# Patient Record
Sex: FEMALE | Race: WHITE | NOT HISPANIC OR LATINO | Employment: OTHER | URBAN - METROPOLITAN AREA
[De-identification: names, ages, dates, MRNs, and addresses within clinical notes are randomized per-mention and may not be internally consistent; named-entity substitution may affect disease eponyms.]

---

## 2023-04-18 ENCOUNTER — HOSPITAL ENCOUNTER (OUTPATIENT)
Dept: WOUND CARE | Facility: HOSPITAL | Age: 68
Discharge: HOME | End: 2023-04-18
Attending: FAMILY MEDICINE
Payer: MEDICARE

## 2023-04-18 VITALS
WEIGHT: 215 LBS | SYSTOLIC BLOOD PRESSURE: 124 MMHG | BODY MASS INDEX: 34.55 KG/M2 | HEIGHT: 66 IN | HEART RATE: 62 BPM | DIASTOLIC BLOOD PRESSURE: 58 MMHG | RESPIRATION RATE: 18 BRPM

## 2023-04-18 DIAGNOSIS — S91.302A OPEN WOUND OF PLANTAR ASPECT OF LEFT FOOT: Primary | ICD-10-CM

## 2023-04-18 DIAGNOSIS — I87.8 CHRONIC VENOUS STASIS: ICD-10-CM

## 2023-04-18 PROCEDURE — 27000999 HC MEDICAL RECORD PHOTO DOCUMENTATION

## 2023-04-18 PROCEDURE — 97597 DBRDMT OPN WND 1ST 20 CM/<: CPT

## 2023-04-18 PROCEDURE — 99203 OFFICE O/P NEW LOW 30 MIN: CPT | Mod: 25

## 2023-04-18 RX ORDER — FLUTICASONE FUROATE, UMECLIDINIUM BROMIDE AND VILANTEROL TRIFENATATE 200; 62.5; 25 UG/1; UG/1; UG/1
POWDER RESPIRATORY (INHALATION)
COMMUNITY
Start: 2023-04-10

## 2023-04-18 RX ORDER — MELOXICAM 15 MG/1
TABLET ORAL
COMMUNITY
Start: 2023-04-10 | End: 2023-07-25

## 2023-04-18 RX ORDER — AMOXICILLIN AND CLAVULANATE POTASSIUM 875; 125 MG/1; MG/1
TABLET, FILM COATED ORAL
COMMUNITY
Start: 2023-04-14 | End: 2023-05-02

## 2023-04-18 RX ORDER — DIVALPROEX SODIUM 500 MG/1
1000 TABLET, DELAYED RELEASE ORAL NIGHTLY
COMMUNITY
Start: 2023-04-10

## 2023-04-18 RX ORDER — ROPINIROLE 0.5 MG/1
0.5 TABLET, FILM COATED ORAL NIGHTLY
COMMUNITY
Start: 2023-04-10

## 2023-04-18 RX ORDER — TRAZODONE HYDROCHLORIDE 50 MG/1
50 TABLET ORAL NIGHTLY PRN
COMMUNITY
Start: 2023-04-10

## 2023-04-18 RX ORDER — PRIMIDONE 50 MG/1
50 TABLET ORAL
COMMUNITY
Start: 2023-04-10

## 2023-04-18 RX ORDER — SERTRALINE HYDROCHLORIDE 100 MG/1
100 TABLET, FILM COATED ORAL
COMMUNITY
Start: 2023-04-10

## 2023-04-18 RX ORDER — TAMSULOSIN HYDROCHLORIDE 0.4 MG/1
1 CAPSULE ORAL
COMMUNITY
Start: 2023-04-10

## 2023-04-18 RX ORDER — NEBIVOLOL 10 MG/1
10 TABLET ORAL NIGHTLY
COMMUNITY
Start: 2023-04-10 | End: 2023-07-25

## 2023-04-18 RX ORDER — FUROSEMIDE 20 MG/1
TABLET ORAL
COMMUNITY
Start: 2023-04-10

## 2023-04-18 RX ORDER — POTASSIUM CHLORIDE 750 MG/1
TABLET, EXTENDED RELEASE ORAL
COMMUNITY
Start: 2023-04-10

## 2023-04-18 RX ORDER — BUSPIRONE HYDROCHLORIDE 10 MG/1
10 TABLET ORAL 2 TIMES DAILY
COMMUNITY
Start: 2023-04-10

## 2023-04-18 RX ORDER — PANTOPRAZOLE SODIUM 40 MG/1
40 TABLET, DELAYED RELEASE ORAL
COMMUNITY
Start: 2023-04-10

## 2023-04-18 RX ORDER — AMLODIPINE BESYLATE 10 MG/1
10 TABLET ORAL
COMMUNITY
Start: 2023-04-10

## 2023-04-18 RX ORDER — HYDROCHLOROTHIAZIDE 25 MG/1
25 TABLET ORAL
COMMUNITY
Start: 2023-04-10 | End: 2023-07-25

## 2023-04-18 RX ORDER — LABETALOL 200 MG/1
TABLET, FILM COATED ORAL
COMMUNITY
Start: 2023-04-10 | End: 2023-07-25

## 2023-04-18 RX ORDER — OXYBUTYNIN CHLORIDE 5 MG/1
5 TABLET, EXTENDED RELEASE ORAL 2 TIMES DAILY
COMMUNITY
Start: 2023-02-24

## 2023-04-18 RX ORDER — ARIPIPRAZOLE 15 MG/1
15 TABLET ORAL
COMMUNITY
Start: 2023-04-10

## 2023-04-18 RX ORDER — FENOFIBRATE 54 MG/1
TABLET ORAL
COMMUNITY
Start: 2023-04-10

## 2023-04-18 RX ORDER — GABAPENTIN 600 MG/1
600 TABLET ORAL 3 TIMES DAILY
COMMUNITY
Start: 2023-04-10

## 2023-04-18 RX ORDER — PNV,CALCIUM 72/IRON/FOLIC ACID 27 MG-1 MG
1 TABLET ORAL
COMMUNITY
Start: 2023-04-10

## 2023-04-18 RX ORDER — MIRTAZAPINE 15 MG/1
15 TABLET, FILM COATED ORAL NIGHTLY
COMMUNITY
Start: 2023-04-10

## 2023-04-18 RX ORDER — LOSARTAN POTASSIUM 100 MG/1
100 TABLET ORAL
Status: ON HOLD | COMMUNITY
Start: 2023-04-10 | End: 2023-08-02 | Stop reason: HOSPADM

## 2023-04-18 RX ORDER — ALBUTEROL SULFATE 90 UG/1
AEROSOL, METERED RESPIRATORY (INHALATION)
COMMUNITY
Start: 2023-04-10 | End: 2023-07-25

## 2023-04-18 NOTE — PROGRESS NOTES
Subjective:       Patient ID: Diane Larsen is a 68 y.o. female.    Chief Complaint: No chief complaint on file.    68-year-old white female, who was referred here by Dr. Hank Owen, for an open wound on the left plantar surface, left foot.  She is not a diabetic.  She was admitted into the [facility], for cellulitis and abscess of the plantar surface of the left foot, and she underwent an intraoperative debridement.  He is here for follow up of this new wound.  It appears that she has had some chronic venous stasis over the past several months, both lower legs.  This has not been treated in the past, as far as I know.  While she was in the hospital, she underwent an MRI of the left foot, which showed no evidence of osteomyelitis.  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  She has Pes planus.  She also underwent arterial ultrasound on April 10th, which showed patent arteries in both lower extremities.  The venous ultrasound was also negative for DVT.  I have reviewed her records from the hospital.  Pt caregiver states that a blister was noticed on her left foot about 2 weeks ago around 4/4/23. The pt opened the blister by scratching and by Monday 4/10/23 the caregiver noted that it needed immediate attention. Pt went to ED in [place] on Monday 4/10/23 and was admitted. Dr. Mckinney debrided the left foot on 4/11/23, she was discharged on 4/14/23. She has a follow up with Dr. Mckinney tomorrow 4/19/23. She is currently taking Augmentin PO. She states that she is not diabetic. Her semmes yudi is 1/10 and her blood sugar was 124. She had venous/arterial ultrasounds done during her hospital stay as well as MRI and xrays    Review of Systems   Constitutional: Negative.    Respiratory: Negative.     Cardiovascular: Negative.    Skin:         As documented in the HPI.   All other systems reviewed and are negative.      Objective:      Vitals:    04/18/23 1338   BP: (!) 124/58   BP Location: Right arm   Patient  "Position: Sitting   Pulse: 62   Resp: 18   Weight: 97.5 kg (215 lb)   Height: 5' 6" (1.676 m)        Physical Exam  Vitals reviewed.   Constitutional:       Appearance: Normal appearance.   Cardiovascular:      Rate and Rhythm: Normal rate.   Pulmonary:      Effort: Pulmonary effort is normal.   Skin:     General: Skin is warm and dry.      Comments: There is some scaling in both lower extremities, over the tibia.  There is trace edema.  There is the healing wound on the left plantar aspect of the left foot, with no obvious secondary infection.  I did some debridement with a freer, but this is mostly fibrin.  There was no significant necrotic tissue removed.   Neurological:      Mental Status: She is alert.     Left plantar foot, prior to debridement    Left plantar foot, post debridement       Altered Skin Integrity 04/18/23 1351 Left medial Foot (Active)   04/18/23 1351   Altered Skin Integrity Present on Admission - Did Patient arrive to the hospital with altered skin?: yes   Side: Left   Orientation: medial   Location: Foot   Wound Number:    Is this injury device related?:    Primary Wound Type:    Description of Altered Skin Integrity:    Ankle-Brachial Index:    Pulses:    Removal Indication and Assessment:    Wound Outcome:    (Retired) Wound Length (cm):    (Retired) Wound Width (cm):    (Retired) Depth (cm):    Wound Description (Comments):    Removal Indications:    Dressing Appearance Moist drainage 04/18/23 1339   Drainage Amount Moderate 04/18/23 1339   Drainage Characteristics/Odor Serosanguineous 04/18/23 1339   Appearance Maroon;العلي;Moist;Fibrin 04/18/23 1339   Tissue loss description Full thickness 04/18/23 1339   Periwound Area Moist;Pale white;Edematous;Redness 04/18/23 1339   Wound Edges Open;Callused 04/18/23 1339   Wound Length (cm) 3.7 cm 04/18/23 1339   Wound Width (cm) 3.6 cm 04/18/23 1339   Wound Depth (cm) 0.4 cm 04/18/23 1339   Wound Volume (cm^3) 5.328 cm^3 04/18/23 1339   Wound Surface " "Area (cm^2) 13.32 cm^2 04/18/23 1339   Care Cleansed with:;Applied: 04/18/23 1339   Dressing Applied 04/18/23 1339   Dressing Change Due 04/19/23 04/18/23 1339        Debridement     Date/Time: 4/18/2023 1:00 PM  Performed by: Samir Lassiter MD  Authorized by: Samir Lsasiter MD      Time out: Immediately prior to procedure a "time out" was called to verify the correct patient, procedure, equipment, support staff and site/side marked as required.     Consent Done?:  Yes (Verbal)     Preparation: Patient was prepped and draped with aseptic technique    Local anesthesia used?: No       Wound Details:    Location:  Left foot    Location:  Left Plantar    Type of Debridement:  Non-excisional       Length (cm):  3.7       Area (sq cm):  13.32       Width (cm):  3.6       Percent Debrided (%):  80       Depth (cm):  0.4       Total Area Debrided (sq cm):  10.66    Depth of debridement:  Epidermis/Dermis    Tissue debrided:  Epidermis    Devitalized tissue debrided:  Biofilm, Exudate and Slough    Instruments:  Curette (freer)  Bleeding:  None  Patient tolerance:  Patient tolerated the procedure well with no immediate complications  1st Wound Pain Assessment: 0            Assessment:         ICD-10-CM ICD-9-CM   1. Open wound of plantar aspect of left foot  S91.302A 892.0   2. Chronic venous stasis  I87.8 459.81         Procedures:   Excisional debridement performed:  [] Yes [x] No   Selective debridement performed:  [x] Yes [] No   Mechanical debridement performed:  [] Yes [x] No   Silver nitrate applied :    [] Yes [x] No   Labs ordered this visit:   [] Yes [x] No   Imaging ordered this visit:   [] Yes [x] No   Tissue pathology and/or culture taken:  [] Yes [x] No     Procedures:  HOME HEALTH AGENCY:   [facility]     Home Health Services     Since 4/17/2023     [phone]     TIMES PER WEEK/DAYS:  ORDERS:  Left medial foot: cleanse with wound cleanser, put topical antibiotic ointment onto wound " bed, cover with silver alginate and 4x4 gauze, wrap with kerlix and coban, change this dressing daily      Patient Orders:                 Follow up in 10 days (on 4/28/2023) for left foot.

## 2023-04-18 NOTE — ADDENDUM NOTE
Encounter addended by: Sugar Artis RN on: 4/18/2023 3:47 PM   Actions taken: Chief Complaint modified

## 2023-05-02 ENCOUNTER — HOSPITAL ENCOUNTER (OUTPATIENT)
Dept: WOUND CARE | Facility: HOSPITAL | Age: 68
Discharge: HOME | End: 2023-05-02
Attending: FAMILY MEDICINE
Payer: MEDICARE

## 2023-05-02 VITALS
BODY MASS INDEX: 34.55 KG/M2 | SYSTOLIC BLOOD PRESSURE: 176 MMHG | WEIGHT: 215 LBS | HEART RATE: 56 BPM | DIASTOLIC BLOOD PRESSURE: 78 MMHG | RESPIRATION RATE: 18 BRPM | HEIGHT: 66 IN

## 2023-05-02 DIAGNOSIS — I87.8 CHRONIC VENOUS STASIS: ICD-10-CM

## 2023-05-02 DIAGNOSIS — S91.302A OPEN WOUND OF PLANTAR ASPECT OF LEFT FOOT: Primary | ICD-10-CM

## 2023-05-02 PROCEDURE — 27000999 HC MEDICAL RECORD PHOTO DOCUMENTATION

## 2023-05-02 PROCEDURE — 97597 DBRDMT OPN WND 1ST 20 CM/<: CPT

## 2023-05-02 PROCEDURE — 99213 OFFICE O/P EST LOW 20 MIN: CPT | Mod: 25

## 2023-05-02 RX ORDER — HYDROXYZINE HYDROCHLORIDE 25 MG/1
TABLET, FILM COATED ORAL
COMMUNITY
Start: 2023-04-20 | End: 2023-07-25

## 2023-05-02 RX ORDER — QUETIAPINE FUMARATE 100 MG/1
TABLET, FILM COATED ORAL
COMMUNITY
Start: 2023-04-20 | End: 2023-07-25

## 2023-05-02 NOTE — PROGRESS NOTES
Subjective:       Patient ID: Diane Larsen is a 68 y.o. female.    Chief Complaint: Wound Care (Left mid foot )    HPI  4/18/23 (Dr. Lassiter) - 68-year-old white female, who was referred here by Dr. Hank Owen, for an open wound on the left plantar surface, left foot.  She is not a diabetic.  She was admitted into the [facility], for cellulitis and abscess of the plantar surface of the left foot, and she underwent an intraoperative debridement.  He is here for follow up of this new wound.  It appears that she has had some chronic venous stasis over the past several months, both lower legs.  This has not been treated in the past, as far as I know.  While she was in the hospital, she underwent an MRI of the left foot, which showed no evidence of osteomyelitis.  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  She has Pes planus.  She also underwent arterial ultrasound on April 10th, which showed patent arteries in both lower extremities.  The venous ultrasound was also negative for DVT.  I have reviewed her records from the hospital.  Pt caregiver states that a blister was noticed on her left foot about 2 weeks ago around 4/4/23. The pt opened the blister by scratching and by Monday 4/10/23 the caregiver noted that it needed immediate attention. Pt went to ED in [place] on Monday 4/10/23 and was admitted. Dr. Mckinney debrided the left foot on 4/11/23, she was discharged on 4/14/23. She has a follow up with Dr. Mckinney tomorrow 4/19/23. She is currently taking Augmentin PO. She states that she is not diabetic. Her semmes yudi is 1/10 and her blood sugar was 124. She had venous/arterial ultrasounds done during her hospital stay as well as MRI and xrays    5/2/23 - Ms. Larsen returns to clinic today for followup on the open wound to the left mid foot.  Her history includes the left ankle being broken several months ago which left the left foot somewhat deformed.  The wound was surgically debrided by Dr. Mckinney  "[place].  She stayed for 1 week at the hospital in [place], [place] and went home on oral Augmentin has now completed.  Her PCP is from [facility] and she reports that he has stated that they are not to use compression on the leg.  However, ultrasounds were done in April and they were clear.  The caregiver does have concern that she is unable to ambulate and would like a wheelchair obtained for the patient.  They were advised to go to [employer]  to have the paperwork sent to us for prescription.  However, [employer] called and their insurance does not cover the wheelchair.    Today, the left mid foot was assessed and selectively debrided.  Additionally, she does have several scattered wounds on her bilateral lower legs that will be monitored.  She was given Hibiclens and instructed to wash the lower extremities including the foot with a Hibiclens x3 days.    Review of Systems   Constitutional: Negative.    Respiratory: Negative.     Cardiovascular: Negative.    Skin:         As documented in the HPI.   All other systems reviewed and are negative.      Objective:      Vitals:    05/02/23 1215   BP: (!) 176/78  Comment: asmptomatic   BP Location: Right arm   Patient Position: Sitting   Pulse: (!) 56   Resp: 18   Weight: 97.5 kg (215 lb)   Height: 5' 6" (1.676 m)        Physical Exam  Vitals reviewed.   Constitutional:       Appearance: Normal appearance.   Cardiovascular:      Rate and Rhythm: Normal rate.   Pulmonary:      Effort: Pulmonary effort is normal.   Skin:     General: Skin is warm and dry.      Comments: There is some scaling in both lower extremities, over the tibia.  There is trace edema.  There is the healing wound on the left plantar aspect of the left foot, with no obvious secondary infection.  I did some debridement with a freer, but this is mostly fibrin.  There was no significant necrotic tissue removed.   Neurological:      Mental Status: She is alert.          Altered Skin Integrity " 04/18/23 1351 Left medial Foot (Active)   04/18/23 1351   Altered Skin Integrity Present on Admission - Did Patient arrive to the hospital with altered skin?: yes   Side: Left   Orientation: medial   Location: Foot   Wound Number:    Is this injury device related?:    Primary Wound Type:    Description of Altered Skin Integrity:    Ankle-Brachial Index:    Pulses:    Removal Indication and Assessment:    Wound Outcome:    (Retired) Wound Length (cm):    (Retired) Wound Width (cm):    (Retired) Depth (cm):    Wound Description (Comments):    Removal Indications:    Dressing Appearance Moist drainage 05/02/23 1207   Drainage Amount Moderate 05/02/23 1207   Drainage Characteristics/Odor Serosanguineous 05/02/23 1207   Appearance Pink;Slough;Moist 05/02/23 1207   Tissue loss description Full thickness 05/02/23 1207   Periwound Area Dry 05/02/23 1207   Wound Edges Callused;Open 05/02/23 1207   Wound Length (cm) 3.4 cm 05/02/23 1207   Wound Width (cm) 3.5 cm 05/02/23 1207   Wound Depth (cm) 0.3 cm 05/02/23 1207   Wound Volume (cm^3) 3.57 cm^3 05/02/23 1207   Wound Surface Area (cm^2) 11.9 cm^2 05/02/23 1207   Care Cleansed with:;Wound cleanser 05/02/23 1207   Dressing Applied 05/02/23 1207   Off Loading Football dressing 05/02/23 1207   Dressing Change Due 05/03/23 05/02/23 1207         Left plantar, pre     Post  calcium alginate, 4x4 gauze, kerlix, coban        Right lower leg  (monitor)    Left lower leg (monitor)  ML      Assessment:         ICD-10-CM ICD-9-CM   1. Open wound of plantar aspect of left foot  S91.302A 892.0   2. Chronic venous stasis  I87.8 459.81         Procedures:   Excisional debridement performed:  [] Yes [x] No   Selective debridement performed:  [x] Yes [] No   Mechanical debridement performed:  [] Yes [x] No   Silver nitrate applied :    [] Yes [x] No   Labs ordered this visit:   [] Yes [x] No   Imaging ordered this visit:   [] Yes [x] No   Tissue pathology and/or culture taken:  [] Yes [x] No  "    Procedures:    Debridement     Date/Time: 5/2/2023 11:20 AM  Performed by: Barbra Perez NP  Authorized by: Barbra Perez NP      Time out: Immediately prior to procedure a "time out" was called to verify the correct patient, procedure, equipment, support staff and site/side marked as required.     Consent Done?:  Yes (Verbal)     Preparation: Patient was prepped and draped with clean technique    Local anesthesia used?: No       Wound Details:    Location:  Left foot    Location:  Left Midfoot    Type of Debridement:  Non-excisional       Length (cm):  3.4       Area (sq cm):  11.9       Width (cm):  3.5       Percent Debrided (%):  100       Depth (cm):  0.3       Total Area Debrided (sq cm):  11.9    Depth of debridement:  Epidermis/Dermis    Devitalized tissue debrided:  Biofilm, Callus, Exudate and Fibrin    Instruments:  Forceps, Scissors and Curette (Dermal)  Patient tolerance:  Patient tolerated the procedure well with no immediate complications                HOME HEALTH AGENCY:   [facility]     Since 4/17/2023     [phone]     TIMES PER WEEK/DAYS: 1 x weekly, Fridays only  Patient will come to wound care weekly    Left mid foot: Cleanse with wound cleanser and apply silver alginate to the wound bed, cover with 4x4 gauze and wrap foot in kerlix and coban loosely - to be changed daily. HH to change on Friday's.     (Hibaclense scrub brush - cleans both legs, let sit for 2 minutes, wash off, dry well. 3 days ONLY)    Follow up in about 1 week (around 5/9/2023) for Left foot .                 "

## 2023-05-02 NOTE — PROCEDURES
"Debridement    Date/Time: 5/2/2023 11:20 AM  Performed by: Barbra Perez NP  Authorized by: Barbra Perez NP     Time out: Immediately prior to procedure a "time out" was called to verify the correct patient, procedure, equipment, support staff and site/side marked as required.    Consent Done?:  Yes (Verbal)    Preparation: Patient was prepped and draped with clean technique    Local anesthesia used?: No      Wound Details:    Location:  Left foot    Location:  Left Midfoot    Type of Debridement:  Non-excisional       Length (cm):  3.4       Area (sq cm):  11.9       Width (cm):  3.5       Percent Debrided (%):  100       Depth (cm):  0.3       Total Area Debrided (sq cm):  11.9    Depth of debridement:  Epidermis/Dermis    Devitalized tissue debrided:  Biofilm, Callus, Exudate and Fibrin    Instruments:  Forceps, Scissors and Curette (Dermal)  Patient tolerance:  Patient tolerated the procedure well with no immediate complications     ML  "

## 2023-05-09 ENCOUNTER — HOSPITAL ENCOUNTER (OUTPATIENT)
Dept: WOUND CARE | Facility: HOSPITAL | Age: 68
Discharge: HOME | End: 2023-05-09
Attending: NURSE PRACTITIONER
Payer: MEDICARE

## 2023-05-09 VITALS
RESPIRATION RATE: 18 BRPM | BODY MASS INDEX: 34.55 KG/M2 | DIASTOLIC BLOOD PRESSURE: 67 MMHG | HEIGHT: 66 IN | SYSTOLIC BLOOD PRESSURE: 125 MMHG | HEART RATE: 59 BPM | WEIGHT: 215 LBS

## 2023-05-09 DIAGNOSIS — I87.8 CHRONIC VENOUS STASIS: ICD-10-CM

## 2023-05-09 DIAGNOSIS — S91.302A OPEN WOUND OF PLANTAR ASPECT OF LEFT FOOT: Primary | ICD-10-CM

## 2023-05-09 PROCEDURE — 99213 OFFICE O/P EST LOW 20 MIN: CPT | Mod: 25

## 2023-05-09 PROCEDURE — 97597 DBRDMT OPN WND 1ST 20 CM/<: CPT

## 2023-05-09 PROCEDURE — 27000999 HC MEDICAL RECORD PHOTO DOCUMENTATION

## 2023-05-09 NOTE — PROGRESS NOTES
Subjective:       Patient ID: Diane Larsen is a 68 y.o. female.    Chief Complaint: Wound Care (Left mid foot )    HPI  4/18/23 (Dr. Lassiter) - 68-year-old white female, who was referred here by Dr. Hank Owen, for an open wound on the left plantar surface, left foot.  She is not a diabetic.  She was admitted into the [facility], for cellulitis and abscess of the plantar surface of the left foot, and she underwent an intraoperative debridement.  He is here for follow up of this new wound.  It appears that she has had some chronic venous stasis over the past several months, both lower legs.  This has not been treated in the past, as far as I know.  While she was in the hospital, she underwent an MRI of the left foot, which showed no evidence of osteomyelitis.  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  She has Pes planus.  She also underwent arterial ultrasound on April 10th, which showed patent arteries in both lower extremities.  The venous ultrasound was also negative for DVT.  I have reviewed her records from the hospital.  Pt caregiver states that a blister was noticed on her left foot about 2 weeks ago around 4/4/23. The pt opened the blister by scratching and by Monday 4/10/23 the caregiver noted that it needed immediate attention. Pt went to ED in [place] on Monday 4/10/23 and was admitted. Dr. Mckinney debrided the left foot on 4/11/23, she was discharged on 4/14/23. She has a follow up with Dr. Mckinney tomorrow 4/19/23. She is currently taking Augmentin PO. She states that she is not diabetic. Her semmes yudi is 1/10 and her blood sugar was 124. She had venous/arterial ultrasounds done during her hospital stay as well as MRI and xrays    5/2/23 - Ms. Larsen returns to clinic today for followup on the open wound to the left mid foot.  Her history includes the left ankle being broken several months ago which left the left foot somewhat deformed.  The wound was surgically debrided by Dr. Hank Owen.   She stayed for 1 week at the hospital in [place], [place] and went home on oral Augmentin has now completed.  Her PCP is from [facility] and she reports that he has stated that they are not to use compression on the leg.  However, ultrasounds were done in April and they were clear.  The caregiver does have concern that she is unable to ambulate and would like a wheelchair obtained for the patient.  They were advised to go to [employer] [facility]  to have the paperwork sent to us for prescription.  However, [facility] called and their insurance does not cover the wheelchair.    Today, the left mid foot was assessed and selectively debrided.  Additionally, she does have several scattered wounds on her bilateral lower legs that will be monitored.  She was given Hibiclens and instructed to wash the lower extremities including the foot with a Hibiclens x3 days.    5/9/23 - the patient returns today for followup with her caregiver present.  In addition to the neuropathic wound on the plantar surface of the left foot she had small scattered wounds on her bilateral lower extremities.  She was instructed to cleanse her wounds on her legs with Hibiclens and she did so.  Today, the majority of those small open areas on the legs are now closed and they look significantly improved.  The wound on the left foot was selectively debrided and it does remains stable.  Granular tissue is surface level so we will begin applying Endoform to the wound bed with the hopes of developing epithelial tissue.  Of note, the patient is still has not received her wheelchair.  That wheelchair was faxed to her [employer] company of choice and she and the caregiver were instructed that they will need to followup with that company.        Review of Systems   Constitutional: Negative.    Respiratory: Negative.     Cardiovascular: Negative.    Skin:         As documented in the HPI.   All other systems reviewed and are negative.      Objective:      Vitals:     "05/09/23 1149   BP: 125/67   BP Location: Left arm   Patient Position: Sitting   Pulse: (!) 59   Resp: 18   Weight: 97.5 kg (215 lb)   Height: 5' 6" (1.676 m)        Physical Exam  Vitals reviewed.   Constitutional:       Appearance: Normal appearance.   Cardiovascular:      Rate and Rhythm: Normal rate.   Pulmonary:      Effort: Pulmonary effort is normal.   Skin:     General: Skin is warm and dry.      Comments: There is some scaling in both lower extremities, over the tibia.  There is trace edema.  There is the healing wound on the left plantar aspect of the left foot, with no obvious secondary infection.  I did some debridement with a freer, but this is mostly fibrin.  There was no significant necrotic tissue removed.   Neurological:      Mental Status: She is alert.          Altered Skin Integrity 04/18/23 1351 Left medial Foot (Active)   04/18/23 1351   Altered Skin Integrity Present on Admission - Did Patient arrive to the hospital with altered skin?: yes   Side: Left   Orientation: medial   Location: Foot   Wound Number:    Is this injury device related?:    Primary Wound Type:    Description of Altered Skin Integrity:    Ankle-Brachial Index:    Pulses:    Removal Indication and Assessment:    Wound Outcome:    (Retired) Wound Length (cm):    (Retired) Wound Width (cm):    (Retired) Depth (cm):    Wound Description (Comments):    Removal Indications:    Dressing Appearance Moist drainage 05/09/23 1150   Drainage Amount Moderate 05/09/23 1150   Drainage Characteristics/Odor Serosanguineous 05/09/23 1150   Appearance Moist;Pink;Smooth 05/09/23 1150   Tissue loss description Full thickness 05/09/23 1150   Periwound Area Intact 05/09/23 1150   Wound Edges Callused 05/09/23 1150   Wound Length (cm) 3.5 cm 05/09/23 1150   Wound Width (cm) 3.2 cm 05/09/23 1150   Wound Depth (cm) 0.3 cm 05/09/23 1150   Wound Volume (cm^3) 3.36 cm^3 05/09/23 1150   Wound Surface Area (cm^2) 11.2 cm^2 05/09/23 1150   Care Cleansed " "with:;Wound cleanser;Debrided 05/09/23 1150   Dressing Applied;Other (comment) 05/09/23 1150   Dressing Change Due 05/10/23 05/09/23 1150         Left plantar, pre     Post  calcium alginate, 4x4 gauze, kerlix, coban        Right lower leg  (monitor)    Left lower leg (monitor)  CT      Assessment:         ICD-10-CM ICD-9-CM   1. Open wound of plantar aspect of left foot  S91.302A 892.0   2. Chronic venous stasis  I87.8 459.81         Procedures:     Debridement     Date/Time: 5/9/2023 11:00 AM  Performed by: Barbra Perez NP  Authorized by: Barbra Perez NP      Time out: Immediately prior to procedure a "time out" was called to verify the correct patient, procedure, equipment, support staff and site/side marked as required.     Consent Done?:  Yes (Verbal)     Preparation: Patient was prepped and draped with clean technique    Local anesthesia used?: No       Wound Details:    Location:  Left foot    Location:  Left Midfoot    Type of Debridement:  Non-excisional       Length (cm):  3.5       Area (sq cm):  12.25       Width (cm):  3.5       Percent Debrided (%):  100       Depth (cm):  0.3       Total Area Debrided (sq cm):  12.25    Depth of debridement:  Epidermis/Dermis    Devitalized tissue debrided:  Biofilm, Callus, Exudate, Fibrin and Slough    Instruments:  Forceps, Scissors and Curette (Dermal)  Patient tolerance:  Patient tolerated the procedure well with no immediate complications      CT      Excisional debridement performed:  [] Yes [x] No   Selective debridement performed:  [x] Yes [] No   Mechanical debridement performed:  [] Yes [x] No   Silver nitrate applied :    [] Yes [x] No   Labs ordered this visit:   [] Yes [x] No   Imaging ordered this visit:   [] Yes [x] No   Tissue pathology and/or culture taken:  [] Yes [x] No         HOME HEALTH AGENCY:   [facility]     Home Health Services     Since 4/17/2023     [phone]     TIMES PER WEEK/DAYS: 1 x weekly, Fridays " only  Patient will come to wound care weekly    Left midfoot wound: Do not remove current dressing, endoform and silver alginate, until Friday 05/12. On Friday, Cleanse with wound cleanser, apply silver alginate, 4x4 gauze, kerlix, and loosely wrapped coban     Follow up in 1 week (on 5/16/2023) for left midfoot .

## 2023-05-09 NOTE — PROCEDURES
"Debridement    Date/Time: 5/9/2023 11:00 AM  Performed by: Barbra Perez NP  Authorized by: Barbra Perez NP     Time out: Immediately prior to procedure a "time out" was called to verify the correct patient, procedure, equipment, support staff and site/side marked as required.    Consent Done?:  Yes (Verbal)    Preparation: Patient was prepped and draped with clean technique    Local anesthesia used?: No      Wound Details:    Location:  Left foot    Location:  Left Midfoot    Type of Debridement:  Non-excisional       Length (cm):  3.5       Area (sq cm):  12.25       Width (cm):  3.5       Percent Debrided (%):  100       Depth (cm):  0.3       Total Area Debrided (sq cm):  12.25    Depth of debridement:  Epidermis/Dermis    Devitalized tissue debrided:  Biofilm, Callus, Exudate, Fibrin and Slough    Instruments:  Forceps, Scissors and Curette (Dermal)  Patient tolerance:  Patient tolerated the procedure well with no immediate complications     CT  "

## 2023-05-16 ENCOUNTER — HOSPITAL ENCOUNTER (OUTPATIENT)
Dept: WOUND CARE | Facility: HOSPITAL | Age: 68
Discharge: HOME | End: 2023-05-16
Attending: NURSE PRACTITIONER
Payer: MEDICARE

## 2023-05-16 VITALS
RESPIRATION RATE: 18 BRPM | SYSTOLIC BLOOD PRESSURE: 115 MMHG | HEART RATE: 54 BPM | DIASTOLIC BLOOD PRESSURE: 56 MMHG | BODY MASS INDEX: 34.55 KG/M2 | HEIGHT: 66 IN | WEIGHT: 215 LBS

## 2023-05-16 DIAGNOSIS — S91.302A OPEN WOUND OF PLANTAR ASPECT OF LEFT FOOT: Primary | ICD-10-CM

## 2023-05-16 DIAGNOSIS — I87.8 CHRONIC VENOUS STASIS: ICD-10-CM

## 2023-05-16 DIAGNOSIS — Z51.89 ENCOUNTER FOR WOUND RE-CHECK: ICD-10-CM

## 2023-05-16 PROCEDURE — 99213 OFFICE O/P EST LOW 20 MIN: CPT | Mod: 25

## 2023-05-16 PROCEDURE — 27000999 HC MEDICAL RECORD PHOTO DOCUMENTATION

## 2023-05-16 PROCEDURE — 97597 DBRDMT OPN WND 1ST 20 CM/<: CPT

## 2023-05-16 NOTE — PROGRESS NOTES
Subjective:       Patient ID: Diane Larsen is a 68 y.o. female.    Chief Complaint: Wound Care (Left mid foot)    HPI  4/18/23 (Dr. Lassiter) - 68-year-old white female, who was referred here by Dr. Hank Owen, for an open wound on the left plantar surface, left foot.  She is not a diabetic.  She was admitted into the [facility], for cellulitis and abscess of the plantar surface of the left foot, and she underwent an intraoperative debridement.  He is here for follow up of this new wound.  It appears that she has had some chronic venous stasis over the past several months, both lower legs.  This has not been treated in the past, as far as I know.  While she was in the hospital, she underwent an MRI of the left foot, which showed no evidence of osteomyelitis.  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  She has Pes planus.  She also underwent arterial ultrasound on April 10th, which showed patent arteries in both lower extremities.  The venous ultrasound was also negative for DVT.  I have reviewed her records from the hospital.  Pt caregiver states that a blister was noticed on her left foot about 2 weeks ago around 4/4/23. The pt opened the blister by scratching and by Monday 4/10/23 the caregiver noted that it needed immediate attention. Pt went to ED in [place] on Monday 4/10/23 and was admitted. Dr. Mckinney debrided the left foot on 4/11/23, she was discharged on 4/14/23. She has a follow up with Dr. Mckinney tomorrow 4/19/23. She is currently taking Augmentin PO. She states that she is not diabetic. Her semmes yudi is 1/10 and her blood sugar was 124. She had venous/arterial ultrasounds done during her hospital stay as well as MRI and xrays    5/2/23 - Ms. Larsen returns to clinic today for followup on the open wound to the left mid foot.  Her history includes the left ankle being broken several months ago which left the left foot somewhat deformed.  The wound was surgically debrided by Dr. Mckinney  Lexie.  She stayed for 1 week at the hospital in [place], [place] and went home on oral Augmentin has now completed.  Her PCP is from [facility] and she reports that he has stated that they are not to use compression on the leg.  However, ultrasounds were done in April and they were clear.  The caregiver does have concern that she is unable to ambulate and would like a wheelchair obtained for the patient.  They were advised to go to [employer]  to have the paperwork sent to us for prescription.  However, [employer] called and their insurance does not cover the wheelchair.    Today, the left mid foot was assessed and selectively debrided.  Additionally, she does have several scattered wounds on her bilateral lower legs that will be monitored.  She was given Hibiclens and instructed to wash the lower extremities including the foot with a Hibiclens x3 days.    5/9/23 - the patient returns today for followup with her caregiver present.  In addition to the neuropathic wound on the plantar surface of the left foot she had small scattered wounds on her bilateral lower extremities.  She was instructed to cleanse her wounds on her legs with Hibiclens and she did so.  Today, the majority of those small open areas on the legs are now closed and they look significantly improved.  The wound on the left foot was selectively debrided and it does remains stable.  Granular tissue is surface level so we will begin applying Endoform to the wound bed with the hopes of developing epithelial tissue.  Of note, the patient is still has not received her wheelchair.  That wheelchair was faxed to her [employer] company of choice and she and the caregiver were instructed that they will need to followup with that company.    5/16/23 - Ms. Larsen returns today for followup in wound check on the wound to the left plantar foot.  This neuropathic wound was selectively debrided today and does remains stable.  We will begin today applying Endoform  "with the hopes of developing epithelial tissue across the wound bed.  She has an appointment with Doreen Barnes NP, on 05/30/2023 to initiate services  as her PCP because she is not satisfied with her current provider.  She will return to clinic in 1 week for followup.        Review of Systems   Constitutional: Negative.    Respiratory: Negative.     Cardiovascular: Negative.    Skin:         As documented in the HPI.   All other systems reviewed and are negative.      Objective:      Vitals:    05/16/23 1044   BP: (!) 115/56   BP Location: Right arm   Patient Position: Sitting   Pulse: (!) 54   Resp: 18   Weight: 97.5 kg (215 lb)   Height: 5' 6" (1.676 m)        Physical Exam  Vitals reviewed.   Constitutional:       Appearance: Normal appearance.   Cardiovascular:      Rate and Rhythm: Normal rate.   Pulmonary:      Effort: Pulmonary effort is normal.   Skin:     General: Skin is warm and dry.      Comments: There is some scaling in both lower extremities, over the tibia.  There is trace edema.  There is the healing wound on the left plantar aspect of the left foot, with no obvious secondary infection.  I did some debridement with a freer, but this is mostly fibrin.  There was no significant necrotic tissue removed.   Neurological:      Mental Status: She is alert.          Altered Skin Integrity 04/18/23 1351 Left medial Foot (Active)   04/18/23 1351   Altered Skin Integrity Present on Admission - Did Patient arrive to the hospital with altered skin?: yes   Side: Left   Orientation: medial   Location: Foot   Wound Number:    Is this injury device related?:    Primary Wound Type:    Description of Altered Skin Integrity:    Ankle-Brachial Index:    Pulses:    Removal Indication and Assessment:    Wound Outcome:    (Retired) Wound Length (cm):    (Retired) Wound Width (cm):    (Retired) Depth (cm):    Wound Description (Comments):    Removal Indications:    Dressing Appearance Moist drainage 05/16/23 1047 " "  Drainage Amount Moderate 05/16/23 1047   Drainage Characteristics/Odor Serosanguineous 05/16/23 1047   Appearance Moist;Smooth;Pink 05/16/23 1047   Tissue loss description Full thickness 05/16/23 1047   Periwound Area Intact 05/16/23 1047   Wound Edges Open 05/16/23 1047   Wound Length (cm) 3.4 cm 05/16/23 1047   Wound Width (cm) 3.5 cm 05/16/23 1047   Wound Depth (cm) 0.3 cm 05/16/23 1047   Wound Volume (cm^3) 3.57 cm^3 05/16/23 1047   Wound Surface Area (cm^2) 11.9 cm^2 05/16/23 1047   Care Cleansed with:;Wound cleanser;Debrided 05/16/23 1047   Dressing Applied;Other (comment) 05/16/23 1047   Dressing Change Due 05/19/23 05/16/23 1047         Left plantar, pre     Post  endoform, silver alginate, 4x4 gauze, kerlix, coban  CT        Assessment:         ICD-10-CM ICD-9-CM   1. Open wound of plantar aspect of left foot  S91.302A 892.0   2. Chronic venous stasis  I87.8 459.81   3. Encounter for wound re-check  Z51.89 V58.89           Procedures:     Debridement     Date/Time: 5/16/2023 10:19 AM  Performed by: Barbra Perez NP  Authorized by: Barbra Perez NP      Time out: Immediately prior to procedure a "time out" was called to verify the correct patient, procedure, equipment, support staff and site/side marked as required.     Consent Done?:  Yes (Verbal)     Preparation: Patient was prepped and draped with clean technique    Local anesthesia used?: No       Wound Details:    Location:  Left foot    Location:  Left Midfoot    Type of Debridement:  Non-excisional       Length (cm):  3.4       Area (sq cm):  11.9       Width (cm):  3.5       Percent Debrided (%):  100       Depth (cm):  0.3       Total Area Debrided (sq cm):  11.9    Depth of debridement:  Epidermis/Dermis    Devitalized tissue debrided:  Biofilm, Callus, Exudate, Fibrin and Slough    Instruments:  Curette (Dermal)  Bleeding:  None  Patient tolerance:  Patient tolerated the procedure well with no immediate complications      " CT      Excisional debridement performed:  [] Yes [x] No   Selective debridement performed:  [x] Yes [] No   Mechanical debridement performed:  [] Yes [x] No   Silver nitrate applied :    [] Yes [x] No   Labs ordered this visit:   [] Yes [x] No   Imaging ordered this visit:   [] Yes [x] No   Tissue pathology and/or culture taken:  [] Yes [x] No         HOME HEALTH AGENCY:   [facility]     Since 4/17/2023     [phone]     TIMES PER WEEK/DAYS: 1 x weekly, Fridays only  Patient will come to wound care weekly    Left foot wound: Do not remove current dressing, endoform and silver alginate, until Friday 05/19. On Friday, cleanse with wound cleanser, apply silver alginate, 4x4 gauze, kerlix, and coban to be changed daily     Follow up in 1 week (on 5/23/2023) for left foot .

## 2023-05-17 PROBLEM — Z51.89 ENCOUNTER FOR WOUND RE-CHECK: Status: ACTIVE | Noted: 2023-05-17

## 2023-05-17 NOTE — PROCEDURES
"Debridement    Date/Time: 5/16/2023 10:19 AM  Performed by: Barbra Perez NP  Authorized by: Barbra Perez NP     Time out: Immediately prior to procedure a "time out" was called to verify the correct patient, procedure, equipment, support staff and site/side marked as required.    Consent Done?:  Yes (Verbal)    Preparation: Patient was prepped and draped with clean technique    Local anesthesia used?: No      Wound Details:    Location:  Left foot    Location:  Left Midfoot    Type of Debridement:  Non-excisional       Length (cm):  3.4       Area (sq cm):  11.9       Width (cm):  3.5       Percent Debrided (%):  100       Depth (cm):  0.3       Total Area Debrided (sq cm):  11.9    Depth of debridement:  Epidermis/Dermis    Devitalized tissue debrided:  Biofilm, Callus, Exudate, Fibrin and Slough    Instruments:  Curette (Dermal)  Bleeding:  None  Patient tolerance:  Patient tolerated the procedure well with no immediate complications     CT  "

## 2023-05-24 ENCOUNTER — HOSPITAL ENCOUNTER (OUTPATIENT)
Dept: WOUND CARE | Facility: HOSPITAL | Age: 68
Discharge: HOME | End: 2023-05-24
Attending: NURSE PRACTITIONER
Payer: MEDICARE

## 2023-05-24 VITALS
DIASTOLIC BLOOD PRESSURE: 59 MMHG | BODY MASS INDEX: 34.55 KG/M2 | HEIGHT: 66 IN | WEIGHT: 215 LBS | SYSTOLIC BLOOD PRESSURE: 130 MMHG | HEART RATE: 45 BPM | RESPIRATION RATE: 18 BRPM

## 2023-05-24 DIAGNOSIS — S91.302A OPEN WOUND OF PLANTAR ASPECT OF LEFT FOOT: Primary | ICD-10-CM

## 2023-05-24 DIAGNOSIS — I87.8 CHRONIC VENOUS STASIS: ICD-10-CM

## 2023-05-24 DIAGNOSIS — Z51.89 ENCOUNTER FOR WOUND RE-CHECK: ICD-10-CM

## 2023-05-24 PROCEDURE — 27000999 HC MEDICAL RECORD PHOTO DOCUMENTATION

## 2023-05-24 PROCEDURE — 99212 OFFICE O/P EST SF 10 MIN: CPT | Mod: 25

## 2023-05-24 PROCEDURE — 97597 DBRDMT OPN WND 1ST 20 CM/<: CPT

## 2023-05-24 NOTE — PROGRESS NOTES
Subjective:       Patient ID: Diane Larsne is a 68 y.o. female.    Chief Complaint: Wound Care (Left midfoot )    HPI  4/18/23 (Dr. Lassiter) - 68-year-old white female, who was referred here by Dr. Hank Owen, for an open wound on the left plantar surface, left foot.  She is not a diabetic.  She was admitted into the [facility], for cellulitis and abscess of the plantar surface of the left foot, and she underwent an intraoperative debridement.  He is here for follow up of this new wound.  It appears that she has had some chronic venous stasis over the past several months, both lower legs.  This has not been treated in the past, as far as I know.  While she was in the hospital, she underwent an MRI of the left foot, which showed no evidence of osteomyelitis.  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  She has Pes planus.  She also underwent arterial ultrasound on April 10th, which showed patent arteries in both lower extremities.  The venous ultrasound was also negative for DVT.  I have reviewed her records from the hospital.  Pt caregiver states that a blister was noticed on her left foot about 2 weeks ago around 4/4/23. The pt opened the blister by scratching and by Monday 4/10/23 the caregiver noted that it needed immediate attention. Pt went to ED in [place] on Monday 4/10/23 and was admitted. Dr. Mckinney debrided the left foot on 4/11/23, she was discharged on 4/14/23. She has a follow up with Dr. Mckinney tomorrow 4/19/23. She is currently taking Augmentin PO. She states that she is not diabetic. Her semmes yudi is 1/10 and her blood sugar was 124. She had venous/arterial ultrasounds done during her hospital stay as well as MRI and xrays    5/2/23 - Ms. Larsen returns to clinic today for followup on the open wound to the left mid foot.  Her history includes the left ankle being broken several months ago which left the left foot somewhat deformed.  The wound was surgically debrided by Dr. Hank Owen.   She stayed for 1 week at the hospital in [place], [place] and went home on oral Augmentin has now completed.  Her PCP is from [facility] and she reports that he has stated that they are not to use compression on the leg.  However, ultrasounds were done in April and they were clear.  The caregiver does have concern that she is unable to ambulate and would like a wheelchair obtained for the patient.  They were advised to go to [employer]  to have the paperwork sent to us for prescription.  However, [facility] called and their insurance does not cover the wheelchair.    Today, the left mid foot was assessed and selectively debrided.  Additionally, she does have several scattered wounds on her bilateral lower legs that will be monitored.  She was given Hibiclens and instructed to wash the lower extremities including the foot with a Hibiclens x3 days.    5/9/23 - the patient returns today for followup with her caregiver present.  In addition to the neuropathic wound on the plantar surface of the left foot she had small scattered wounds on her bilateral lower extremities.  She was instructed to cleanse her wounds on her legs with Hibiclens and she did so.  Today, the majority of those small open areas on the legs are now closed and they look significantly improved.  The wound on the left foot was selectively debrided and it does remains stable.  Granular tissue is surface level so we will begin applying Endoform to the wound bed with the hopes of developing epithelial tissue.  Of note, the patient is still has not received her wheelchair.  That wheelchair was faxed to her [employer] company of choice and she and the caregiver were instructed that they will need to followup with that company.    5/16/23 - Ms. Larsen returns today for followup in wound check on the wound to the left plantar foot.  This neuropathic wound was selectively debrided today and does remains stable.  We will begin today applying Endoform with the hopes  "of developing epithelial tissue across the wound bed.  She has an appointment with Doreen Barnes NP, on 05/30/2023 to initiate services  as her PCP because she is not satisfied with her current provider.  She will return to clinic in 1 week for followup.    5/24/23 - the patient returns today for followup on the neuropathic wound to the plantar surface of the left foot.  We began using Endoform at the last visit, however, the patient in her caregiver misunderstood the instructions in removed that product prematurely.  Today, the wound was selectively debrided and we did again place Endoform on the wound to be left in place until Friday.  The wound does appear to be slowly improving.  We will be seeking authorization for grafting if possible.        Review of Systems   Constitutional: Negative.    Respiratory: Negative.     Cardiovascular: Negative.    Skin:         As documented in the HPI.   All other systems reviewed and are negative.      Objective:      Vitals:    05/24/23 1139   BP: (!) 130/59   BP Location: Right arm   Patient Position: Sitting   Pulse: (!) 45   Resp: 18   Weight: 97.5 kg (215 lb)   Height: 5' 6" (1.676 m)        Physical Exam  Vitals reviewed.   Constitutional:       Appearance: Normal appearance.   Cardiovascular:      Rate and Rhythm: Normal rate.   Pulmonary:      Effort: Pulmonary effort is normal.   Skin:     General: Skin is warm and dry.      Comments: There is some scaling in both lower extremities, over the tibia.  There is trace edema.  There is the healing wound on the left plantar aspect of the left foot, with no obvious secondary infection.  I did some debridement with a freer, but this is mostly fibrin.  There was no significant necrotic tissue removed.   Neurological:      Mental Status: She is alert.          Altered Skin Integrity 04/18/23 1351 Left medial Foot (Active)   04/18/23 1351   Altered Skin Integrity Present on Admission - Did Patient arrive to the hospital with " "altered skin?: yes   Side: Left   Orientation: medial   Location: Foot   Wound Number:    Is this injury device related?:    Primary Wound Type:    Description of Altered Skin Integrity:    Ankle-Brachial Index:    Pulses:    Removal Indication and Assessment:    Wound Outcome:    (Retired) Wound Length (cm):    (Retired) Wound Width (cm):    (Retired) Depth (cm):    Wound Description (Comments):    Removal Indications:    Dressing Appearance Moist drainage 05/24/23 1141   Drainage Amount Moderate 05/24/23 1141   Drainage Characteristics/Odor Serosanguineous 05/24/23 1141   Appearance Pink;Slough;Moist 05/24/23 1141   Tissue loss description Full thickness 05/24/23 1141   Periwound Area Intact 05/24/23 1141   Wound Edges Open 05/24/23 1141   Wound Length (cm) 3.4 cm 05/24/23 1141   Wound Width (cm) 3.5 cm 05/24/23 1141   Wound Depth (cm) 0.2 cm 05/24/23 1141   Wound Volume (cm^3) 2.38 cm^3 05/24/23 1141   Wound Surface Area (cm^2) 11.9 cm^2 05/24/23 1141   Care Cleansed with:;Wound cleanser 05/24/23 1141   Dressing Applied 05/24/23 1141   Dressing Change Due 05/25/23 05/24/23 1141         Left plantar, pre     Post  endoform, silver alginate, 4x4 gauze, kerlix, coban  ML        Assessment:         ICD-10-CM ICD-9-CM   1. Open wound of plantar aspect of left foot  S91.302A 892.0   2. Chronic venous stasis  I87.8 459.81   3. Encounter for wound re-check  Z51.89 V58.89           Procedures:     Debridement     Date/Time: 5/24/2023 10:31 AM  Performed by: Barbra Perez NP  Authorized by: Barbra Preez NP      Time out: Immediately prior to procedure a "time out" was called to verify the correct patient, procedure, equipment, support staff and site/side marked as required.     Consent Done?:  Yes (Verbal)     Preparation: Patient was prepped and draped with clean technique    Local anesthesia used?: No       Wound Details:    Location:  Left foot    Location:  Left Midfoot    Type of Debridement:  " Non-excisional       Length (cm):  3.4       Area (sq cm):  11.9       Width (cm):  3.5       Percent Debrided (%):  100       Depth (cm):  0.2       Total Area Debrided (sq cm):  11.9    Depth of debridement:  Epidermis/Dermis    Devitalized tissue debrided:  Biofilm, Callus, Exudate, Fibrin and Slough    Instruments:  Curette (Dermal)      ML      Excisional debridement performed:  [] Yes [x] No   Selective debridement performed:  [x] Yes [] No   Mechanical debridement performed:  [] Yes [x] No   Silver nitrate applied :    [] Yes [x] No   Labs ordered this visit:   [] Yes [x] No   Imaging ordered this visit:   [] Yes [x] No   Tissue pathology and/or culture taken:  [] Yes [x] No         HOME HEALTH AGENCY:   [facility]     Since 4/17/2023     [phone]     TIMES PER WEEK/DAYS: 1 x weekly, Fridays only  Patient will come to wound care weekly    Left foot wound: Do not remove current dressing, endoform and silver alginate, until Saturday 05/27. On Saturday, cleanse with wound cleanser, apply silver alginate, 4x4 gauze, kerlix, and coban - to be changed every other day. (HH to change dressing on Saturday and Monday)     Follow up in about 1 week (around 5/31/2023) for Left foot .

## 2023-05-24 NOTE — PROCEDURES
"Debridement    Date/Time: 5/24/2023 10:31 AM  Performed by: Barbra Perez NP  Authorized by: Barbra Perez NP     Time out: Immediately prior to procedure a "time out" was called to verify the correct patient, procedure, equipment, support staff and site/side marked as required.    Consent Done?:  Yes (Verbal)    Preparation: Patient was prepped and draped with clean technique    Local anesthesia used?: No      Wound Details:    Location:  Left foot    Location:  Left Midfoot    Type of Debridement:  Non-excisional       Length (cm):  3.4       Area (sq cm):  11.9       Width (cm):  3.5       Percent Debrided (%):  100       Depth (cm):  0.2       Total Area Debrided (sq cm):  11.9    Depth of debridement:  Epidermis/Dermis    Devitalized tissue debrided:  Biofilm, Callus, Exudate, Fibrin and Slough    Instruments:  Curette (Dermal)     ML  "

## 2023-05-30 ENCOUNTER — HOSPITAL ENCOUNTER (OUTPATIENT)
Dept: WOUND CARE | Facility: HOSPITAL | Age: 68
Discharge: HOME | DRG: 683 | End: 2023-05-30
Attending: NURSE PRACTITIONER
Payer: MEDICARE

## 2023-05-30 VITALS
DIASTOLIC BLOOD PRESSURE: 80 MMHG | SYSTOLIC BLOOD PRESSURE: 118 MMHG | WEIGHT: 215 LBS | RESPIRATION RATE: 18 BRPM | BODY MASS INDEX: 34.55 KG/M2 | HEART RATE: 50 BPM | HEIGHT: 66 IN

## 2023-05-30 DIAGNOSIS — I87.8 CHRONIC VENOUS STASIS: ICD-10-CM

## 2023-05-30 DIAGNOSIS — Z51.89 ENCOUNTER FOR WOUND RE-CHECK: ICD-10-CM

## 2023-05-30 DIAGNOSIS — M14.672 CHARCOT'S JOINT OF LEFT FOOT, NON-DIABETIC: ICD-10-CM

## 2023-05-30 DIAGNOSIS — S91.302A OPEN WOUND OF PLANTAR ASPECT OF LEFT FOOT: Primary | ICD-10-CM

## 2023-05-30 PROCEDURE — 27000999 HC MEDICAL RECORD PHOTO DOCUMENTATION

## 2023-05-30 PROCEDURE — 97597 DBRDMT OPN WND 1ST 20 CM/<: CPT

## 2023-05-30 PROCEDURE — 99212 OFFICE O/P EST SF 10 MIN: CPT | Mod: 25

## 2023-05-30 NOTE — PROCEDURES
"Debridement    Date/Time: 5/30/2023 10:40 AM  Performed by: Barbra Perez NP  Authorized by: Barbra Perez NP     Time out: Immediately prior to procedure a "time out" was called to verify the correct patient, procedure, equipment, support staff and site/side marked as required.    Consent Done?:  Yes (Verbal)    Preparation: Patient was prepped and draped with clean technique    Local anesthesia used?: No      Wound Details:    Location:  Left foot    Location:  Left Midfoot    Type of Debridement:  Non-excisional       Length (cm):  3.3       Area (sq cm):  11.55       Width (cm):  3.5       Percent Debrided (%):  100       Depth (cm):  0.2       Total Area Debrided (sq cm):  11.55    Depth of debridement:  Epidermis/Dermis    Devitalized tissue debrided:  Biofilm, Callus, Exudate, Fibrin and Necrotic/Eschar    Debridement - 1st Wound - Instruments: Dermal.  Bleeding:  None     ML  "

## 2023-05-30 NOTE — PROGRESS NOTES
Subjective:       Patient ID: Diane Larsen is a 68 y.o. female.    Chief Complaint: Wound Care (Left midfoot )    HPI  4/18/23 (Dr. Lassiter) - 68-year-old white female, who was referred here by Dr. Hank Owen, for an open wound on the left plantar surface, left foot.  She is not a diabetic.  She was admitted into the [facility], for cellulitis and abscess of the plantar surface of the left foot, and she underwent an intraoperative debridement.  He is here for follow up of this new wound.  It appears that she has had some chronic venous stasis over the past several months, both lower legs.  This has not been treated in the past, as far as I know.  While she was in the hospital, she underwent an MRI of the left foot, which showed no evidence of osteomyelitis.  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  She has Pes planus.  She also underwent arterial ultrasound on April 10th, which showed patent arteries in both lower extremities.  The venous ultrasound was also negative for DVT.  I have reviewed her records from the hospital.  Pt caregiver states that a blister was noticed on her left foot about 2 weeks ago around 4/4/23. The pt opened the blister by scratching and by Monday 4/10/23 the caregiver noted that it needed immediate attention. Pt went to ED in [place] on Monday 4/10/23 and was admitted. Dr. Mckinney debrided the left foot on 4/11/23, she was discharged on 4/14/23. She has a follow up with Dr. Mckinney tomorrow 4/19/23. She is currently taking Augmentin PO. She states that she is not diabetic. Her semmes yudi is 1/10 and her blood sugar was 124. She had venous/arterial ultrasounds done during her hospital stay as well as MRI and xrays    5/2/23 - Ms. Larsen returns to clinic today for followup on the open wound to the left mid foot.  Her history includes the left ankle being broken several months ago which left the left foot somewhat deformed.  The wound was surgically debrided by Dr. Mckinney  Lexie.  She stayed for 1 week at the hospital in [place], [place] and went home on oral Augmentin has now completed.  Her PCP is from [facility] and she reports that he has stated that they are not to use compression on the leg.  However, ultrasounds were done in April and they were clear.  The caregiver does have concern that she is unable to ambulate and would like a wheelchair obtained for the patient.  They were advised to go to [employer]  to have the paperwork sent to us for prescription.  However, [employer] called and their insurance does not cover the wheelchair.    Today, the left mid foot was assessed and selectively debrided.  Additionally, she does have several scattered wounds on her bilateral lower legs that will be monitored.  She was given Hibiclens and instructed to wash the lower extremities including the foot with a Hibiclens x3 days.    5/9/23 - the patient returns today for followup with her caregiver present.  In addition to the neuropathic wound on the plantar surface of the left foot she had small scattered wounds on her bilateral lower extremities.  She was instructed to cleanse her wounds on her legs with Hibiclens and she did so.  Today, the majority of those small open areas on the legs are now closed and they look significantly improved.  The wound on the left foot was selectively debrided and it does remains stable.  Granular tissue is surface level so we will begin applying Endoform to the wound bed with the hopes of developing epithelial tissue.  Of note, the patient is still has not received her wheelchair.  That wheelchair was faxed to her [employer] company of choice and she and the caregiver were instructed that they will need to followup with that company.    5/16/23 - Ms. Larsen returns today for followup in wound check on the wound to the left plantar foot.  This neuropathic wound was selectively debrided today and does remains stable.  We will begin today applying Endoform  with the hopes of developing epithelial tissue across the wound bed.  She has an appointment with Doreen Barnes NP, on 05/30/2023 to initiate services  as her PCP because she is not satisfied with her current provider.  She will return to clinic in 1 week for followup.    5/24/23 - the patient returns today for followup on the neuropathic wound to the plantar surface of the left foot.  We began using Endoform at the last visit, however, the patient in her caregiver misunderstood the instructions in removed that product prematurely.  Today, the wound was selectively debrided and we did again place Endoform on the wound to be left in place until Friday.  The wound does appear to be slowly improving.  We will be seeking authorization for grafting if possible.    5/30/23 - the patient returns today for the neuropathic wound to the left foot.  She does report that she fell sometime over the weekend.  She did fall backwards and injured her bottom.  She noted some bruises and pain, however, there are no open wounds.  She did not go to ER.  This morning, her caregiver reports that the patient was found soaked in urine and that urine had dripped down to the wound on the foot.  There does not appear to be any change in the wound.  Today, the patient fell asleep in both the lobby and in the chair while she was receiving care.  She will be seeing a new provider today, Doreen Barnes NP.  We are hopeful that there may be some medication changes done that will help her in becoming a little more alert.    Today, the wound was assessed and appears to be improving.  A selective debridement was performed and the hyper granulated tissue was chemically cauterized with silver nitrate.  We are still attempting to authorize a graft of some kind.  She does though need 30 days of continuous care so we can hopefully get one of the grafts authorized now.        Review of Systems   Constitutional: Negative.    Respiratory: Negative.    "  Cardiovascular: Negative.    Skin:         As documented in the HPI.   All other systems reviewed and are negative.      Objective:      Vitals:    05/30/23 1107   BP: 118/80   BP Location: Left arm   Patient Position: Sitting   Pulse: (!) 50   Resp: 18   Weight: 97.5 kg (215 lb)   Height: 5' 6" (1.676 m)        Physical Exam  Vitals reviewed.   Constitutional:       Appearance: Normal appearance.   Cardiovascular:      Rate and Rhythm: Normal rate.   Pulmonary:      Effort: Pulmonary effort is normal.   Skin:     General: Skin is warm and dry.      Comments: There is some scaling in both lower extremities, over the tibia.  There is trace edema.  There is the healing wound on the left plantar aspect of the left foot, with no obvious secondary infection.  I did some debridement with a freer, but this is mostly fibrin.  There was no significant necrotic tissue removed.   Neurological:      Mental Status: She is alert.          Altered Skin Integrity 04/18/23 1351 Left medial Foot (Active)   04/18/23 1351   Altered Skin Integrity Present on Admission - Did Patient arrive to the hospital with altered skin?: yes   Side: Left   Orientation: medial   Location: Foot   Wound Number:    Is this injury device related?:    Primary Wound Type:    Description of Altered Skin Integrity:    Ankle-Brachial Index:    Pulses:    Removal Indication and Assessment:    Wound Outcome:    (Retired) Wound Length (cm):    (Retired) Wound Width (cm):    (Retired) Depth (cm):    Wound Description (Comments):    Removal Indications:    Dressing Appearance Moist drainage 05/30/23 1109   Drainage Amount Moderate 05/30/23 1109   Drainage Characteristics/Odor Serosanguineous 05/30/23 1109   Appearance Pink;Moist 05/30/23 1109   Tissue loss description Full thickness 05/30/23 1109   Periwound Area Redness 05/30/23 1109   Wound Edges Open 05/30/23 1109   Wound Length (cm) 3.3 cm 05/30/23 1109   Wound Width (cm) 3.5 cm 05/30/23 1109   Wound Depth " "(cm) 0.2 cm 05/30/23 1109   Wound Volume (cm^3) 2.31 cm^3 05/30/23 1109   Wound Surface Area (cm^2) 11.55 cm^2 05/30/23 1109   Care Cleansed with:;Wound cleanser 05/30/23 1109         Left plantar, pre     Post  endoform, silver alginate, 4x4 gauze, kerlix, coban  ML        Assessment:         ICD-10-CM ICD-9-CM   1. Open wound of plantar aspect of left foot  S91.302A 892.0   2. Chronic venous stasis  I87.8 459.81   3. Encounter for wound re-check  Z51.89 V58.89   4. Charcot's joint of right foot, non-diabetic  M14.671 094.0     713.5           Procedures:     Debridement     Date/Time: 5/30/2023 10:40 AM  Performed by: Barbra Perez NP  Authorized by: Barbra Perez NP      Time out: Immediately prior to procedure a "time out" was called to verify the correct patient, procedure, equipment, support staff and site/side marked as required.     Consent Done?:  Yes (Verbal)     Preparation: Patient was prepped and draped with clean technique    Local anesthesia used?: No       Wound Details:    Location:  Left foot    Location:  Left Midfoot    Type of Debridement:  Non-excisional       Length (cm):  3.3       Area (sq cm):  11.55       Width (cm):  3.5       Percent Debrided (%):  100       Depth (cm):  0.2       Total Area Debrided (sq cm):  11.55    Depth of debridement:  Epidermis/Dermis    Devitalized tissue debrided:  Biofilm, Callus, Exudate, Fibrin and Necrotic/Eschar    Debridement - 1st Wound - Instruments: Dermal.  Bleeding:  None      ML      Excisional debridement performed:  [] Yes [x] No   Selective debridement performed:  [x] Yes [] No   Mechanical debridement performed:  [] Yes [x] No   Silver nitrate applied :    [x] Yes [] No   Labs ordered this visit:   [] Yes [x] No   Imaging ordered this visit:   [] Yes [x] No   Tissue pathology and/or culture taken:  [] Yes [x] No         HOME HEALTH AGENCY:   [facility]     Home Health Services     Since 4/17/2023     [phone] "     TIMES PER WEEK/DAYS: 1 x weekly, Fridays only  Patient will come to wound care weekly    Left foot wound: Do not remove current dressing, endoform and silver alginate, until Friday 6/02. On Friday, cleanse with wound cleanser, apply silver alginate, 4x4 gauze, kerlix, and coban - to be changed every other day. (HH to change dressing on Friday and Tuesday)     Follow up in about 1 week (around 6/6/2023).

## 2023-05-31 ENCOUNTER — HOSPITAL ENCOUNTER (INPATIENT)
Facility: HOSPITAL | Age: 68
LOS: 6 days | Discharge: HOME HEALTH | DRG: 683 | End: 2023-06-06
Attending: EMERGENCY MEDICINE | Admitting: INTERNAL MEDICINE
Payer: MEDICARE

## 2023-05-31 DIAGNOSIS — R00.1 BRADYCARDIA WITH 41-50 BEATS PER MINUTE: ICD-10-CM

## 2023-05-31 DIAGNOSIS — R53.1 GENERALIZED WEAKNESS: ICD-10-CM

## 2023-05-31 DIAGNOSIS — N17.9 AKI (ACUTE KIDNEY INJURY): Primary | ICD-10-CM

## 2023-05-31 DIAGNOSIS — R07.9 CHEST PAIN: ICD-10-CM

## 2023-05-31 DIAGNOSIS — R31.9 URINARY TRACT INFECTION WITH HEMATURIA, SITE UNSPECIFIED: ICD-10-CM

## 2023-05-31 DIAGNOSIS — N39.0 URINARY TRACT INFECTION WITH HEMATURIA, SITE UNSPECIFIED: ICD-10-CM

## 2023-05-31 DIAGNOSIS — E87.1 HYPONATREMIA: ICD-10-CM

## 2023-05-31 LAB
ANION GAP SERPL CALC-SCNC: 11 MEQ/L
ANION GAP SERPL CALC-SCNC: 13 MEQ/L
APPEARANCE UR: CLEAR
BACTERIA #/AREA URNS AUTO: ABNORMAL /HPF
BASOPHILS # BLD AUTO: 0.01 X10(3)/MCL
BASOPHILS NFR BLD AUTO: 0.1 %
BILIRUB UR QL STRIP.AUTO: NEGATIVE MG/DL
BNP BLD-MCNC: 1250.4 PG/ML
BUN SERPL-MCNC: 41 MG/DL (ref 9.8–20.1)
BUN SERPL-MCNC: 41 MG/DL (ref 9.8–20.1)
CALCIUM SERPL-MCNC: 8.7 MG/DL (ref 8.4–10.2)
CALCIUM SERPL-MCNC: 9 MG/DL (ref 8.4–10.2)
CHLORIDE SERPL-SCNC: 90 MMOL/L (ref 98–107)
CHLORIDE SERPL-SCNC: 94 MMOL/L (ref 98–107)
CO2 SERPL-SCNC: 22 MMOL/L (ref 23–31)
CO2 SERPL-SCNC: 22 MMOL/L (ref 23–31)
COLOR UR: YELLOW
CREAT SERPL-MCNC: 1.79 MG/DL (ref 0.55–1.02)
CREAT SERPL-MCNC: 2.05 MG/DL (ref 0.55–1.02)
CREAT/UREA NIT SERPL: 20
CREAT/UREA NIT SERPL: 23
EOSINOPHIL # BLD AUTO: 0.08 X10(3)/MCL (ref 0–0.9)
EOSINOPHIL NFR BLD AUTO: 0.7 %
ERYTHROCYTE [DISTWIDTH] IN BLOOD BY AUTOMATED COUNT: 14.6 % (ref 11.5–17)
GFR SERPLBLD CREATININE-BSD FMLA CKD-EPI: 26 MLS/MIN/1.73/M2
GFR SERPLBLD CREATININE-BSD FMLA CKD-EPI: 31 MLS/MIN/1.73/M2
GLUCOSE SERPL-MCNC: 101 MG/DL (ref 82–115)
GLUCOSE SERPL-MCNC: 93 MG/DL (ref 82–115)
GLUCOSE UR QL STRIP.AUTO: NEGATIVE MG/DL
HCT VFR BLD AUTO: 33.9 % (ref 37–47)
HGB BLD-MCNC: 11.1 G/DL (ref 12–16)
IMM GRANULOCYTES # BLD AUTO: 0.04 X10(3)/MCL (ref 0–0.04)
IMM GRANULOCYTES NFR BLD AUTO: 0.4 %
KETONES UR QL STRIP.AUTO: NEGATIVE MG/DL
LACTATE SERPL-SCNC: 1 MMOL/L (ref 0.5–2.2)
LEUKOCYTE ESTERASE UR QL STRIP.AUTO: ABNORMAL UNIT/L
LYMPHOCYTES # BLD AUTO: 2.71 X10(3)/MCL (ref 0.6–4.6)
LYMPHOCYTES NFR BLD AUTO: 25 %
MAGNESIUM SERPL-MCNC: 1.8 MG/DL (ref 1.6–2.6)
MCH RBC QN AUTO: 28.2 PG (ref 27–31)
MCHC RBC AUTO-ENTMCNC: 32.7 G/DL (ref 33–36)
MCV RBC AUTO: 86 FL (ref 80–94)
MONOCYTES # BLD AUTO: 0.99 X10(3)/MCL (ref 0.1–1.3)
MONOCYTES NFR BLD AUTO: 9.1 %
NEUTROPHILS # BLD AUTO: 7.03 X10(3)/MCL (ref 2.1–9.2)
NEUTROPHILS NFR BLD AUTO: 64.7 %
NITRITE UR QL STRIP.AUTO: NEGATIVE
PH UR STRIP.AUTO: 6 [PH]
PLATELET # BLD AUTO: 412 X10(3)/MCL (ref 130–400)
PMV BLD AUTO: 9.1 FL (ref 7.4–10.4)
POCT GLUCOSE: 98 MG/DL (ref 70–110)
POTASSIUM SERPL-SCNC: 3.5 MMOL/L (ref 3.5–5.1)
POTASSIUM SERPL-SCNC: 3.6 MMOL/L (ref 3.5–5.1)
PROT UR QL STRIP.AUTO: ABNORMAL MG/DL
RBC # BLD AUTO: 3.94 X10(6)/MCL (ref 4.2–5.4)
RBC #/AREA URNS AUTO: ABNORMAL /HPF
RBC UR QL AUTO: ABNORMAL UNIT/L
SODIUM SERPL-SCNC: 125 MMOL/L (ref 136–145)
SODIUM SERPL-SCNC: 127 MMOL/L (ref 136–145)
SP GR UR STRIP.AUTO: 1.02
SQUAMOUS #/AREA URNS AUTO: ABNORMAL /HPF
TROPONIN I SERPL-MCNC: 0.03 NG/ML (ref 0–0.04)
UROBILINOGEN UR STRIP-ACNC: 0.2 MG/DL
WBC # SPEC AUTO: 10.86 X10(3)/MCL (ref 4.5–11.5)
WBC #/AREA URNS AUTO: ABNORMAL /HPF

## 2023-05-31 PROCEDURE — 87040 BLOOD CULTURE FOR BACTERIA: CPT | Performed by: EMERGENCY MEDICINE

## 2023-05-31 PROCEDURE — 96365 THER/PROPH/DIAG IV INF INIT: CPT

## 2023-05-31 PROCEDURE — 93010 EKG 12-LEAD: ICD-10-PCS | Mod: ,,, | Performed by: INTERNAL MEDICINE

## 2023-05-31 PROCEDURE — 83605 ASSAY OF LACTIC ACID: CPT | Performed by: EMERGENCY MEDICINE

## 2023-05-31 PROCEDURE — 83880 ASSAY OF NATRIURETIC PEPTIDE: CPT | Performed by: EMERGENCY MEDICINE

## 2023-05-31 PROCEDURE — 87088 URINE BACTERIA CULTURE: CPT | Performed by: EMERGENCY MEDICINE

## 2023-05-31 PROCEDURE — 99285 EMERGENCY DEPT VISIT HI MDM: CPT | Mod: 25

## 2023-05-31 PROCEDURE — 25000242 PHARM REV CODE 250 ALT 637 W/ HCPCS: Performed by: EMERGENCY MEDICINE

## 2023-05-31 PROCEDURE — 63600175 PHARM REV CODE 636 W HCPCS: Performed by: EMERGENCY MEDICINE

## 2023-05-31 PROCEDURE — 93005 ELECTROCARDIOGRAM TRACING: CPT

## 2023-05-31 PROCEDURE — 81001 URINALYSIS AUTO W/SCOPE: CPT | Performed by: EMERGENCY MEDICINE

## 2023-05-31 PROCEDURE — 11000001 HC ACUTE MED/SURG PRIVATE ROOM

## 2023-05-31 PROCEDURE — 94640 AIRWAY INHALATION TREATMENT: CPT

## 2023-05-31 PROCEDURE — 80048 BASIC METABOLIC PNL TOTAL CA: CPT | Performed by: EMERGENCY MEDICINE

## 2023-05-31 PROCEDURE — 87186 SC STD MICRODIL/AGAR DIL: CPT | Performed by: EMERGENCY MEDICINE

## 2023-05-31 PROCEDURE — 25000003 PHARM REV CODE 250: Performed by: EMERGENCY MEDICINE

## 2023-05-31 PROCEDURE — 84484 ASSAY OF TROPONIN QUANT: CPT | Performed by: EMERGENCY MEDICINE

## 2023-05-31 PROCEDURE — 94761 N-INVAS EAR/PLS OXIMETRY MLT: CPT

## 2023-05-31 PROCEDURE — 83735 ASSAY OF MAGNESIUM: CPT | Performed by: EMERGENCY MEDICINE

## 2023-05-31 PROCEDURE — 85025 COMPLETE CBC W/AUTO DIFF WBC: CPT | Performed by: EMERGENCY MEDICINE

## 2023-05-31 PROCEDURE — 96361 HYDRATE IV INFUSION ADD-ON: CPT

## 2023-05-31 PROCEDURE — 93010 ELECTROCARDIOGRAM REPORT: CPT | Mod: ,,, | Performed by: INTERNAL MEDICINE

## 2023-05-31 PROCEDURE — 82962 GLUCOSE BLOOD TEST: CPT

## 2023-05-31 RX ORDER — FLUCONAZOLE 100 MG/1
100 TABLET ORAL DAILY
COMMUNITY
End: 2023-07-25

## 2023-05-31 RX ORDER — NYSTATIN 100000 U/G
1 CREAM TOPICAL 2 TIMES DAILY
COMMUNITY
End: 2023-07-25

## 2023-05-31 RX ORDER — IPRATROPIUM BROMIDE 0.5 MG/2.5ML
500 SOLUTION RESPIRATORY (INHALATION) 4 TIMES DAILY
COMMUNITY

## 2023-05-31 RX ORDER — SODIUM CHLORIDE 9 MG/ML
1000 INJECTION, SOLUTION INTRAVENOUS
Status: COMPLETED | OUTPATIENT
Start: 2023-05-31 | End: 2023-05-31

## 2023-05-31 RX ORDER — DIPHENOXYLATE HYDROCHLORIDE AND ATROPINE SULFATE 2.5; .025 MG/1; MG/1
1 TABLET ORAL 4 TIMES DAILY PRN
COMMUNITY

## 2023-05-31 RX ORDER — NYSTATIN 100000 [USP'U]/G
1 POWDER TOPICAL 2 TIMES DAILY
COMMUNITY
End: 2023-07-25

## 2023-05-31 RX ORDER — IPRATROPIUM BROMIDE AND ALBUTEROL SULFATE 2.5; .5 MG/3ML; MG/3ML
3 SOLUTION RESPIRATORY (INHALATION)
Status: COMPLETED | OUTPATIENT
Start: 2023-05-31 | End: 2023-05-31

## 2023-05-31 RX ORDER — DAPAGLIFLOZIN 10 MG/1
10 TABLET, FILM COATED ORAL DAILY
COMMUNITY

## 2023-05-31 RX ADMIN — CEFTRIAXONE SODIUM 2 G: 2 INJECTION, POWDER, FOR SOLUTION INTRAMUSCULAR; INTRAVENOUS at 09:05

## 2023-05-31 RX ADMIN — SODIUM CHLORIDE 1000 ML: 9 INJECTION, SOLUTION INTRAVENOUS at 10:05

## 2023-05-31 RX ADMIN — IPRATROPIUM BROMIDE AND ALBUTEROL SULFATE 3 ML: .5; 3 SOLUTION RESPIRATORY (INHALATION) at 08:05

## 2023-05-31 RX ADMIN — SODIUM CHLORIDE 1000 ML: 9 INJECTION, SOLUTION INTRAVENOUS at 09:05

## 2023-06-01 LAB
ANION GAP SERPL CALC-SCNC: 9 MEQ/L
AV PEAK GRADIENT: 16 MMHG
AV VELOCITY RATIO: 0.74
BASOPHILS # BLD AUTO: 0.04 X10(3)/MCL
BASOPHILS NFR BLD AUTO: 0.4 %
BSA FOR ECHO PROCEDURE: 2.07 M2
BUN SERPL-MCNC: 38 MG/DL (ref 9.8–20.1)
CALCIUM SERPL-MCNC: 8.9 MG/DL (ref 8.4–10.2)
CHLORIDE SERPL-SCNC: 96 MMOL/L (ref 98–107)
CO2 SERPL-SCNC: 25 MMOL/L (ref 23–31)
CREAT SERPL-MCNC: 1.65 MG/DL (ref 0.55–1.02)
CREAT/UREA NIT SERPL: 23
CV ECHO LV RWT: 0.71 CM
DOP CALC AO PEAK VEL: 1.98 M/S
DOP CALC LVOT AREA: 2.8 CM2
DOP CALC LVOT DIAMETER: 1.9 CM
DOP CALC LVOT PEAK VEL: 1.46 M/S
DOP CALC LVOT STROKE VOLUME: 91.53 CM3
DOP CALCLVOT PEAK VEL VTI: 32.3 CM
ECHO LV POSTERIOR WALL: 1.43 CM (ref 0.6–1.1)
EJECTION FRACTION: 58 %
EOSINOPHIL # BLD AUTO: 0.14 X10(3)/MCL (ref 0–0.9)
EOSINOPHIL NFR BLD AUTO: 1.3 %
ERYTHROCYTE [DISTWIDTH] IN BLOOD BY AUTOMATED COUNT: 14.6 % (ref 11.5–17)
FRACTIONAL SHORTENING: 30 % (ref 28–44)
GFR SERPLBLD CREATININE-BSD FMLA CKD-EPI: 34 MLS/MIN/1.73/M2
GLUCOSE SERPL-MCNC: 98 MG/DL (ref 82–115)
HCT VFR BLD AUTO: 35 % (ref 37–47)
HGB BLD-MCNC: 11.4 G/DL (ref 12–16)
IMM GRANULOCYTES # BLD AUTO: 0.03 X10(3)/MCL (ref 0–0.04)
IMM GRANULOCYTES NFR BLD AUTO: 0.3 %
INTERVENTRICULAR SEPTUM: 1.53 CM (ref 0.6–1.1)
LEFT ATRIUM SIZE: 4.06 CM
LEFT INTERNAL DIMENSION IN SYSTOLE: 2.82 CM (ref 2.1–4)
LEFT VENTRICLE DIASTOLIC VOLUME INDEX: 22.99 ML/M2
LEFT VENTRICLE DIASTOLIC VOLUME: 46.21 ML
LEFT VENTRICLE MASS INDEX: 115 G/M2
LEFT VENTRICLE SYSTOLIC VOLUME INDEX: 9.1 ML/M2
LEFT VENTRICLE SYSTOLIC VOLUME: 18.39 ML
LEFT VENTRICULAR INTERNAL DIMENSION IN DIASTOLE: 4.03 CM (ref 3.5–6)
LEFT VENTRICULAR MASS: 230.3 G
LVOT MG: 4.52 MMHG
LVOT MV: 1 CM/S
LYMPHOCYTES # BLD AUTO: 1.81 X10(3)/MCL (ref 0.6–4.6)
LYMPHOCYTES NFR BLD AUTO: 17.3 %
MCH RBC QN AUTO: 28.2 PG (ref 27–31)
MCHC RBC AUTO-ENTMCNC: 32.6 G/DL (ref 33–36)
MCV RBC AUTO: 86.6 FL (ref 80–94)
MONOCYTES # BLD AUTO: 0.78 X10(3)/MCL (ref 0.1–1.3)
MONOCYTES NFR BLD AUTO: 7.5 %
NEUTROPHILS # BLD AUTO: 7.66 X10(3)/MCL (ref 2.1–9.2)
NEUTROPHILS NFR BLD AUTO: 73.2 %
OHS LV EJECTION FRACTION SIMPSONS BIPLANE MOD: 6 %
PISA MRMAX VEL: 3.29 M/S
PISA TR MAX VEL: 2.36 M/S
PLATELET # BLD AUTO: 403 X10(3)/MCL (ref 130–400)
PMV BLD AUTO: 9.2 FL (ref 7.4–10.4)
POCT GLUCOSE: 115 MG/DL (ref 70–110)
POCT GLUCOSE: 133 MG/DL (ref 70–110)
POTASSIUM SERPL-SCNC: 3.7 MMOL/L (ref 3.5–5.1)
PV PEAK VELOCITY: 0.95 CM/S
RBC # BLD AUTO: 4.04 X10(6)/MCL (ref 4.2–5.4)
RIGHT VENTRICULAR END-DIASTOLIC DIMENSION: 1.76 CM
SODIUM SERPL-SCNC: 130 MMOL/L (ref 136–145)
TR MAX PG: 22 MMHG
WBC # SPEC AUTO: 10.46 X10(3)/MCL (ref 4.5–11.5)

## 2023-06-01 PROCEDURE — 80048 BASIC METABOLIC PNL TOTAL CA: CPT | Performed by: INTERNAL MEDICINE

## 2023-06-01 PROCEDURE — 94761 N-INVAS EAR/PLS OXIMETRY MLT: CPT

## 2023-06-01 PROCEDURE — 21400001 HC TELEMETRY ROOM

## 2023-06-01 PROCEDURE — 11000001 HC ACUTE MED/SURG PRIVATE ROOM

## 2023-06-01 PROCEDURE — 25000003 PHARM REV CODE 250: Performed by: INTERNAL MEDICINE

## 2023-06-01 PROCEDURE — 63600175 PHARM REV CODE 636 W HCPCS: Performed by: INTERNAL MEDICINE

## 2023-06-01 PROCEDURE — 85025 COMPLETE CBC W/AUTO DIFF WBC: CPT | Performed by: INTERNAL MEDICINE

## 2023-06-01 RX ORDER — ONDANSETRON 2 MG/ML
4 INJECTION INTRAMUSCULAR; INTRAVENOUS EVERY 8 HOURS PRN
Status: DISCONTINUED | OUTPATIENT
Start: 2023-06-01 | End: 2023-06-06 | Stop reason: HOSPADM

## 2023-06-01 RX ORDER — AMLODIPINE BESYLATE 10 MG/1
10 TABLET ORAL DAILY
Status: DISCONTINUED | OUTPATIENT
Start: 2023-06-01 | End: 2023-06-06 | Stop reason: HOSPADM

## 2023-06-01 RX ORDER — GLUCAGON 1 MG
1 KIT INJECTION
Status: DISCONTINUED | OUTPATIENT
Start: 2023-06-01 | End: 2023-06-06 | Stop reason: HOSPADM

## 2023-06-01 RX ORDER — INSULIN ASPART 100 [IU]/ML
0-5 INJECTION, SOLUTION INTRAVENOUS; SUBCUTANEOUS
Status: DISCONTINUED | OUTPATIENT
Start: 2023-06-01 | End: 2023-06-06 | Stop reason: HOSPADM

## 2023-06-01 RX ORDER — ARIPIPRAZOLE 5 MG/1
15 TABLET ORAL DAILY
Status: DISCONTINUED | OUTPATIENT
Start: 2023-06-01 | End: 2023-06-06 | Stop reason: HOSPADM

## 2023-06-01 RX ORDER — ROPINIROLE 0.25 MG/1
0.5 TABLET, FILM COATED ORAL NIGHTLY
Status: DISCONTINUED | OUTPATIENT
Start: 2023-06-01 | End: 2023-06-06 | Stop reason: HOSPADM

## 2023-06-01 RX ORDER — ACETAMINOPHEN 325 MG/1
650 TABLET ORAL EVERY 4 HOURS PRN
Status: DISCONTINUED | OUTPATIENT
Start: 2023-06-01 | End: 2023-06-06 | Stop reason: HOSPADM

## 2023-06-01 RX ORDER — LOSARTAN POTASSIUM 50 MG/1
100 TABLET ORAL DAILY
Status: DISCONTINUED | OUTPATIENT
Start: 2023-06-01 | End: 2023-06-06 | Stop reason: HOSPADM

## 2023-06-01 RX ORDER — SODIUM CHLORIDE 0.9 % (FLUSH) 0.9 %
10 SYRINGE (ML) INJECTION EVERY 12 HOURS PRN
Status: DISCONTINUED | OUTPATIENT
Start: 2023-06-01 | End: 2023-06-06 | Stop reason: HOSPADM

## 2023-06-01 RX ORDER — TRAZODONE HYDROCHLORIDE 50 MG/1
50 TABLET ORAL NIGHTLY PRN
Status: DISCONTINUED | OUTPATIENT
Start: 2023-06-01 | End: 2023-06-06 | Stop reason: HOSPADM

## 2023-06-01 RX ORDER — MICONAZOLE NITRATE 2 %
POWDER (GRAM) TOPICAL 2 TIMES DAILY
Status: DISCONTINUED | OUTPATIENT
Start: 2023-06-01 | End: 2023-06-06 | Stop reason: HOSPADM

## 2023-06-01 RX ORDER — HYDROCODONE BITARTRATE AND ACETAMINOPHEN 5; 325 MG/1; MG/1
1 TABLET ORAL EVERY 6 HOURS PRN
Status: DISCONTINUED | OUTPATIENT
Start: 2023-06-01 | End: 2023-06-06 | Stop reason: HOSPADM

## 2023-06-01 RX ORDER — NALOXONE HCL 0.4 MG/ML
0.02 VIAL (ML) INJECTION
Status: DISCONTINUED | OUTPATIENT
Start: 2023-06-01 | End: 2023-06-06 | Stop reason: HOSPADM

## 2023-06-01 RX ORDER — HEPARIN SODIUM 5000 [USP'U]/ML
5000 INJECTION, SOLUTION INTRAVENOUS; SUBCUTANEOUS EVERY 8 HOURS
Status: DISCONTINUED | OUTPATIENT
Start: 2023-06-01 | End: 2023-06-06 | Stop reason: HOSPADM

## 2023-06-01 RX ORDER — OXYBUTYNIN CHLORIDE 5 MG/1
5 TABLET, EXTENDED RELEASE ORAL DAILY
Status: DISCONTINUED | OUTPATIENT
Start: 2023-06-01 | End: 2023-06-06 | Stop reason: HOSPADM

## 2023-06-01 RX ADMIN — MICONAZOLE NITRATE: 20 POWDER TOPICAL at 10:06

## 2023-06-01 RX ADMIN — HEPARIN SODIUM 5000 UNITS: 5000 INJECTION, SOLUTION INTRAVENOUS; SUBCUTANEOUS at 05:06

## 2023-06-01 RX ADMIN — OXYBUTYNIN CHLORIDE 5 MG: 5 TABLET, EXTENDED RELEASE ORAL at 09:06

## 2023-06-01 RX ADMIN — ARIPIPRAZOLE 15 MG: 5 TABLET ORAL at 09:06

## 2023-06-01 RX ADMIN — HEPARIN SODIUM 5000 UNITS: 5000 INJECTION, SOLUTION INTRAVENOUS; SUBCUTANEOUS at 10:06

## 2023-06-01 RX ADMIN — HEPARIN SODIUM 5000 UNITS: 5000 INJECTION, SOLUTION INTRAVENOUS; SUBCUTANEOUS at 03:06

## 2023-06-01 RX ADMIN — ROPINIROLE HYDROCHLORIDE 0.5 MG: 0.25 TABLET, FILM COATED ORAL at 02:06

## 2023-06-01 RX ADMIN — LOSARTAN POTASSIUM 100 MG: 50 TABLET, FILM COATED ORAL at 09:06

## 2023-06-01 RX ADMIN — ROPINIROLE HYDROCHLORIDE 0.5 MG: 0.25 TABLET, FILM COATED ORAL at 10:06

## 2023-06-01 RX ADMIN — MICONAZOLE NITRATE: 20 POWDER TOPICAL at 11:06

## 2023-06-01 RX ADMIN — AMLODIPINE BESYLATE 10 MG: 10 TABLET ORAL at 09:06

## 2023-06-01 NOTE — ED PROVIDER NOTES
Encounter Date: 5/31/2023       History     Chief Complaint   Patient presents with    Fatigue     Pt caregiver states pt has had low bp, low oxygen sat x past week, saw Doreen Barnes yesterday and meds adjusted and given iv fluids.     Fatigue.  Onset was about 2 weeks ago whenever she began having fatigue along with cough, wheezing, and shortness of breath.  States her blood pressure and heart rate has been running low with systolic as low as 99 and heart rate in the 40s.  Reports that her PCP did make some recent blood pressure medication adjustments.  She also reports having chronic nonbloody diarrhea for the past 6 months or so.  Denies chest pain, fever, abdominal pain, nausea, vomiting.    The history is provided by the patient and a caregiver.   Review of patient's allergies indicates:  No Known Allergies  Past Medical History:   Diagnosis Date    Anxiety disorder, unspecified     Bipolar disorder, unspecified     COPD (chronic obstructive pulmonary disease)     Depression     Essential (primary) hypertension     Neuropathy     Schizophrenia, unspecified     Seizure disorder      Past Surgical History:   Procedure Laterality Date    BACK SURGERY  2003    jesusita inserted    EXCISION-WIDE LOCAL Left 04/11/2023    left foot     Family History   Problem Relation Age of Onset    Hypertension Mother     Parkinsonism Mother     Hypertension Father     Alzheimer's disease Father     Stroke Father     Heart attack Father      Social History     Tobacco Use    Smoking status: Every Day     Packs/day: 0.50     Years: 50.00     Pack years: 25.00     Types: Cigarettes    Smokeless tobacco: Never   Substance Use Topics    Alcohol use: Not Currently    Drug use: Never     Review of Systems    Physical Exam     Initial Vitals [05/31/23 1853]   BP Pulse Resp Temp SpO2   (!) 115/99 (!) 44 20 97.4 °F (36.3 °C) (!) 94 %      MAP       --         Physical Exam    Nursing note and vitals reviewed.  Constitutional: She appears  well-developed and well-nourished.   HENT:   Head: Normocephalic and atraumatic.   Mouth/Throat: Uvula is midline and mucous membranes are normal.   Eyes: EOM are normal. Pupils are equal, round, and reactive to light.   Neck: Trachea normal. Neck supple.   Cardiovascular:  Normal rate, regular rhythm and normal pulses.           Pulmonary/Chest: Effort normal. She has wheezes.   Abdominal: Abdomen is soft. Bowel sounds are normal. There is no rebound and no guarding.   Musculoskeletal:         General: Normal range of motion.      Cervical back: Neck supple.      Right lower le+ Pitting Edema present.      Left lower le+ Pitting Edema present.     Neurological: She is alert. GCS eye subscore is 4. GCS verbal subscore is 4. GCS motor subscore is 6.   Skin: Skin is warm and dry.   3.5 x 3.5 cm wound to medial left foot with beefy red wound bed and mild surrounding erythema, no induration or exudate.       Psychiatric: She has a normal mood and affect. Her speech is normal. Thought content normal.       ED Course   Procedures  Labs Reviewed   BASIC METABOLIC PANEL - Abnormal; Notable for the following components:       Result Value    Sodium Level 125 (*)     Chloride 90 (*)     Carbon Dioxide 22 (*)     Blood Urea Nitrogen 41.0 (*)     Creatinine 2.05 (*)     All other components within normal limits   B-TYPE NATRIURETIC PEPTIDE - Abnormal; Notable for the following components:    Natriuretic Peptide 1,250.4 (*)     All other components within normal limits   URINALYSIS, REFLEX TO URINE CULTURE - Abnormal; Notable for the following components:    Protein, UA 2+ (*)     Blood, UA 3+ (*)     Leukocyte Esterase, UA 2+ (*)     All other components within normal limits   CBC WITH DIFFERENTIAL - Abnormal; Notable for the following components:    RBC 3.94 (*)     Hgb 11.1 (*)     Hct 33.9 (*)     MCHC 32.7 (*)     Platelet 412 (*)     All other components within normal limits   URINALYSIS, MICROSCOPIC - Abnormal;  Notable for the following components:    Bacteria, UA Many (*)     RBC, UA 21-50 (*)     WBC, UA 21-50 (*)     Squamous Epithelial Cells, UA Few (*)     All other components within normal limits   BASIC METABOLIC PANEL - Abnormal; Notable for the following components:    Sodium Level 127 (*)     Chloride 94 (*)     Carbon Dioxide 22 (*)     Blood Urea Nitrogen 41.0 (*)     Creatinine 1.79 (*)     All other components within normal limits   MAGNESIUM - Normal   TROPONIN I - Normal   LACTIC ACID, PLASMA - Normal   CULTURE, URINE   BLOOD CULTURE [facility]   BLOOD CULTURE [facility]   CBC W/ AUTO DIFFERENTIAL    Narrative:     The following orders were created for panel order CBC auto differential.  Procedure                               Abnormality         Status                     ---------                               -----------         ------                     CBC with Differential[[id number]]        Abnormal            Final result                 Please view results for these tests on the individual orders.   BASIC METABOLIC PANEL   CBC W/ AUTO DIFFERENTIAL    Narrative:     The following orders were created for panel order CBC with Automated Differential.  Procedure                               Abnormality         Status                     ---------                               -----------         ------                     CBC with Differential[[id number]]                                                         Please view results for these tests on the individual orders.   CBC WITH DIFFERENTIAL   POCT GLUCOSE     EKG Readings: (Independently Interpreted)   Initial Reading: No STEMI. Rhythm: Sinus Arrhythmia. Heart Rate: 64. Conduction: 1st Degree AV Block. Axis: Normal.   ECG Results              EKG 12-lead (In process)  Result time 05/31/23 21:10:28      In process by Interface, Lab In [facility] (05/31/23 21:10:28)                   Narrative:    Test Reason : R53.1,    Vent. Rate : 064 BPM     Atrial Rate : 064  BPM     P-R Int : 244 ms          QRS Dur : 104 ms      QT Int : 454 ms       P-R-T Axes : 058 061 071 degrees     QTc Int : 468 ms    Sinus rhythm with 1st degree A-V block  Otherwise normal ECG  No previous ECGs available    Referred By: [email or web address]   SELF           Confirmed By:                                   Imaging Results              X-Ray Chest AP Portable (Preliminary result)  Result time 05/31/23 22:08:08      Preliminary result by Javier Victoria Jr., MD (05/31/23 22:08:08)                   Narrative:    START OF REPORT:  TECHNIQUE: 1 AP VIEW OF THE CHEST WASPERFORMED.    COMPARISON: NONE.    CLINICAL HISTORY: SOB. LOW O2 SATURATION FOR COUPLE OF DAYS.    Findings:  Patient Position: Moderate leftward patient rotation is seen.  Soft Tissues: Unremarkable.  Lines and Tubes: None.  Neck: The trachea is midline.  Mediastinum: The cardiomediastinal silhouette is within normal limits.  Heart: There is moderate cardiomegaly after accounting for patient position technique.  Aorta: There is moderate tortuosity of the arch and thoracic aorta.  Pulmonary Arteries: Unremarkable.  Lungs: Asymmetric prominence of lung markings assumed due to patient rotation and greater overlying right chest soft tissues. No focal consolidation.  Diaphragms: Unremarkable.  Pleura: No pneumothorax or effusions are identified.  Bony Structures:  Spine: Mild lower thoracic dextroscoliosis centred at T11-T12.  Left ribs: Normal.  Right ribs: Normal.  Abdomen: The visualized upper abdomen appears unremarkable.      Impression:  1. Asymmetric prominence of lung markings assumed due to patient rotation and greater overlying right chest soft tissues. No focal consolidation.                          Wet Read by Dylon Root DO (05/31/23 20:55:35, [facility] - Emergency Dept, Emergency Medicine)    Cardiomegaly.  Increased interstitial markings.  No dense lobar consolidation or pneumothorax.                                      Medications   amLODIPine tablet 10 mg (has no administration in time range)   ARIPiprazole tablet 15 mg (has no administration in time range)   losartan tablet 100 mg (has no administration in time range)   oxybutynin 24 hr tablet 5 mg (has no administration in time range)   rOPINIRole tablet 0.5 mg (0.5 mg Oral Given 6/1/23 0250)   traZODone tablet 50 mg (has no administration in time range)   sodium chloride 0.9% flush 10 mL (has no administration in time range)   naloxone 0.4 mg/mL injection 0.02 mg (has no administration in time range)   glucagon (human recombinant) injection 1 mg (has no administration in time range)   heparin (porcine) injection 5,000 Units (has no administration in time range)   acetaminophen tablet 650 mg (has no administration in time range)   HYDROcodone-acetaminophen 5-325 mg per tablet 1 tablet (has no administration in time range)   ondansetron injection 4 mg (has no administration in time range)   insulin aspart U-100 injection 0-5 Units (has no administration in time range)   miconazole NITRATE 2 % top powder (has no administration in time range)   albuterol-ipratropium 2.5 mg-0.5 mg/3 mL nebulizer solution 3 mL (3 mLs Nebulization Given 5/31/23 2052)   0.9%  NaCl infusion (0 mLs Intravenous Stopped 5/31/23 2240)   0.9%  NaCl infusion (1,000 mLs Intravenous New Bag 5/31/23 2226)   cefTRIAXone (ROCEPHIN) 2 g in dextrose 5 % in water (D5W) 5 % 50 mL IVPB (MB+) (0 g Intravenous Stopped 5/31/23 2130)     Medical Decision Making:   History:   I obtained history from: someone other than patient.  Initial Assessment:   68-year-old female who presents with fatigue over the past couple of weeks along with cough, wheezing, and shortness of breath.  States that her systolic blood pressure typically ran around 160 but over the past couple of weeks has been running in the low 100s and has been as low as 99 systolic along with having low heart rate.  Was seen by her PCP recently and had some  of her blood pressure medication discontinued.  Labs were obtained to evaluate for possible etiologies of her low blood pressure and fatigue.  CBC shows no leukocytosis or leukopenia, hemoglobin 11.1, no previous labs for comparison.  Initial troponin within normal limits.  ECG shows sinus rhythm with first-degree AV block at a rate of 64, no STEMI.  UA shows evidence of infection with 2+ leukocyte esterase many bacteria with 21-50 WBCs and RBCs, this was sent for culture.  Blood cultures were obtained along with lactic acid which was normal and she was given IV Rocephin to cover for urinary tract infection.  BMP shows hyponatremia of 125 with creatinine 2.05, no previous labs to compare this to.  Her hyponatremia definitely could be a cause for her fatigue and weakness.  Started on normal saline at 125 mL/hr.  We will admit for further medical evaluation and treatment.  I have spoken with the patient and/or caregivers. I have explained the patient's condition, diagnoses and treatment plan based on the information available to me at this time. I have answered the patient's and/or caregiver's questions and addressed any concerns. The patient and/or caregivers have as good an understanding of the patient's diagnosis, condition and treatment plan as can be expected at this point. The patient has been stabilized within the capability of the emergency department. The patient will be transported for further care and management or will be moved to an observation or inpatient service. I have communicated with the staff or medical practitioner taking over this patient's care.    I have personally provided 10 minutes of critical care time exclusive of time spent on separately billable procedures. Time includes review of laboratory data, radiology results, discussion with consultants, and monitoring for potential decompensation. Interventions were performed as documented above.   Clinical Tests:   Lab Tests: Ordered and  Reviewed  Radiological Study: Reviewed and Ordered  Medical Tests: Ordered and Reviewed           ED Course as of 06/01/23 0448   Wed May 31, 2023   2054 Sodium(!): 125 [IB]   2054 Creatinine(!): 2.05 [IB]   2054 BNP(!): 1,250.4 [IB]      ED Course User Index  [IB] Dylon Root DO                 Clinical Impression:   Final diagnoses:  [R53.1] Generalized weakness  [N17.9] LUCIEN (acute kidney injury) (Primary)  [E87.1] Hyponatremia  [N39.0, R31.9] Urinary tract infection with hematuria, site unspecified        ED Disposition Condition    Admit                 Dylon Root DO  06/01/23 0448

## 2023-06-01 NOTE — CONSULTS
Subjective:       Patient ID: Diane Larsen is a 68 y.o. female.    Chief Complaint: Fatigue (Pt caregiver states pt has had low bp, low oxygen sat x past week, saw Doreen Barnes yesterday and meds adjusted and given iv fluids.)    HPI  6/1/23 - History obtained from medical record, Dr. England:  Ms Larsen presented to the ER with caregiver with a primary complaint of low blood pressure low heart rate. She is on a monumentally long list of medications but it is uncertain if all these are accurate at this time. She reportedly was seen by PCP recently pressure medication adjustments which likely includes stopping hydrochlorothiazide but this has not been confirmed.  There is also a Ford of nonbloody diarrhea chronically for at least six months. No report of fever, chills, abdominal pain, nausea or vomiting.  Workup in the emergency room did reveal a low sodium of 125.  She also has acute  renal failure with an uncertain baseline.  She is being admitted to telemetry due to the hypotension bradycardia and given IV fluids for the low-sodium.    Wound Care Consult:  Wound Care is being consulted for the patient for a neuropathic wound at the dorsal surface of the left foot.  She is known to this provider as a patient of mine at [facility].  She establish services on 05/02/2023 this foot wound.  During the duration of her time as the patient, her personal Care attendance has indicated that she will be changing her primary care provider due to excessive medications, lack of lab tests performed etc.  She was seen on 05/30/2023 and did fall asleep a couple of times during the visit and seemed unusually groggy.  Particular concern is her increase in falls, she did have a fall the previous day and she was uncertain if she hit her head although she said that she thought that she did not.  The care attendance said that there were no obvious injuries on the head and the patient denied filling dizzy  etc  Wound #1 - L97.422 - non pressure chronic ulcer, left heel and midfoot, fat layer exposed  3.3 x 3.5 x 0.2 cm, 100% granular tissue, defined margin          Left foot: cleanse with wound cleanser, apply mesalt to wound bed, cover with silver alginate, 4x4, kerlix and coban, change daily, wear heel protectors at all times      Review of Systems      Objective:      Vitals:    06/01/23 0753   BP: 136/74   Pulse: 66   Resp: 20   Temp: 97.9 °F (36.6 °C)       Physical Exam       Altered Skin Integrity 04/18/23 1351 Left medial Foot (Active)   04/18/23 1351   Altered Skin Integrity Present on Admission - Did Patient arrive to the hospital with altered skin?: yes   Side: Left   Orientation: medial   Location: Foot   Wound Number:    Is this injury device related?:    Primary Wound Type:    Description of Altered Skin Integrity:    Ankle-Brachial Index:    Pulses:    Removal Indication and Assessment:    Wound Outcome:    (Retired) Wound Length (cm):    (Retired) Wound Width (cm):    (Retired) Depth (cm):    Wound Description (Comments):    Removal Indications:    Wound Image   06/01/23 0245   Tissue loss description Full thickness 06/01/23 0245         Assessment:         ICD-10-CM ICD-9-CM   1. LUCIEN (acute kidney injury)  N17.9 584.9   2. Generalized weakness  R53.1 780.79   3. Hyponatremia  E87.1 276.1   4. Urinary tract infection with hematuria, site unspecified  N39.0 599.0    R31.9 599.70   5. Chest pain  R07.9 786.50           MEDICATIONS    Current Facility-Administered Medications:     acetaminophen tablet 650 mg, 650 mg, Oral, Q4H PRN, Jt England Jr., MD    amLODIPine tablet 10 mg, 10 mg, Oral, Daily, Jt England Jr., MD    ARIPiprazole tablet 15 mg, 15 mg, Oral, Daily, Jt England Jr., MD    glucagon (human recombinant) injection 1 mg, 1 mg, Intramuscular, PRN, Jt England Jr., MD    heparin (porcine) injection 5,000 Units, 5,000 Units, Subcutaneous, Q8H, Jt England Jr., MD, 5,000 Units at 06/01/23 0550     HYDROcodone-acetaminophen 5-325 mg per tablet 1 tablet, 1 tablet, Oral, Q6H PRN, Jt England Jr., MD    insulin aspart U-100 injection 0-5 Units, 0-5 Units, Subcutaneous, QID (AC + HS) PRN, Jt England Jr., MD    losartan tablet 100 mg, 100 mg, Oral, Daily, Jt England Jr., MD    miconazole NITRATE 2 % top powder, , Topical (Top), BID, Jt England Jr., MD    naloxone 0.4 mg/mL injection 0.02 mg, 0.02 mg, Intravenous, PRN, Jt England Jr., MD    ondansetron injection 4 mg, 4 mg, Intravenous, Q8H PRN, Jt England Jr., MD    oxybutynin 24 hr tablet 5 mg, 5 mg, Oral, Daily, Jt England Jr., MD    rOPINIRole tablet 0.5 mg, 0.5 mg, Oral, QHS, Jt England Jr., MD, 0.5 mg at 06/01/23 0250    sodium chloride 0.9% flush 10 mL, 10 mL, Intravenous, Q12H PRN, Jt England Jr., MD    traZODone tablet 50 mg, 50 mg, Oral, Nightly PRN, Jt England Jr., MD Review of patient's allergies indicates:  No Known Allergies    DIAGNOSTICS      Labs/Scans/Micro:    CBC:   Lab Results   Component Value Date    WBC 10.46 06/01/2023    HGB 11.4 (L) 06/01/2023    HCT 35.0 (L) 06/01/2023    MCV 86.6 06/01/2023     (H) 06/01/2023       Sedimentation rate: No results found for: SEDRATE C-reactive protein: No results found for: CRP Chemistry: [unfilled]  HBA1C: No components found for: HBA1C  (ordered)    Ankle Brachial Index: No results found for this or any previous visit.      Imaging:    Plain film: No results found for this or any previous visit.    MRI: No results found for this or any previous visit.   No results found for this or any previous visit.    Bone Scan: No results found for this or any previous visit.   No results found for this or any previous visit.      Vascular studies:      Microbiology: No results found for: MICRO    Electronically signed:  Barbra Perez NP

## 2023-06-01 NOTE — PROGRESS NOTES
Inpatient Nutrition Evaluation    Admit Date: 5/31/2023   Total duration of encounter: 1 day    Nutrition Recommendation/Prescription     Continue current diet as tolerated.   Monitor need for adding Renal restriction to current diet.   Daily weight and labs.   Monitor intake, weight, and labs.     RD following and available as needed. Thank you.     Nutrition Assessment     Chart Review    Reason Seen: continuous nutrition monitoring    Malnutrition Screening Tool Results   Have you recently lost weight without trying?: No  Have you been eating poorly because of a decreased appetite?: No   MST Score: 0     Diagnosis:  1 - Hyponatremia: IV fluids, by report hydrochlorothiazide stopped recently, Check a.m. basic metabolic panel     2 - UTI: IV ceftriaxone, follow-up urine culture susceptibility     3 - Acute renal failure: IV fluids, check a.m. basic metabolic panel, renal dose medications as indicated     4 - Bradycardia: Monitor on telemetry, Hold labetalol for now     5 - Parkinson's disease: Continue current management, assistance with ADLs as needed    Relevant Medical History:   Anxiety disorder, unspecified      Bipolar disorder, unspecified      COPD (chronic obstructive pulmonary disease)      Depression      Essential (primary) hypertension      Neuropathy      Schizophrenia, unspecified      Seizure disorder          Nutrition-Related Medications:   Heparin; Insulin.    Nutrition-Related Labs:  6/1: Na+ 130(L); Cl 96(L); BUN/Crea 38/1.65(H);GFR 34(L); H/H 11.4/35.0(L).     Diet Order: Diet diabetic  Oral Supplement Order: none  Appetite/Oral Intake: good/% of meals  Factors Affecting Nutritional Intake: none identified  Food/Mandaen/Cultural Preferences: none reported  Food Allergies: none reported    Skin Integrity: wound, other (see comments)  Wound(s):      Altered Skin Integrity 04/18/23 1351 Left medial Foot-Tissue loss description: Full thickness     Comments    6/1: Pt is a new admit with  "good appetite and intake. Consuming 75% of meals so far today. No recent weight loss noted/reported. Labs and meds reviewed. Na+ and renal indices improving from admit. Will continue to monitor during stay.     Anthropometrics    Height: 5' 6" (167.6 cm) Height Method: Stated  Last Weight: 92 kg (202 lb 13.2 oz) (06/01/23 0309) Weight Method: Bed Scale  BMI (Calculated): 32.8  BMI Classification: obese grade I (BMI 30-34.9)     Ideal Body Weight (IBW), Female: 130 lb     % Ideal Body Weight, Female (lb): 156.02 %                             Usual Weight Provided By: unable to obtain usual weight    Wt Readings from Last 5 Encounters:   06/01/23 92 kg (202 lb 13.2 oz)   05/30/23 97.5 kg (215 lb)   05/24/23 97.5 kg (215 lb)   05/16/23 97.5 kg (215 lb)   05/09/23 97.5 kg (215 lb)     Weight Change(s) Since Admission:  Admit Weight: 90.7 kg (200 lb) (05/31/23 1853)      Patient Education    Not applicable.    Monitoring & Evaluation     Dietitian will monitor food and beverage intake, energy intake, weight, weight change, electrolyte/renal panel, glucose/endocrine profile, and gastrointestinal profile.  Nutrition Risk/Follow-Up: low (follow-up in 5-7 days)  Patients assigned 'low nutrition risk' status do not qualify for a full nutritional assessment but will be monitored and re-evaluated in a 5-7 day time period. Please consult if re-evaluation needed sooner.   "

## 2023-06-01 NOTE — CONSULTS
[facility]  Cardiology  Consult Note    Patient Name: Diane Larsen  MRN: [id number]  Admission Date: 5/31/2023  Hospital Length of Stay: 1 days  Code Status: Full Code   Attending Provider: Jt England Jr., MD   Consulting Provider: NIGEL Holland  Primary Care Physician: NIGEL Avendaño  Principal Problem:<principal problem not specified>    Patient information was obtained from patient and primary team.     Inpatient consult to Cardiology  Consult performed by: NIGEL Holland  Consult ordered by: Jt England Jr., MD  Reason for consult: Bradycardia      Subjective:     Chief Complaint:  Weakness     HPI: Patient is a 67 yo female unknown to [facility]. PMH of COPD, HTN and anxiety. She presented to ED with c/o weakness. She was found to be hyponatremic and bradycardic. Reviewing her home med list, it looks like she was on 2 beta blockers. Her HR was in the 40's overnight but has since been running in the 60's since around 630 am. She is currently resting in bed without distress. She denies any CP or SOB.     Past Medical History:   Diagnosis Date    Anxiety disorder, unspecified     Bipolar disorder, unspecified     COPD (chronic obstructive pulmonary disease)     Depression     Essential (primary) hypertension     Neuropathy     Schizophrenia, unspecified     Seizure disorder        Past Surgical History:   Procedure Laterality Date    BACK SURGERY  2003    jesusita inserted    EXCISION-WIDE LOCAL Left 04/11/2023    left foot       Review of patient's allergies indicates:  No Known Allergies    No current facility-administered medications on file prior to encounter.     Current Outpatient Medications on File Prior to Encounter   Medication Sig    albuterol (PROVENTIL/VENTOLIN HFA) 90 mcg/actuation inhaler USE TWO PUFFS BY MOUTH EVERY 4 HOURS AS NEEDED    amLODIPine (NORVASC) 10 MG tablet Take 10 mg by mouth.    ARIPiprazole (ABILIFY) 15 MG Tab Take 15 mg by mouth.     busPIRone (BUSPAR) 10 MG tablet Take 10 mg by mouth 2 (two) times daily.    dapagliflozin (FARXIGA) 10 mg tablet Take 10 mg by mouth once daily.    diphenoxylate-atropine 2.5-0.025 mg (LOMOTIL) 2.5-0.025 mg per tablet Take 1 tablet by mouth 4 (four) times daily as needed for Diarrhea.    divalproex (DEPAKOTE) 500 MG TbEC Take 1,000 mg by mouth every evening.    fenofibrate (TRICOR) 54 MG tablet TAKE ONE TABLET BY MOUTH ONCE A DAY WITH FOOD    fluconazole (DIFLUCAN) 100 MG tablet Take 100 mg by mouth once daily.    furosemide (LASIX) 20 MG tablet TAKE ONE TABLET BY MOUTH EVERY DAY AS NEEDED FOR FLUID    gabapentin (NEURONTIN) 600 MG tablet Take 600 mg by mouth 3 (three) times daily.    ipratropium (ATROVENT) 0.02 % nebulizer solution Take 500 mcg by nebulization 4 (four) times daily. Rescue    labetaloL (NORMODYNE) 200 MG tablet TAKE ONE TABLET BY MOUTH TWICE A DAY FOR BLOOD PRESSURE    losartan (COZAAR) 100 MG tablet Take 100 mg by mouth.    mirtazapine (REMERON) 15 MG tablet Take 15 mg by mouth every evening. at bedtime.    nystatin (MYCOSTATIN) cream Apply 1 g topically 2 (two) times daily.    nystatin (MYCOSTATIN) powder Apply 1 g topically 2 (two) times daily.    oxybutynin (DITROPAN-XL) 5 MG TR24 Take 5 mg by mouth 2 (two) times daily.    pantoprazole (PROTONIX) 40 MG tablet Take 40 mg by mouth.    potassium chloride (KLOR-CON) 10 MEQ TbSR TAKE ONE TABLET BY MOUTH ONCE A DAY ONLY WHEN TAKING LASIX    primidone (MYSOLINE) 50 MG Tab Take 50 mg by mouth.    QUEtiapine (SEROQUEL) 100 MG Tab     rOPINIRole (REQUIP) 0.5 MG tablet Take 0.5 mg by mouth every evening. at bedtime.    sertraline (ZOLOFT) 100 MG tablet Take 100 mg by mouth.    tamsulosin (FLOMAX) 0.4 mg Cap Take 1 capsule by mouth.    traZODone (DESYREL) 50 MG tablet Take 50 mg by mouth nightly as needed.    TRELEGY ELLIPTA 200-62.5-25 mcg inhaler INHALE ONE PUFF INTO THE LUNGS EVERY DAY    WESTAB PLUS 27 mg iron- 1 mg Tab Take 1 tablet by mouth.     hydroCHLOROthiazide (HYDRODIURIL) 25 MG tablet Take 25 mg by mouth.    hydrOXYzine HCL (ATARAX) 25 MG tablet     meloxicam (MOBIC) 15 MG tablet TAKE ONE TABLET BY MOUTH EVERY DAY FOR ARTHRITIS    nebivoloL (BYSTOLIC) 10 MG Tab Take 10 mg by mouth every evening. at bedtime.     Family History       Problem Relation (Age of Onset)    Alzheimer's disease Father    Heart attack Father    Hypertension Mother, Father    Parkinsonism Mother    Stroke Father          Tobacco Use    Smoking status: Every Day     Packs/day: 0.50     Years: 50.00     Pack years: 25.00     Types: Cigarettes    Smokeless tobacco: Never   Substance and Sexual Activity    Alcohol use: Not Currently    Drug use: Never    Sexual activity: Not on file     Review of Systems   Constitutional: Negative.   Cardiovascular: Negative.    Respiratory: Negative.     Objective:     Vital Signs (Most Recent):  Temp: 98.9 °F (37.2 °C) (06/01/23 1157)  Pulse: 67 (06/01/23 1157)  Resp: 20 (06/01/23 1157)  BP: (!) 142/71 (06/01/23 1157)  SpO2: 95 % (06/01/23 1157) Vital Signs (24h Range):  Temp:  [97.4 °F (36.3 °C)-98.9 °F (37.2 °C)] 98.9 °F (37.2 °C)  Pulse:  [40-67] 67  Resp:  [16-22] 20  SpO2:  [93 %-98 %] 95 %  BP: (103-159)/(58-99) 142/71     Weight: 92 kg (202 lb 13.2 oz)  Body mass index is 32.74 kg/m².    SpO2: 95 %         Intake/Output Summary (Last 24 hours) at 6/1/2023 1325  Last data filed at 6/1/2023 1311  Gross per 24 hour   Intake 1659 ml   Output 800 ml   Net 859 ml       Lines/Drains/Airways       Peripheral Intravenous Line  Duration                  Peripheral IV - Single Lumen 05/31/23 2100 22 G Left;Posterior Hand <1 day                    Physical Exam  Constitutional:       General: She is not in acute distress.  Eyes:      Extraocular Movements: Extraocular movements intact.   Cardiovascular:      Rate and Rhythm: Normal rate and regular rhythm.      Heart sounds: No murmur heard.  Pulmonary:      Effort: Pulmonary effort is normal. No  respiratory distress.      Breath sounds: Normal breath sounds.   Musculoskeletal:         General: No swelling. Normal range of motion.   Skin:     General: Skin is warm and dry.   Neurological:      Mental Status: She is alert and oriented to person, place, and time.   Psychiatric:         Mood and Affect: Mood normal.         Behavior: Behavior normal.       Significant Labs: CMP   Recent Labs   Lab 05/31/23 1946 05/31/23  2330 06/01/23  0448   * 127* 130*   K 3.5 3.6 3.7   CO2 22* 22* 25   BUN 41.0* 41.0* 38.0*   CREATININE 2.05* 1.79* 1.65*   CALCIUM 9.0 8.7 8.9   , CBC   Recent Labs   Lab 05/31/23 1946 06/01/23 0448   WBC 10.86 10.46   HGB 11.1* 11.4*   HCT 33.9* 35.0*   * 403*   , and Troponin   Recent Labs   Lab 05/31/23 1946   TROPONINI 0.027       Significant Imaging:   Assessment and Plan:     1. Bradycardia, resolved     - Hold AV andrés blocking agents     - Monitor tele  2. Hyponatremia     - Per primary  3. Acute renal failure, improving  4. UTI     - per primary      Thank you for your consult.     Ludwig Forrest, NIGEL  Cardiology   [facility]

## 2023-06-01 NOTE — PLAN OF CARE
Problem: Adult Inpatient Plan of Care  Goal: Plan of Care Review  Outcome: Ongoing, Progressing  Goal: Patient-Specific Goal (Individualized)  Outcome: Ongoing, Progressing  Goal: Absence of Hospital-Acquired Illness or Injury  Outcome: Ongoing, Progressing  Goal: Optimal Comfort and Wellbeing  Outcome: Ongoing, Progressing  Goal: Readiness for Transition of Care  Outcome: Ongoing, Progressing     Problem: Impaired Wound Healing  Goal: Optimal Wound Healing  Outcome: Ongoing, Progressing     Problem: Skin Injury Risk Increased  Goal: Skin Health and Integrity  Outcome: Ongoing, Progressing     Problem: Dysrhythmia  Goal: Normalized Cardiac Rhythm  Outcome: Ongoing, Progressing     Problem: Fall Injury Risk  Goal: Absence of Fall and Fall-Related Injury  Outcome: Ongoing, Progressing     Problem: Fatigue  Goal: Improved Activity Tolerance  Outcome: Ongoing, Progressing     Problem: UTI (Urinary Tract Infection)  Goal: Improved Infection Symptoms  Outcome: Ongoing, Progressing

## 2023-06-01 NOTE — H&P
[facility] - Emergency Dept    History & Physical      Patient Name: Diane Larsen  MRN: [id number]  Admission Date: 5/31/2023  Attending Physician:  Jt England MD  Primary Care Provider: NIGEL Avendaño         Patient information was obtained from patient and ER records.     Subjective:     Principal Problem: Hyponatremia    Chief Complaint:   Chief Complaint   Patient presents with    Fatigue     Pt caregiver states pt has had low bp, low oxygen sat x past week, saw Doreen Barnes yesterday and meds adjusted and given iv fluids.        HPI: Ms Larsen presented to the ER with caregiver with a primary complaint of low blood pressure low heart rate. She is on a monumentally long list of medications but it is uncertain if all these are accurate at this time. She reportedly was seen by PCP recently pressure medication adjustments which likely includes stopping hydrochlorothiazide but this has not been confirmed.  There is also a Ford of nonbloody diarrhea chronically for at least six months. No report of fever, chills, abdominal pain, nausea or vomiting.  Workup in the emergency room did reveal a low sodium of 125.  She also has acute  renal failure with an uncertain baseline.  She is being admitted to telemetry due to the hypotension bradycardia and given IV fluids for the low-sodium.      Past Medical History:   Diagnosis Date    Anxiety disorder, unspecified     Bipolar disorder, unspecified     COPD (chronic obstructive pulmonary disease)     Depression     Essential (primary) hypertension     Neuropathy     Schizophrenia, unspecified     Seizure disorder        Past Surgical History:   Procedure Laterality Date    BACK SURGERY  2003    jesusita inserted    EXCISION-WIDE LOCAL Left 04/11/2023    left foot       Review of patient's allergies indicates:  No Known Allergies    No current facility-administered medications on file prior to encounter.     Current Outpatient Medications on File Prior to  Encounter   Medication Sig    albuterol (PROVENTIL/VENTOLIN HFA) 90 mcg/actuation inhaler USE TWO PUFFS BY MOUTH EVERY 4 HOURS AS NEEDED    amLODIPine (NORVASC) 10 MG tablet Take 10 mg by mouth.    ARIPiprazole (ABILIFY) 15 MG Tab Take 15 mg by mouth.    busPIRone (BUSPAR) 10 MG tablet Take 10 mg by mouth 2 (two) times daily.    dapagliflozin (FARXIGA) 10 mg tablet Take 10 mg by mouth once daily.    diphenoxylate-atropine 2.5-0.025 mg (LOMOTIL) 2.5-0.025 mg per tablet Take 1 tablet by mouth 4 (four) times daily as needed for Diarrhea.    divalproex (DEPAKOTE) 500 MG TbEC Take 1,000 mg by mouth every evening.    fenofibrate (TRICOR) 54 MG tablet TAKE ONE TABLET BY MOUTH ONCE A DAY WITH FOOD    fluconazole (DIFLUCAN) 100 MG tablet Take 100 mg by mouth once daily.    furosemide (LASIX) 20 MG tablet TAKE ONE TABLET BY MOUTH EVERY DAY AS NEEDED FOR FLUID    gabapentin (NEURONTIN) 600 MG tablet Take 600 mg by mouth 3 (three) times daily.    ipratropium (ATROVENT) 0.02 % nebulizer solution Take 500 mcg by nebulization 4 (four) times daily. Rescue    labetaloL (NORMODYNE) 200 MG tablet TAKE ONE TABLET BY MOUTH TWICE A DAY FOR BLOOD PRESSURE    losartan (COZAAR) 100 MG tablet Take 100 mg by mouth.    mirtazapine (REMERON) 15 MG tablet Take 15 mg by mouth every evening. at bedtime.    nystatin (MYCOSTATIN) cream Apply 1 g topically 2 (two) times daily.    nystatin (MYCOSTATIN) powder Apply 1 g topically 2 (two) times daily.    oxybutynin (DITROPAN-XL) 5 MG TR24 Take 5 mg by mouth 2 (two) times daily.    pantoprazole (PROTONIX) 40 MG tablet Take 40 mg by mouth.    potassium chloride (KLOR-CON) 10 MEQ TbSR TAKE ONE TABLET BY MOUTH ONCE A DAY ONLY WHEN TAKING LASIX    primidone (MYSOLINE) 50 MG Tab Take 50 mg by mouth.    QUEtiapine (SEROQUEL) 100 MG Tab     rOPINIRole (REQUIP) 0.5 MG tablet Take 0.5 mg by mouth every evening. at bedtime.    sertraline (ZOLOFT) 100 MG tablet Take 100 mg by mouth.    tamsulosin (FLOMAX) 0.4 mg  Cap Take 1 capsule by mouth.    traZODone (DESYREL) 50 MG tablet Take 50 mg by mouth nightly as needed.    TRELEGY ELLIPTA 200-62.5-25 mcg inhaler INHALE ONE PUFF INTO THE LUNGS EVERY DAY    WESTAB PLUS 27 mg iron- 1 mg Tab Take 1 tablet by mouth.    hydroCHLOROthiazide (HYDRODIURIL) 25 MG tablet Take 25 mg by mouth.    hydrOXYzine HCL (ATARAX) 25 MG tablet     meloxicam (MOBIC) 15 MG tablet TAKE ONE TABLET BY MOUTH EVERY DAY FOR ARTHRITIS    nebivoloL (BYSTOLIC) 10 MG Tab Take 10 mg by mouth every evening. at bedtime.     Family History       Problem Relation (Age of Onset)    Alzheimer's disease Father    Heart attack Father    Hypertension Mother, Father    Parkinsonism Mother    Stroke Father          Tobacco Use    Smoking status: Every Day     Packs/day: 0.50     Years: 50.00     Pack years: 25.00     Types: Cigarettes    Smokeless tobacco: Never   Substance and Sexual Activity    Alcohol use: Not Currently    Drug use: Never    Sexual activity: Not on file     Review of Systems   Constitutional:  Positive for fatigue. Negative for chills and fever.   HENT:  Negative for congestion and sore throat.    Eyes:  Negative for photophobia and pain.   Respiratory:  Positive for shortness of breath. Negative for chest tightness.    Cardiovascular:  Negative for chest pain and palpitations.   Gastrointestinal:  Negative for abdominal pain, diarrhea, nausea and vomiting.   Endocrine: Negative for cold intolerance and heat intolerance.   Genitourinary:  Negative for dysuria and flank pain.   Musculoskeletal:  Negative for joint swelling and myalgias.   Skin:  Negative for pallor and rash.   Allergic/Immunologic: Negative for environmental allergies and food allergies.   Neurological:  Negative for seizures and headaches.   Psychiatric/Behavioral:  Negative for agitation and confusion.      Objective:     Vital Signs (Most Recent):  Temp: 97.4 °F (36.3 °C) (05/31/23 1853)  Pulse: 66 (05/31/23 2329)  Resp: (!) 22 (05/31/23  2052)  BP: 119/67 (05/31/23 2302)  SpO2: 96 % (05/31/23 2329) Vital Signs (24h Range):  Temp:  [97.4 °F (36.3 °C)] 97.4 °F (36.3 °C)  Pulse:  [44-66] 66  Resp:  [20-22] 22  SpO2:  [93 %-96 %] 96 %  BP: (115-146)/(67-99) 119/67     Weight: 90.7 kg (200 lb)  Body mass index is 32.28 kg/m².    Physical Exam  Constitutional:       General: She is not in acute distress.     Appearance: Normal appearance.   HENT:      Head: Normocephalic and atraumatic.   Eyes:      General: No scleral icterus.     Extraocular Movements: Extraocular movements intact.   Cardiovascular:      Rate and Rhythm: Normal rate and regular rhythm.   Pulmonary:      Effort: Pulmonary effort is normal. No respiratory distress.   Abdominal:      General: There is no distension.      Palpations: Abdomen is soft.   Musculoskeletal:         General: No swelling. Normal range of motion.      Cervical back: Normal range of motion and neck supple.   Skin:     General: Skin is dry.      Coloration: Skin is not jaundiced.   Neurological:      General: No focal deficit present.      Mental Status: She is alert. Mental status is at baseline.   Psychiatric:         Mood and Affect: Mood normal.         Behavior: Behavior normal.          Significant Labs: All pertinent labs within the past 24 hours have been reviewed.  Recent Lab Results  (Last 5 results in the past 24 hours)        05/31/23  2330   05/31/23  2107 05/31/23 2022 05/31/23  1946 05/31/23  1853        Anion Gap 11.0       13.0         Appearance, UA     Clear           Bacteria, UA     Many           Baso #       0.01         Basophil %       0.1         Bilirubin, UA     Negative           BNP       1,250.4         BUN 41.0       41.0         BUN/CREAT RATIO 23       20         Calcium 8.7       9.0         Chloride 94       90         CO2 22       22         Color, UA     Yellow           Creatinine 1.79       2.05         eGFR 31       26         Eos #       0.08         Eosinophil %        0.7         Glucose 101       93         Glucose, UA     Negative           Hematocrit       33.9         Hemoglobin       11.1         Immature Grans (Abs)       0.04         Immature Granulocytes       0.4         Ketones, UA     Negative           Lactate, Joaquim   1.0             Leukocytes, UA     2+           Lymph #       2.71         LYMPH %       25.0         Magnesium       1.80         MCH       28.2         MCHC       32.7         MCV       86.0         Mono #       0.99         Mono %       9.1         MPV       9.1         Neut #       7.03         Neut %       64.7         NITRITE UA     Negative           Occult Blood UA     3+           pH, UA     6.0           Platelets       412         POCT Glucose         98       Potassium 3.6       3.5         Protein, UA     2+           RBC       3.94         RBC, UA     21-50           RDW       14.6         Sodium 127       125         Specific Gravity,UA     1.020           Squam Epithel, UA     Few           Troponin I       0.027         Urobilinogen, UA     0.2           WBC, UA     21-50           WBC       10.86                                Significant Imaging: I have reviewed all pertinent imaging results/findings within the past 24 hours.    Assessment/Plan:     1 - Hyponatremia: IV fluids, by report hydrochlorothiazide stopped recently, Check a.m. basic metabolic panel    2 - UTI: IV ceftriaxone, follow-up urine culture susceptibility    3 - Acute renal failure: IV fluids, check a.m. basic metabolic panel, renal dose medications as indicated    4 - Bradycardia: Monitor on telemetry, Hold labetalol for now    5 - Parkinson's disease: Continue current management, assistance with ADLs as needed    VTE Risk Mitigation (From admission, onward)           Ordered     heparin (porcine) injection 5,000 Units  Every 8 hours         06/01/23 0003     IP VTE HIGH RISK PATIENT  Once         06/01/23 0003     Place sequential compression device  Until  discontinued         06/01/23 0003                  This visit is via telemedicine from [place], [place]  Patient is located in [place], [place]    Full code    Time spent 6/1/23 is 50 minutes  Plan discussed with patient, she is her own decision maker and does understand and concur with plan    Patient screened for home safety and security concerns    Jt England JR, MD  Department of Hospital Medicine   [facility] - Emergency Dept

## 2023-06-02 LAB
ANION GAP SERPL CALC-SCNC: 10 MEQ/L
BUN SERPL-MCNC: 21 MG/DL (ref 9.8–20.1)
CALCIUM SERPL-MCNC: 9.7 MG/DL (ref 8.4–10.2)
CHLORIDE SERPL-SCNC: 101 MMOL/L (ref 98–107)
CLOSTRIDIUM DIFFICILE GDH ANTIGEN (OHS): POSITIVE
CLOSTRIDIUM DIFFICILE TOXIN A/B (OHS): NEGATIVE
CO2 SERPL-SCNC: 25 MMOL/L (ref 23–31)
CREAT SERPL-MCNC: 0.86 MG/DL (ref 0.55–1.02)
CREAT/UREA NIT SERPL: 24
CRP SERPL-MCNC: 60.7 MG/L
ERYTHROCYTE [SEDIMENTATION RATE] IN BLOOD: 66 MM/HR (ref 0–20)
EST. AVERAGE GLUCOSE BLD GHB EST-MCNC: 148.5 MG/DL
GFR SERPLBLD CREATININE-BSD FMLA CKD-EPI: >60 MLS/MIN/1.73/M2
GLUCOSE SERPL-MCNC: 114 MG/DL (ref 82–115)
HBA1C MFR BLD: 6.8 %
POCT GLUCOSE: 104 MG/DL (ref 70–110)
POCT GLUCOSE: 111 MG/DL (ref 70–110)
POCT GLUCOSE: 122 MG/DL (ref 70–110)
POTASSIUM SERPL-SCNC: 3.5 MMOL/L (ref 3.5–5.1)
SODIUM SERPL-SCNC: 136 MMOL/L (ref 136–145)

## 2023-06-02 PROCEDURE — 83036 HEMOGLOBIN GLYCOSYLATED A1C: CPT | Performed by: NURSE PRACTITIONER

## 2023-06-02 PROCEDURE — 27000207 HC ISOLATION

## 2023-06-02 PROCEDURE — 11000001 HC ACUTE MED/SURG PRIVATE ROOM

## 2023-06-02 PROCEDURE — 86318 IA INFECTIOUS AGENT ANTIBODY: CPT | Performed by: INTERNAL MEDICINE

## 2023-06-02 PROCEDURE — 25000003 PHARM REV CODE 250: Performed by: INTERNAL MEDICINE

## 2023-06-02 PROCEDURE — 86140 C-REACTIVE PROTEIN: CPT | Performed by: NURSE PRACTITIONER

## 2023-06-02 PROCEDURE — 97530 THERAPEUTIC ACTIVITIES: CPT

## 2023-06-02 PROCEDURE — 85652 RBC SED RATE AUTOMATED: CPT | Performed by: NURSE PRACTITIONER

## 2023-06-02 PROCEDURE — 87507 IADNA-DNA/RNA PROBE TQ 12-25: CPT | Performed by: INTERNAL MEDICINE

## 2023-06-02 PROCEDURE — 21400001 HC TELEMETRY ROOM

## 2023-06-02 PROCEDURE — 80048 BASIC METABOLIC PNL TOTAL CA: CPT | Performed by: INTERNAL MEDICINE

## 2023-06-02 PROCEDURE — 94761 N-INVAS EAR/PLS OXIMETRY MLT: CPT

## 2023-06-02 PROCEDURE — 87493 C DIFF AMPLIFIED PROBE: CPT | Performed by: INTERNAL MEDICINE

## 2023-06-02 PROCEDURE — 63600175 PHARM REV CODE 636 W HCPCS: Performed by: INTERNAL MEDICINE

## 2023-06-02 PROCEDURE — 97161 PT EVAL LOW COMPLEX 20 MIN: CPT

## 2023-06-02 RX ORDER — HYDRALAZINE HYDROCHLORIDE 20 MG/ML
10 INJECTION INTRAMUSCULAR; INTRAVENOUS EVERY 4 HOURS PRN
Status: DISCONTINUED | OUTPATIENT
Start: 2023-06-02 | End: 2023-06-06 | Stop reason: HOSPADM

## 2023-06-02 RX ORDER — TAMSULOSIN HYDROCHLORIDE 0.4 MG/1
1 CAPSULE ORAL DAILY
Status: DISCONTINUED | OUTPATIENT
Start: 2023-06-02 | End: 2023-06-06 | Stop reason: HOSPADM

## 2023-06-02 RX ADMIN — HYDRALAZINE HYDROCHLORIDE 10 MG: 20 INJECTION INTRAMUSCULAR; INTRAVENOUS at 03:06

## 2023-06-02 RX ADMIN — ARIPIPRAZOLE 15 MG: 5 TABLET ORAL at 08:06

## 2023-06-02 RX ADMIN — HYDRALAZINE HYDROCHLORIDE 10 MG: 20 INJECTION INTRAMUSCULAR; INTRAVENOUS at 09:06

## 2023-06-02 RX ADMIN — HEPARIN SODIUM 5000 UNITS: 5000 INJECTION, SOLUTION INTRAVENOUS; SUBCUTANEOUS at 01:06

## 2023-06-02 RX ADMIN — MICONAZOLE NITRATE: 20 POWDER TOPICAL at 09:06

## 2023-06-02 RX ADMIN — HEPARIN SODIUM 5000 UNITS: 5000 INJECTION, SOLUTION INTRAVENOUS; SUBCUTANEOUS at 09:06

## 2023-06-02 RX ADMIN — ROPINIROLE HYDROCHLORIDE 0.5 MG: 0.25 TABLET, FILM COATED ORAL at 09:06

## 2023-06-02 RX ADMIN — AMLODIPINE BESYLATE 10 MG: 10 TABLET ORAL at 08:06

## 2023-06-02 RX ADMIN — LOSARTAN POTASSIUM 100 MG: 50 TABLET, FILM COATED ORAL at 08:06

## 2023-06-02 RX ADMIN — HEPARIN SODIUM 5000 UNITS: 5000 INJECTION, SOLUTION INTRAVENOUS; SUBCUTANEOUS at 05:06

## 2023-06-02 RX ADMIN — HYDROCODONE BITARTRATE AND ACETAMINOPHEN 1 TABLET: 5; 325 TABLET ORAL at 10:06

## 2023-06-02 RX ADMIN — OXYBUTYNIN CHLORIDE 5 MG: 5 TABLET, EXTENDED RELEASE ORAL at 08:06

## 2023-06-02 RX ADMIN — TAMSULOSIN HYDROCHLORIDE 0.4 MG: 0.4 CAPSULE ORAL at 03:06

## 2023-06-02 NOTE — PROGRESS NOTES
[facility] - Medical Surgical Unit  Cardiology  Progress Note    Patient Name: Diane Larsen  MRN: [id number]  Admission Date: 5/31/2023  Hospital Length of Stay: 2 days  Code Status: Full Code   Attending Provider: Jt England Jr., MD   Consulting Provider: NIGEL [place]  Primary Care Physician: NIGEL Avendaño  Principal Problem:<principal problem not specified>    Patient information was obtained from patient and primary team.       Subjective:     Chief Complaint:  Weakness     HPI: Patient is a 67 yo female unknown to [facility]. PMH of COPD, HTN and anxiety. She presented to ED with c/o weakness. She was found to be hyponatremic and bradycardic. Reviewing her home med list, it looks like she was on 2 beta blockers. Her HR was in the 40's overnight but has since been running in the 60's since around 630 am. She is currently resting in bed without distress. She denies any CP or SOB.     6/2/23: Patient in bed without distress. She denies any CP or SOB. HR has remained stable in the 60's.    Past Medical History:   Diagnosis Date    Anxiety disorder, unspecified     Bipolar disorder, unspecified     COPD (chronic obstructive pulmonary disease)     Depression     Essential (primary) hypertension     Neuropathy     Schizophrenia, unspecified     Seizure disorder        Past Surgical History:   Procedure Laterality Date    BACK SURGERY  2003    jesusita inserted    EXCISION-WIDE LOCAL Left 04/11/2023    left foot       Review of patient's allergies indicates:  No Known Allergies    No current facility-administered medications on file prior to encounter.     Current Outpatient Medications on File Prior to Encounter   Medication Sig    albuterol (PROVENTIL/VENTOLIN HFA) 90 mcg/actuation inhaler USE TWO PUFFS BY MOUTH EVERY 4 HOURS AS NEEDED    amLODIPine (NORVASC) 10 MG tablet Take 10 mg by mouth.    ARIPiprazole (ABILIFY) 15 MG Tab Take 15 mg by mouth.    busPIRone (BUSPAR) 10 MG tablet Take 10 mg by  mouth 2 (two) times daily.    dapagliflozin (FARXIGA) 10 mg tablet Take 10 mg by mouth once daily.    diphenoxylate-atropine 2.5-0.025 mg (LOMOTIL) 2.5-0.025 mg per tablet Take 1 tablet by mouth 4 (four) times daily as needed for Diarrhea.    divalproex (DEPAKOTE) 500 MG TbEC Take 1,000 mg by mouth every evening.    fenofibrate (TRICOR) 54 MG tablet TAKE ONE TABLET BY MOUTH ONCE A DAY WITH FOOD    fluconazole (DIFLUCAN) 100 MG tablet Take 100 mg by mouth once daily.    furosemide (LASIX) 20 MG tablet TAKE ONE TABLET BY MOUTH EVERY DAY AS NEEDED FOR FLUID    gabapentin (NEURONTIN) 600 MG tablet Take 600 mg by mouth 3 (three) times daily.    ipratropium (ATROVENT) 0.02 % nebulizer solution Take 500 mcg by nebulization 4 (four) times daily. Rescue    labetaloL (NORMODYNE) 200 MG tablet TAKE ONE TABLET BY MOUTH TWICE A DAY FOR BLOOD PRESSURE    losartan (COZAAR) 100 MG tablet Take 100 mg by mouth.    mirtazapine (REMERON) 15 MG tablet Take 15 mg by mouth every evening. at bedtime.    nystatin (MYCOSTATIN) cream Apply 1 g topically 2 (two) times daily.    nystatin (MYCOSTATIN) powder Apply 1 g topically 2 (two) times daily.    oxybutynin (DITROPAN-XL) 5 MG TR24 Take 5 mg by mouth 2 (two) times daily.    pantoprazole (PROTONIX) 40 MG tablet Take 40 mg by mouth.    potassium chloride (KLOR-CON) 10 MEQ TbSR TAKE ONE TABLET BY MOUTH ONCE A DAY ONLY WHEN TAKING LASIX    primidone (MYSOLINE) 50 MG Tab Take 50 mg by mouth.    QUEtiapine (SEROQUEL) 100 MG Tab     rOPINIRole (REQUIP) 0.5 MG tablet Take 0.5 mg by mouth every evening. at bedtime.    sertraline (ZOLOFT) 100 MG tablet Take 100 mg by mouth.    tamsulosin (FLOMAX) 0.4 mg Cap Take 1 capsule by mouth.    traZODone (DESYREL) 50 MG tablet Take 50 mg by mouth nightly as needed.    TRELEGY ELLIPTA 200-62.5-25 mcg inhaler INHALE ONE PUFF INTO THE LUNGS EVERY DAY    WESTAB PLUS 27 mg iron- 1 mg Tab Take 1 tablet by mouth.    hydroCHLOROthiazide (HYDRODIURIL) 25 MG tablet Take  25 mg by mouth.    hydrOXYzine HCL (ATARAX) 25 MG tablet     meloxicam (MOBIC) 15 MG tablet TAKE ONE TABLET BY MOUTH EVERY DAY FOR ARTHRITIS    nebivoloL (BYSTOLIC) 10 MG Tab Take 10 mg by mouth every evening. at bedtime.     Family History       Problem Relation (Age of Onset)    Alzheimer's disease Father    Heart attack Father    Hypertension Mother, Father    Parkinsonism Mother    Stroke Father          Tobacco Use    Smoking status: Every Day     Packs/day: 0.50     Years: 50.00     Pack years: 25.00     Types: Cigarettes    Smokeless tobacco: Never   Substance and Sexual Activity    Alcohol use: Not Currently    Drug use: Never    Sexual activity: Not on file     Review of Systems   Constitutional: Negative.   Cardiovascular: Negative.    Respiratory: Negative.     Objective:     Vital Signs (Most Recent):  Temp: 98.9 °F (37.2 °C) (06/02/23 1114)  Pulse: 68 (06/02/23 1114)  Resp: 20 (06/02/23 1114)  BP: (!) 177/92 (reported b/p to nurse) (06/02/23 1114)  SpO2: (!) 91 % (06/02/23 1114) Vital Signs (24h Range):  Temp:  [97.9 °F (36.6 °C)-98.9 °F (37.2 °C)] 98.9 °F (37.2 °C)  Pulse:  [] 68  Resp:  [20-21] 20  SpO2:  [91 %-97 %] 91 %  BP: (111-177)/(62-92) 177/92     Weight: 92 kg (202 lb 13.2 oz)  Body mass index is 32.74 kg/m².    SpO2: (!) 91 %         Intake/Output Summary (Last 24 hours) at 6/2/2023 1318  Last data filed at 6/2/2023 1143  Gross per 24 hour   Intake 660 ml   Output 1375 ml   Net -715 ml         Lines/Drains/Airways       Peripheral Intravenous Line  Duration                  Peripheral IV - Single Lumen 05/31/23 2100 22 G Left;Posterior Hand 1 day                    Physical Exam  Constitutional:       General: She is not in acute distress.  Eyes:      Extraocular Movements: Extraocular movements intact.   Cardiovascular:      Rate and Rhythm: Normal rate and regular rhythm.      Heart sounds: No murmur heard.  Pulmonary:      Effort: Pulmonary effort is normal. No respiratory distress.       Breath sounds: Normal breath sounds.   Musculoskeletal:         General: No swelling. Normal range of motion.   Skin:     General: Skin is warm and dry.   Neurological:      Mental Status: She is alert and oriented to person, place, and time.   Psychiatric:         Mood and Affect: Mood normal.         Behavior: Behavior normal.       Significant Labs: CMP   Recent Labs   Lab 05/31/23 1946 05/31/23  2330 06/01/23  0448   * 127* 130*   K 3.5 3.6 3.7   CO2 22* 22* 25   BUN 41.0* 41.0* 38.0*   CREATININE 2.05* 1.79* 1.65*   CALCIUM 9.0 8.7 8.9     , CBC   Recent Labs   Lab 05/31/23 1946 06/01/23 0448   WBC 10.86 10.46   HGB 11.1* 11.4*   HCT 33.9* 35.0*   * 403*     , and Troponin   Recent Labs   Lab 05/31/23 1946   TROPONINI 0.027         Significant Imaging:   Echocardiogram 6/1/23:   The left ventricle is normal in size with moderate concentric hypertrophy and normal systolic function.  The estimated ejection fraction is 58%.  Grade I left ventricular diastolic dysfunction.  Mild tricuspid regurgitation.  Normal right ventricular size with normal right ventricular systolic function.  Mild aortic stenosis. Peak AV velocity 2.0 m/sec.  Assessment and Plan:     1. Bradycardia, resolved     - Hold AV andrés blocking agents     - Monitor tele  2. Hyponatremia, improved     - Per primary  3. Acute renal failure, improving  4. UTI     - per primary      Thank you for your consult.   Please call with any further questions.    Ludwig Forrest, LINDYP  Cardiology   [facility] - Medical Surgical Unit

## 2023-06-02 NOTE — PT/OT/SLP EVAL
"Physical Therapy Evaluation    Patient Name:  Diane Larsen   MRN:  [id number]    Recommendations:     Discharge Recommendations: home   Discharge Equipment Recommendations: none   Barriers to discharge: None    Assessment:     Diane Larsen is a 68 y.o. female admitted with a medical diagnosis of <principal problem not specified>.  She presents with the following impairments/functional limitations: weakness, impaired endurance, impaired functional mobility, gait instability, impaired balance, decreased lower extremity function, decreased safety awareness Patient tolerated multiple sit to stands at EOB using rollator. Patient with noted fatigue.     Rehab Prognosis: Good and Fair; patient would benefit from acute skilled PT services to address these deficits and reach maximum level of function.    Recent Surgery: * No surgery found *      Plan:     During this hospitalization, patient to be seen 5 x/week (5-6x week/1-2x daily) to address the identified rehab impairments via gait training, therapeutic activities, therapeutic exercises and progress toward the following goals:    Plan of Care Expires:  07/02/23    Subjective     Chief Complaint: fatigue  Patient/Family Comments/goals: to go home  Pain/Comfort:  Pain Rating 1: 0/10  Pain Rating Post-Intervention 1: 0/10    Patients cultural, spiritual, Moravian conflicts given the current situation:      Living Environment:  Lives alone, has "worker"    Prior to admission, patients level of function was able to walk short distances, able to transfer.  Equipment used at home: wheelchair, rollator, bath bench.  DME owned (not currently used): none.  Upon discharge, patient will have assistance from worker.    Objective:     Communicated with nursing prior to session.  Patient found HOB elevated with peripheral IV, pressure relief boots, pulse ox (continuous)  upon PT entry to room.    General Precautions: Standard, fall, seizure  Orthopedic Precautions:N/A   Braces: " N/A  Respiratory Status: Nasal cannula, flow 2 L/min    Exams:  RLE Strength: grossly 3+/5  LLE Strength: grossly 3+/5    Functional Mobility:  Bed Mobility:     Supine to Sit: minimum assistance  Sit to Supine: minimum assistance  Transfers:     Sit to Stand:  contact guard assistance and minimum assistance with rollator      AM-PAC 6 CLICK MOBILITY  Total Score:        Treatment & Education:  See above. Patient able to tolerated multiple sit to stands at EOB with rollator. Also able to tolerate BLE therex while sitting EOB.   Call bell and safety education.    Patient left HOB elevated with all lines intact, call button in reach, and bed alarm on.    GOALS:   Multidisciplinary Problems       Physical Therapy Goals          Problem: Physical Therapy    Goal Priority Disciplines Outcome Goal Variances Interventions   Physical Therapy Goal     PT, PT/OT Ongoing, Progressing     Description: Goals to be met by: discharge     Patient will increase functional independence with mobility by performin. Supine to sit with Contact Guard Assistance  2. Sit to stand transfer with Stand-by Assistance and Contact Guard Assistance  3. Bed to chair transfer with Stand-by Assistance and Contact Guard Assistance using Rolling Walker                         History:     Past Medical History:   Diagnosis Date    Anxiety disorder, unspecified     Bipolar disorder, unspecified     COPD (chronic obstructive pulmonary disease)     Depression     Essential (primary) hypertension     Neuropathy     Schizophrenia, unspecified     Seizure disorder        Past Surgical History:   Procedure Laterality Date    BACK SURGERY      jesusita inserted    EXCISION-WIDE LOCAL Left 2023    left foot       Time Tracking:     PT Received On: 23  PT Start Time: 1235     PT Stop Time: 1300  PT Total Time (min): 25 min     Billable Minutes: Evaluation 15 and Therapeutic Activity 10      2023

## 2023-06-02 NOTE — PLAN OF CARE
Problem: Physical Therapy  Goal: Physical Therapy Goal  Description: Goals to be met by: discharge     Patient will increase functional independence with mobility by performin. Supine to sit with Contact Guard Assistance  2. Sit to stand transfer with Stand-by Assistance and Contact Guard Assistance  3. Bed to chair transfer with Stand-by Assistance and Contact Guard Assistance using Rolling Walker    Outcome: Ongoing, Progressing

## 2023-06-02 NOTE — PLAN OF CARE
06/02/23 1509   Discharge Assessment   Assessment Type Discharge Planning Assessment   Source of Information patient   People in Home alone   Do you expect to return to your current living situation? Yes   Do you have help at home or someone to help you manage your care at home? Yes   Prior to hospitilization cognitive status: Alert/Oriented;No Deficits   Current cognitive status: Alert/Oriented;No Deficits   Walking or Climbing Stairs ambulation difficulty, requires equipment   Dressing/Bathing bathing difficulty, assistance 1 person   Equipment Currently Used at Home walker, rolling;wheelchair;bath bench   Do you currently have service(s) that help you manage your care at home? No   Do you take prescription medications? Yes   Do you have prescription coverage? Yes   Do you have any problems affording any of your prescribed medications? No   Is the patient taking medications as prescribed? yes   How do you get to doctors appointments? family or friend will provide   Are you on dialysis? No   Do you take coumadin? No   Discharge Plan A Home   Discharge Plan B Home   DME Needed Upon Discharge  none   Discharge Plan discussed with: Patient   Transition of Care Barriers None   Physical Activity   On average, how many days per week do you engage in moderate to strenuous exercise (like a brisk walk)? Patient refu   On average, how many minutes do you engage in exercise at this level? Patient refu   Financial Resource Strain   How hard is it for you to pay for the very basics like food, housing, medical care, and heating? Patient refu   Housing Stability   In the last 12 months, was there a time when you were not able to pay the mortgage or rent on time? [place]   In the last 12 months, was there a time when you did not have a steady place to sleep or slept in a shelter (including now)? [place]   Transportation Needs   In the past 12 months, has lack of transportation kept you from medical appointments or from getting  medications? Patient refu   In the past 12 months, has lack of transportation kept you from meetings, work, or from getting things needed for daily living? Patient refu   Food Insecurity   Within the past 12 months, you worried that your food would run out before you got the money to buy more. Patient refu   Within the past 12 months, the food you bought just didn't last and you didn't have money to get more. Patient refu   Stress   Do you feel stress - tense, restless, nervous, or anxious, or unable to sleep at night because your mind is troubled all the time - these days? Patient refu   Social Connections   In a typical week, how many times do you talk on the phone with family, friends, or neighbors? Patient refu   How often do you get together with friends or relatives? Patient refu   How often do you attend Latter day or Hoahaoism services? Patient refu   Do you belong to any clubs or organizations such as Latter day groups, unions, fraternal or athletic groups, or school groups? Patient refu   How often do you attend meetings of the clubs or organizations you belong to? Patient refu   Are you , , , , never , or living with a partner? Patient refu   Alcohol Use   Q1: How often do you have a drink containing alcohol? Patient refu   Q2: How many drinks containing alcohol do you have on a typical day when you are drinking? Patient refu   Q3: How often do you have six or more drinks on one occasion? Patient refu

## 2023-06-03 PROBLEM — A09 DIARRHEA OF INFECTIOUS ORIGIN: Status: ACTIVE | Noted: 2023-06-03

## 2023-06-03 PROBLEM — E87.1 HYPONATREMIA: Status: ACTIVE | Noted: 2023-06-03

## 2023-06-03 PROBLEM — E87.6 HYPOKALEMIA: Status: ACTIVE | Noted: 2023-06-03

## 2023-06-03 LAB
ADV 40+41 DNA STL QL NAA+NON-PROBE: NOT DETECTED
ANION GAP SERPL CALC-SCNC: 12 MEQ/L
ASTRO TYP 1-8 RNA STL QL NAA+NON-PROBE: NOT DETECTED
BUN SERPL-MCNC: 16 MG/DL (ref 9.8–20.1)
C CAYETANENSIS DNA STL QL NAA+NON-PROBE: NOT DETECTED
C COLI+JEJ+UPSA DNA STL QL NAA+NON-PROBE: NOT DETECTED
C DIFF TOX GENS STL QL NAA+PROBE: DETECTED
CALCIUM SERPL-MCNC: 9.7 MG/DL (ref 8.4–10.2)
CHLORIDE SERPL-SCNC: 102 MMOL/L (ref 98–107)
CO2 SERPL-SCNC: 24 MMOL/L (ref 23–31)
CREAT SERPL-MCNC: 0.69 MG/DL (ref 0.55–1.02)
CREAT/UREA NIT SERPL: 23
CRYPTOSP DNA STL QL NAA+NON-PROBE: NOT DETECTED
E HISTOLYT DNA STL QL NAA+NON-PROBE: NOT DETECTED
EAEC PAA PLAS AGGR+AATA ST NAA+NON-PRB: NOT DETECTED
EC STX1+STX2 GENES STL QL NAA+NON-PROBE: NOT DETECTED
EPEC EAE GENE STL QL NAA+NON-PROBE: NOT DETECTED
ETEC LTA+ST1A+ST1B TOX ST NAA+NON-PROBE: NOT DETECTED
G LAMBLIA DNA STL QL NAA+NON-PROBE: NOT DETECTED
GFR SERPLBLD CREATININE-BSD FMLA CKD-EPI: >60 MLS/MIN/1.73/M2
GLUCOSE SERPL-MCNC: 123 MG/DL (ref 82–115)
NOROVIRUS GI+II RNA STL QL NAA+NON-PROBE: NOT DETECTED
P SHIGELLOIDES DNA STL QL NAA+NON-PROBE: NOT DETECTED
POCT GLUCOSE: 109 MG/DL (ref 70–110)
POCT GLUCOSE: 99 MG/DL (ref 70–110)
POTASSIUM SERPL-SCNC: 3 MMOL/L (ref 3.5–5.1)
RVA RNA STL QL NAA+NON-PROBE: NOT DETECTED
S ENT+BONG DNA STL QL NAA+NON-PROBE: NOT DETECTED
SAPO I+II+IV+V RNA STL QL NAA+NON-PROBE: NOT DETECTED
SHIGELLA SP+EIEC IPAH ST NAA+NON-PROBE: NOT DETECTED
SODIUM SERPL-SCNC: 138 MMOL/L (ref 136–145)
V CHOL+PARA+VUL DNA STL QL NAA+NON-PROBE: NOT DETECTED
V CHOLERAE DNA STL QL NAA+NON-PROBE: NOT DETECTED
Y ENTEROCOL DNA STL QL NAA+NON-PROBE: NOT DETECTED

## 2023-06-03 PROCEDURE — 27000207 HC ISOLATION

## 2023-06-03 PROCEDURE — 94761 N-INVAS EAR/PLS OXIMETRY MLT: CPT

## 2023-06-03 PROCEDURE — 25000003 PHARM REV CODE 250: Performed by: INTERNAL MEDICINE

## 2023-06-03 PROCEDURE — 21400001 HC TELEMETRY ROOM

## 2023-06-03 PROCEDURE — 99900035 HC TECH TIME PER 15 MIN (STAT)

## 2023-06-03 PROCEDURE — 11000001 HC ACUTE MED/SURG PRIVATE ROOM

## 2023-06-03 PROCEDURE — 80048 BASIC METABOLIC PNL TOTAL CA: CPT | Performed by: INTERNAL MEDICINE

## 2023-06-03 PROCEDURE — 63600175 PHARM REV CODE 636 W HCPCS: Performed by: INTERNAL MEDICINE

## 2023-06-03 PROCEDURE — 27000221 HC OXYGEN, UP TO 24 HOURS

## 2023-06-03 RX ORDER — POTASSIUM CHLORIDE 20 MEQ/1
20 TABLET, EXTENDED RELEASE ORAL ONCE
Status: COMPLETED | OUTPATIENT
Start: 2023-06-03 | End: 2023-06-03

## 2023-06-03 RX ADMIN — LOSARTAN POTASSIUM 100 MG: 50 TABLET, FILM COATED ORAL at 09:06

## 2023-06-03 RX ADMIN — MICONAZOLE NITRATE: 20 POWDER TOPICAL at 09:06

## 2023-06-03 RX ADMIN — OXYBUTYNIN CHLORIDE 5 MG: 5 TABLET, EXTENDED RELEASE ORAL at 09:06

## 2023-06-03 RX ADMIN — ARIPIPRAZOLE 15 MG: 5 TABLET ORAL at 09:06

## 2023-06-03 RX ADMIN — VANCOMYCIN HYDROCHLORIDE 125 MG: KIT at 05:06

## 2023-06-03 RX ADMIN — TAMSULOSIN HYDROCHLORIDE 0.4 MG: 0.4 CAPSULE ORAL at 09:06

## 2023-06-03 RX ADMIN — HEPARIN SODIUM 5000 UNITS: 5000 INJECTION, SOLUTION INTRAVENOUS; SUBCUTANEOUS at 05:06

## 2023-06-03 RX ADMIN — HEPARIN SODIUM 5000 UNITS: 5000 INJECTION, SOLUTION INTRAVENOUS; SUBCUTANEOUS at 01:06

## 2023-06-03 RX ADMIN — HYDRALAZINE HYDROCHLORIDE 10 MG: 20 INJECTION INTRAMUSCULAR; INTRAVENOUS at 01:06

## 2023-06-03 RX ADMIN — HYDROCODONE BITARTRATE AND ACETAMINOPHEN 1 TABLET: 5; 325 TABLET ORAL at 10:06

## 2023-06-03 RX ADMIN — POTASSIUM CHLORIDE 20 MEQ: 1500 TABLET, EXTENDED RELEASE ORAL at 12:06

## 2023-06-03 RX ADMIN — MICONAZOLE NITRATE: 20 POWDER TOPICAL at 10:06

## 2023-06-03 RX ADMIN — AMLODIPINE BESYLATE 10 MG: 10 TABLET ORAL at 09:06

## 2023-06-03 RX ADMIN — HYDRALAZINE HYDROCHLORIDE 10 MG: 20 INJECTION INTRAMUSCULAR; INTRAVENOUS at 06:06

## 2023-06-03 RX ADMIN — HEPARIN SODIUM 5000 UNITS: 5000 INJECTION, SOLUTION INTRAVENOUS; SUBCUTANEOUS at 10:06

## 2023-06-03 RX ADMIN — ROPINIROLE HYDROCHLORIDE 0.5 MG: 0.25 TABLET, FILM COATED ORAL at 10:06

## 2023-06-03 RX ADMIN — VANCOMYCIN HYDROCHLORIDE 125 MG: KIT at 12:06

## 2023-06-03 NOTE — HPI
68 yearo old  female presented to the ER with caregiver with a primary complaint of low blood pressure low heart rate. She is on a monumentally long list of medications but it is uncertain if all these are accurate at this time. She reportedly was seen by PCP recently pressure medication adjustments which likely includes stopping hydrochlorothiazide but this has not been confirmed.  There is also a Ford of nonbloody diarrhea chronically for at least six months. No report of fever, chills, abdominal pain, nausea or vomiting.  Workup in the emergency room did reveal a low sodium of 125.  She also has acute  renal failure with an uncertain baseline.  She is being admitted to telemetry due to the hypotension bradycardia and given IV fluids for the low-sodium.

## 2023-06-03 NOTE — PROGRESS NOTES
[facility] Medicine  Progress Note    Patient Name: Diane Larsen  MRN: [id number]  Patient Class: IP- Inpatient   Admission Date: 5/31/2023  Length of Stay: 3 days  Attending Physician: Jt England Jr., MD  Primary Care Provider: NIGEL Avendaño        Subjective:     Principal Problem:<principal problem not specified>        HPI:  68 yearo old  female presented to the ER with caregiver with a primary complaint of low blood pressure low heart rate. She is on a monumentally long list of medications but it is uncertain if all these are accurate at this time. She reportedly was seen by PCP recently pressure medication adjustments which likely includes stopping hydrochlorothiazide but this has not been confirmed.  There is also a Ford of nonbloody diarrhea chronically for at least six months. No report of fever, chills, abdominal pain, nausea or vomiting.  Workup in the emergency room did reveal a low sodium of 125.  She also has acute  renal failure with an uncertain baseline.  She is being admitted to telemetry due to the hypotension bradycardia and given IV fluids for the low-sodium.      Overview/Hospital Course:  6/3/23-Patient is still having some diarrhea.  Will start oral Vanco today and continue to monitor.      Interval History:     Review of Systems   Constitutional:  Positive for activity change and fatigue.   HENT: Negative.     Eyes: Negative.    Respiratory: Negative.     Cardiovascular: Negative.    Gastrointestinal:  Positive for abdominal pain and diarrhea.   Endocrine: Negative.    Genitourinary: Negative.    Musculoskeletal: Negative.    Skin: Negative.    Allergic/Immunologic: Negative.    Neurological:  Positive for weakness.   Hematological: Negative.    Psychiatric/Behavioral: Negative.     Objective:     Vital Signs (Most Recent):  Temp: 98.3 °F (36.8 °C) (06/03/23 1228)  Pulse: 72 (06/03/23 1228)  Resp: 20 (06/03/23 0400)  BP: (!) 159/86  (06/03/23 1228)  SpO2: 95 % (06/03/23 1228) Vital Signs (24h Range):  Temp:  [98.2 °F (36.8 °C)-99.4 °F (37.4 °C)] 98.3 °F (36.8 °C)  Pulse:  [67-85] 72  Resp:  [18-20] 20  SpO2:  [91 %-98 %] 95 %  BP: (159-190)/(70-97) 159/86     Weight: 92 kg (202 lb 13.2 oz)  Body mass index is 32.74 kg/m².    Intake/Output Summary (Last 24 hours) at 6/3/2023 1502  Last data filed at 6/3/2023 1200  Gross per 24 hour   Intake --   Output 2920 ml   Net -2920 ml         Physical Exam  Constitutional:       Appearance: Normal appearance. She is obese.   HENT:      Head: Normocephalic and atraumatic.      Nose: Nose normal.      Mouth/Throat:      Mouth: Mucous membranes are moist.      Pharynx: Oropharynx is clear.   Eyes:      Extraocular Movements: Extraocular movements intact.      Conjunctiva/sclera: Conjunctivae normal.      Pupils: Pupils are equal, round, and reactive to light.   Cardiovascular:      Rate and Rhythm: Normal rate and regular rhythm.      Pulses: Normal pulses.      Heart sounds: Normal heart sounds.   Pulmonary:      Effort: Pulmonary effort is normal.      Breath sounds: Normal breath sounds.   Abdominal:      General: Bowel sounds are normal.      Palpations: Abdomen is soft.   Musculoskeletal:         General: Normal range of motion.      Cervical back: Normal range of motion and neck supple.   Skin:     General: Skin is warm and dry.      Capillary Refill: Capillary refill takes 2 to 3 seconds.   Neurological:      General: No focal deficit present.      Mental Status: She is alert and oriented to person, place, and time. Mental status is at baseline.   Psychiatric:         Mood and Affect: Mood normal.         Behavior: Behavior normal.         Thought Content: Thought content normal.         Judgment: Judgment normal.           Significant Labs: All pertinent labs within the past 24 hours have been reviewed.  BMP:   Recent Labs   Lab 06/03/23  0244      K 3.0*   CO2 24   BUN 16.0   CREATININE 0.69    CALCIUM 9.7     CBC: No results for input(s): WBC, HGB, HCT, PLT in the last 48 hours.  CMP:   Recent Labs   Lab 06/02/23  1413 06/03/23  0244    138   K 3.5 3.0*   CO2 25 24   BUN 21.0* 16.0   CREATININE 0.86 0.69   CALCIUM 9.7 9.7     Magnesium: No results for input(s): MG in the last 48 hours.    Significant Imaging: I have reviewed all pertinent imaging results/findings within the past 24 hours.      Assessment/Plan:      Hypokalemia  Follow labs  Replace K      Hyponatremia  IV fluids  Follow labs      Diarrhea of infectious origin  Follow labs  DVT prophylaxis  Add oral Vanco        VTE Risk Mitigation (From admission, onward)         Ordered     heparin (porcine) injection 5,000 Units  Every 8 hours         06/01/23 0003     IP VTE HIGH RISK PATIENT  Once         06/01/23 0003     Place sequential compression device  Until discontinued         06/01/23 0003            follow labs  Replace K  Add oral vanco  OOB  therapy    Discharge Planning   MEGHANN:      Code Status: Full Code   Is the patient medically ready for discharge?:     Reason for patient still in hospital (select all that apply): Patient trending condition, Laboratory test, Treatment and PT / OT recommendations  Discharge Plan A: Home                  Loco Brice MD  Department of Hospital Medicine   [facility]

## 2023-06-03 NOTE — PROGRESS NOTES
Hospital Medicine  Progress Note    Patient Name: Diane Larsen  MRN: [id number]  Status: IP- Inpatient   Admission Date: 5/31/2023  Length of Stay: 2  Date of Service: 06/02/2023       CC: hospital follow-up for diarrhea       SUBJECTIVE   68 yearo old  female presented to the ER with caregiver with a primary complaint of low blood pressure low heart rate. She is on a monumentally long list of medications but it is uncertain if all these are accurate at this time. She reportedly was seen by PCP recently pressure medication adjustments which likely includes stopping hydrochlorothiazide but this has not been confirmed.  There is also a Ford of nonbloody diarrhea chronically for at least six months. No report of fever, chills, abdominal pain, nausea or vomiting.  Workup in the emergency room did reveal a low sodium of 125.  She also has acute  renal failure with an uncertain baseline.  She is being admitted to telemetry due to the hypotension bradycardia and given IV fluids for the low-sodium.    Today: Patient seen and examined at bedside, and chart reviewed.  Still having diarrhea, otherwise no acute complaints.      MEDICATIONS   Scheduled   amLODIPine  10 mg Oral Daily    ARIPiprazole  15 mg Oral Daily    heparin (porcine)  5,000 Units Subcutaneous Q8H    losartan  100 mg Oral Daily    miconazole NITRATE 2 %   Topical (Top) BID    oxybutynin  5 mg Oral Daily    rOPINIRole  0.5 mg Oral QHS    tamsulosin  1 capsule Oral Daily     Continuous Infusions        PHYSICAL EXAM   VITALS: T 98.6 °F (37 °C)   BP (!) 183/74   P 76   RR 18   O2 98 %    GENERAL: Awake and in NAD  LUNGS: CTA B/L  CVS: Normal rate  GI/: Soft, NT, bowel sounds positive.  EXTREMITIES: No peripheral edema  NEURO: AAOx3  PSYCH: Cooperative      LABS   CBC  Recent Labs     05/31/23 1946 06/01/23  0448   WBC 10.86 10.46   RBC 3.94* 4.04*   HGB 11.1* 11.4*   HCT 33.9* 35.0*   MCV 86.0 86.6   MCH 28.2 28.2   MCHC 32.7* 32.6*   RDW 14.6 14.6   *  403*     CHEM  Recent Labs     05/31/23  1946 05/31/23  2330 06/01/23  0448 06/02/23  0806 06/02/23  1413   *   < > 130*  --  136   K 3.5   < > 3.7  --  3.5   CHLORIDE 90*   < > 96*  --  101   CO2 22*   < > 25  --  25   BUN 41.0*   < > 38.0*  --  21.0*   CREATININE 2.05*   < > 1.65*  --  0.86   GLUCOSE 93   < > 98  --  114   CALCIUM 9.0   < > 8.9  --  9.7   MG 1.80  --   --   --   --    CRP  --   --   --  60.70*  --     < > = values in this interval not displayed.         MICROBIOLOGY     Microbiology Results (last 7 days)       Procedure Component Value Units Date/Time    Clostridium Diff Toxin, A & B, EIA [[id number]]  (Abnormal) Collected: 06/02/23 1738    Order Status: Completed Specimen: Stool Updated: 06/02/23 1818     Clostridium Difficile GDH Antigen Positive     Clostridium Difficile Toxin A/B Negative    C Diff Toxin by PCR [[id number]] Collected: 06/02/23 1738    Order Status: Sent Specimen: Stool Updated: 06/02/23 1818    Blood culture #1 **CANNOT BE ORDERED STAT** [[id number]]  (Normal) Collected: 05/31/23 2107    Order Status: Completed Specimen: Blood from Arm, Right Updated: 06/02/23 1500     CULTURE, BLOOD ([facility]) No Growth At 24 Hours    Blood culture #2 **CANNOT BE ORDERED STAT** [[id number]]  (Normal) Collected: 05/31/23 2107    Order Status: Completed Specimen: Blood from Arm, Right Updated: 06/02/23 1500     CULTURE, BLOOD ([facility]) No Growth At 24 Hours    Urine culture [[id number]]  (Abnormal) Collected: 05/31/23 2022    Order Status: Completed Specimen: Urine Updated: 06/02/23 0739     Urine Culture >/= 100,000 colonies/ml Gram-negative Rods              DIAGNOSTICS   Echo  · The left ventricle is normal in size with moderate concentric   hypertrophy and normal systolic function.  · The estimated ejection fraction is 58%.  · Grade I left ventricular diastolic dysfunction.  · Mild tricuspid regurgitation.  · Normal right ventricular size with normal right ventricular systolic   function.  · Mild  aortic stenosis. Peak AV velocity 2.0 m/sec.     X-Ray Chest AP Portable  Narrative: EXAMINATION:  XR CHEST AP PORTABLE    CLINICAL HISTORY:  cough;, .    COMPARISON:  None available    FINDINGS:  An AP view or more reveals the heart to be enlarged.  The trachea is midline.  Mild increased interstitial opacities evident at the right lung.  Degenerative changes in curvature are noted to the thoracic spine.  Impression: 1. Borderline cardiomegaly  2. Mild hazy interstitial opacities at the right the lung which may be due to positioning and technique.  A acute infiltrative process cannot be excluded  3. Thoracic spondylosis and scoliosis    Electronically signed by: Osman Guevara  Date:    06/01/2023  Time:    09:04        ASSESSMENT/PLAN   1 - Hyponatremia: IV fluids discontinued, repeat BMP in AM    2 Acute renal failure: resolved    3 - Bradycardia: Appreciate cardiology input, holding AV andrés blocking agents, continue to monitor on telemetry    4 - Diarrhea: check stool studies    5 - Parkinson's disease: Continue home meds        Prophylaxis: SC Heparin      Arsalan Collins MD  [facility]

## 2023-06-03 NOTE — PLAN OF CARE
Problem: Adult Inpatient Plan of Care  Goal: Plan of Care Review  Outcome: Ongoing, Progressing  Goal: Patient-Specific Goal (Individualized)  Outcome: Ongoing, Progressing  Goal: Absence of Hospital-Acquired Illness or Injury  Outcome: Ongoing, Progressing  Goal: Optimal Comfort and Wellbeing  Outcome: Ongoing, Progressing  Goal: Readiness for Transition of Care  Outcome: Ongoing, Progressing     Problem: Impaired Wound Healing  Goal: Optimal Wound Healing  Outcome: Ongoing, Progressing     Problem: Skin Injury Risk Increased  Goal: Skin Health and Integrity  Outcome: Ongoing, Progressing     Problem: Dysrhythmia  Goal: Normalized Cardiac Rhythm  Outcome: Ongoing, Progressing     Problem: Fall Injury Risk  Goal: Absence of Fall and Fall-Related Injury  Outcome: Ongoing, Progressing     Problem: Fatigue  Goal: Improved Activity Tolerance  Outcome: Ongoing, Progressing     Problem: UTI (Urinary Tract Infection)  Goal: Improved Infection Symptoms  Outcome: Ongoing, Progressing     Problem: Infection  Goal: Absence of Infection Signs and Symptoms  Outcome: Ongoing, Progressing

## 2023-06-03 NOTE — HOSPITAL COURSE
6/3/23-Patient is still having some diarrhea.  Will start oral Vanco today and continue to monitor.    6/4/23-Patient is feeling a little better.  She states diarrhea is slowing down.  Will replace K and Mag today.    6/5/23-Patient is doing well.  She did have a large BM this morning which was very watery.  Will continue to monitor.    6/6/23-Patient is doing well at this time.  She has only had one BM thus far today and her previous was last night.  She will need to continue the vancomycin for another 10 days.  She is stable for discharge home today.  Exam(A&O, NAD, RRR, CTA, BS +, soft,  NTTP, ROM I)

## 2023-06-03 NOTE — SUBJECTIVE & OBJECTIVE
Interval History:     Review of Systems   Constitutional:  Positive for activity change and fatigue.   HENT: Negative.     Eyes: Negative.    Respiratory: Negative.     Cardiovascular: Negative.    Gastrointestinal:  Positive for abdominal pain and diarrhea.   Endocrine: Negative.    Genitourinary: Negative.    Musculoskeletal: Negative.    Skin: Negative.    Allergic/Immunologic: Negative.    Neurological:  Positive for weakness.   Hematological: Negative.    Psychiatric/Behavioral: Negative.     Objective:     Vital Signs (Most Recent):  Temp: 98.3 °F (36.8 °C) (06/03/23 1228)  Pulse: 72 (06/03/23 1228)  Resp: 20 (06/03/23 0400)  BP: (!) 159/86 (06/03/23 1228)  SpO2: 95 % (06/03/23 1228) Vital Signs (24h Range):  Temp:  [98.2 °F (36.8 °C)-99.4 °F (37.4 °C)] 98.3 °F (36.8 °C)  Pulse:  [67-85] 72  Resp:  [18-20] 20  SpO2:  [91 %-98 %] 95 %  BP: (159-190)/(70-97) 159/86     Weight: 92 kg (202 lb 13.2 oz)  Body mass index is 32.74 kg/m².    Intake/Output Summary (Last 24 hours) at 6/3/2023 1502  Last data filed at 6/3/2023 1200  Gross per 24 hour   Intake --   Output 2920 ml   Net -2920 ml         Physical Exam  Constitutional:       Appearance: Normal appearance. She is obese.   HENT:      Head: Normocephalic and atraumatic.      Nose: Nose normal.      Mouth/Throat:      Mouth: Mucous membranes are moist.      Pharynx: Oropharynx is clear.   Eyes:      Extraocular Movements: Extraocular movements intact.      Conjunctiva/sclera: Conjunctivae normal.      Pupils: Pupils are equal, round, and reactive to light.   Cardiovascular:      Rate and Rhythm: Normal rate and regular rhythm.      Pulses: Normal pulses.      Heart sounds: Normal heart sounds.   Pulmonary:      Effort: Pulmonary effort is normal.      Breath sounds: Normal breath sounds.   Abdominal:      General: Bowel sounds are normal.      Palpations: Abdomen is soft.   Musculoskeletal:         General: Normal range of motion.      Cervical back: Normal range  of motion and neck supple.   Skin:     General: Skin is warm and dry.      Capillary Refill: Capillary refill takes 2 to 3 seconds.   Neurological:      General: No focal deficit present.      Mental Status: She is alert and oriented to person, place, and time. Mental status is at baseline.   Psychiatric:         Mood and Affect: Mood normal.         Behavior: Behavior normal.         Thought Content: Thought content normal.         Judgment: Judgment normal.           Significant Labs: All pertinent labs within the past 24 hours have been reviewed.  BMP:   Recent Labs   Lab 06/03/23  0244      K 3.0*   CO2 24   BUN 16.0   CREATININE 0.69   CALCIUM 9.7     CBC: No results for input(s): WBC, HGB, HCT, PLT in the last 48 hours.  CMP:   Recent Labs   Lab 06/02/23  1413 06/03/23  0244    138   K 3.5 3.0*   CO2 25 24   BUN 21.0* 16.0   CREATININE 0.86 0.69   CALCIUM 9.7 9.7     Magnesium: No results for input(s): MG in the last 48 hours.    Significant Imaging: I have reviewed all pertinent imaging results/findings within the past 24 hours.

## 2023-06-04 LAB
ALBUMIN SERPL-MCNC: 3 G/DL (ref 3.4–4.8)
ALBUMIN/GLOB SERPL: 0.7 RATIO (ref 1.1–2)
ALP SERPL-CCNC: 58 UNIT/L (ref 40–150)
ALT SERPL-CCNC: 25 UNIT/L (ref 0–55)
AST SERPL-CCNC: 40 UNIT/L (ref 5–34)
BACTERIA UR CULT: ABNORMAL
BACTERIA UR CULT: ABNORMAL
BASOPHILS # BLD AUTO: 0.04 X10(3)/MCL
BASOPHILS NFR BLD AUTO: 0.5 %
BILIRUBIN DIRECT+TOT PNL SERPL-MCNC: 0.5 MG/DL
BUN SERPL-MCNC: 13 MG/DL (ref 9.8–20.1)
CALCIUM SERPL-MCNC: 9.4 MG/DL (ref 8.4–10.2)
CHLORIDE SERPL-SCNC: 102 MMOL/L (ref 98–107)
CO2 SERPL-SCNC: 25 MMOL/L (ref 23–31)
CREAT SERPL-MCNC: 0.72 MG/DL (ref 0.55–1.02)
EOSINOPHIL # BLD AUTO: 0.11 X10(3)/MCL (ref 0–0.9)
EOSINOPHIL NFR BLD AUTO: 1.4 %
ERYTHROCYTE [DISTWIDTH] IN BLOOD BY AUTOMATED COUNT: 15.2 % (ref 11.5–17)
GFR SERPLBLD CREATININE-BSD FMLA CKD-EPI: >60 MLS/MIN/1.73/M2
GLOBULIN SER-MCNC: 4.3 GM/DL (ref 2.4–3.5)
GLUCOSE SERPL-MCNC: 109 MG/DL (ref 82–115)
HCT VFR BLD AUTO: 37.3 % (ref 37–47)
HGB BLD-MCNC: 11.8 G/DL (ref 12–16)
IMM GRANULOCYTES # BLD AUTO: 0.03 X10(3)/MCL (ref 0–0.04)
IMM GRANULOCYTES NFR BLD AUTO: 0.4 %
LYMPHOCYTES # BLD AUTO: 2.62 X10(3)/MCL (ref 0.6–4.6)
LYMPHOCYTES NFR BLD AUTO: 32.6 %
MAGNESIUM SERPL-MCNC: 1.5 MG/DL (ref 1.6–2.6)
MCH RBC QN AUTO: 27.9 PG (ref 27–31)
MCHC RBC AUTO-ENTMCNC: 31.6 G/DL (ref 33–36)
MCV RBC AUTO: 88.2 FL (ref 80–94)
MONOCYTES # BLD AUTO: 0.83 X10(3)/MCL (ref 0.1–1.3)
MONOCYTES NFR BLD AUTO: 10.3 %
NEUTROPHILS # BLD AUTO: 4.4 X10(3)/MCL (ref 2.1–9.2)
NEUTROPHILS NFR BLD AUTO: 54.8 %
PLATELET # BLD AUTO: 303 X10(3)/MCL (ref 130–400)
PMV BLD AUTO: 9.9 FL (ref 7.4–10.4)
POTASSIUM SERPL-SCNC: 3.3 MMOL/L (ref 3.5–5.1)
PROT SERPL-MCNC: 7.3 GM/DL (ref 5.8–7.6)
RBC # BLD AUTO: 4.23 X10(6)/MCL (ref 4.2–5.4)
SODIUM SERPL-SCNC: 138 MMOL/L (ref 136–145)
WBC # SPEC AUTO: 8.03 X10(3)/MCL (ref 4.5–11.5)

## 2023-06-04 PROCEDURE — 11000001 HC ACUTE MED/SURG PRIVATE ROOM

## 2023-06-04 PROCEDURE — 25000003 PHARM REV CODE 250: Performed by: INTERNAL MEDICINE

## 2023-06-04 PROCEDURE — 63600175 PHARM REV CODE 636 W HCPCS: Performed by: INTERNAL MEDICINE

## 2023-06-04 PROCEDURE — 27000221 HC OXYGEN, UP TO 24 HOURS

## 2023-06-04 PROCEDURE — 21400001 HC TELEMETRY ROOM

## 2023-06-04 PROCEDURE — 27000207 HC ISOLATION

## 2023-06-04 PROCEDURE — 94761 N-INVAS EAR/PLS OXIMETRY MLT: CPT

## 2023-06-04 PROCEDURE — 80053 COMPREHEN METABOLIC PANEL: CPT | Performed by: INTERNAL MEDICINE

## 2023-06-04 PROCEDURE — 85025 COMPLETE CBC W/AUTO DIFF WBC: CPT | Performed by: INTERNAL MEDICINE

## 2023-06-04 PROCEDURE — 83735 ASSAY OF MAGNESIUM: CPT | Performed by: INTERNAL MEDICINE

## 2023-06-04 RX ORDER — MAGNESIUM SULFATE HEPTAHYDRATE 40 MG/ML
4 INJECTION, SOLUTION INTRAVENOUS ONCE
Status: COMPLETED | OUTPATIENT
Start: 2023-06-04 | End: 2023-06-04

## 2023-06-04 RX ORDER — POTASSIUM CHLORIDE 20 MEQ/1
20 TABLET, EXTENDED RELEASE ORAL ONCE
Status: COMPLETED | OUTPATIENT
Start: 2023-06-04 | End: 2023-06-04

## 2023-06-04 RX ADMIN — MICONAZOLE NITRATE: 20 POWDER TOPICAL at 10:06

## 2023-06-04 RX ADMIN — AMLODIPINE BESYLATE 10 MG: 10 TABLET ORAL at 09:06

## 2023-06-04 RX ADMIN — ARIPIPRAZOLE 15 MG: 5 TABLET ORAL at 09:06

## 2023-06-04 RX ADMIN — MAGNESIUM SULFATE HEPTAHYDRATE 4 G: 40 INJECTION, SOLUTION INTRAVENOUS at 11:06

## 2023-06-04 RX ADMIN — OXYBUTYNIN CHLORIDE 5 MG: 5 TABLET, EXTENDED RELEASE ORAL at 09:06

## 2023-06-04 RX ADMIN — VANCOMYCIN HYDROCHLORIDE 125 MG: KIT at 12:06

## 2023-06-04 RX ADMIN — TAMSULOSIN HYDROCHLORIDE 0.4 MG: 0.4 CAPSULE ORAL at 09:06

## 2023-06-04 RX ADMIN — LOSARTAN POTASSIUM 100 MG: 50 TABLET, FILM COATED ORAL at 09:06

## 2023-06-04 RX ADMIN — VANCOMYCIN HYDROCHLORIDE 125 MG: KIT at 06:06

## 2023-06-04 RX ADMIN — POTASSIUM CHLORIDE 20 MEQ: 1500 TABLET, EXTENDED RELEASE ORAL at 11:06

## 2023-06-04 RX ADMIN — VANCOMYCIN HYDROCHLORIDE 125 MG: KIT at 04:06

## 2023-06-04 RX ADMIN — VANCOMYCIN HYDROCHLORIDE 125 MG: KIT at 11:06

## 2023-06-04 RX ADMIN — HEPARIN SODIUM 5000 UNITS: 5000 INJECTION, SOLUTION INTRAVENOUS; SUBCUTANEOUS at 10:06

## 2023-06-04 RX ADMIN — VANCOMYCIN HYDROCHLORIDE 125 MG: KIT at 10:06

## 2023-06-04 RX ADMIN — HEPARIN SODIUM 5000 UNITS: 5000 INJECTION, SOLUTION INTRAVENOUS; SUBCUTANEOUS at 06:06

## 2023-06-04 RX ADMIN — MICONAZOLE NITRATE: 20 POWDER TOPICAL at 09:06

## 2023-06-04 RX ADMIN — HYDRALAZINE HYDROCHLORIDE 10 MG: 20 INJECTION INTRAMUSCULAR; INTRAVENOUS at 04:06

## 2023-06-04 RX ADMIN — ROPINIROLE HYDROCHLORIDE 0.5 MG: 0.25 TABLET, FILM COATED ORAL at 10:06

## 2023-06-04 RX ADMIN — HEPARIN SODIUM 5000 UNITS: 5000 INJECTION, SOLUTION INTRAVENOUS; SUBCUTANEOUS at 02:06

## 2023-06-04 NOTE — PROGRESS NOTES
[facility]  Progress Note    Patient Name: Diane Larsen  MRN: [id number]  Patient Class: IP- Inpatient   Admission Date: 5/31/2023  Length of Stay: 4 days  Attending Physician: Jt England Jr., MD  Primary Care Provider: NIGEL Avendaño        Subjective:     Principal Problem:Diarrhea of infectious origin        HPI:  68 yearo old  female presented to the ER with caregiver with a primary complaint of low blood pressure low heart rate. She is on a monumentally long list of medications but it is uncertain if all these are accurate at this time. She reportedly was seen by PCP recently pressure medication adjustments which likely includes stopping hydrochlorothiazide but this has not been confirmed.  There is also a Ford of nonbloody diarrhea chronically for at least six months. No report of fever, chills, abdominal pain, nausea or vomiting.  Workup in the emergency room did reveal a low sodium of 125.  She also has acute  renal failure with an uncertain baseline.  She is being admitted to telemetry due to the hypotension bradycardia and given IV fluids for the low-sodium.      Overview/Hospital Course:  6/3/23-Patient is still having some diarrhea.  Will start oral Vanco today and continue to monitor.    6/4/23-Patient is feeling a little better.  She states diarrhea is slowing down.  Will replace K and Mag today.      Interval History:     Review of Systems   Constitutional:  Positive for activity change and fatigue.   HENT: Negative.     Eyes: Negative.    Respiratory: Negative.     Cardiovascular: Negative.    Gastrointestinal:  Positive for diarrhea.   Endocrine: Negative.    Genitourinary: Negative.    Musculoskeletal: Negative.    Skin: Negative.    Allergic/Immunologic: Negative.    Neurological:  Positive for weakness.   Hematological: Negative.    Psychiatric/Behavioral: Negative.     Objective:     Vital Signs (Most Recent):  Temp: 98 °F (36.7 °C)  (06/04/23 1305)  Pulse: 72 (06/04/23 1305)  Resp: (!) 22 (06/04/23 0900)  BP: (!) 174/89 (06/04/23 1305)  SpO2: (!) 94 % (06/04/23 1305) Vital Signs (24h Range):  Temp:  [98 °F (36.7 °C)-98.9 °F (37.2 °C)] 98 °F (36.7 °C)  Pulse:  [66-85] 72  Resp:  [16-22] 22  SpO2:  [94 %-97 %] 94 %  BP: (151-181)/(76-95) 174/89     Weight: 92 kg (202 lb 13.2 oz)  Body mass index is 32.74 kg/m².    Intake/Output Summary (Last 24 hours) at 6/4/2023 1446  Last data filed at 6/3/2023 1759  Gross per 24 hour   Intake --   Output 500 ml   Net -500 ml         Physical Exam  Constitutional:       Appearance: Normal appearance. She is obese.   HENT:      Head: Normocephalic and atraumatic.      Nose: Nose normal.      Mouth/Throat:      Mouth: Mucous membranes are moist.      Pharynx: Oropharynx is clear.   Eyes:      Extraocular Movements: Extraocular movements intact.      Conjunctiva/sclera: Conjunctivae normal.      Pupils: Pupils are equal, round, and reactive to light.   Cardiovascular:      Rate and Rhythm: Normal rate and regular rhythm.      Pulses: Normal pulses.      Heart sounds: Normal heart sounds.   Pulmonary:      Effort: Pulmonary effort is normal.      Breath sounds: Normal breath sounds.   Abdominal:      General: Bowel sounds are normal.      Palpations: Abdomen is soft.   Musculoskeletal:         General: Normal range of motion.      Cervical back: Normal range of motion and neck supple.   Skin:     General: Skin is warm and dry.      Capillary Refill: Capillary refill takes 2 to 3 seconds.   Neurological:      General: No focal deficit present.      Mental Status: She is alert and oriented to person, place, and time. Mental status is at baseline.   Psychiatric:         Mood and Affect: Mood normal.         Behavior: Behavior normal.         Thought Content: Thought content normal.         Judgment: Judgment normal.           Significant Labs: All pertinent labs within the past 24 hours have been reviewed.  BMP:    Recent Labs   Lab 06/04/23  0152      K 3.3*   CO2 25   BUN 13.0   CREATININE 0.72   CALCIUM 9.4   MG 1.50*     CBC:   Recent Labs   Lab 06/04/23  0152   WBC 8.03   HGB 11.8*   HCT 37.3        CMP:   Recent Labs   Lab 06/03/23  0244 06/04/23  0152    138   K 3.0* 3.3*   CO2 24 25   BUN 16.0 13.0   CREATININE 0.69 0.72   CALCIUM 9.7 9.4   ALBUMIN  --  3.0*   BILITOT  --  0.5   ALKPHOS  --  58   AST  --  40*   ALT  --  25     Magnesium:   Recent Labs   Lab 06/04/23  0152   MG 1.50*       Significant Imaging: I have reviewed all pertinent imaging results/findings within the past 24 hours.      Assessment/Plan:      * Diarrhea of infectious origin  Follow labs  DVT prophylaxis  Add oral Vanco      Hypokalemia  Follow labs  Replace K      Hyponatremia  IV fluids  Follow labs        VTE Risk Mitigation (From admission, onward)         Ordered     heparin (porcine) injection 5,000 Units  Every 8 hours         06/01/23 0003     IP VTE HIGH RISK PATIENT  Once         06/01/23 0003     Place sequential compression device  Until discontinued         06/01/23 0003            follow labs  Replace Mag and K  Oral vanco  Possible d/c tomorrow  therapy    Discharge Planning   MEGHANN:      Code Status: Full Code   Is the patient medically ready for discharge?:     Reason for patient still in hospital (select all that apply): Patient trending condition, Laboratory test, Treatment, PT / OT recommendations and Pending disposition  Discharge Plan A: Home                  Loco Brice MD  Department of Hospital Medicine   [facility]

## 2023-06-04 NOTE — SUBJECTIVE & OBJECTIVE
Interval History:     Review of Systems   Constitutional:  Positive for activity change and fatigue.   HENT: Negative.     Eyes: Negative.    Respiratory: Negative.     Cardiovascular: Negative.    Gastrointestinal:  Positive for diarrhea.   Endocrine: Negative.    Genitourinary: Negative.    Musculoskeletal: Negative.    Skin: Negative.    Allergic/Immunologic: Negative.    Neurological:  Positive for weakness.   Hematological: Negative.    Psychiatric/Behavioral: Negative.     Objective:     Vital Signs (Most Recent):  Temp: 98 °F (36.7 °C) (06/04/23 1305)  Pulse: 72 (06/04/23 1305)  Resp: (!) 22 (06/04/23 0900)  BP: (!) 174/89 (06/04/23 1305)  SpO2: (!) 94 % (06/04/23 1305) Vital Signs (24h Range):  Temp:  [98 °F (36.7 °C)-98.9 °F (37.2 °C)] 98 °F (36.7 °C)  Pulse:  [66-85] 72  Resp:  [16-22] 22  SpO2:  [94 %-97 %] 94 %  BP: (151-181)/(76-95) 174/89     Weight: 92 kg (202 lb 13.2 oz)  Body mass index is 32.74 kg/m².    Intake/Output Summary (Last 24 hours) at 6/4/2023 1446  Last data filed at 6/3/2023 1759  Gross per 24 hour   Intake --   Output 500 ml   Net -500 ml         Physical Exam  Constitutional:       Appearance: Normal appearance. She is obese.   HENT:      Head: Normocephalic and atraumatic.      Nose: Nose normal.      Mouth/Throat:      Mouth: Mucous membranes are moist.      Pharynx: Oropharynx is clear.   Eyes:      Extraocular Movements: Extraocular movements intact.      Conjunctiva/sclera: Conjunctivae normal.      Pupils: Pupils are equal, round, and reactive to light.   Cardiovascular:      Rate and Rhythm: Normal rate and regular rhythm.      Pulses: Normal pulses.      Heart sounds: Normal heart sounds.   Pulmonary:      Effort: Pulmonary effort is normal.      Breath sounds: Normal breath sounds.   Abdominal:      General: Bowel sounds are normal.      Palpations: Abdomen is soft.   Musculoskeletal:         General: Normal range of motion.      Cervical back: Normal range of motion and neck  supple.   Skin:     General: Skin is warm and dry.      Capillary Refill: Capillary refill takes 2 to 3 seconds.   Neurological:      General: No focal deficit present.      Mental Status: She is alert and oriented to person, place, and time. Mental status is at baseline.   Psychiatric:         Mood and Affect: Mood normal.         Behavior: Behavior normal.         Thought Content: Thought content normal.         Judgment: Judgment normal.           Significant Labs: All pertinent labs within the past 24 hours have been reviewed.  BMP:   Recent Labs   Lab 06/04/23 0152      K 3.3*   CO2 25   BUN 13.0   CREATININE 0.72   CALCIUM 9.4   MG 1.50*     CBC:   Recent Labs   Lab 06/04/23 0152   WBC 8.03   HGB 11.8*   HCT 37.3        CMP:   Recent Labs   Lab 06/03/23  0244 06/04/23 0152    138   K 3.0* 3.3*   CO2 24 25   BUN 16.0 13.0   CREATININE 0.69 0.72   CALCIUM 9.7 9.4   ALBUMIN  --  3.0*   BILITOT  --  0.5   ALKPHOS  --  58   AST  --  40*   ALT  --  25     Magnesium:   Recent Labs   Lab 06/04/23 0152   MG 1.50*       Significant Imaging: I have reviewed all pertinent imaging results/findings within the past 24 hours.

## 2023-06-05 LAB
ALBUMIN SERPL-MCNC: 3.2 G/DL (ref 3.4–4.8)
ALBUMIN/GLOB SERPL: 0.7 RATIO (ref 1.1–2)
ALP SERPL-CCNC: 65 UNIT/L (ref 40–150)
ALT SERPL-CCNC: 37 UNIT/L (ref 0–55)
AST SERPL-CCNC: 47 UNIT/L (ref 5–34)
BASOPHILS # BLD AUTO: 0.08 X10(3)/MCL
BASOPHILS NFR BLD AUTO: 0.8 %
BILIRUBIN DIRECT+TOT PNL SERPL-MCNC: 0.5 MG/DL
BUN SERPL-MCNC: 14 MG/DL (ref 9.8–20.1)
CALCIUM SERPL-MCNC: 9.6 MG/DL (ref 8.4–10.2)
CHLORIDE SERPL-SCNC: 105 MMOL/L (ref 98–107)
CO2 SERPL-SCNC: 24 MMOL/L (ref 23–31)
CREAT SERPL-MCNC: 0.73 MG/DL (ref 0.55–1.02)
EOSINOPHIL # BLD AUTO: 0.06 X10(3)/MCL (ref 0–0.9)
EOSINOPHIL NFR BLD AUTO: 0.6 %
ERYTHROCYTE [DISTWIDTH] IN BLOOD BY AUTOMATED COUNT: 14.8 % (ref 11.5–17)
GFR SERPLBLD CREATININE-BSD FMLA CKD-EPI: >60 MLS/MIN/1.73/M2
GLOBULIN SER-MCNC: 4.6 GM/DL (ref 2.4–3.5)
GLUCOSE SERPL-MCNC: 130 MG/DL (ref 82–115)
HCT VFR BLD AUTO: 40.2 % (ref 37–47)
HGB BLD-MCNC: 13.1 G/DL (ref 12–16)
IMM GRANULOCYTES # BLD AUTO: 0.03 X10(3)/MCL (ref 0–0.04)
IMM GRANULOCYTES NFR BLD AUTO: 0.3 %
LYMPHOCYTES # BLD AUTO: 2.74 X10(3)/MCL (ref 0.6–4.6)
LYMPHOCYTES NFR BLD AUTO: 26.3 %
MAGNESIUM SERPL-MCNC: 2 MG/DL (ref 1.6–2.6)
MCH RBC QN AUTO: 28.1 PG (ref 27–31)
MCHC RBC AUTO-ENTMCNC: 32.6 G/DL (ref 33–36)
MCV RBC AUTO: 86.1 FL (ref 80–94)
MONOCYTES # BLD AUTO: 0.8 X10(3)/MCL (ref 0.1–1.3)
MONOCYTES NFR BLD AUTO: 7.7 %
NEUTROPHILS # BLD AUTO: 6.69 X10(3)/MCL (ref 2.1–9.2)
NEUTROPHILS NFR BLD AUTO: 64.3 %
PLATELET # BLD AUTO: 450 X10(3)/MCL (ref 130–400)
PMV BLD AUTO: 8.4 FL (ref 7.4–10.4)
POCT GLUCOSE: 101 MG/DL (ref 70–110)
POCT GLUCOSE: 115 MG/DL (ref 70–110)
POCT GLUCOSE: 116 MG/DL (ref 70–110)
POTASSIUM SERPL-SCNC: 3.5 MMOL/L (ref 3.5–5.1)
PROT SERPL-MCNC: 7.8 GM/DL (ref 5.8–7.6)
RBC # BLD AUTO: 4.67 X10(6)/MCL (ref 4.2–5.4)
SODIUM SERPL-SCNC: 139 MMOL/L (ref 136–145)
WBC # SPEC AUTO: 10.4 X10(3)/MCL (ref 4.5–11.5)

## 2023-06-05 PROCEDURE — 21400001 HC TELEMETRY ROOM

## 2023-06-05 PROCEDURE — 27000221 HC OXYGEN, UP TO 24 HOURS

## 2023-06-05 PROCEDURE — 63600175 PHARM REV CODE 636 W HCPCS: Performed by: INTERNAL MEDICINE

## 2023-06-05 PROCEDURE — 25000003 PHARM REV CODE 250: Performed by: INTERNAL MEDICINE

## 2023-06-05 PROCEDURE — 83735 ASSAY OF MAGNESIUM: CPT | Performed by: INTERNAL MEDICINE

## 2023-06-05 PROCEDURE — 85025 COMPLETE CBC W/AUTO DIFF WBC: CPT | Performed by: INTERNAL MEDICINE

## 2023-06-05 PROCEDURE — 94761 N-INVAS EAR/PLS OXIMETRY MLT: CPT

## 2023-06-05 PROCEDURE — 27000207 HC ISOLATION

## 2023-06-05 PROCEDURE — 97530 THERAPEUTIC ACTIVITIES: CPT | Mod: CQ

## 2023-06-05 PROCEDURE — 80053 COMPREHEN METABOLIC PANEL: CPT | Performed by: INTERNAL MEDICINE

## 2023-06-05 RX ADMIN — HEPARIN SODIUM 5000 UNITS: 5000 INJECTION, SOLUTION INTRAVENOUS; SUBCUTANEOUS at 10:06

## 2023-06-05 RX ADMIN — TAMSULOSIN HYDROCHLORIDE 0.4 MG: 0.4 CAPSULE ORAL at 11:06

## 2023-06-05 RX ADMIN — VANCOMYCIN HYDROCHLORIDE 125 MG: KIT at 06:06

## 2023-06-05 RX ADMIN — LOSARTAN POTASSIUM 100 MG: 50 TABLET, FILM COATED ORAL at 11:06

## 2023-06-05 RX ADMIN — OXYBUTYNIN CHLORIDE 5 MG: 5 TABLET, EXTENDED RELEASE ORAL at 11:06

## 2023-06-05 RX ADMIN — AMLODIPINE BESYLATE 10 MG: 10 TABLET ORAL at 11:06

## 2023-06-05 RX ADMIN — ROPINIROLE HYDROCHLORIDE 0.5 MG: 0.25 TABLET, FILM COATED ORAL at 10:06

## 2023-06-05 RX ADMIN — HYDRALAZINE HYDROCHLORIDE 10 MG: 20 INJECTION INTRAMUSCULAR; INTRAVENOUS at 12:06

## 2023-06-05 RX ADMIN — HEPARIN SODIUM 5000 UNITS: 5000 INJECTION, SOLUTION INTRAVENOUS; SUBCUTANEOUS at 06:06

## 2023-06-05 RX ADMIN — MICONAZOLE NITRATE: 20 POWDER TOPICAL at 11:06

## 2023-06-05 RX ADMIN — HYDROCODONE BITARTRATE AND ACETAMINOPHEN 1 TABLET: 5; 325 TABLET ORAL at 06:06

## 2023-06-05 RX ADMIN — VANCOMYCIN HYDROCHLORIDE 125 MG: KIT at 11:06

## 2023-06-05 RX ADMIN — MICONAZOLE NITRATE: 20 POWDER TOPICAL at 09:06

## 2023-06-05 RX ADMIN — ARIPIPRAZOLE 15 MG: 5 TABLET ORAL at 11:06

## 2023-06-05 RX ADMIN — HEPARIN SODIUM 5000 UNITS: 5000 INJECTION, SOLUTION INTRAVENOUS; SUBCUTANEOUS at 02:06

## 2023-06-05 NOTE — PT/OT/SLP PROGRESS
Physical Therapy Treatment    Patient Name:  Diane Larsen   MRN:  [id number]    Recommendations:     Discharge Recommendations: home  Discharge Equipment Recommendations: none  Barriers to discharge: None    Assessment:     Diane Larsen is a 68 y.o. female admitted with a medical diagnosis of Diarrhea of infectious origin.  She presents with the following impairments/functional limitations: weakness, impaired endurance, gait instability, impaired balance, decreased safety awareness.    Pt participated well however had limited mobility tolerance due to wound on L foot.     Rehab Prognosis: Good; patient would benefit from acute skilled PT services to address these deficits and reach maximum level of function.    Recent Surgery: * No surgery found *      Plan:     During this hospitalization, patient to be seen 5 x/week to address the identified rehab impairments via gait training, therapeutic activities, therapeutic exercises and progress toward the following goals:    Plan of Care Expires:  07/02/23    Subjective     Chief Complaint: weakness  Patient/Family Comments/goals: to get strong enough to return home  Pain/Comfort:         Objective:     Communicated with patient prior to session.  Patient found HOB elevated with telemetry, peripheral IV, oxygen upon PT entry to room.     General Precautions: Standard, fall, seizure  Orthopedic Precautions: N/A  Braces: N/A  Respiratory Status: Nasal cannula, flow 2 L/min     Functional Mobility:  Bed Mobility:     Supine to Sit: minimum assistance  Sit to Supine: minimum assistance  Transfers:     Sit to Stand:  contact guard assistance with 4 wheeled walker  Gait: steps  L and R along bedside  Balance: min-CGA in supported standing      AM-PAC 6 CLICK MOBILITY          Treatment & Education:  See above, seated BLE exercises    Patient left HOB elevated with all lines intact, call button in reach, and bed alarm on..    GOALS:   Multidisciplinary Problems       Physical  Therapy Goals          Problem: Physical Therapy    Goal Priority Disciplines Outcome Goal Variances Interventions   Physical Therapy Goal     PT, PT/OT Ongoing, Progressing     Description: Goals to be met by: discharge     Patient will increase functional independence with mobility by performin. Supine to sit with Contact Guard Assistance  2. Sit to stand transfer with Stand-by Assistance and Contact Guard Assistance  3. Bed to chair transfer with Stand-by Assistance and Contact Guard Assistance using Rolling Walker                         Time Tracking:     PT Received On: 23  PT Start Time: 1450     PT Stop Time: 1515  PT Total Time (min): 25 min     Billable Minutes: Therapeutic Activity 25    Treatment Type: Treatment  PT/PTA: PTA           2023

## 2023-06-05 NOTE — PROGRESS NOTES
[facility]  [facility]  Progress Note    Patient Name: Diane Larsen  MRN: [id number]  Patient Class: IP- Inpatient   Admission Date: 5/31/2023  Length of Stay: 5 days  Attending Physician: Jt England Jr., MD  Primary Care Provider: NIGEL Avendaño        Subjective:     Principal Problem:Diarrhea of infectious origin        HPI:  68 yearo old  female presented to the ER with caregiver with a primary complaint of low blood pressure low heart rate. She is on a monumentally long list of medications but it is uncertain if all these are accurate at this time. She reportedly was seen by PCP recently pressure medication adjustments which likely includes stopping hydrochlorothiazide but this has not been confirmed.  There is also a Ford of nonbloody diarrhea chronically for at least six months. No report of fever, chills, abdominal pain, nausea or vomiting.  Workup in the emergency room did reveal a low sodium of 125.  She also has acute  renal failure with an uncertain baseline.  She is being admitted to telemetry due to the hypotension bradycardia and given IV fluids for the low-sodium.      Overview/Hospital Course:  6/3/23-Patient is still having some diarrhea.  Will start oral Vanco today and continue to monitor.    6/4/23-Patient is feeling a little better.  She states diarrhea is slowing down.  Will replace K and Mag today.    6/5/23-Patient is doing well.  She did have a large BM this morning which was very watery.  Will continue to monitor.      Interval History:     Review of Systems   Constitutional:  Positive for activity change and fatigue.   HENT: Negative.     Eyes: Negative.    Respiratory: Negative.     Cardiovascular: Negative.    Gastrointestinal:  Positive for diarrhea.   Endocrine: Negative.    Genitourinary: Negative.    Musculoskeletal: Negative.    Skin: Negative.    Allergic/Immunologic: Negative.    Neurological: Negative.    Hematological: Negative.     Psychiatric/Behavioral: Negative.     Objective:     Vital Signs (Most Recent):  Temp: 98.8 °F (37.1 °C) (06/05/23 1158)  Pulse: 68 (06/05/23 1158)  Resp: (!) 22 (06/04/23 0900)  BP: (!) 170/95 (06/05/23 1158)  SpO2: 96 % (06/05/23 1158) Vital Signs (24h Range):  Temp:  [98.2 °F (36.8 °C)-98.8 °F (37.1 °C)] 98.8 °F (37.1 °C)  Pulse:  [68-92] 68  SpO2:  [95 %-96 %] 96 %  BP: (166-185)/(72-95) 170/95     Weight: 92 kg (202 lb 13.2 oz)  Body mass index is 32.74 kg/m².    Intake/Output Summary (Last 24 hours) at 6/5/2023 1423  Last data filed at 6/4/2023 1930  Gross per 24 hour   Intake 238 ml   Output 600 ml   Net -362 ml         Physical Exam  Constitutional:       Appearance: Normal appearance. She is obese.   HENT:      Head: Normocephalic and atraumatic.      Nose: Nose normal.      Mouth/Throat:      Mouth: Mucous membranes are moist.      Pharynx: Oropharynx is clear.   Eyes:      Extraocular Movements: Extraocular movements intact.      Conjunctiva/sclera: Conjunctivae normal.      Pupils: Pupils are equal, round, and reactive to light.   Cardiovascular:      Rate and Rhythm: Normal rate and regular rhythm.      Pulses: Normal pulses.      Heart sounds: Normal heart sounds.   Pulmonary:      Effort: Pulmonary effort is normal.      Breath sounds: Normal breath sounds.   Abdominal:      General: Bowel sounds are normal.      Palpations: Abdomen is soft.   Musculoskeletal:         General: Normal range of motion.      Cervical back: Normal range of motion and neck supple.   Skin:     General: Skin is warm and dry.      Capillary Refill: Capillary refill takes 2 to 3 seconds.   Neurological:      General: No focal deficit present.      Mental Status: She is alert and oriented to person, place, and time. Mental status is at baseline.   Psychiatric:         Mood and Affect: Mood normal.         Behavior: Behavior normal.         Thought Content: Thought content normal.         Judgment: Judgment normal.            Significant Labs: All pertinent labs within the past 24 hours have been reviewed.  BMP:   Recent Labs   Lab 06/05/23 0422      K 3.5   CO2 24   BUN 14.0   CREATININE 0.73   CALCIUM 9.6   MG 2.00     CBC:   Recent Labs   Lab 06/04/23  0152 06/05/23 0422   WBC 8.03 10.40   HGB 11.8* 13.1   HCT 37.3 40.2    450*     CMP:   Recent Labs   Lab 06/04/23  0152 06/05/23 0422    139   K 3.3* 3.5   CO2 25 24   BUN 13.0 14.0   CREATININE 0.72 0.73   CALCIUM 9.4 9.6   ALBUMIN 3.0* 3.2*   BILITOT 0.5 0.5   ALKPHOS 58 65   AST 40* 47*   ALT 25 37     Magnesium:   Recent Labs   Lab 06/04/23  0152 06/05/23 0422   MG 1.50* 2.00       Significant Imaging: I have reviewed all pertinent imaging results/findings within the past 24 hours.      Assessment/Plan:      * Diarrhea of infectious origin  Follow labs  DVT prophylaxis  Add oral Vanco      Hypokalemia  Follow labs  Replace K      Hyponatremia  IV fluids  Follow labs        VTE Risk Mitigation (From admission, onward)         Ordered     heparin (porcine) injection 5,000 Units  Every 8 hours         06/01/23 0003     IP VTE HIGH RISK PATIENT  Once         06/01/23 0003     Place sequential compression device  Until discontinued         06/01/23 0003            DVT prophylaxis  Follow labs  Oral Vanco  IV fluids  Discharge Planning   MEGHANN:      Code Status: Full Code   Is the patient medically ready for discharge?:     Reason for patient still in hospital (select all that apply): Patient trending condition, Laboratory test, Treatment and PT / OT recommendations  Discharge Plan A: Home                  Loco Brice MD  Department of Hospital Medicine   [facility]

## 2023-06-05 NOTE — SUBJECTIVE & OBJECTIVE
Interval History:     Review of Systems   Constitutional:  Positive for activity change and fatigue.   HENT: Negative.     Eyes: Negative.    Respiratory: Negative.     Cardiovascular: Negative.    Gastrointestinal:  Positive for diarrhea.   Endocrine: Negative.    Genitourinary: Negative.    Musculoskeletal: Negative.    Skin: Negative.    Allergic/Immunologic: Negative.    Neurological: Negative.    Hematological: Negative.    Psychiatric/Behavioral: Negative.     Objective:     Vital Signs (Most Recent):  Temp: 98.8 °F (37.1 °C) (06/05/23 1158)  Pulse: 68 (06/05/23 1158)  Resp: (!) 22 (06/04/23 0900)  BP: (!) 170/95 (06/05/23 1158)  SpO2: 96 % (06/05/23 1158) Vital Signs (24h Range):  Temp:  [98.2 °F (36.8 °C)-98.8 °F (37.1 °C)] 98.8 °F (37.1 °C)  Pulse:  [68-92] 68  SpO2:  [95 %-96 %] 96 %  BP: (166-185)/(72-95) 170/95     Weight: 92 kg (202 lb 13.2 oz)  Body mass index is 32.74 kg/m².    Intake/Output Summary (Last 24 hours) at 6/5/2023 1423  Last data filed at 6/4/2023 1930  Gross per 24 hour   Intake 238 ml   Output 600 ml   Net -362 ml         Physical Exam  Constitutional:       Appearance: Normal appearance. She is obese.   HENT:      Head: Normocephalic and atraumatic.      Nose: Nose normal.      Mouth/Throat:      Mouth: Mucous membranes are moist.      Pharynx: Oropharynx is clear.   Eyes:      Extraocular Movements: Extraocular movements intact.      Conjunctiva/sclera: Conjunctivae normal.      Pupils: Pupils are equal, round, and reactive to light.   Cardiovascular:      Rate and Rhythm: Normal rate and regular rhythm.      Pulses: Normal pulses.      Heart sounds: Normal heart sounds.   Pulmonary:      Effort: Pulmonary effort is normal.      Breath sounds: Normal breath sounds.   Abdominal:      General: Bowel sounds are normal.      Palpations: Abdomen is soft.   Musculoskeletal:         General: Normal range of motion.      Cervical back: Normal range of motion and neck supple.   Skin:      General: Skin is warm and dry.      Capillary Refill: Capillary refill takes 2 to 3 seconds.   Neurological:      General: No focal deficit present.      Mental Status: She is alert and oriented to person, place, and time. Mental status is at baseline.   Psychiatric:         Mood and Affect: Mood normal.         Behavior: Behavior normal.         Thought Content: Thought content normal.         Judgment: Judgment normal.           Significant Labs: All pertinent labs within the past 24 hours have been reviewed.  BMP:   Recent Labs   Lab 06/05/23 0422      K 3.5   CO2 24   BUN 14.0   CREATININE 0.73   CALCIUM 9.6   MG 2.00     CBC:   Recent Labs   Lab 06/04/23  0152 06/05/23 0422   WBC 8.03 10.40   HGB 11.8* 13.1   HCT 37.3 40.2    450*     CMP:   Recent Labs   Lab 06/04/23  0152 06/05/23 0422    139   K 3.3* 3.5   CO2 25 24   BUN 13.0 14.0   CREATININE 0.72 0.73   CALCIUM 9.4 9.6   ALBUMIN 3.0* 3.2*   BILITOT 0.5 0.5   ALKPHOS 58 65   AST 40* 47*   ALT 25 37     Magnesium:   Recent Labs   Lab 06/04/23  0152 06/05/23 0422   MG 1.50* 2.00       Significant Imaging: I have reviewed all pertinent imaging results/findings within the past 24 hours.

## 2023-06-06 VITALS
BODY MASS INDEX: 32.59 KG/M2 | TEMPERATURE: 99 F | SYSTOLIC BLOOD PRESSURE: 135 MMHG | OXYGEN SATURATION: 95 % | RESPIRATION RATE: 18 BRPM | HEIGHT: 66 IN | DIASTOLIC BLOOD PRESSURE: 72 MMHG | HEART RATE: 74 BPM | WEIGHT: 202.81 LBS

## 2023-06-06 LAB
ALBUMIN SERPL-MCNC: 3.2 G/DL (ref 3.4–4.8)
ALBUMIN/GLOB SERPL: 0.7 RATIO (ref 1.1–2)
ALP SERPL-CCNC: 63 UNIT/L (ref 40–150)
ALT SERPL-CCNC: 27 UNIT/L (ref 0–55)
AST SERPL-CCNC: 34 UNIT/L (ref 5–34)
BACTERIA BLD CULT: NORMAL
BACTERIA BLD CULT: NORMAL
BILIRUBIN DIRECT+TOT PNL SERPL-MCNC: 0.5 MG/DL
BUN SERPL-MCNC: 15 MG/DL (ref 9.8–20.1)
CALCIUM SERPL-MCNC: 9.6 MG/DL (ref 8.4–10.2)
CHLORIDE SERPL-SCNC: 104 MMOL/L (ref 98–107)
CO2 SERPL-SCNC: 23 MMOL/L (ref 23–31)
CREAT SERPL-MCNC: 0.76 MG/DL (ref 0.55–1.02)
GFR SERPLBLD CREATININE-BSD FMLA CKD-EPI: >60 MLS/MIN/1.73/M2
GLOBULIN SER-MCNC: 4.4 GM/DL (ref 2.4–3.5)
GLUCOSE SERPL-MCNC: 123 MG/DL (ref 82–115)
MAGNESIUM SERPL-MCNC: 1.8 MG/DL (ref 1.6–2.6)
POTASSIUM SERPL-SCNC: 3.6 MMOL/L (ref 3.5–5.1)
PROT SERPL-MCNC: 7.6 GM/DL (ref 5.8–7.6)
SODIUM SERPL-SCNC: 138 MMOL/L (ref 136–145)

## 2023-06-06 PROCEDURE — 63600175 PHARM REV CODE 636 W HCPCS: Performed by: INTERNAL MEDICINE

## 2023-06-06 PROCEDURE — 97530 THERAPEUTIC ACTIVITIES: CPT | Mod: CQ

## 2023-06-06 PROCEDURE — 83735 ASSAY OF MAGNESIUM: CPT | Performed by: INTERNAL MEDICINE

## 2023-06-06 PROCEDURE — 27000221 HC OXYGEN, UP TO 24 HOURS

## 2023-06-06 PROCEDURE — 25000003 PHARM REV CODE 250: Performed by: INTERNAL MEDICINE

## 2023-06-06 PROCEDURE — 80053 COMPREHEN METABOLIC PANEL: CPT | Performed by: INTERNAL MEDICINE

## 2023-06-06 PROCEDURE — 94761 N-INVAS EAR/PLS OXIMETRY MLT: CPT

## 2023-06-06 RX ADMIN — TAMSULOSIN HYDROCHLORIDE 0.4 MG: 0.4 CAPSULE ORAL at 09:06

## 2023-06-06 RX ADMIN — LOSARTAN POTASSIUM 100 MG: 50 TABLET, FILM COATED ORAL at 09:06

## 2023-06-06 RX ADMIN — ARIPIPRAZOLE 15 MG: 5 TABLET ORAL at 09:06

## 2023-06-06 RX ADMIN — HEPARIN SODIUM 5000 UNITS: 5000 INJECTION, SOLUTION INTRAVENOUS; SUBCUTANEOUS at 07:06

## 2023-06-06 RX ADMIN — VANCOMYCIN HYDROCHLORIDE 125 MG: KIT at 01:06

## 2023-06-06 RX ADMIN — VANCOMYCIN HYDROCHLORIDE 125 MG: KIT at 07:06

## 2023-06-06 RX ADMIN — AMLODIPINE BESYLATE 10 MG: 10 TABLET ORAL at 09:06

## 2023-06-06 RX ADMIN — HEPARIN SODIUM 5000 UNITS: 5000 INJECTION, SOLUTION INTRAVENOUS; SUBCUTANEOUS at 02:06

## 2023-06-06 RX ADMIN — OXYBUTYNIN CHLORIDE 5 MG: 5 TABLET, EXTENDED RELEASE ORAL at 09:06

## 2023-06-06 NOTE — DISCHARGE SUMMARY
[facility] - Medical Surgical Unit  Hospital Medicine  Discharge Summary      Patient Name: Diane Larsen  MRN: [id number]  MARYJANE: [id number]  Patient Class: IP- Inpatient  Admission Date: 5/31/2023  Hospital Length of Stay: 6 days  Discharge Date and Time:  06/06/2023 12:05 PM  Attending Physician: Jt England Jr., MD   Discharging Provider: Loco Brice MD  Primary Care Provider: NIGEL Avendaño    Primary Care Team: Networked reference to record PCT     HPI:   68 yearo old  female presented to the ER with caregiver with a primary complaint of low blood pressure low heart rate. She is on a monumentally long list of medications but it is uncertain if all these are accurate at this time. She reportedly was seen by PCP recently pressure medication adjustments which likely includes stopping hydrochlorothiazide but this has not been confirmed.  There is also a Ford of nonbloody diarrhea chronically for at least six months. No report of fever, chills, abdominal pain, nausea or vomiting.  Workup in the emergency room did reveal a low sodium of 125.  She also has acute  renal failure with an uncertain baseline.  She is being admitted to telemetry due to the hypotension bradycardia and given IV fluids for the low-sodium.      * No surgery found *      Hospital Course:   6/3/23-Patient is still having some diarrhea.  Will start oral Vanco today and continue to monitor.    6/4/23-Patient is feeling a little better.  She states diarrhea is slowing down.  Will replace K and Mag today.    6/5/23-Patient is doing well.  She did have a large BM this morning which was very watery.  Will continue to monitor.    6/6/23-Patient is doing well at this time.  She has only had one BM thus far today and her previous was last night.  She will need to continue the vancomycin for another 10 days.  She is stable for discharge home today.  Exam(A&O, NAD, RRR, CTA, BS +, soft,  NTTP, ROM I)       Goals of Care Treatment  Preferences:  Code Status: Full Code      Consults:   Consults (From admission, onward)        Status Ordering Provider     Inpatient consult to Cardiology  Once        Provider:  Keron Claros MD    Completed MURTAZA FLEMING JR          No new Assessment & Plan notes have been filed under this hospital service since the last note was generated.  Service: Hospital Medicine    Final Active Diagnoses:    Diagnosis Date Noted POA    PRINCIPAL PROBLEM:  Diarrhea of infectious origin [A09] 06/03/2023 Yes    Hyponatremia [E87.1] 06/03/2023 Yes    Hypokalemia [E87.6] 06/03/2023 Yes      Problems Resolved During this Admission:       Discharged Condition: stable    [place] [facility] [place]    Follow Up:   Follow-up Information     NIGEL Avendaño Follow up in 1 week(s).    Specialties: Family Medicine, Emergency Medicine  Contact information:  [address] [place] [id number] [phone]                       Patient Instructions:   No discharge procedures on file.    Significant Diagnostic Studies: Labs:   BMP:   Recent Labs   Lab 06/05/23 0422 06/06/23 0518    138   K 3.5 3.6   CO2 24 23   BUN 14.0 15.0   CREATININE 0.73 0.76   CALCIUM 9.6 9.6   MG 2.00 1.80   , CMP   Recent Labs   Lab 06/05/23 0422 06/06/23 0518    138   K 3.5 3.6   CO2 24 23   BUN 14.0 15.0   CREATININE 0.73 0.76   CALCIUM 9.6 9.6   ALBUMIN 3.2* 3.2*   BILITOT 0.5 0.5   ALKPHOS 65 63   AST 47* 34   ALT 37 27    and CBC   Recent Labs   Lab 06/05/23 0422   WBC 10.40   HGB 13.1   HCT 40.2   *       Pending Diagnostic Studies:     None         Medications:  Reconciled Home Medications:      Medication List      START taking these medications    vancomycin 125 mg/5 mL Soln  Take 5 mLs (125 mg total) by mouth every 6 (six) hours. for 10 days        CONTINUE taking these medications    albuterol 90 mcg/actuation inhaler  Commonly known as: PROVENTIL/VENTOLIN HFA  USE TWO PUFFS BY MOUTH EVERY 4 HOURS AS NEEDED     amLODIPine  10 MG tablet  Commonly known as: NORVASC  Take 10 mg by mouth.     ARIPiprazole 15 MG Tab  Commonly known as: ABILIFY  Take 15 mg by mouth.     busPIRone 10 MG tablet  Commonly known as: BUSPAR  Take 10 mg by mouth 2 (two) times daily.     diphenoxylate-atropine 2.5-0.025 mg 2.5-0.025 mg per tablet  Commonly known as: LOMOTIL  Take 1 tablet by mouth 4 (four) times daily as needed for Diarrhea.     divalproex 500 MG Tbec  Commonly known as: DEPAKOTE  Take 1,000 mg by mouth every evening.     FARXIGA 10 mg tablet  Generic drug: dapagliflozin  Take 10 mg by mouth once daily.     fenofibrate 54 MG tablet  Commonly known as: TRICOR  TAKE ONE TABLET BY MOUTH ONCE A DAY WITH FOOD     fluconazole 100 MG tablet  Commonly known as: DIFLUCAN  Take 100 mg by mouth once daily.     furosemide 20 MG tablet  Commonly known as: LASIX  TAKE ONE TABLET BY MOUTH EVERY DAY AS NEEDED FOR FLUID     gabapentin 600 MG tablet  Commonly known as: NEURONTIN  Take 600 mg by mouth 3 (three) times daily.     hydroCHLOROthiazide 25 MG tablet  Commonly known as: HYDRODIURIL  Take 25 mg by mouth.     hydrOXYzine HCL 25 MG tablet  Commonly known as: ATARAX     ipratropium 0.02 % nebulizer solution  Commonly known as: ATROVENT  Take 500 mcg by nebulization 4 (four) times daily. Rescue     labetaloL 200 MG tablet  Commonly known as: NORMODYNE  TAKE ONE TABLET BY MOUTH TWICE A DAY FOR BLOOD PRESSURE     losartan 100 MG tablet  Commonly known as: COZAAR  Take 100 mg by mouth.     meloxicam 15 MG tablet  Commonly known as: MOBIC  TAKE ONE TABLET BY MOUTH EVERY DAY FOR ARTHRITIS     mirtazapine 15 MG tablet  Commonly known as: REMERON  Take 15 mg by mouth every evening. at bedtime.     nebivoloL 10 MG Tab  Commonly known as: BYSTOLIC  Take 10 mg by mouth every evening. at bedtime.     * nystatin cream  Commonly known as: MYCOSTATIN  Apply 1 g topically 2 (two) times daily.     * nystatin powder  Commonly known as: MYCOSTATIN  Apply 1 g topically 2 (two)  times daily.     oxybutynin 5 MG Tr24  Commonly known as: DITROPAN-XL  Take 5 mg by mouth 2 (two) times daily.     pantoprazole 40 MG tablet  Commonly known as: PROTONIX  Take 40 mg by mouth.     potassium chloride 10 MEQ Tbsr  Commonly known as: KLOR-CON  TAKE ONE TABLET BY MOUTH ONCE A DAY ONLY WHEN TAKING LASIX     primidone 50 MG Tab  Commonly known as: MYSOLINE  Take 50 mg by mouth.     QUEtiapine 100 MG Tab  Commonly known as: SEROQUEL     rOPINIRole 0.5 MG tablet  Commonly known as: REQUIP  Take 0.5 mg by mouth every evening. at bedtime.     sertraline 100 MG tablet  Commonly known as: ZOLOFT  Take 100 mg by mouth.     tamsulosin 0.4 mg Cap  Commonly known as: FLOMAX  Take 1 capsule by mouth.     traZODone 50 MG tablet  Commonly known as: DESYREL  Take 50 mg by mouth nightly as needed.     TRELEGY ELLIPTA 200-62.5-25 mcg inhaler  Generic drug: fluticasone-umeclidin-vilanter  INHALE ONE PUFF INTO THE LUNGS EVERY DAY     WESTAB PLUS 27 mg iron- 1 mg Tab  Generic drug: PNV,calcium 72-iron-folic acid  Take 1 tablet by mouth.         * This list has 2 medication(s) that are the same as other medications prescribed for you. Read the directions carefully, and ask your doctor or other care provider to review them with you.                Indwelling Lines/Drains at time of discharge:   Lines/Drains/Airways     None                 Time spent on the discharge of patient: 35 minutes         Loco Brice MD  Department of Hospital Medicine  [facility] - Medical Surgical Unit

## 2023-06-06 NOTE — PT/OT/SLP PROGRESS
Physical Therapy Treatment    Patient Name:  Diane Larsen   MRN:  [id number]    Recommendations:     Discharge Recommendations: home  Discharge Equipment Recommendations: none  Barriers to discharge: None    Assessment:     Diane Larsen is a 68 y.o. female admitted with a medical diagnosis of Diarrhea of infectious origin.  She presents with the following impairments/functional limitations: weakness, impaired endurance, gait instability, impaired balance, decreased safety awareness.    Pt required encouragement for participation as well as frequent cues for sequencing tasks safely.    Rehab Prognosis: Good; patient would benefit from acute skilled PT services to address these deficits and reach maximum level of function.    Recent Surgery: * No surgery found *      Plan:     During this hospitalization, patient to be seen 5 x/week to address the identified rehab impairments via gait training, therapeutic activities, therapeutic exercises and progress toward the following goals:    Plan of Care Expires:  07/02/23    Subjective     Chief Complaint: weakness  Patient/Family Comments/goals: to get strong enough to return home  Pain/Comfort:         Objective:     Communicated with patient prior to session.  Patient found HOB elevated with telemetry, peripheral IV, oxygen upon PT entry to room.     General Precautions: Standard, fall, seizure  Orthopedic Precautions: N/A  Braces: N/A  Respiratory Status: Nasal cannula, flow 2 L/min     Functional Mobility:  Bed Mobility:     Supine to Sit: contact guard assistance  Sit to Supine: minimum assistance  Transfers:     Sit to Stand:  contact guard assistance with 4 wheeled walker  Bed to Chair: moderate assistance with  4 wheeled walker  using  Stand Pivot and Step Transfer  Gait: steps  L and R along bedside and to chair to and from bed  Balance: min-CGA in supported standing      AM-PAC 6 CLICK MOBILITY          Treatment & Education:  See above, seated and standing BLE  exercises    Patient left HOB elevated with all lines intact, call button in reach, and bed alarm on..    GOALS:   Multidisciplinary Problems       Physical Therapy Goals          Problem: Physical Therapy    Goal Priority Disciplines Outcome Goal Variances Interventions   Physical Therapy Goal     PT, PT/OT Ongoing, Progressing     Description: Goals to be met by: discharge     Patient will increase functional independence with mobility by performin. Supine to sit with Contact Guard Assistance  2. Sit to stand transfer with Stand-by Assistance and Contact Guard Assistance  3. Bed to chair transfer with Stand-by Assistance and Contact Guard Assistance using Rolling Walker                         Time Tracking:     PT Received On: 23  PT Start Time: 1130    PT Stop Time: 1200  PT Total Time (min): 30 min     Billable Minutes: Therapeutic Activity 30    Treatment Type: Treatment  PT/PTA: PTA           2023

## 2023-06-06 NOTE — PLAN OF CARE
MD placed orders for DC today. Updates with DC info sent to [facility] in [facility].

## 2023-06-06 NOTE — NURSING
6/6/2023 Reassessment of L plantar foot wound    Dressing removed, area cleansed with NS and gauze, measurements and picture taken 3.0cm x 2.7 cm x 0.1cm.  Wound bed pink and granulating, applied silver alginate to wound bed covered with gauze and secured with roll gauze secured to self with tape.  Bilateral foam heel protectors applied.  Patient tolerated well.

## 2023-06-06 NOTE — PLAN OF CARE
Problem: Adult Inpatient Plan of Care  Goal: Plan of Care Review  Outcome: Met  Goal: Patient-Specific Goal (Individualized)  Outcome: Met  Goal: Absence of Hospital-Acquired Illness or Injury  Outcome: Met  Goal: Optimal Comfort and Wellbeing  Outcome: Met  Goal: Readiness for Transition of Care  Outcome: Met     Problem: Impaired Wound Healing  Goal: Optimal Wound Healing  Outcome: Met     Problem: Skin Injury Risk Increased  Goal: Skin Health and Integrity  Outcome: Met     Problem: Dysrhythmia  Goal: Normalized Cardiac Rhythm  Outcome: Met     Problem: Fall Injury Risk  Goal: Absence of Fall and Fall-Related Injury  Outcome: Met     Problem: Fatigue  Goal: Improved Activity Tolerance  Outcome: Met     Problem: UTI (Urinary Tract Infection)  Goal: Improved Infection Symptoms  Outcome: Met     Problem: Infection  Goal: Absence of Infection Signs and Symptoms  Outcome: Met     Problem: Diarrhea  Goal: Fluid and Electrolyte Balance  Outcome: Met     Problem: Behavioral Health Comorbidity  Goal: Maintenance of Behavioral Health Symptom Control  Outcome: Met     Problem: COPD (Chronic Obstructive Pulmonary Disease) Comorbidity  Goal: Maintenance of COPD Symptom Control  Outcome: Met     Problem: Hypertension Comorbidity  Goal: Blood Pressure in Desired Range  Outcome: Met     Problem: Seizure Disorder Comorbidity  Goal: Maintenance of Seizure Control  Outcome: Met

## 2023-06-09 ENCOUNTER — PATIENT OUTREACH (OUTPATIENT)
Dept: ADMINISTRATIVE | Facility: CLINIC | Age: 68
End: 2023-06-09
Payer: MEDICARE

## 2023-06-09 NOTE — PROGRESS NOTES
C3 nurse attempted to contact Diane Larsen for a TCC post hospital discharge follow up call. No answer or voicemail available on the pts phone. Spoke with the pts caregiver Melonie who declined the TCC call, stating she felt comfortable with everything since she has spoken to the pts doctor twice since discharge and has an appt this coming Tuesday. Declined the need for [facility] number, stating home health has already initiated care and she has their number. Unable to route to pts PCP, NIGEL Avendaño.

## 2023-07-18 ENCOUNTER — HOSPITAL ENCOUNTER (OUTPATIENT)
Dept: WOUND CARE | Facility: HOSPITAL | Age: 68
Discharge: HOME | End: 2023-07-18
Attending: NURSE PRACTITIONER
Payer: MEDICARE

## 2023-07-18 VITALS
WEIGHT: 202 LBS | DIASTOLIC BLOOD PRESSURE: 64 MMHG | HEART RATE: 48 BPM | SYSTOLIC BLOOD PRESSURE: 142 MMHG | RESPIRATION RATE: 18 BRPM | BODY MASS INDEX: 32.47 KG/M2 | HEIGHT: 66 IN

## 2023-07-18 DIAGNOSIS — S91.302A OPEN WOUND OF PLANTAR ASPECT OF LEFT FOOT: Primary | ICD-10-CM

## 2023-07-18 DIAGNOSIS — Z51.89 ENCOUNTER FOR WOUND RE-CHECK: ICD-10-CM

## 2023-07-18 DIAGNOSIS — L60.2 ONYCHAUXIS: ICD-10-CM

## 2023-07-18 DIAGNOSIS — I87.8 CHRONIC VENOUS STASIS: ICD-10-CM

## 2023-07-18 DIAGNOSIS — M14.672 CHARCOT'S JOINT OF LEFT FOOT, NON-DIABETIC: ICD-10-CM

## 2023-07-18 PROCEDURE — 27000999 HC MEDICAL RECORD PHOTO DOCUMENTATION

## 2023-07-18 PROCEDURE — 99213 OFFICE O/P EST LOW 20 MIN: CPT | Mod: 25

## 2023-07-18 PROCEDURE — 97597 DBRDMT OPN WND 1ST 20 CM/<: CPT | Mod: 59

## 2023-07-18 PROCEDURE — 11719 TRIM NAIL(S) ANY NUMBER: CPT | Mod: 59

## 2023-07-18 RX ORDER — COLLAGENASE SANTYL 250 [ARB'U]/G
OINTMENT TOPICAL DAILY
Qty: 90 G | Refills: 2 | Status: SHIPPED | OUTPATIENT
Start: 2023-07-18 | End: 2023-08-17

## 2023-07-18 NOTE — PROGRESS NOTES
Home Health: [facility]  Smoker  [x] Yes  [] No   Last A1C: 6.8 on 23  Last CB  Keavy Sukhdeep  [x] Yes [] No            Results: 0/5 left foot   Doppler done in office? [] Yes [x] No   Date:      Right dorsalis:     Right posterior:     Left dorsalis:     Left posterior:  [place] Shoe [x] Yes [] No   Date given: does not like to wear  Is patient eligible for HBO [] Yes [x] No   Start date:   Is the patient eligible for a graft, and/or currently grafting?  [] Yes [x] No    If so, which one/size?      Subjective:       Patient ID: Diane Larsen is a 68 y.o. female.    Chief Complaint: Diabetic Foot Ulcer (Left plantar foot - [place] 2 )    HPI  23 (Dr. Lassiter) - 68-year-old white female, who was referred here by Dr. Hank Owen, for an open wound on the left plantar surface, left foot.  She is not a diabetic.  She was admitted into the [facility], for cellulitis and abscess of the plantar surface of the left foot, and she underwent an intraoperative debridement.  He is here for follow up of this new wound.  It appears that she has had some chronic venous stasis over the past several months, both lower legs.  This has not been treated in the past, as far as I know.  While she was in the hospital, she underwent an MRI of the left foot, which showed no evidence of osteomyelitis.  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  She has Pes planus.  She also underwent arterial ultrasound on , which showed patent arteries in both lower extremities.  The venous ultrasound was also negative for DVT.  I have reviewed her records from the hospital.  Pt caregiver states that a blister was noticed on her left foot about 2 weeks ago around 23. The pt opened the blister by scratching and by Monday 4/10/23 the caregiver noted that it needed immediate attention. Pt went to ED in [place] on Monday 4/10/23 and was admitted. Dr. Mckinney debrided the left foot on 23, she was discharged on  4/14/23. She has a follow up with Dr. Mckinney tomorrow 4/19/23. She is currently taking Augmentin PO. She states that she is not diabetic. Her semmes yudi is 1/10 and her blood sugar was 124. She had venous/arterial ultrasounds done during her hospital stay as well as MRI and xrays    5/2/23 - Ms. Larsen returns to clinic today for followup on the open wound to the left mid foot.  Her history includes the left ankle being broken several months ago which left the left foot somewhat deformed.  The wound was surgically debrided by Dr. Hank Owen.  She stayed for 1 week at the hospital in [place], [place] and went home on oral Augmentin has now completed.  Her PCP is from [facility] and she reports that he has stated that they are not to use compression on the leg.  However, ultrasounds were done in April and they were clear.  The caregiver does have concern that she is unable to ambulate and would like a wheelchair obtained for the patient.  They were advised to go to [employer]  to have the paperwork sent to us for prescription.  However, [facility] called and their insurance does not cover the wheelchair.    Today, the left mid foot was assessed and selectively debrided.  Additionally, she does have several scattered wounds on her bilateral lower legs that will be monitored.  She was given Hibiclens and instructed to wash the lower extremities including the foot with a Hibiclens x3 days.    5/9/23 - the patient returns today for followup with her caregiver present.  In addition to the neuropathic wound on the plantar surface of the left foot she had small scattered wounds on her bilateral lower extremities.  She was instructed to cleanse her wounds on her legs with Hibiclens and she did so.  Today, the majority of those small open areas on the legs are now closed and they look significantly improved.  The wound on the left foot was selectively debrided and it does remains stable.  Granular tissue is surface  level so we will begin applying Endoform to the wound bed with the hopes of developing epithelial tissue.  Of note, the patient is still has not received her wheelchair.  That wheelchair was faxed to her DME company of choice and she and the caregiver were instructed that they will need to followup with that company.    5/16/23 - Ms. Larsen returns today for followup in wound check on the wound to the left plantar foot.  This neuropathic wound was selectively debrided today and does remains stable.  We will begin today applying Endoform with the hopes of developing epithelial tissue across the wound bed.  She has an appointment with Doreen Barnes NP, on 05/30/2023 to initiate services  as her PCP because she is not satisfied with her current provider.  She will return to clinic in 1 week for followup.    5/24/23 - the patient returns today for followup on the neuropathic wound to the plantar surface of the left foot.  We began using Endoform at the last visit, however, the patient in her caregiver misunderstood the instructions in removed that product prematurely.  Today, the wound was selectively debrided and we did again place Endoform on the wound to be left in place until Friday.  The wound does appear to be slowly improving.  We will be seeking authorization for grafting if possible.    5/30/23 - the patient returns today for the neuropathic wound to the left foot.  She does report that she fell sometime over the weekend.  She did fall backwards and injured her bottom.  She noted some bruises and pain, however, there are no open wounds.  She did not go to ER.  This morning, her caregiver reports that the patient was found soaked in urine and that urine had dripped down to the wound on the foot.  There does not appear to be any change in the wound.  Today, the patient fell asleep in both the lobby and in the chair while she was receiving care.  She will be seeing a new provider today, Doreen Barnes NP.  We  "are hopeful that there may be some medication changes done that will help her in becoming a little more alert.    Today, the wound was assessed and appears to be improving.  A selective debridement was performed and the hyper granulated tissue was chemically cauterized with silver nitrate.  We are still attempting to authorize a graft of some kind.  She does though need 30 days of continuous care so we can hopefully get one of the grafts authorized now.    7/18/23 - Ms. Larsen was last seen in the clinic on 5/30/23. She was admitted to [facility] on 5/31/23 - 6/6/23 with hypotension and low O2 sats. While there it was discovered she had C-Diff. Her caregiver states that she did several rounds of oral antibiotics but is no longer taking anything. She has been using mesalt and silver alginate to dress the wound since being home. She has been diagnosed as a diabetic since her last visit with us. Her A1C on 6/2/23 was 6.8.   Today her wound was assessed in has improved since her last visit.  It was selectively debrided.  There is a light coating of slough over the entire wound bed, therefore Santyl has been ordered for daily application.  Today, we applied mesalt and if she is not able to get Santyl with her insurance she will continue with daily use of mesalt.    Additionally, her caregiver requested that her toenails be trimmed.  Six toenails were debrided off today.      Review of Systems   Constitutional: Negative.    Respiratory: Negative.     Cardiovascular: Negative.    Skin:         As documented in the HPI.   All other systems reviewed and are negative.      Objective:      Vitals:    07/18/23 1403   BP: (!) 142/64   BP Location: Left arm   Patient Position: Sitting   Pulse: (!) 48   Resp: 18   Weight: 91.6 kg (202 lb)   Height: 5' 6" (1.676 m)        Physical Exam  Vitals reviewed.   Constitutional:       Appearance: Normal appearance.   Cardiovascular:      Rate and Rhythm: Normal rate.   Pulmonary:      Effort: " Pulmonary effort is normal.   Skin:     General: Skin is warm and dry.      Comments: There is some scaling in both lower extremities, over the tibia.  There is trace edema.  There is the healing wound on the left plantar aspect of the left foot, with no obvious secondary infection.  I did some debridement with a freer, but this is mostly fibrin.  There was no significant necrotic tissue removed.   Neurological:      Mental Status: She is alert.          Altered Skin Integrity 04/18/23 1351 Left medial Foot (Active)   04/18/23 1351   Altered Skin Integrity Present on Admission - Did Patient arrive to the hospital with altered skin?: yes   Side: Left   Orientation: medial   Location: Foot   Wound Number:    Is this injury device related?:    Primary Wound Type:    Description of Altered Skin Integrity:    Ankle-Brachial Index:    Pulses:    Removal Indication and Assessment:    Wound Outcome:    (Retired) Wound Length (cm):    (Retired) Wound Width (cm):    (Retired) Depth (cm):    Wound Description (Comments):    Removal Indications:    Dressing Appearance Moist drainage 07/18/23 1405   Drainage Amount Moderate 07/18/23 1405   Drainage Characteristics/Odor Yellow 07/18/23 1405   Appearance Pink;Red;Moist 07/18/23 1405   Tissue loss description Full thickness 07/18/23 1405   Periwound Area [place];Dry 07/18/23 1405   Wound Edges Callused;Defined 07/18/23 1405   Wound Length (cm) 2.8 cm 07/18/23 1405   Wound Width (cm) 2.6 cm 07/18/23 1405   Wound Depth (cm) 0.2 cm 07/18/23 1405   Wound Volume (cm^3) 1.456 cm^3 07/18/23 1405   Wound Surface Area (cm^2) 7.28 cm^2 07/18/23 1405   Undermining (depth (cm)/location) 0.5cm from 7-9 oclock 07/18/23 1405   Care Cleansed with:;Wound cleanser 07/18/23 1405   Dressing Applied 07/18/23 1405   Dressing Change Due 07/19/23 07/18/23 1405 7/18/23        Left plantar foot (pre)                                     Left plantar foot (post)  mesalt, 4x4, kerlix, coban        Assessment:  "        ICD-10-CM ICD-9-CM   1. Open wound of plantar aspect of left foot  S91.302A 892.0   2. Charcot's joint of left foot, non-diabetic  M14.672 094.0     713.5   3. Chronic venous stasis  I87.8 459.81   4. 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     Consent Done?:  Yes (Verbal)     Preparation: Patient was prepped and draped with clean technique    Local anesthesia used?: No       Wound Details:    Location:  Left foot    Location:  Left Midfoot    Type of Debridement:  Non-excisional       Length (cm):  2.8       Area (sq cm):  7.28       Width (cm):  2.6       Percent Debrided (%):  100       Depth (cm):  0.2       Total Area Debrided (sq cm):  7.28    Depth of debridement:  Epidermis/Dermis    Devitalized tissue debrided:  Biofilm, Callus, Exudate and Fibrin    Instruments:  Curette (Dermal)  Bleeding:  None  Patient tolerance:  Patient tolerated the procedure well with no immediate complications      Silver nitrate applied in treatment of hypergranulated tissue.    Excisional debridement performed:  [] Yes [x] No   Selective debridement performed:  [x] Yes [] No (dermal)  Mechanical debridement performed:  [] Yes [x] No   Silver nitrate applied :    [x] Yes [] No   Labs ordered this visit:   [] Yes [x] No   Imaging ordered this visit:   [] Yes [x] No   Tissue pathology and/or culture taken:  [] Yes [x] No   *nail trimming 6**      HOME HEALTH AGENCY:   [facility]     Since 4/17/2023     [phone]     TIMES PER WEEK/DAYS: 1 x weekly, Fridays only  Patient will come to wound care weekly    Left foot wound: Cleanse with wound " cleanser, apply santyl (nickel thick) to wound, cover wound bed with mesalt, 4x4 gauze and wrap with kerlix and coban, change this dressing daily  **If unable to get Santyl, use mesalt, 4x4 gauze, kerlix and coban**    Follow up in 1 week (on 7/25/2023) for left foot.

## 2023-07-18 NOTE — PROCEDURES
"Debridement    Date/Time: 7/18/2023 1:50 PM  Performed by: Barbra Perez NP  Authorized by: Barbra Perez NP     Time out: Immediately prior to procedure a "time out" was called to verify the correct patient, procedure, equipment, support staff and site/side marked as required.    Consent Done?:  Yes (Verbal)    Preparation: Patient was prepped and draped with clean technique    Local anesthesia used?: No      Wound Details:    Location:  Left foot    Location:  Left Midfoot    Type of Debridement:  Non-excisional       Length (cm):  2.8       Area (sq cm):  7.28       Width (cm):  2.6       Percent Debrided (%):  100       Depth (cm):  0.2       Total Area Debrided (sq cm):  7.28    Depth of debridement:  Epidermis/Dermis    Devitalized tissue debrided:  Biofilm, Callus, Exudate and Fibrin    Instruments:  Curette (Dermal)  Bleeding:  None  Patient tolerance:  Patient tolerated the procedure well with no immediate complications     Silver nitrate applied in treatment of hypergranulated tissue.  "

## 2023-07-25 ENCOUNTER — HOSPITAL ENCOUNTER (OUTPATIENT)
Dept: WOUND CARE | Facility: HOSPITAL | Age: 68
Discharge: HOME | End: 2023-07-25
Attending: NURSE PRACTITIONER
Payer: MEDICARE

## 2023-07-25 VITALS
HEART RATE: 41 BPM | SYSTOLIC BLOOD PRESSURE: 143 MMHG | WEIGHT: 202 LBS | DIASTOLIC BLOOD PRESSURE: 65 MMHG | BODY MASS INDEX: 32.47 KG/M2 | RESPIRATION RATE: 18 BRPM | HEIGHT: 66 IN

## 2023-07-25 DIAGNOSIS — S91.302A OPEN WOUND OF PLANTAR ASPECT OF LEFT FOOT: Primary | ICD-10-CM

## 2023-07-25 DIAGNOSIS — Z51.89 ENCOUNTER FOR WOUND RE-CHECK: ICD-10-CM

## 2023-07-25 DIAGNOSIS — I87.8 CHRONIC VENOUS STASIS: ICD-10-CM

## 2023-07-25 DIAGNOSIS — M14.672 CHARCOT'S JOINT OF LEFT FOOT, NON-DIABETIC: ICD-10-CM

## 2023-07-25 PROCEDURE — 97597 DBRDMT OPN WND 1ST 20 CM/<: CPT

## 2023-07-25 PROCEDURE — 99213 OFFICE O/P EST LOW 20 MIN: CPT | Mod: 25

## 2023-07-25 PROCEDURE — 27000999 HC MEDICAL RECORD PHOTO DOCUMENTATION

## 2023-07-25 RX ORDER — NEBIVOLOL 5 MG/1
5 TABLET ORAL
Status: ON HOLD | COMMUNITY
Start: 2023-07-03 | End: 2023-08-02 | Stop reason: HOSPADM

## 2023-07-25 RX ORDER — METFORMIN HYDROCHLORIDE 500 MG/1
500 TABLET, EXTENDED RELEASE ORAL
COMMUNITY
Start: 2023-07-07

## 2023-07-25 RX ORDER — LIOTHYRONINE SODIUM 5 UG/1
5 TABLET ORAL EVERY MORNING
COMMUNITY
Start: 2023-06-29

## 2023-07-25 RX ORDER — TRAZODONE HYDROCHLORIDE 100 MG/1
100 TABLET ORAL NIGHTLY
COMMUNITY
Start: 2023-07-07 | End: 2023-10-18

## 2023-07-25 RX ORDER — QUETIAPINE FUMARATE 50 MG/1
50 TABLET, FILM COATED ORAL NIGHTLY
COMMUNITY
Start: 2023-07-07

## 2023-07-25 NOTE — PROGRESS NOTES
Home Health: [facility]  Smoker  [x] Yes  [] No   Last A1C: 6.8 on 23  Last CB  [place] Sukhdeep  [x] Yes [] No            Results: 0/5 left foot   Doppler done in office? [] Yes [x] No   Date:      Right dorsalis:     Right posterior:     Left dorsalis:     Left posterior:  [place] Shoe [x] Yes [] No   Date given: does not like to wear  Is patient eligible for HBO [] Yes [x] No   Start date:   Is the patient eligible for a graft, and/or currently grafting?  [] Yes [x] No    If so, which one/size?      Subjective:       Patient ID: Diane Larsen is a 68 y.o. female.    Chief Complaint: Diabetic Foot Ulcer (Left plantar foot - [place] 2)    HPI  23 (Dr. Lassiter) - 68-year-old white female, who was referred here by Dr. Hank Owen, for an open wound on the left plantar surface, left foot.  She is not a diabetic.  She was admitted into the [facility], for cellulitis and abscess of the plantar surface of the left foot, and she underwent an intraoperative debridement.  He is here for follow up of this new wound.  It appears that she has had some chronic venous stasis over the past several months, both lower legs.  This has not been treated in the past, as far as I know.  While she was in the hospital, she underwent an MRI of the left foot, which showed no evidence of osteomyelitis.  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  She has Pes planus.  She also underwent arterial ultrasound on , which showed patent arteries in both lower extremities.  The venous ultrasound was also negative for DVT.  I have reviewed her records from the hospital.  Pt caregiver states that a blister was noticed on her left foot about 2 weeks ago around 23. The pt opened the blister by scratching and by Monday 4/10/23 the caregiver noted that it needed immediate attention. Pt went to ED in [place] on Monday 4/10/23 and was admitted. Dr. Mckinney debrided the left foot on 23, she was discharged on  4/14/23. She has a follow up with Dr. Mckinney tomorrow 4/19/23. She is currently taking Augmentin PO. She states that she is not diabetic. Her semmes yudi is 1/10 and her blood sugar was 124. She had venous/arterial ultrasounds done during her hospital stay as well as MRI and xrays    5/2/23 - Ms. Larsen returns to clinic today for followup on the open wound to the left mid foot.  Her history includes the left ankle being broken several months ago which left the left foot somewhat deformed.  The wound was surgically debrided by Dr. Hank Owen.  She stayed for 1 week at the hospital in [place], [place] and went home on oral Augmentin has now completed.  Her PCP is from [facility] and she reports that he has stated that they are not to use compression on the leg.  However, ultrasounds were done in April and they were clear.  The caregiver does have concern that she is unable to ambulate and would like a wheelchair obtained for the patient.  They were advised to go to [employer]s  to have the paperwork sent to us for prescription.  However, [facility] called and their insurance does not cover the wheelchair.    Today, the left mid foot was assessed and selectively debrided.  Additionally, she does have several scattered wounds on her bilateral lower legs that will be monitored.  She was given Hibiclens and instructed to wash the lower extremities including the foot with a Hibiclens x3 days.    5/9/23 - the patient returns today for followup with her caregiver present.  In addition to the neuropathic wound on the plantar surface of the left foot she had small scattered wounds on her bilateral lower extremities.  She was instructed to cleanse her wounds on her legs with Hibiclens and she did so.  Today, the majority of those small open areas on the legs are now closed and they look significantly improved.  The wound on the left foot was selectively debrided and it does remains stable.  Granular tissue is surface  level so we will begin applying Endoform to the wound bed with the hopes of developing epithelial tissue.  Of note, the patient is still has not received her wheelchair.  That wheelchair was faxed to her DME company of choice and she and the caregiver were instructed that they will need to followup with that company.    5/16/23 - Ms. Larsen returns today for followup in wound check on the wound to the left plantar foot.  This neuropathic wound was selectively debrided today and does remains stable.  We will begin today applying Endoform with the hopes of developing epithelial tissue across the wound bed.  She has an appointment with Doreen Barnes NP, on 05/30/2023 to initiate services  as her PCP because she is not satisfied with her current provider.  She will return to clinic in 1 week for followup.    5/24/23 - the patient returns today for followup on the neuropathic wound to the plantar surface of the left foot.  We began using Endoform at the last visit, however, the patient in her caregiver misunderstood the instructions in removed that product prematurely.  Today, the wound was selectively debrided and we did again place Endoform on the wound to be left in place until Friday.  The wound does appear to be slowly improving.  We will be seeking authorization for grafting if possible.    5/30/23 - the patient returns today for the neuropathic wound to the left foot.  She does report that she fell sometime over the weekend.  She did fall backwards and injured her bottom.  She noted some bruises and pain, however, there are no open wounds.  She did not go to ER.  This morning, her caregiver reports that the patient was found soaked in urine and that urine had dripped down to the wound on the foot.  There does not appear to be any change in the wound.  Today, the patient fell asleep in both the lobby and in the chair while she was receiving care.  She will be seeing a new provider today, Doreen Barnes NP.  We  are hopeful that there may be some medication changes done that will help her in becoming a little more alert.    Today, the wound was assessed and appears to be improving.  A selective debridement was performed and the hyper granulated tissue was chemically cauterized with silver nitrate.  We are still attempting to authorize a graft of some kind.  She does though need 30 days of continuous care so we can hopefully get one of the grafts authorized now.    7/18/23 - Ms. Larsen was last seen in the clinic on 5/30/23. She was admitted to [facility] on 5/31/23 - 6/6/23 with hypotension and low O2 sats. While there it was discovered she had C-Diff. Her caregiver states that she did several rounds of oral antibiotics but is no longer taking anything. She has been using mesalt and silver alginate to dress the wound since being home. She has been diagnosed as a diabetic since her last visit with us. Her A1C on 6/2/23 was 6.8.   Today her wound was assessed in has improved since her last visit.  It was selectively debrided.  There is a light coating of slough over the entire wound bed, therefore Santyl has been ordered for daily application.  Today, we applied mesalt and if she is not able to get Santyl with her insurance she will continue with daily use of mesalt.    Additionally, her caregiver requested that her toenails be trimmed.  Six toenails were debrided off today.    7/25/23 - the patient returns today for followup on a D FU to the left foot.  The wound was assessed and selectively debrided.  The hypergranulated tissue was chemically cauterized using silver nitrate.  The caregiver is still attempting to obtain Santyl that was ordered.  It was initially sent to [facility] in [place] who could not get the product.  The Rx was transferred to [facility] in [place]. She is waiting for this to go through so she can see if the medicine will be covered by the patient's insurance.   The would is improved, however, with the use of  "Mesalt only.  We will continue that product.  Will will also attempt to get her approved for grafts, preferably Apligraft or Theraskin.      Review of Systems   Constitutional: Negative.    Respiratory: Negative.     Cardiovascular: Negative.    Skin:         As documented in the HPI.   All other systems reviewed and are negative.      Objective:      Vitals:    07/25/23 1317   BP: (!) 143/65   BP Location: Right arm   Patient Position: Sitting   Pulse: (!) 41   Resp: 18   Weight: 91.6 kg (202 lb)   Height: 5' 6" (1.676 m)        Physical Exam  Vitals reviewed.   Constitutional:       Appearance: Normal appearance.   Cardiovascular:      Rate and Rhythm: Normal rate.   Pulmonary:      Effort: Pulmonary effort is normal.   Skin:     General: Skin is warm and dry.      Comments: DFU left foot   Neurological:      Mental Status: She is alert.          Altered Skin Integrity 04/18/23 1351 Left medial Foot (Active)   04/18/23 1351   Altered Skin Integrity Present on Admission - Did Patient arrive to the hospital with altered skin?: yes   Side: Left   Orientation: medial   Location: Foot   Wound Number:    Is this injury device related?:    Primary Wound Type:    Description of Altered Skin Integrity:    Ankle-Brachial Index:    Pulses:    Removal Indication and Assessment:    Wound Outcome:    (Retired) Wound Length (cm):    (Retired) Wound Width (cm):    (Retired) Depth (cm):    Wound Description (Comments):    Removal Indications:    Dressing Appearance Moist drainage 07/25/23 1317   Drainage Amount Moderate 07/25/23 1317   Drainage Characteristics/Odor Serosanguineous 07/25/23 1317   Appearance Pink;Red;Tan;Slough;Moist 07/25/23 1317   Tissue loss description Full thickness 07/25/23 1317   Periwound Area Dry;[place] 07/25/23 1317   Wound Edges Callused;Open 07/25/23 1317   Kovacs Classification (diabetic foot ulcers only) Grade 2 07/25/23 1317   Wound Length (cm) 2.8 cm 07/25/23 1317   Wound Width (cm) 2.5 cm 07/25/23 " "1317   Wound Depth (cm) 0.2 cm 07/25/23 1317   Wound Volume (cm^3) 1.4 cm^3 07/25/23 1317   Wound Surface Area (cm^2) 7 cm^2 07/25/23 1317   Care Cleansed with:;Wound cleanser 07/25/23 1317   Dressing Applied 07/25/23 1317   Dressing Change Due 07/26/23 07/25/23 1317 7/25/23        Left medial foot (pre)                                      Left medial foot (post)  mesalt, 4x4, kerlix, coban        Assessment:         ICD-10-CM ICD-9-CM   1. Open wound of plantar aspect of left foot  S91.302A 892.0   2. Charcot's joint of left foot, non-diabetic  M14.672 094.0     713.5   3. Chronic venous stasis  I87.8 459.81   4. Encounter for wound re-check  Z51.89 V58.89           Procedures:     Debridement     Date/Time: 7/25/2023 1:02 PM  Performed by: Barbra Perez NP  Authorized by: Barbra Perez NP      Time out: Immediately prior to procedure a "time out" was called to verify the correct patient, procedure, equipment, support staff and site/side marked as required.     Consent Done?:  Yes (Verbal)  Local anesthesia used?: No       Wound Details:    Location:  Left foot    Location:  Left Plantar    Type of Debridement:  Non-excisional       Length (cm):  2.8       Area (sq cm):  7       Width (cm):  2.5       Percent Debrided (%):  100       Depth (cm):  0.2       Total Area Debrided (sq cm):  7    Depth of debridement:  Epidermis/Dermis    Devitalized tissue debrided:  Biofilm, Callus, Exudate and Fibrin    Instruments:  Curette (Dermal)  Bleeding:  None  Patient tolerance:  Patient tolerated the procedure well with no immediate complications      Silver nitrate applied in treatment of hypergranulated tissue.       Excisional debridement performed:  [] Yes [x] No   Selective debridement performed:  [x] Yes [] No (dermal)   Mechanical debridement performed:  [] Yes [x] No   Silver nitrate applied :    [x] Yes [] No   Labs ordered this visit:   [] Yes [x] No   Imaging ordered this visit:   [] Yes [x] No "   Tissue pathology and/or culture taken:  [] Yes [x] No         HOME HEALTH AGENCY:   [facility]     Home Health Services     Since 4/17/2023     [phone]     TIMES PER WEEK/DAYS: 1 x weekly, Fridays only  Patient will come to wound care weekly    Left foot wound: Cleanse with wound cleanser, apply santyl (nickel thick) to wound, cover wound bed with mesalt, 4x4 gauze and wrap with kerlix and coban, change this dressing daily  **If unable to get Santyl, use mesalt, 4x4 gauze, kerlix and coban**    Follow up in 1 week (on 8/1/2023) for left foot.

## 2023-07-25 NOTE — PROCEDURES
"Debridement    Date/Time: 7/25/2023 1:02 PM  Performed by: Barbra Perez NP  Authorized by: Barbra Perez NP     Time out: Immediately prior to procedure a "time out" was called to verify the correct patient, procedure, equipment, support staff and site/side marked as required.    Consent Done?:  Yes (Verbal)  Local anesthesia used?: No      Wound Details:    Location:  Left foot    Location:  Left Plantar    Type of Debridement:  Non-excisional       Length (cm):  2.8       Area (sq cm):  7       Width (cm):  2.5       Percent Debrided (%):  100       Depth (cm):  0.2       Total Area Debrided (sq cm):  7    Depth of debridement:  Epidermis/Dermis    Devitalized tissue debrided:  Biofilm, Callus, Exudate and Fibrin    Instruments:  Curette (Dermal)  Bleeding:  None  Patient tolerance:  Patient tolerated the procedure well with no immediate complications     Silver nitrate applied in treatment of hypergranulated tissue.  "

## 2023-07-30 ENCOUNTER — HOSPITAL ENCOUNTER (OUTPATIENT)
Facility: HOSPITAL | Age: 68
Discharge: HOME HEALTH | End: 2023-08-02
Attending: STUDENT IN AN ORGANIZED HEALTH CARE EDUCATION/TRAINING PROGRAM | Admitting: INTERNAL MEDICINE
Payer: MEDICARE

## 2023-07-30 DIAGNOSIS — R00.1 BRADYCARDIA: ICD-10-CM

## 2023-07-30 DIAGNOSIS — R53.83 FATIGUE: ICD-10-CM

## 2023-07-30 DIAGNOSIS — E87.1 HYPONATREMIA: Primary | ICD-10-CM

## 2023-07-30 LAB
BASOPHILS # BLD AUTO: 0.04 X10(3)/MCL
BASOPHILS NFR BLD AUTO: 0.4 %
EOSINOPHIL # BLD AUTO: 0.1 X10(3)/MCL (ref 0–0.9)
EOSINOPHIL NFR BLD AUTO: 0.9 %
ERYTHROCYTE [DISTWIDTH] IN BLOOD BY AUTOMATED COUNT: 15.1 % (ref 11.5–17)
HCT VFR BLD AUTO: 32.1 % (ref 37–47)
HGB BLD-MCNC: 10.6 G/DL (ref 12–16)
IMM GRANULOCYTES # BLD AUTO: 0.04 X10(3)/MCL (ref 0–0.04)
IMM GRANULOCYTES NFR BLD AUTO: 0.4 %
LYMPHOCYTES # BLD AUTO: 3.44 X10(3)/MCL (ref 0.6–4.6)
LYMPHOCYTES NFR BLD AUTO: 30.2 %
MCH RBC QN AUTO: 29.5 PG (ref 27–31)
MCHC RBC AUTO-ENTMCNC: 33 G/DL (ref 33–36)
MCV RBC AUTO: 89.4 FL (ref 80–94)
MONOCYTES # BLD AUTO: 0.78 X10(3)/MCL (ref 0.1–1.3)
MONOCYTES NFR BLD AUTO: 6.8 %
NEUTROPHILS # BLD AUTO: 7 X10(3)/MCL (ref 2.1–9.2)
NEUTROPHILS NFR BLD AUTO: 61.3 %
PLATELET # BLD AUTO: 289 X10(3)/MCL (ref 130–400)
PMV BLD AUTO: 9.9 FL (ref 7.4–10.4)
RBC # BLD AUTO: 3.59 X10(6)/MCL (ref 4.2–5.4)
WBC # SPEC AUTO: 11.4 X10(3)/MCL (ref 4.5–11.5)

## 2023-07-30 PROCEDURE — 83930 ASSAY OF BLOOD OSMOLALITY: CPT | Performed by: STUDENT IN AN ORGANIZED HEALTH CARE EDUCATION/TRAINING PROGRAM

## 2023-07-30 PROCEDURE — 63600175 PHARM REV CODE 636 W HCPCS: Performed by: STUDENT IN AN ORGANIZED HEALTH CARE EDUCATION/TRAINING PROGRAM

## 2023-07-30 PROCEDURE — 99285 EMERGENCY DEPT VISIT HI MDM: CPT | Mod: 25

## 2023-07-30 PROCEDURE — 94761 N-INVAS EAR/PLS OXIMETRY MLT: CPT

## 2023-07-30 PROCEDURE — 83735 ASSAY OF MAGNESIUM: CPT | Performed by: STUDENT IN AN ORGANIZED HEALTH CARE EDUCATION/TRAINING PROGRAM

## 2023-07-30 PROCEDURE — 80053 COMPREHEN METABOLIC PANEL: CPT | Performed by: STUDENT IN AN ORGANIZED HEALTH CARE EDUCATION/TRAINING PROGRAM

## 2023-07-30 PROCEDURE — 85025 COMPLETE CBC W/AUTO DIFF WBC: CPT | Performed by: STUDENT IN AN ORGANIZED HEALTH CARE EDUCATION/TRAINING PROGRAM

## 2023-07-30 PROCEDURE — 84484 ASSAY OF TROPONIN QUANT: CPT | Performed by: STUDENT IN AN ORGANIZED HEALTH CARE EDUCATION/TRAINING PROGRAM

## 2023-07-30 RX ADMIN — SODIUM CHLORIDE, POTASSIUM CHLORIDE, SODIUM LACTATE AND CALCIUM CHLORIDE 1000 ML: 600; 310; 30; 20 INJECTION, SOLUTION INTRAVENOUS at 11:07

## 2023-07-31 PROBLEM — F41.9 ANXIETY AND DEPRESSION: Status: ACTIVE | Noted: 2023-07-31

## 2023-07-31 PROBLEM — F31.9 BIPOLAR DISORDER: Status: ACTIVE | Noted: 2023-07-31

## 2023-07-31 PROBLEM — E11.9 T2DM (TYPE 2 DIABETES MELLITUS): Status: ACTIVE | Noted: 2023-07-31

## 2023-07-31 PROBLEM — I10 HTN (HYPERTENSION): Status: ACTIVE | Noted: 2023-07-31

## 2023-07-31 PROBLEM — F32.A ANXIETY AND DEPRESSION: Status: ACTIVE | Noted: 2023-07-31

## 2023-07-31 PROBLEM — F20.9 SCHIZOPHRENIA: Status: ACTIVE | Noted: 2023-07-31

## 2023-07-31 PROBLEM — N17.9 AKI (ACUTE KIDNEY INJURY): Status: ACTIVE | Noted: 2023-07-31

## 2023-07-31 PROBLEM — J44.9 COPD (CHRONIC OBSTRUCTIVE PULMONARY DISEASE): Status: ACTIVE | Noted: 2023-07-31

## 2023-07-31 LAB
ALBUMIN SERPL-MCNC: 3.3 G/DL (ref 3.4–4.8)
ALBUMIN SERPL-MCNC: 3.5 G/DL (ref 3.4–4.8)
ALBUMIN/GLOB SERPL: 0.9 RATIO (ref 1.1–2)
ALBUMIN/GLOB SERPL: 0.9 RATIO (ref 1.1–2)
ALP SERPL-CCNC: 47 UNIT/L (ref 40–150)
ALP SERPL-CCNC: 49 UNIT/L (ref 40–150)
ALT SERPL-CCNC: 10 UNIT/L (ref 0–55)
ALT SERPL-CCNC: 11 UNIT/L (ref 0–55)
APPEARANCE UR: ABNORMAL
AST SERPL-CCNC: 15 UNIT/L (ref 5–34)
AST SERPL-CCNC: 16 UNIT/L (ref 5–34)
BACTERIA #/AREA URNS AUTO: NORMAL /HPF
BASOPHILS # BLD AUTO: 0.04 X10(3)/MCL
BASOPHILS NFR BLD AUTO: 0.4 %
BILIRUB UR QL STRIP.AUTO: NEGATIVE
BILIRUBIN DIRECT+TOT PNL SERPL-MCNC: 0.4 MG/DL
BILIRUBIN DIRECT+TOT PNL SERPL-MCNC: 0.4 MG/DL
BUN SERPL-MCNC: 36 MG/DL (ref 9.8–20.1)
BUN SERPL-MCNC: 39 MG/DL (ref 9.8–20.1)
CALCIUM SERPL-MCNC: 9 MG/DL (ref 8.4–10.2)
CALCIUM SERPL-MCNC: 9.3 MG/DL (ref 8.4–10.2)
CHLORIDE SERPL-SCNC: 94 MMOL/L (ref 98–107)
CHLORIDE SERPL-SCNC: 97 MMOL/L (ref 98–107)
CO2 SERPL-SCNC: 22 MMOL/L (ref 23–31)
CO2 SERPL-SCNC: 23 MMOL/L (ref 23–31)
COLOR UR: ABNORMAL
CREAT SERPL-MCNC: 1.25 MG/DL (ref 0.55–1.02)
CREAT SERPL-MCNC: 1.49 MG/DL (ref 0.55–1.02)
EOSINOPHIL # BLD AUTO: 0.08 X10(3)/MCL (ref 0–0.9)
EOSINOPHIL NFR BLD AUTO: 0.8 %
ERYTHROCYTE [DISTWIDTH] IN BLOOD BY AUTOMATED COUNT: 15.1 % (ref 11.5–17)
GFR SERPLBLD CREATININE-BSD FMLA CKD-EPI: 38 MLS/MIN/1.73/M2
GFR SERPLBLD CREATININE-BSD FMLA CKD-EPI: 47 MLS/MIN/1.73/M2
GLOBULIN SER-MCNC: 3.6 GM/DL (ref 2.4–3.5)
GLOBULIN SER-MCNC: 4 GM/DL (ref 2.4–3.5)
GLUCOSE SERPL-MCNC: 109 MG/DL (ref 82–115)
GLUCOSE SERPL-MCNC: 94 MG/DL (ref 82–115)
GLUCOSE UR QL STRIP.AUTO: ABNORMAL
HCT VFR BLD AUTO: 32.7 % (ref 37–47)
HGB BLD-MCNC: 10.5 G/DL (ref 12–16)
IMM GRANULOCYTES # BLD AUTO: 0.04 X10(3)/MCL (ref 0–0.04)
IMM GRANULOCYTES NFR BLD AUTO: 0.4 %
KETONES UR QL STRIP.AUTO: NEGATIVE
LEUKOCYTE ESTERASE UR QL STRIP.AUTO: NEGATIVE
LYMPHOCYTES # BLD AUTO: 4.24 X10(3)/MCL (ref 0.6–4.6)
LYMPHOCYTES NFR BLD AUTO: 40.4 %
MAGNESIUM SERPL-MCNC: 2 MG/DL (ref 1.6–2.6)
MCH RBC QN AUTO: 29 PG (ref 27–31)
MCHC RBC AUTO-ENTMCNC: 32.1 G/DL (ref 33–36)
MCV RBC AUTO: 90.3 FL (ref 80–94)
MONOCYTES # BLD AUTO: 0.69 X10(3)/MCL (ref 0.1–1.3)
MONOCYTES NFR BLD AUTO: 6.6 %
NEUTROPHILS # BLD AUTO: 5.4 X10(3)/MCL (ref 2.1–9.2)
NEUTROPHILS NFR BLD AUTO: 51.4 %
NITRITE UR QL STRIP.AUTO: NEGATIVE
OSMOLALITY SERPL: 280 MOSM/KG (ref 280–300)
OSMOLALITY UR: 476 MOSM/KG (ref 300–1300)
PH UR STRIP.AUTO: 6 [PH]
PLATELET # BLD AUTO: 253 X10(3)/MCL (ref 130–400)
PMV BLD AUTO: 9.8 FL (ref 7.4–10.4)
POCT GLUCOSE: 111 MG/DL (ref 70–110)
POCT GLUCOSE: 138 MG/DL (ref 70–110)
POCT GLUCOSE: 86 MG/DL (ref 70–110)
POCT GLUCOSE: 97 MG/DL (ref 70–110)
POTASSIUM SERPL-SCNC: 4.2 MMOL/L (ref 3.5–5.1)
POTASSIUM SERPL-SCNC: 4.9 MMOL/L (ref 3.5–5.1)
PROT SERPL-MCNC: 6.9 GM/DL (ref 5.8–7.6)
PROT SERPL-MCNC: 7.5 GM/DL (ref 5.8–7.6)
PROT UR QL STRIP.AUTO: NEGATIVE
RBC # BLD AUTO: 3.62 X10(6)/MCL (ref 4.2–5.4)
RBC #/AREA URNS AUTO: NORMAL /HPF
RBC UR QL AUTO: ABNORMAL
SODIUM SERPL-SCNC: 126 MMOL/L (ref 136–145)
SODIUM SERPL-SCNC: 131 MMOL/L (ref 136–145)
SODIUM UR-SCNC: 24 MMOL/L
SP GR UR STRIP.AUTO: 1.01
SQUAMOUS #/AREA URNS AUTO: NORMAL /HPF
TROPONIN I SERPL-MCNC: 0.02 NG/ML (ref 0–0.04)
UROBILINOGEN UR STRIP-ACNC: 0.2
VALPROATE SERPL-MCNC: 32.2 UG/ML (ref 50–100)
WBC # SPEC AUTO: 10.49 X10(3)/MCL (ref 4.5–11.5)
WBC #/AREA URNS AUTO: NORMAL /HPF

## 2023-07-31 PROCEDURE — 80053 COMPREHEN METABOLIC PANEL: CPT | Performed by: INTERNAL MEDICINE

## 2023-07-31 PROCEDURE — 80164 ASSAY DIPROPYLACETIC ACD TOT: CPT | Performed by: STUDENT IN AN ORGANIZED HEALTH CARE EDUCATION/TRAINING PROGRAM

## 2023-07-31 PROCEDURE — 25000242 PHARM REV CODE 250 ALT 637 W/ HCPCS: Performed by: INTERNAL MEDICINE

## 2023-07-31 PROCEDURE — 85025 COMPLETE CBC W/AUTO DIFF WBC: CPT | Performed by: STUDENT IN AN ORGANIZED HEALTH CARE EDUCATION/TRAINING PROGRAM

## 2023-07-31 PROCEDURE — 84300 ASSAY OF URINE SODIUM: CPT | Performed by: STUDENT IN AN ORGANIZED HEALTH CARE EDUCATION/TRAINING PROGRAM

## 2023-07-31 PROCEDURE — G0378 HOSPITAL OBSERVATION PER HR: HCPCS

## 2023-07-31 PROCEDURE — 93010 EKG 12-LEAD: ICD-10-PCS | Mod: ,,, | Performed by: INTERNAL MEDICINE

## 2023-07-31 PROCEDURE — 94761 N-INVAS EAR/PLS OXIMETRY MLT: CPT

## 2023-07-31 PROCEDURE — 93005 ELECTROCARDIOGRAM TRACING: CPT

## 2023-07-31 PROCEDURE — 96361 HYDRATE IV INFUSION ADD-ON: CPT

## 2023-07-31 PROCEDURE — 94640 AIRWAY INHALATION TREATMENT: CPT | Mod: XB

## 2023-07-31 PROCEDURE — 25000003 PHARM REV CODE 250: Performed by: NURSE PRACTITIONER

## 2023-07-31 PROCEDURE — 96360 HYDRATION IV INFUSION INIT: CPT

## 2023-07-31 PROCEDURE — 27000221 HC OXYGEN, UP TO 24 HOURS

## 2023-07-31 PROCEDURE — 81001 URINALYSIS AUTO W/SCOPE: CPT | Performed by: STUDENT IN AN ORGANIZED HEALTH CARE EDUCATION/TRAINING PROGRAM

## 2023-07-31 PROCEDURE — 25000003 PHARM REV CODE 250: Performed by: INTERNAL MEDICINE

## 2023-07-31 PROCEDURE — 93010 ELECTROCARDIOGRAM REPORT: CPT | Mod: ,,, | Performed by: INTERNAL MEDICINE

## 2023-07-31 PROCEDURE — 83935 ASSAY OF URINE OSMOLALITY: CPT | Performed by: STUDENT IN AN ORGANIZED HEALTH CARE EDUCATION/TRAINING PROGRAM

## 2023-07-31 PROCEDURE — 99900031 HC PATIENT EDUCATION (STAT)

## 2023-07-31 RX ORDER — AMLODIPINE BESYLATE 10 MG/1
10 TABLET ORAL DAILY
Status: DISCONTINUED | OUTPATIENT
Start: 2023-07-31 | End: 2023-08-02 | Stop reason: HOSPADM

## 2023-07-31 RX ORDER — DIPHENOXYLATE HYDROCHLORIDE AND ATROPINE SULFATE 2.5; .025 MG/1; MG/1
1 TABLET ORAL 4 TIMES DAILY PRN
Status: DISCONTINUED | OUTPATIENT
Start: 2023-07-31 | End: 2023-08-02 | Stop reason: HOSPADM

## 2023-07-31 RX ORDER — SERTRALINE HYDROCHLORIDE 50 MG/1
100 TABLET, FILM COATED ORAL DAILY
Status: DISCONTINUED | OUTPATIENT
Start: 2023-07-31 | End: 2023-08-02 | Stop reason: HOSPADM

## 2023-07-31 RX ORDER — TRAZODONE HYDROCHLORIDE 50 MG/1
100 TABLET ORAL NIGHTLY
Status: DISCONTINUED | OUTPATIENT
Start: 2023-07-31 | End: 2023-08-02 | Stop reason: HOSPADM

## 2023-07-31 RX ORDER — GLUCAGON 1 MG
1 KIT INJECTION
Status: DISCONTINUED | OUTPATIENT
Start: 2023-07-31 | End: 2023-08-02 | Stop reason: HOSPADM

## 2023-07-31 RX ORDER — INSULIN ASPART 100 [IU]/ML
1-10 INJECTION, SOLUTION INTRAVENOUS; SUBCUTANEOUS
Status: DISCONTINUED | OUTPATIENT
Start: 2023-07-31 | End: 2023-08-02 | Stop reason: HOSPADM

## 2023-07-31 RX ORDER — IPRATROPIUM BROMIDE 0.5 MG/2.5ML
500 SOLUTION RESPIRATORY (INHALATION) EVERY 6 HOURS
Status: DISCONTINUED | OUTPATIENT
Start: 2023-07-31 | End: 2023-08-02 | Stop reason: HOSPADM

## 2023-07-31 RX ORDER — SODIUM CHLORIDE 0.9 % (FLUSH) 0.9 %
10 SYRINGE (ML) INJECTION
Status: DISCONTINUED | OUTPATIENT
Start: 2023-07-31 | End: 2023-08-02 | Stop reason: HOSPADM

## 2023-07-31 RX ORDER — ROPINIROLE 0.25 MG/1
0.5 TABLET, FILM COATED ORAL NIGHTLY
Status: DISCONTINUED | OUTPATIENT
Start: 2023-07-31 | End: 2023-08-02 | Stop reason: HOSPADM

## 2023-07-31 RX ORDER — ARIPIPRAZOLE 5 MG/1
15 TABLET ORAL DAILY
Status: DISCONTINUED | OUTPATIENT
Start: 2023-07-31 | End: 2023-08-02 | Stop reason: HOSPADM

## 2023-07-31 RX ORDER — IBUPROFEN 200 MG
24 TABLET ORAL
Status: DISCONTINUED | OUTPATIENT
Start: 2023-07-31 | End: 2023-08-02 | Stop reason: HOSPADM

## 2023-07-31 RX ORDER — QUETIAPINE FUMARATE 25 MG/1
50 TABLET, FILM COATED ORAL NIGHTLY
Status: DISCONTINUED | OUTPATIENT
Start: 2023-07-31 | End: 2023-08-02 | Stop reason: HOSPADM

## 2023-07-31 RX ORDER — METOPROLOL TARTRATE 25 MG/1
25 TABLET, FILM COATED ORAL 2 TIMES DAILY
Status: DISCONTINUED | OUTPATIENT
Start: 2023-07-31 | End: 2023-07-31

## 2023-07-31 RX ORDER — BUSPIRONE HYDROCHLORIDE 5 MG/1
10 TABLET ORAL 2 TIMES DAILY
Status: DISCONTINUED | OUTPATIENT
Start: 2023-07-31 | End: 2023-08-02 | Stop reason: HOSPADM

## 2023-07-31 RX ORDER — SODIUM CHLORIDE 9 MG/ML
INJECTION, SOLUTION INTRAVENOUS CONTINUOUS
Status: CANCELLED | OUTPATIENT
Start: 2023-07-31

## 2023-07-31 RX ORDER — SODIUM CHLORIDE 9 MG/ML
INJECTION, SOLUTION INTRAVENOUS CONTINUOUS
Status: DISCONTINUED | OUTPATIENT
Start: 2023-07-31 | End: 2023-08-02 | Stop reason: HOSPADM

## 2023-07-31 RX ORDER — SODIUM CHLORIDE 0.9 % (FLUSH) 0.9 %
10 SYRINGE (ML) INJECTION
Status: CANCELLED | OUTPATIENT
Start: 2023-07-31

## 2023-07-31 RX ORDER — LIOTHYRONINE SODIUM 5 UG/1
5 TABLET ORAL EVERY MORNING
Status: DISCONTINUED | OUTPATIENT
Start: 2023-07-31 | End: 2023-08-02 | Stop reason: HOSPADM

## 2023-07-31 RX ORDER — GABAPENTIN 300 MG/1
600 CAPSULE ORAL 3 TIMES DAILY
Status: DISCONTINUED | OUTPATIENT
Start: 2023-07-31 | End: 2023-08-02 | Stop reason: HOSPADM

## 2023-07-31 RX ORDER — FENOFIBRATE 145 MG/1
145 TABLET, FILM COATED ORAL DAILY
Status: DISCONTINUED | OUTPATIENT
Start: 2023-07-31 | End: 2023-08-02 | Stop reason: HOSPADM

## 2023-07-31 RX ORDER — DIVALPROEX SODIUM 500 MG/1
1000 TABLET, DELAYED RELEASE ORAL NIGHTLY
Status: DISCONTINUED | OUTPATIENT
Start: 2023-07-31 | End: 2023-08-02 | Stop reason: HOSPADM

## 2023-07-31 RX ORDER — MIRTAZAPINE 15 MG/1
15 TABLET, FILM COATED ORAL NIGHTLY
Status: DISCONTINUED | OUTPATIENT
Start: 2023-07-31 | End: 2023-08-02 | Stop reason: HOSPADM

## 2023-07-31 RX ORDER — IBUPROFEN 200 MG
16 TABLET ORAL
Status: DISCONTINUED | OUTPATIENT
Start: 2023-07-31 | End: 2023-08-02 | Stop reason: HOSPADM

## 2023-07-31 RX ORDER — OXYBUTYNIN CHLORIDE 5 MG/1
5 TABLET, EXTENDED RELEASE ORAL 2 TIMES DAILY
Status: DISCONTINUED | OUTPATIENT
Start: 2023-07-31 | End: 2023-08-02 | Stop reason: HOSPADM

## 2023-07-31 RX ORDER — PANTOPRAZOLE SODIUM 40 MG/1
40 TABLET, DELAYED RELEASE ORAL DAILY
Status: DISCONTINUED | OUTPATIENT
Start: 2023-07-31 | End: 2023-08-02 | Stop reason: HOSPADM

## 2023-07-31 RX ORDER — POTASSIUM CHLORIDE 750 MG/1
10 TABLET, EXTENDED RELEASE ORAL DAILY
Status: DISCONTINUED | OUTPATIENT
Start: 2023-07-31 | End: 2023-08-02 | Stop reason: HOSPADM

## 2023-07-31 RX ORDER — LOSARTAN POTASSIUM 50 MG/1
100 TABLET ORAL DAILY
Status: DISCONTINUED | OUTPATIENT
Start: 2023-07-31 | End: 2023-07-31

## 2023-07-31 RX ORDER — PRIMIDONE 50 MG/1
50 TABLET ORAL NIGHTLY
Status: DISCONTINUED | OUTPATIENT
Start: 2023-07-31 | End: 2023-08-02 | Stop reason: HOSPADM

## 2023-07-31 RX ORDER — TAMSULOSIN HYDROCHLORIDE 0.4 MG/1
1 CAPSULE ORAL DAILY
Status: DISCONTINUED | OUTPATIENT
Start: 2023-07-31 | End: 2023-08-02 | Stop reason: HOSPADM

## 2023-07-31 RX ADMIN — SODIUM CHLORIDE: 9 INJECTION, SOLUTION INTRAVENOUS at 02:07

## 2023-07-31 RX ADMIN — OXYBUTYNIN 5 MG: 5 TABLET, FILM COATED, EXTENDED RELEASE ORAL at 08:07

## 2023-07-31 RX ADMIN — MIRTAZAPINE 15 MG: 15 TABLET, FILM COATED ORAL at 08:07

## 2023-07-31 RX ADMIN — OXYBUTYNIN 5 MG: 5 TABLET, FILM COATED, EXTENDED RELEASE ORAL at 09:07

## 2023-07-31 RX ADMIN — SODIUM CHLORIDE: 9 INJECTION, SOLUTION INTRAVENOUS at 09:07

## 2023-07-31 RX ADMIN — SODIUM CHLORIDE: 9 INJECTION, SOLUTION INTRAVENOUS at 05:07

## 2023-07-31 RX ADMIN — BUSPIRONE HYDROCHLORIDE 10 MG: 5 TABLET ORAL at 09:07

## 2023-07-31 RX ADMIN — GABAPENTIN 600 MG: 300 CAPSULE ORAL at 09:07

## 2023-07-31 RX ADMIN — TAMSULOSIN HYDROCHLORIDE 0.4 MG: 0.4 CAPSULE ORAL at 09:07

## 2023-07-31 RX ADMIN — IPRATROPIUM BROMIDE 500 MCG: 0.5 SOLUTION RESPIRATORY (INHALATION) at 07:07

## 2023-07-31 RX ADMIN — GABAPENTIN 600 MG: 300 CAPSULE ORAL at 08:07

## 2023-07-31 RX ADMIN — LOSARTAN POTASSIUM 100 MG: 50 TABLET, FILM COATED ORAL at 09:07

## 2023-07-31 RX ADMIN — FENOFIBRATE 145 MG: 145 TABLET, COATED ORAL at 09:07

## 2023-07-31 RX ADMIN — BUSPIRONE HYDROCHLORIDE 10 MG: 5 TABLET ORAL at 08:07

## 2023-07-31 RX ADMIN — AMLODIPINE BESYLATE 10 MG: 10 TABLET ORAL at 09:07

## 2023-07-31 RX ADMIN — DIVALPROEX SODIUM 1000 MG: 500 TABLET, DELAYED RELEASE ORAL at 08:07

## 2023-07-31 RX ADMIN — ARIPIPRAZOLE 15 MG: 5 TABLET ORAL at 09:07

## 2023-07-31 RX ADMIN — IPRATROPIUM BROMIDE 500 MCG: 0.5 SOLUTION RESPIRATORY (INHALATION) at 02:07

## 2023-07-31 RX ADMIN — TRAZODONE HYDROCHLORIDE 100 MG: 50 TABLET ORAL at 08:07

## 2023-07-31 RX ADMIN — LIOTHYRONINE SODIUM 5 MCG: 5 TABLET ORAL at 06:07

## 2023-07-31 RX ADMIN — ROPINIROLE HYDROCHLORIDE 0.5 MG: 0.25 TABLET, FILM COATED ORAL at 08:07

## 2023-07-31 RX ADMIN — PANTOPRAZOLE SODIUM 40 MG: 40 TABLET, DELAYED RELEASE ORAL at 09:07

## 2023-07-31 RX ADMIN — SERTRALINE HYDROCHLORIDE 100 MG: 50 TABLET ORAL at 09:07

## 2023-07-31 RX ADMIN — COLLAGENASE SANTYL: 250 OINTMENT TOPICAL at 01:07

## 2023-07-31 RX ADMIN — POTASSIUM CHLORIDE 10 MEQ: 750 TABLET, FILM COATED, EXTENDED RELEASE ORAL at 09:07

## 2023-07-31 RX ADMIN — DIVALPROEX SODIUM 1000 MG: 500 TABLET, DELAYED RELEASE ORAL at 02:07

## 2023-07-31 RX ADMIN — QUETIAPINE FUMARATE 50 MG: 25 TABLET ORAL at 08:07

## 2023-07-31 RX ADMIN — METOPROLOL TARTRATE 25 MG: 25 TABLET, FILM COATED ORAL at 09:07

## 2023-07-31 RX ADMIN — PRIMIDONE 50 MG: 50 TABLET ORAL at 09:07

## 2023-07-31 NOTE — H&P
[facility]  History & Physical    Patient Name: Diane Larsen  MRN: [id number]  Patient Class: OP- Observation  Admission Date: 7/30/2023  Attending Physician: Magdy Espinoza MD   Primary Care Provider: NIGEL Avendaño         Patient information was obtained from patient and ER records.     Subjective:     Principal Problem:Hyponatremia    Chief Complaint:   Chief Complaint   Patient presents with    Fatigue     Pt does not really have a chief complaint. Pt was a little too weak to get up from chair. Pt does have some weakness to her left side, which her assisted living staff states is her baseline. Pt is AAO, pt also does have a history of accidentally over medicating herself and being drowsy. Also state that pt has been out in the heat around 6pm today.        HPI: Chief complaint: Fatigue    History given by: patient    HPI: 68 year old F patient with PMH of DM, HTN, bipolar disorder, schizophrenia, depression/anxiety and COPD presents to the ER with fatigue. She reports that she could not get out of the chair. She thinks she took an extra dose of trazodone that likely led to this. Denies cough, chest pain, SOB, fever, nausea or vomiting.     I personally spoke with ED clinician regarding the case and reviewed their notes. I personally reviewed all imaging and lab findings as well as any prior medical records that were available within the chart prior to admission. Workup in the ER showed Na 126 and Cr 1.49. She was admitted for further management.       Past Medical History:   Diagnosis Date    Anxiety disorder, unspecified     Bipolar disorder, unspecified     COPD (chronic obstructive pulmonary disease)     Depression     Diabetes mellitus, type 2     Essential (primary) hypertension     Neuropathy     Schizophrenia, unspecified     Seizure disorder        Past Surgical History:   Procedure Laterality Date    BACK SURGERY  2003    jesusita inserted     EXCISION-WIDE LOCAL Left 04/11/2023    left foot       Review of patient's allergies indicates:  No Known Allergies    No current facility-administered medications on file prior to encounter.     Current Outpatient Medications on File Prior to Encounter   Medication Sig    amLODIPine (NORVASC) 10 MG tablet Take 10 mg by mouth.    ARIPiprazole (ABILIFY) 15 MG Tab Take 15 mg by mouth.    busPIRone (BUSPAR) 10 MG tablet Take 10 mg by mouth 2 (two) times daily.    collagenase (SANTYL) ointment Apply topically once daily. Apply nickel thick to wound bed, changing daily (Patient not taking: Reported on 7/25/2023)    dapagliflozin (FARXIGA) 10 mg tablet Take 10 mg by mouth once daily.    diphenoxylate-atropine 2.5-0.025 mg (LOMOTIL) 2.5-0.025 mg per tablet Take 1 tablet by mouth 4 (four) times daily as needed for Diarrhea.    divalproex (DEPAKOTE) 500 MG TbEC Take 1,000 mg by mouth every evening.    fenofibrate (TRICOR) 54 MG tablet TAKE ONE TABLET BY MOUTH ONCE A DAY WITH FOOD    furosemide (LASIX) 20 MG tablet TAKE ONE TABLET BY MOUTH EVERY DAY AS NEEDED FOR FLUID    gabapentin (NEURONTIN) 600 MG tablet Take 600 mg by mouth 3 (three) times daily.    ipratropium (ATROVENT) 0.02 % nebulizer solution Take 500 mcg by nebulization 4 (four) times daily. Rescue    liothyronine (CYTOMEL) 5 MCG Tab Take 5 mcg by mouth every morning.    losartan (COZAAR) 100 MG tablet Take 100 mg by mouth.    metFORMIN (GLUCOPHAGE-XR) 500 MG ER 24hr tablet Take 500 mg by mouth.    mirtazapine (REMERON) 15 MG tablet Take 15 mg by mouth every evening. at bedtime.    nebivoloL (BYSTOLIC) 5 MG Tab Take 5 mg by mouth.    oxybutynin (DITROPAN-XL) 5 MG TR24 Take 5 mg by mouth 2 (two) times daily.    pantoprazole (PROTONIX) 40 MG tablet Take 40 mg by mouth.    potassium chloride (KLOR-CON) 10 MEQ TbSR TAKE ONE TABLET BY MOUTH ONCE A DAY ONLY WHEN TAKING LASIX    primidone (MYSOLINE) 50 MG Tab Take 50 mg by mouth.    QUEtiapine (SEROQUEL) 50 MG tablet  Take 50 mg by mouth every evening.    rOPINIRole (REQUIP) 0.5 MG tablet Take 0.5 mg by mouth every evening. at bedtime.    sertraline (ZOLOFT) 100 MG tablet Take 100 mg by mouth.    tamsulosin (FLOMAX) 0.4 mg Cap Take 1 capsule by mouth.    traZODone (DESYREL) 100 MG tablet Take 100 mg by mouth every evening. at bedtime.    traZODone (DESYREL) 50 MG tablet Take 50 mg by mouth nightly as needed.    TRELEGY ELLIPTA 200-62.5-25 mcg inhaler INHALE ONE PUFF INTO THE LUNGS EVERY DAY    WESTAB PLUS 27 mg iron- 1 mg Tab Take 1 tablet by mouth.     Family History       Problem Relation (Age of Onset)    Alzheimer's disease Father    Heart attack Father    Hypertension Mother, Father    Parkinsonism Mother    Stroke Father          Tobacco Use    Smoking status: Every Day     Current packs/day: 0.50     Average packs/day: 0.5 packs/day for 50.0 years (25.0 ttl pk-yrs)     Types: Cigarettes    Smokeless tobacco: Never   Substance and Sexual Activity    Alcohol use: Not Currently    Drug use: Never    Sexual activity: Not on file     Review of Systems  Except as documented, all other systems are negative.      Objective:     Vital Signs (Most Recent):  Temp: 97.9 °F (36.6 °C) (07/30/23 2332)  Pulse: 62 (07/31/23 0040)  Resp: 18 (07/30/23 2332)  BP: (!) 141/66 (07/31/23 0040)  SpO2: (!) 93 % (07/31/23 0040) Vital Signs (24h Range):  Temp:  [97.9 °F (36.6 °C)] 97.9 °F (36.6 °C)  Pulse:  [50-62] 62  Resp:  [18] 18  SpO2:  [93 %-97 %] 93 %  BP: (130-141)/(56-66) 141/66     Weight: 90.7 kg (200 lb)  Body mass index is 31.32 kg/m².     Physical Exam     CONSTITUTIONAL: vitals as noted, alert, awake, no distress  HEENT: No scleral icterus, oropharynx clear  RESPIRATORY: clear to auscultation  CVS: regular rate and rhythm  GASTROINTESTINAL: soft, non-tender, non-distended  NEURO: grossly normal exam, moves all extremities  HEM/LYMPH: no lymphadenopathy  PSYCH: alert and oriented x 3, normal affect  SKIN: pressure ulcer on left medial  foot (POA)     Significant Labs: All pertinent labs within the past 24 hours have been reviewed.  CBC:   Recent Labs   Lab 07/30/23  2345   WBC 11.40   HGB 10.6*   HCT 32.1*        CMP:   Recent Labs   Lab 07/30/23  2345   *   K 4.9   CO2 22*   BUN 39.0*   CREATININE 1.49*   CALCIUM 9.3   ALBUMIN 3.5   BILITOT 0.4   ALKPHOS 49   AST 15   ALT 11       Significant Imaging: I have reviewed all pertinent imaging results/findings within the past 24 hours. CXR shows no infiltrates    Assessment/Plan:     * Hyponatremia  - check serum osmolality, urine osmolality, urine sodium  - start hydration and repeat BMP    LUCIEN (acute kidney injury)  - baseline Cr is normal  - IVF as above    T2DM (type 2 diabetes mellitus)  -SSI and Accu-Cheks  -hemoglobin A1c in the morning  -diabetic diet    HTN (hypertension)  - home meds    Anxiety and depression  - home meds    Bipolar disorder  - home meds    Schizophrenia  - home meds    COPD (chronic obstructive pulmonary disease)  - home meds      VTE Risk Mitigation (From admission, onward)           Ordered     IP VTE HIGH RISK PATIENT  Once         07/31/23 0101     Place sequential compression device  Until discontinued         07/31/23 0101                    On 07/31/2023, patient should be placed in hospital observation services under my care.    Service was provided using HIPAA compliant web platform using SOC for audio/visual equipment.   Patient Location: Hospital  Provider Location: Home ([place], [place])  Participants on call: bedside RN, patient  Consent was obtained, and the patient was seen with nurse assisting from the bedside.        Magdy Espinoza MD  Department of Hospital Medicine  [facility]

## 2023-07-31 NOTE — ED PROVIDER NOTES
Encounter Date: 7/30/2023       History     Chief Complaint   Patient presents with    Fatigue     Pt does not really have a chief complaint. Pt was a little too weak to get up from chair. Pt does have some weakness to her left side, which her assisted living staff states is her baseline. Pt is AAO, pt also does have a history of accidentally over medicating herself and being drowsy. Also state that pt has been out in the heat around 6pm today.     HPI    68-year-old female with a past medical history as delineated below presents emergency department for fatigue.  Patient states that she got weak and felt like she could not get out of her chair.  States she took her trazodone at bedtime and thinks she may have accidentally took more.  Denies any shortness of breath or chest pain.  No abdominal pain.  States that she feels like she may be dehydrated because she was out in the heat today.  Patient really does not have any complaints.  She only states that she is just tired.    Review of patient's allergies indicates:  No Known Allergies  Past Medical History:   Diagnosis Date    Anxiety disorder, unspecified     Bipolar disorder, unspecified     COPD (chronic obstructive pulmonary disease)     Depression     Diabetes mellitus, type 2     Essential (primary) hypertension     Neuropathy     Schizophrenia, unspecified     Seizure disorder      Past Surgical History:   Procedure Laterality Date    BACK SURGERY  2003    jesusita inserted    EXCISION-WIDE LOCAL Left 04/11/2023    left foot     Family History   Problem Relation Age of Onset    Hypertension Mother     Parkinsonism Mother     Hypertension Father     Alzheimer's disease Father     Stroke Father     Heart attack Father      Social History     Tobacco Use    Smoking status: Every Day     Current packs/day: 0.50     Average packs/day: 0.5 packs/day for 50.0 years (25.0 ttl pk-yrs)     Types: Cigarettes    Smokeless tobacco: Never   Substance Use Topics    Alcohol use:  Not Currently    Drug use: Never     Review of Systems   Constitutional:  Positive for fatigue. Negative for fever.   Respiratory:  Negative for cough and shortness of breath.    Cardiovascular:  Negative for chest pain.   Gastrointestinal:  Negative for abdominal pain.   Musculoskeletal:  Negative for back pain.   Neurological:  Negative for dizziness, weakness, numbness and headaches.   All other systems reviewed and are negative.      Physical Exam     Initial Vitals [07/30/23 2332]   BP Pulse Resp Temp SpO2   (!) 130/56 (!) 50 18 97.9 °F (36.6 °C) 97 %      MAP       --         Physical Exam    Nursing note and vitals reviewed.  Constitutional: She appears well-developed and well-nourished. No distress.   Patient is drowsy although completely alert and oriented   Cardiovascular:  Normal rate and regular rhythm.           Pulmonary/Chest: Breath sounds normal. No respiratory distress. She has no wheezes. She has no rhonchi. She has no rales.   Abdominal: Abdomen is soft.   Musculoskeletal:         General: No tenderness. Normal range of motion.     Neurological: She is alert and oriented to person, place, and time. She has normal strength.   Skin: Skin is warm. Capillary refill takes less than 2 seconds.   She has a pressure sore noted to the left medial foot which is clean   Psychiatric: She has a normal mood and affect.         ED Course   Procedures  Labs Reviewed   COMPREHENSIVE METABOLIC PANEL - Abnormal; Notable for the following components:       Result Value    Sodium Level 126 (*)     Chloride 94 (*)     Carbon Dioxide 22 (*)     Blood Urea Nitrogen 39.0 (*)     Creatinine 1.49 (*)     Globulin 4.0 (*)     Albumin/Globulin Ratio 0.9 (*)     All other components within normal limits   URINALYSIS, REFLEX TO URINE CULTURE - Abnormal; Notable for the following components:    Appearance, UA Slightly Cloudy (*)     Glucose, UA Trace (*)     Blood, UA 2+ (*)     All other components within normal limits   CBC  WITH DIFFERENTIAL - Abnormal; Notable for the following components:    RBC 3.59 (*)     Hgb 10.6 (*)     Hct 32.1 (*)     All other components within normal limits   VALPROIC ACID - Abnormal; Notable for the following components:    Valproic Acid Level 32.2 (*)     All other components within normal limits   MAGNESIUM - Normal   TROPONIN I - Normal   URINALYSIS, MICROSCOPIC - Normal   CBC W/ AUTO DIFFERENTIAL    Narrative:     The following orders were created for panel order CBC auto differential.  Procedure                               Abnormality         Status                     ---------                               -----------         ------                     CBC with Differential[[id number]]        Abnormal            Final result                 Please view results for these tests on the individual orders.   SODIUM, URINE, RANDOM   OSMOLALITY, SERUM   OSMOLALITY, URINE RANDOM     EKG Readings: (Independently Interpreted)   Initial Reading: No STEMI. Rhythm: Junctional Rhythm. Heart Rate: 37. ST Segments: Non-Specific ST Segment Depression. T Waves: Normal. Clinical Impression: Junctional Escape Rhythm       Imaging Results              X-Ray Chest 1 View (Preliminary result)  Result time 07/31/23 00:21:45      Wet Read by Wicho Maravilla MD (07/31/23 00:21:45, [facility] - Emergency Dept, Emergency Medicine)    No consolidations appreciated                                     Medications   sodium chloride 0.9% flush 10 mL (has no administration in time range)   0.9%  NaCl infusion (has no administration in time range)   lactated ringers bolus 1,000 mL ( Intravenous Stopped 7/31/23 0053)     Medical Decision Making:   Differential Diagnosis:   Medication effect, overmedication, dehydration/metabolic derangement  ED Management:  Will obtain basic blood work and give some fluids.  Will also check for urinary tract infection  Medical Decision Making  Problems Addressed:  Hyponatremia: acute  illness or injury    Amount and/or Complexity of Data Reviewed  Labs: ordered. Decision-making details documented in ED Course.  Radiology: ordered and independent interpretation performed.    Risk  Prescription drug management.                 ED Course as of 07/31/23 0101   Sun Jul 30, 2023 2352 WBC: 11.40 [BS]   2352 Hemoglobin(!): 10.6 [BS]   2352 Hematocrit(!): 32.1 [BS]   2352 Platelets: 289 [BS]   Mon Jul 31, 2023   0007 Sodium(!): 126  New finding.  Will need to be admitted [BS]   0009 Potassium: 4.9 [BS]   0009 Chloride(!): 94 [BS]   0009 CO2(!): 22 [BS]   0009 Glucose: 109 [BS]   0009 BUN(!): 39.0 [BS]   0009 Creatinine(!): 1.49 [BS]   0021 Magnesium: 2.00 [BS]   0059 Hospitalist agrees with admission.  Recommends obtaining a serum and urine osmolality [BS]      ED Course User Index  [BS] Wicho Maravilla MD                 Clinical Impression:   Final diagnoses:  [R53.83] Fatigue  [E87.1] Hyponatremia (Primary)        ED Disposition Condition    Observation                 Wicho Maravilla MD  07/31/23 0101

## 2023-07-31 NOTE — CONSULTS
Subjective:       Patient ID: Diane Larsen is a 68 y.o. female.    Chief Complaint: Fatigue (Pt does not really have a chief complaint. Pt was a little too weak to get up from chair. Pt does have some weakness to her left side, which her assisted living staff states is her baseline. Pt is AAO, pt also does have a history of accidentally over medicating herself and being drowsy. Also state that pt has been out in the heat around 6pm today.)    HPI  7/31/23 - History obtained from medical record, Dr. Espinoza:    68 year old F patient with PMH of DM, HTN, bipolar disorder, schizophrenia, depression/anxiety and COPD presents to the ER with fatigue. She reports that she could not get out of the chair. She thinks she took an extra dose of trazodone that likely led to this. Denies cough, chest pain, SOB, fever, nausea or vomiting.    I personally spoke with ED clinician regarding the case and reviewed their notes. I personally reviewed all imaging and lab findings as well as any prior medical records that were available within the chart prior to admission. Workup in the ER showed Na 126 and Cr 1.49. She was admitted for further management.     Wound Care Consult:  Ms. Larsen is a long term patient of [facility] and this provider.  She is seen weekly for the DFU to the plantar surface of the left foot.  She was last seen at clinic on 7/25/23 at which time her wound was debrided.  Unfortunately the patient does not frequently offload as expected, causing pressure to this area of the charcot foot.  Orders are below.        Left foot wound: Cleanse with wound cleanser, apply santyl (nickel thick) to wound, cover wound bed with mesalt, 4x4 gauze and wrap with kerlix and coban, change this dressing daily  **If unable to get Santyl, use mesalt, 4x4 gauze, kerlix and coban**      Review of Systems      Objective:      Vitals:    07/31/23 0805   BP: 139/73   Pulse: 60   Resp:    Temp: 97.7 °F (36.5 °C)       Physical  Exam         Assessment:         ICD-10-CM ICD-9-CM   1. Hyponatremia  E87.1 276.1   2. Fatigue  R53.83 780.79           Procedures:     No bedside procedures performed    [] Yes [] No   I & D performed  [] Yes [] No   Excisional debridement performed  [] Yes [] No   Selective debridement performed           [] Yes [] No   Mechanical debridement performed         [] Yes [] No  Silver nitrate applied                                     [] Yes [] No  Labs ordered this visit                                  [] Yes [] No   Imaging ordered this visit                           [] Yes [] No   Tissue pathology and/or culture taken       MEDICATIONS    Current Facility-Administered Medications:     0.9%  NaCl infusion, , Intravenous, Continuous, Magdy Espinoza MD, Last Rate: 125 mL/hr at 07/31/23 0928, New Bag at 07/31/23 0928    amLODIPine tablet 10 mg, 10 mg, Oral, Daily, Magdy Espinoza MD, 10 mg at 07/31/23 0928    ARIPiprazole tablet 15 mg, 15 mg, Oral, Daily, Magdy Espinoza MD, 15 mg at 07/31/23 0928    busPIRone tablet 10 mg, 10 mg, Oral, BID, Magdy Espinoza MD, 10 mg at 07/31/23 0928    diphenoxylate-atropine 2.5-0.025 mg per tablet 1 tablet, 1 tablet, Oral, QID PRN, Magdy Espinoza MD    divalproex EC tablet 1,000 mg, 1,000 mg, Oral, QHS, Magdy Espinoza MD, 1,000 mg at 07/31/23 0223    fenofibrate tablet 145 mg, 145 mg, Oral, Daily, Magdy Espinoza MD, 145 mg at 07/31/23 0929    gabapentin capsule 600 mg, 600 mg, Oral, TID, Magdy Espinoza MD, 600 mg at 07/31/23 0928    glucagon (human recombinant) injection 1 mg, 1 mg, Intramuscular, PRN, Magdy Espinoza MD    glucose chewable tablet 16 g, 16 g, Oral, PRN, Magdy Espinoza MD    glucose chewable tablet 24 g, 24 g, Oral, PRN, Magdy Espinoza MD    insulin aspart U-100 injection 1-10 Units, 1-10 Units, Subcutaneous, QID (AC + HS) PRN, Magdy Espinoza MD    ipratropium 0.02 % nebulizer solution 500 mcg, 500 mcg,  "Nebulization, Q6H, Magdy Espinoza MD, 500 mcg at 07/31/23 0731    liothyronine tablet 5 mcg, 5 mcg, Oral, QAM, Magdy Espinoza MD, 5 mcg at 07/31/23 0607    losartan tablet 100 mg, 100 mg, Oral, Daily, Magdy Espinoza MD, 100 mg at 07/31/23 0928    metoprolol tartrate (LOPRESSOR) tablet 25 mg, 25 mg, Oral, BID, Magdy Espinoza MD, 25 mg at 07/31/23 0929    mirtazapine tablet 15 mg, 15 mg, Oral, QHS, Magdy Espinoza MD    oxybutynin 24 hr tablet 5 mg, 5 mg, Oral, BID, Magdy Espinoza MD, 5 mg at 07/31/23 0928    pantoprazole EC tablet 40 mg, 40 mg, Oral, Daily, Magdy Espinoza MD, 40 mg at 07/31/23 0929    potassium chloride CR tablet 10 mEq, 10 mEq, Oral, Daily, Magdy Espinoza MD, 10 mEq at 07/31/23 0928    primidone tablet 50 mg, 50 mg, Oral, QHS, Magdy Espinoza MD    QUEtiapine tablet 50 mg, 50 mg, Oral, QHS, Magdy Espinoza MD    rOPINIRole tablet 0.5 mg, 0.5 mg, Oral, QHS, Magdy Espinoza MD    sertraline tablet 100 mg, 100 mg, Oral, Daily, Magdy Espinoza MD, 100 mg at 07/31/23 0929    sodium chloride 0.9% flush 10 mL, 10 mL, Intravenous, PRN, Magdy Espinoza MD    tamsulosin 24 hr capsule 0.4 mg, 1 capsule, Oral, Daily, Magdy Espinoza MD, 0.4 mg at 07/31/23 0928    traZODone tablet 100 mg, 100 mg, Oral, QHS, Magdy Espinoza MD Review of patient's allergies indicates:  No Known Allergies    DIAGNOSTICS      Labs/Scans/Micro:    CBC:   Lab Results   Component Value Date    WBC 10.49 07/31/2023    HGB 10.5 (L) 07/31/2023    HCT 32.7 (L) 07/31/2023    MCV 90.3 07/31/2023     07/31/2023       Sedimentation rate:   Lab Results   Component Value Date    SEDRATE 66 (H) 06/02/2023    C-reactive protein:   Lab Results   Component Value Date    CRP 60.70 (H) 06/02/2023    Chemistry: [unfilled]  HBA1C: No components found for: "HBA1C"    Ankle Brachial Index: No results found for this or any previous visit.      Imaging:    Plain film: No results " "found for this or any previous visit.    MRI: No results found for this or any previous visit.   No results found for this or any previous visit.    Bone Scan: No results found for this or any previous visit.   No results found for this or any previous visit.      Vascular studies: No recent studies    Microbiology: No results found for: "MICRO"    [facility]:  [facility] in [place], Phone # [phone], Fax # [phone]  TIMES PER WEEK/DAYS:    WOUND CARE ORDERS:    Patient to follow up at wound clinic upon discharge    Electronically signed:  Barbra Perez NP     "

## 2023-07-31 NOTE — HPI
Chief complaint: Fatigue    History given by: patient    HPI: 68 year old F patient with PMH of DM, HTN, bipolar disorder, schizophrenia, depression/anxiety and COPD presents to the ER with fatigue. She reports that she could not get out of the chair. She thinks she took an extra dose of trazodone that likely led to this. Denies cough, chest pain, SOB, fever, nausea or vomiting.     I personally spoke with ED clinician regarding the case and reviewed their notes. I personally reviewed all imaging and lab findings as well as any prior medical records that were available within the chart prior to admission. Workup in the ER showed Na 126 and Cr 1.49. She was admitted for further management.

## 2023-07-31 NOTE — SUBJECTIVE & OBJECTIVE
Past Medical History:   Diagnosis Date    Anxiety disorder, unspecified     Bipolar disorder, unspecified     COPD (chronic obstructive pulmonary disease)     Depression     Diabetes mellitus, type 2     Essential (primary) hypertension     Neuropathy     Schizophrenia, unspecified     Seizure disorder        Past Surgical History:   Procedure Laterality Date    BACK SURGERY  2003    jesusita inserted    EXCISION-WIDE LOCAL Left 04/11/2023    left foot       Review of patient's allergies indicates:  No Known Allergies    No current facility-administered medications on file prior to encounter.     Current Outpatient Medications on File Prior to Encounter   Medication Sig    amLODIPine (NORVASC) 10 MG tablet Take 10 mg by mouth.    ARIPiprazole (ABILIFY) 15 MG Tab Take 15 mg by mouth.    busPIRone (BUSPAR) 10 MG tablet Take 10 mg by mouth 2 (two) times daily.    collagenase (SANTYL) ointment Apply topically once daily. Apply nickel thick to wound bed, changing daily (Patient not taking: Reported on 7/25/2023)    dapagliflozin (FARXIGA) 10 mg tablet Take 10 mg by mouth once daily.    diphenoxylate-atropine 2.5-0.025 mg (LOMOTIL) 2.5-0.025 mg per tablet Take 1 tablet by mouth 4 (four) times daily as needed for Diarrhea.    divalproex (DEPAKOTE) 500 MG TbEC Take 1,000 mg by mouth every evening.    fenofibrate (TRICOR) 54 MG tablet TAKE ONE TABLET BY MOUTH ONCE A DAY WITH FOOD    furosemide (LASIX) 20 MG tablet TAKE ONE TABLET BY MOUTH EVERY DAY AS NEEDED FOR FLUID    gabapentin (NEURONTIN) 600 MG tablet Take 600 mg by mouth 3 (three) times daily.    ipratropium (ATROVENT) 0.02 % nebulizer solution Take 500 mcg by nebulization 4 (four) times daily. Rescue    liothyronine (CYTOMEL) 5 MCG Tab Take 5 mcg by mouth every morning.    losartan (COZAAR) 100 MG tablet Take 100 mg by mouth.    metFORMIN (GLUCOPHAGE-XR) 500 MG ER 24hr tablet Take 500 mg by mouth.    mirtazapine (REMERON) 15 MG tablet Take 15 mg by mouth every evening. at  bedtime.    nebivoloL (BYSTOLIC) 5 MG Tab Take 5 mg by mouth.    oxybutynin (DITROPAN-XL) 5 MG TR24 Take 5 mg by mouth 2 (two) times daily.    pantoprazole (PROTONIX) 40 MG tablet Take 40 mg by mouth.    potassium chloride (KLOR-CON) 10 MEQ TbSR TAKE ONE TABLET BY MOUTH ONCE A DAY ONLY WHEN TAKING LASIX    primidone (MYSOLINE) 50 MG Tab Take 50 mg by mouth.    QUEtiapine (SEROQUEL) 50 MG tablet Take 50 mg by mouth every evening.    rOPINIRole (REQUIP) 0.5 MG tablet Take 0.5 mg by mouth every evening. at bedtime.    sertraline (ZOLOFT) 100 MG tablet Take 100 mg by mouth.    tamsulosin (FLOMAX) 0.4 mg Cap Take 1 capsule by mouth.    traZODone (DESYREL) 100 MG tablet Take 100 mg by mouth every evening. at bedtime.    traZODone (DESYREL) 50 MG tablet Take 50 mg by mouth nightly as needed.    TRELEGY ELLIPTA 200-62.5-25 mcg inhaler INHALE ONE PUFF INTO THE LUNGS EVERY DAY    WESTAB PLUS 27 mg iron- 1 mg Tab Take 1 tablet by mouth.     Family History       Problem Relation (Age of Onset)    Alzheimer's disease Father    Heart attack Father    Hypertension Mother, Father    Parkinsonism Mother    Stroke Father          Tobacco Use    Smoking status: Every Day     Current packs/day: 0.50     Average packs/day: 0.5 packs/day for 50.0 years (25.0 ttl pk-yrs)     Types: Cigarettes    Smokeless tobacco: Never   Substance and Sexual Activity    Alcohol use: Not Currently    Drug use: Never    Sexual activity: Not on file     Review of Systems  Except as documented, all other systems are negative.      Objective:     Vital Signs (Most Recent):  Temp: 97.9 °F (36.6 °C) (07/30/23 2332)  Pulse: 62 (07/31/23 0040)  Resp: 18 (07/30/23 2332)  BP: (!) 141/66 (07/31/23 0040)  SpO2: (!) 93 % (07/31/23 0040) Vital Signs (24h Range):  Temp:  [97.9 °F (36.6 °C)] 97.9 °F (36.6 °C)  Pulse:  [50-62] 62  Resp:  [18] 18  SpO2:  [93 %-97 %] 93 %  BP: (130-141)/(56-66) 141/66     Weight: 90.7 kg (200 lb)  Body mass index is 31.32 kg/m².      Physical Exam     CONSTITUTIONAL: vitals as noted, alert, awake, no distress  HEENT: No scleral icterus, oropharynx clear  RESPIRATORY: clear to auscultation  CVS: regular rate and rhythm  GASTROINTESTINAL: soft, non-tender, non-distended  NEURO: grossly normal exam, moves all extremities  HEM/LYMPH: no lymphadenopathy  PSYCH: alert and oriented x 3, normal affect  SKIN: no rashes noted      Significant Labs: All pertinent labs within the past 24 hours have been reviewed.  CBC:   Recent Labs   Lab 07/30/23  2345   WBC 11.40   HGB 10.6*   HCT 32.1*        CMP:   Recent Labs   Lab 07/30/23  2345   *   K 4.9   CO2 22*   BUN 39.0*   CREATININE 1.49*   CALCIUM 9.3   ALBUMIN 3.5   BILITOT 0.4   ALKPHOS 49   AST 15   ALT 11       Significant Imaging: I have reviewed all pertinent imaging results/findings within the past 24 hours. CXR shows no infiltrates

## 2023-08-01 LAB
ANION GAP SERPL CALC-SCNC: 7 MEQ/L
BUN SERPL-MCNC: 29 MG/DL (ref 9.8–20.1)
CALCIUM SERPL-MCNC: 9 MG/DL (ref 8.4–10.2)
CHLORIDE SERPL-SCNC: 104 MMOL/L (ref 98–107)
CO2 SERPL-SCNC: 24 MMOL/L (ref 23–31)
CREAT SERPL-MCNC: 1 MG/DL (ref 0.55–1.02)
CREAT/UREA NIT SERPL: 29
GFR SERPLBLD CREATININE-BSD FMLA CKD-EPI: >60 MLS/MIN/1.73/M2
GLUCOSE SERPL-MCNC: 85 MG/DL (ref 82–115)
POCT GLUCOSE: 114 MG/DL (ref 70–110)
POCT GLUCOSE: 128 MG/DL (ref 70–110)
POCT GLUCOSE: 183 MG/DL (ref 70–110)
POTASSIUM SERPL-SCNC: 4.8 MMOL/L (ref 3.5–5.1)
SODIUM SERPL-SCNC: 135 MMOL/L (ref 136–145)
TSH SERPL-ACNC: 4.84 UIU/ML (ref 0.35–4.94)

## 2023-08-01 PROCEDURE — 25000003 PHARM REV CODE 250: Performed by: INTERNAL MEDICINE

## 2023-08-01 PROCEDURE — 84443 ASSAY THYROID STIM HORMONE: CPT | Performed by: INTERNAL MEDICINE

## 2023-08-01 PROCEDURE — 97530 THERAPEUTIC ACTIVITIES: CPT

## 2023-08-01 PROCEDURE — 94640 AIRWAY INHALATION TREATMENT: CPT | Mod: XB

## 2023-08-01 PROCEDURE — 97161 PT EVAL LOW COMPLEX 20 MIN: CPT

## 2023-08-01 PROCEDURE — 97116 GAIT TRAINING THERAPY: CPT

## 2023-08-01 PROCEDURE — 93005 ELECTROCARDIOGRAM TRACING: CPT

## 2023-08-01 PROCEDURE — 93010 EKG 12-LEAD: ICD-10-PCS | Mod: ,,, | Performed by: INTERNAL MEDICINE

## 2023-08-01 PROCEDURE — 27000221 HC OXYGEN, UP TO 24 HOURS

## 2023-08-01 PROCEDURE — 80048 BASIC METABOLIC PNL TOTAL CA: CPT | Performed by: INTERNAL MEDICINE

## 2023-08-01 PROCEDURE — G0378 HOSPITAL OBSERVATION PER HR: HCPCS

## 2023-08-01 PROCEDURE — 25000242 PHARM REV CODE 250 ALT 637 W/ HCPCS: Performed by: INTERNAL MEDICINE

## 2023-08-01 PROCEDURE — 94761 N-INVAS EAR/PLS OXIMETRY MLT: CPT

## 2023-08-01 PROCEDURE — 99900035 HC TECH TIME PER 15 MIN (STAT)

## 2023-08-01 PROCEDURE — 96372 THER/PROPH/DIAG INJ SC/IM: CPT | Performed by: INTERNAL MEDICINE

## 2023-08-01 PROCEDURE — 93010 ELECTROCARDIOGRAM REPORT: CPT | Mod: ,,, | Performed by: INTERNAL MEDICINE

## 2023-08-01 PROCEDURE — 96361 HYDRATE IV INFUSION ADD-ON: CPT

## 2023-08-01 PROCEDURE — 63600175 PHARM REV CODE 636 W HCPCS: Performed by: INTERNAL MEDICINE

## 2023-08-01 RX ORDER — HYDRALAZINE HYDROCHLORIDE 50 MG/1
50 TABLET, FILM COATED ORAL EVERY 8 HOURS
Status: DISCONTINUED | OUTPATIENT
Start: 2023-08-01 | End: 2023-08-02 | Stop reason: HOSPADM

## 2023-08-01 RX ORDER — HYDRALAZINE HYDROCHLORIDE 20 MG/ML
10 INJECTION INTRAMUSCULAR; INTRAVENOUS EVERY 6 HOURS PRN
Status: DISCONTINUED | OUTPATIENT
Start: 2023-08-01 | End: 2023-08-02 | Stop reason: HOSPADM

## 2023-08-01 RX ADMIN — MIRTAZAPINE 15 MG: 15 TABLET, FILM COATED ORAL at 09:08

## 2023-08-01 RX ADMIN — ROPINIROLE HYDROCHLORIDE 0.5 MG: 0.25 TABLET, FILM COATED ORAL at 09:08

## 2023-08-01 RX ADMIN — OXYBUTYNIN 5 MG: 5 TABLET, FILM COATED, EXTENDED RELEASE ORAL at 09:08

## 2023-08-01 RX ADMIN — HYDRALAZINE HYDROCHLORIDE 50 MG: 50 TABLET, FILM COATED ORAL at 09:08

## 2023-08-01 RX ADMIN — SODIUM CHLORIDE: 9 INJECTION, SOLUTION INTRAVENOUS at 09:08

## 2023-08-01 RX ADMIN — IPRATROPIUM BROMIDE 500 MCG: 0.5 SOLUTION RESPIRATORY (INHALATION) at 01:08

## 2023-08-01 RX ADMIN — BUSPIRONE HYDROCHLORIDE 10 MG: 5 TABLET ORAL at 09:08

## 2023-08-01 RX ADMIN — SERTRALINE HYDROCHLORIDE 100 MG: 50 TABLET ORAL at 09:08

## 2023-08-01 RX ADMIN — TRAZODONE HYDROCHLORIDE 100 MG: 50 TABLET ORAL at 09:08

## 2023-08-01 RX ADMIN — QUETIAPINE FUMARATE 50 MG: 25 TABLET ORAL at 09:08

## 2023-08-01 RX ADMIN — AMLODIPINE BESYLATE 10 MG: 10 TABLET ORAL at 09:08

## 2023-08-01 RX ADMIN — IPRATROPIUM BROMIDE 500 MCG: 0.5 SOLUTION RESPIRATORY (INHALATION) at 07:08

## 2023-08-01 RX ADMIN — GABAPENTIN 600 MG: 300 CAPSULE ORAL at 09:08

## 2023-08-01 RX ADMIN — PANTOPRAZOLE SODIUM 40 MG: 40 TABLET, DELAYED RELEASE ORAL at 09:08

## 2023-08-01 RX ADMIN — SODIUM CHLORIDE: 9 INJECTION, SOLUTION INTRAVENOUS at 01:08

## 2023-08-01 RX ADMIN — FENOFIBRATE 145 MG: 145 TABLET, COATED ORAL at 09:08

## 2023-08-01 RX ADMIN — INSULIN ASPART 1 UNITS: 100 INJECTION, SOLUTION INTRAVENOUS; SUBCUTANEOUS at 09:08

## 2023-08-01 RX ADMIN — POTASSIUM CHLORIDE 10 MEQ: 750 TABLET, FILM COATED, EXTENDED RELEASE ORAL at 09:08

## 2023-08-01 RX ADMIN — DIVALPROEX SODIUM 1000 MG: 500 TABLET, DELAYED RELEASE ORAL at 09:08

## 2023-08-01 RX ADMIN — GABAPENTIN 600 MG: 300 CAPSULE ORAL at 02:08

## 2023-08-01 RX ADMIN — PRIMIDONE 50 MG: 50 TABLET ORAL at 09:08

## 2023-08-01 RX ADMIN — TAMSULOSIN HYDROCHLORIDE 0.4 MG: 0.4 CAPSULE ORAL at 09:08

## 2023-08-01 RX ADMIN — ARIPIPRAZOLE 15 MG: 5 TABLET ORAL at 09:08

## 2023-08-01 RX ADMIN — SODIUM CHLORIDE: 9 INJECTION, SOLUTION INTRAVENOUS at 06:08

## 2023-08-01 NOTE — NURSING
Night hospitalist notified of decreased heart rate, new orders to hold losartan and continue to monitor patient

## 2023-08-01 NOTE — CONSULTS
[facility]    Cardiology  Consult Note    Patient Name: Diane Larsen  MRN: [id number]  Admission Date: 7/30/2023  Hospital Length of Stay: 0 days  Code Status: Full Code   Attending Provider: Karmen Hightower MD   Consulting Provider: NIGEL Ross  Primary Care Physician: NIGEL Avendaño  Principal Problem:Hyponatremia    Patient information was obtained from patient, past medical records, ER records, and primary team.     Subjective:     Chief Complaint:  bradycardia    HPI:  68-year-old female patient unknown to [facility] with the exception of a hospitalization last month.  She would presented to the ED with complaints of weakness.  Was found to be hyponatremic and bradycardic.  Was also noted to be on 2 beta blockers.  She had an echocardiogram during that time revealed preserved left ventricular function.  Heart rate improved once beta-blockers were discontinued.  She again presented to the emergency room on 07/30 with complaints of weakness.  She had also been outside in the heat that day until 6:00 p.m..  Laboratory data revealed sodium 126, creatinine 1.49.  EKG revealed a junctional bradycardia with heart rate in the 40s.  Serum valproic acid level was low at 32.2, TSH 4.8, magnesium 2.0.    Cis was consulted due to bradycardia  Home meds were reviewed, she was on Bystolic.  During this hospitalization she was started on metoprolol and received her last dose at 9:00 a.m. yesterday.  Telemetry was reviewed she fluctuates from sinus rhythm with heart rates in the 60s to 70s to a junctional rhythm with a heart rate in the low to mid 40s.  She did get up in the chair and heart rate did increase with movement.  She denies any lightheadedness, syncope or near-syncope.    She is resting comfortably in bed.  Denies chest pain, shortness O breath, syncope or near-syncope.      PMH:  Diabetes, hypertension, bipolar, schizophrenia, anxiety, depression, COPD, hyponatremia  PSH:   See below  Social History:  Lives at home alone    Previous Cardiac Diagnostics:   Results for orders placed during the hospital encounter of 05/31/23    Echo    Interpretation Summary  · The left ventricle is normal in size with moderate concentric hypertrophy and normal systolic function.  · The estimated ejection fraction is 58%.  · Grade I left ventricular diastolic dysfunction.  · Mild tricuspid regurgitation.  · Normal right ventricular size with normal right ventricular systolic function.  · Mild aortic stenosis. Peak AV velocity 2.0 m/sec.     Past Medical History:   Diagnosis Date    Anxiety disorder, unspecified     Bipolar disorder, unspecified     COPD (chronic obstructive pulmonary disease)     Depression     Diabetes mellitus, type 2     Essential (primary) hypertension     Neuropathy     Schizophrenia, unspecified     Seizure disorder        Past Surgical History:   Procedure Laterality Date    BACK SURGERY  2003    jesusita inserted    EXCISION-WIDE LOCAL Left 04/11/2023    left foot       Review of patient's allergies indicates:  No Known Allergies    No current facility-administered medications on file prior to encounter.     Current Outpatient Medications on File Prior to Encounter   Medication Sig    amLODIPine (NORVASC) 10 MG tablet Take 10 mg by mouth.    ARIPiprazole (ABILIFY) 15 MG Tab Take 15 mg by mouth.    busPIRone (BUSPAR) 10 MG tablet Take 10 mg by mouth 2 (two) times daily.    collagenase (SANTYL) ointment Apply topically once daily. Apply nickel thick to wound bed, changing daily (Patient not taking: Reported on 7/25/2023)    dapagliflozin (FARXIGA) 10 mg tablet Take 10 mg by mouth once daily.    diphenoxylate-atropine 2.5-0.025 mg (LOMOTIL) 2.5-0.025 mg per tablet Take 1 tablet by mouth 4 (four) times daily as needed for Diarrhea.    divalproex (DEPAKOTE) 500 MG TbEC Take 1,000 mg by mouth every evening.    fenofibrate (TRICOR) 54 MG tablet TAKE ONE TABLET BY MOUTH ONCE A DAY WITH FOOD     furosemide (LASIX) 20 MG tablet TAKE ONE TABLET BY MOUTH EVERY DAY AS NEEDED FOR FLUID    gabapentin (NEURONTIN) 600 MG tablet Take 600 mg by mouth 3 (three) times daily.    ipratropium (ATROVENT) 0.02 % nebulizer solution Take 500 mcg by nebulization 4 (four) times daily. Rescue    liothyronine (CYTOMEL) 5 MCG Tab Take 5 mcg by mouth every morning.    losartan (COZAAR) 100 MG tablet Take 100 mg by mouth.    metFORMIN (GLUCOPHAGE-XR) 500 MG ER 24hr tablet Take 500 mg by mouth.    mirtazapine (REMERON) 15 MG tablet Take 15 mg by mouth every evening. at bedtime.    nebivoloL (BYSTOLIC) 5 MG Tab Take 5 mg by mouth.    oxybutynin (DITROPAN-XL) 5 MG TR24 Take 5 mg by mouth 2 (two) times daily.    pantoprazole (PROTONIX) 40 MG tablet Take 40 mg by mouth.    potassium chloride (KLOR-CON) 10 MEQ TbSR TAKE ONE TABLET BY MOUTH ONCE A DAY ONLY WHEN TAKING LASIX    primidone (MYSOLINE) 50 MG Tab Take 50 mg by mouth.    QUEtiapine (SEROQUEL) 50 MG tablet Take 50 mg by mouth every evening.    rOPINIRole (REQUIP) 0.5 MG tablet Take 0.5 mg by mouth every evening. at bedtime.    sertraline (ZOLOFT) 100 MG tablet Take 100 mg by mouth.    tamsulosin (FLOMAX) 0.4 mg Cap Take 1 capsule by mouth.    traZODone (DESYREL) 100 MG tablet Take 100 mg by mouth every evening. at bedtime.    traZODone (DESYREL) 50 MG tablet Take 50 mg by mouth nightly as needed.    TRELEGY ELLIPTA 200-62.5-25 mcg inhaler INHALE ONE PUFF INTO THE LUNGS EVERY DAY    WESTAB PLUS 27 mg iron- 1 mg Tab Take 1 tablet by mouth.     Family History       Problem Relation (Age of Onset)    Alzheimer's disease Father    Heart attack Father    Hypertension Mother, Father    Parkinsonism Mother    Stroke Father          Tobacco Use    Smoking status: Every Day     Current packs/day: 0.50     Average packs/day: 0.5 packs/day for 50.0 years (25.0 ttl pk-yrs)     Types: Cigarettes    Smokeless tobacco: Never   Substance and Sexual Activity    Alcohol use: Not Currently    Drug  use: Never    Sexual activity: Not on file       Review of Systems   Constitutional:  Positive for fatigue.   Respiratory:  Negative for shortness of breath.    Cardiovascular:  Negative for chest pain, palpitations and leg swelling.   Gastrointestinal: Negative.    Skin:  Positive for wound.   Neurological:  Positive for weakness. Negative for syncope and light-headedness.       Objective:     Vital Signs (Most Recent):  Temp: 98.6 °F (37 °C) (08/01/23 1214)  Pulse: (!) 50 (08/01/23 1300)  Resp: 18 (08/01/23 1300)  BP: (!) 154/75 (08/01/23 1214)  SpO2: 95 % (08/01/23 1300) Vital Signs (24h Range):  Temp:  [97.9 °F (36.6 °C)-99.7 °F (37.6 °C)] 98.6 °F (37 °C)  Pulse:  [38-69] 50  Resp:  [18] 18  SpO2:  [93 %-98 %] 95 %  BP: (122-154)/(68-81) 154/75     Weight: 90.7 kg (200 lb)  Body mass index is 31.32 kg/m².    SpO2: 95 %         Intake/Output Summary (Last 24 hours) at 8/1/2023 1436  Last data filed at 8/1/2023 1200  Gross per 24 hour   Intake 2371.88 ml   Output 2800 ml   Net -428.12 ml       Lines/Drains/Airways       Peripheral Intravenous Line  Duration                  Peripheral IV - Single Lumen 20 G Anterior;Distal;Left Forearm -- days                    Significant Labs:  Recent Results (from the past 72 hour(s))   Comprehensive metabolic panel    Collection Time: 07/30/23 11:45 PM   Result Value Ref Range    Sodium Level 126 (L) 136 - 145 mmol/L    Potassium Level 4.9 3.5 - 5.1 mmol/L    Chloride 94 (L) 98 - 107 mmol/L    Carbon Dioxide 22 (L) 23 - 31 mmol/L    Glucose Level 109 82 - 115 mg/dL    Blood Urea Nitrogen 39.0 (H) 9.8 - 20.1 mg/dL    Creatinine 1.49 (H) 0.55 - 1.02 mg/dL    Calcium Level Total 9.3 8.4 - 10.2 mg/dL    Protein Total 7.5 5.8 - 7.6 gm/dL    Albumin Level 3.5 3.4 - 4.8 g/dL    Globulin 4.0 (H) 2.4 - 3.5 gm/dL    Albumin/Globulin Ratio 0.9 (L) 1.1 - 2.0 ratio    Bilirubin Total 0.4 <=1.5 mg/dL    Alkaline Phosphatase 49 40 - 150 unit/L    Alanine Aminotransferase 11 0 - 55 unit/L     Aspartate Aminotransferase 15 5 - 34 unit/L    eGFR 38 mls/min/1.73/m2   CBC with Differential    Collection Time: 07/30/23 11:45 PM   Result Value Ref Range    WBC 11.40 4.50 - 11.50 x10(3)/mcL    RBC 3.59 (L) 4.20 - 5.40 x10(6)/mcL    Hgb 10.6 (L) 12.0 - 16.0 g/dL    Hct 32.1 (L) 37.0 - 47.0 %    MCV 89.4 80.0 - 94.0 fL    MCH 29.5 27.0 - 31.0 pg    MCHC 33.0 33.0 - 36.0 g/dL    RDW 15.1 11.5 - 17.0 %    Platelet 289 130 - 400 x10(3)/mcL    MPV 9.9 7.4 - 10.4 fL    Neut % 61.3 %    Lymph % 30.2 %    Mono % 6.8 %    Eos % 0.9 %    Basophil % 0.4 %    Lymph # 3.44 0.6 - 4.6 x10(3)/mcL    Neut # 7.00 2.1 - 9.2 x10(3)/mcL    Mono # 0.78 0.1 - 1.3 x10(3)/mcL    Eos # 0.10 0 - 0.9 x10(3)/mcL    Baso # 0.04 <=0.2 x10(3)/mcL    IG# 0.04 0 - 0.04 x10(3)/mcL    IG% 0.4 %   Magnesium    Collection Time: 07/30/23 11:45 PM   Result Value Ref Range    Magnesium Level 2.00 1.60 - 2.60 mg/dL   Troponin I    Collection Time: 07/30/23 11:45 PM   Result Value Ref Range    Troponin-I 0.017 0.000 - 0.045 ng/mL   Osmolality, Serum    Collection Time: 07/30/23 11:45 PM   Result Value Ref Range    Osmolality 280 280 - 300 mOsm/kg   Valproic Acid    Collection Time: 07/31/23 12:24 AM   Result Value Ref Range    Valproic Acid Level 32.2 (L) 50.0 - 100.0 ug/ml   Urinalysis, Reflex to Urine Culture    Collection Time: 07/31/23 12:41 AM    Specimen: Urine   Result Value Ref Range    Color, UA Dark Yellow Yellow, Light-Yellow, Dark Yellow, Hannah, Straw    Appearance, UA Slightly Cloudy (A) Clear    Specific Gravity, UA 1.015     pH, UA 6.0 5.0 - 8.5    Protein, UA Negative Negative    Glucose, UA Trace (A) Negative, Normal    Ketones, UA Negative Negative    Blood, UA 2+ (A) Negative    Bilirubin, UA Negative Negative    Urobilinogen, UA 0.2 0.2, 1.0, Normal    Nitrites, UA Negative Negative    Leukocyte Esterase, UA Negative Negative   Sodium, Random Urine    Collection Time: 07/31/23 12:41 AM   Result Value Ref Range    Urine Sodium 24.0  mmol/L   Urinalysis, Microscopic    Collection Time: 07/31/23 12:41 AM   Result Value Ref Range    Bacteria, UA None Seen None Seen, Rare, Occasional /HPF    RBC, UA 0-2 None Seen, 0-2, 3-5, 0-5 /HPF    WBC, UA 0-2 None Seen, 0-2, 3-5, 0-5 /HPF    Squamous Epithelial Cells, UA Rare None Seen, Rare, Occasional, Occ /HPF   Osmolality, Urine    Collection Time: 07/31/23  1:02 AM   Result Value Ref Range    Urine Osmolality 476 300 - 1,300 mOsm/kg   Comprehensive metabolic panel    Collection Time: 07/31/23  3:51 AM   Result Value Ref Range    Sodium Level 131 (L) 136 - 145 mmol/L    Potassium Level 4.2 3.5 - 5.1 mmol/L    Chloride 97 (L) 98 - 107 mmol/L    Carbon Dioxide 23 23 - 31 mmol/L    Glucose Level 94 82 - 115 mg/dL    Blood Urea Nitrogen 36.0 (H) 9.8 - 20.1 mg/dL    Creatinine 1.25 (H) 0.55 - 1.02 mg/dL    Calcium Level Total 9.0 8.4 - 10.2 mg/dL    Protein Total 6.9 5.8 - 7.6 gm/dL    Albumin Level 3.3 (L) 3.4 - 4.8 g/dL    Globulin 3.6 (H) 2.4 - 3.5 gm/dL    Albumin/Globulin Ratio 0.9 (L) 1.1 - 2.0 ratio    Bilirubin Total 0.4 <=1.5 mg/dL    Alkaline Phosphatase 47 40 - 150 unit/L    Alanine Aminotransferase 10 0 - 55 unit/L    Aspartate Aminotransferase 16 5 - 34 unit/L    eGFR 47 mls/min/1.73/m2   CBC with Differential    Collection Time: 07/31/23  3:51 AM   Result Value Ref Range    WBC 10.49 4.50 - 11.50 x10(3)/mcL    RBC 3.62 (L) 4.20 - 5.40 x10(6)/mcL    Hgb 10.5 (L) 12.0 - 16.0 g/dL    Hct 32.7 (L) 37.0 - 47.0 %    MCV 90.3 80.0 - 94.0 fL    MCH 29.0 27.0 - 31.0 pg    MCHC 32.1 (L) 33.0 - 36.0 g/dL    RDW 15.1 11.5 - 17.0 %    Platelet 253 130 - 400 x10(3)/mcL    MPV 9.8 7.4 - 10.4 fL    Neut % 51.4 %    Lymph % 40.4 %    Mono % 6.6 %    Eos % 0.8 %    Basophil % 0.4 %    Lymph # 4.24 0.6 - 4.6 x10(3)/mcL    Neut # 5.40 2.1 - 9.2 x10(3)/mcL    Mono # 0.69 0.1 - 1.3 x10(3)/mcL    Eos # 0.08 0 - 0.9 x10(3)/mcL    Baso # 0.04 <=0.2 x10(3)/mcL    IG# 0.04 0 - 0.04 x10(3)/mcL    IG% 0.4 %   POCT glucose     Collection Time: 07/31/23  6:10 AM   Result Value Ref Range    POCT Glucose 86 70 - 110 mg/dL   POCT glucose    Collection Time: 07/31/23 11:44 AM   Result Value Ref Range    POCT Glucose 97 70 - 110 mg/dL   POCT glucose    Collection Time: 07/31/23  3:46 PM   Result Value Ref Range    POCT Glucose 138 (H) 70 - 110 mg/dL   POCT glucose    Collection Time: 07/31/23  9:11 PM   Result Value Ref Range    POCT Glucose 111 (H) 70 - 110 mg/dL   Basic Metabolic Panel    Collection Time: 08/01/23  4:21 AM   Result Value Ref Range    Sodium Level 135 (L) 136 - 145 mmol/L    Potassium Level 4.8 3.5 - 5.1 mmol/L    Chloride 104 98 - 107 mmol/L    Carbon Dioxide 24 23 - 31 mmol/L    Glucose Level 85 82 - 115 mg/dL    Blood Urea Nitrogen 29.0 (H) 9.8 - 20.1 mg/dL    Creatinine 1.00 0.55 - 1.02 mg/dL    BUN/Creatinine Ratio 29     Calcium Level Total 9.0 8.4 - 10.2 mg/dL    Anion Gap 7.0 mEq/L    eGFR >60 mls/min/1.73/m2   TSH    Collection Time: 08/01/23  4:22 AM   Result Value Ref Range    Thyroid Stimulating Hormone 4.845 0.350 - 4.940 uIU/mL   POCT glucose    Collection Time: 08/01/23 11:50 AM   Result Value Ref Range    POCT Glucose 114 (H) 70 - 110 mg/dL       Significant Imaging:  Imaging Results              X-Ray Chest 1 View (Final result)  Result time 07/31/23 09:44:52      Final result by Osman Guevara MD (07/31/23 09:44:52)                   Impression:      1. Borderline cardiomegaly  2. Atherosclerosis  3. Thoracic spondylosis and scoliosis      Electronically signed by: Osman Guevara  Date:    07/31/2023  Time:    09:44               Narrative:    EXAMINATION:  XR CHEST 1 VIEW    CLINICAL HISTORY:  , Hypo-osmolality and hyponatremia.    COMPARISON:  05/31/2023    FINDINGS:  An AP view or more reveals the heart to be borderline enlarged.  The trachea is just left of midline.  The patient is rotated on the exam.  Degenerative changes and curvature are noted to the thoracic spine.  No definite infiltrate or  effusion is seen.                        Wet Read by Wicho Maravilla MD (07/31/23 00:21:45, [facility] Dept, Emergency Medicine)    No consolidations appreciated                                    EKG:    Results for orders placed or performed during the hospital encounter of 07/30/23   EKG 12-lead    Narrative    Test Reason : R00.1,    Vent. Rate : 041 BPM     Atrial Rate : 038 BPM     P-R Int : 000 ms          QRS Dur : 088 ms      QT Int : 464 ms       P-R-T Axes : 000 094 024 degrees     QTc Int : 382 ms    Junctional bradycardia  Rightward axis  Abnormal ECG  When compared with ECG of 31-JUL-2023 00:30,  No significant change was found    Referred By: [email or web address]   SELF           Confirmed By:        Telemetry:  Junctional 46 bpm    Physical Exam  Constitutional:       General: She is not in acute distress.     Appearance: She is obese.   HENT:      Mouth/Throat:      Mouth: Mucous membranes are moist.   Eyes:      Extraocular Movements: Extraocular movements intact.   Cardiovascular:      Rate and Rhythm: Regular rhythm. Bradycardia present.      Heart sounds: No murmur heard.  Pulmonary:      Effort: Pulmonary effort is normal.      Breath sounds: Normal breath sounds.   Abdominal:      Palpations: Abdomen is soft.   Musculoskeletal:      Right lower leg: No edema.      Left lower leg: No edema.   Skin:     General: Skin is warm.   Neurological:      General: No focal deficit present.      Mental Status: She is alert.   Psychiatric:         Mood and Affect: Mood normal.         Home Medications:   No current facility-administered medications on file prior to encounter.     Current Outpatient Medications on File Prior to Encounter   Medication Sig Dispense Refill    amLODIPine (NORVASC) 10 MG tablet Take 10 mg by mouth.      ARIPiprazole (ABILIFY) 15 MG Tab Take 15 mg by mouth.      busPIRone (BUSPAR) 10 MG tablet Take 10 mg by mouth 2 (two) times daily.      collagenase (SANTYL)  ointment Apply topically once daily. Apply nickel thick to wound bed, changing daily (Patient not taking: Reported on 7/25/2023) 90 g 2    dapagliflozin (FARXIGA) 10 mg tablet Take 10 mg by mouth once daily.      diphenoxylate-atropine 2.5-0.025 mg (LOMOTIL) 2.5-0.025 mg per tablet Take 1 tablet by mouth 4 (four) times daily as needed for Diarrhea.      divalproex (DEPAKOTE) 500 MG TbEC Take 1,000 mg by mouth every evening.      fenofibrate (TRICOR) 54 MG tablet TAKE ONE TABLET BY MOUTH ONCE A DAY WITH FOOD      furosemide (LASIX) 20 MG tablet TAKE ONE TABLET BY MOUTH EVERY DAY AS NEEDED FOR FLUID      gabapentin (NEURONTIN) 600 MG tablet Take 600 mg by mouth 3 (three) times daily.      ipratropium (ATROVENT) 0.02 % nebulizer solution Take 500 mcg by nebulization 4 (four) times daily. Rescue      liothyronine (CYTOMEL) 5 MCG Tab Take 5 mcg by mouth every morning.      losartan (COZAAR) 100 MG tablet Take 100 mg by mouth.      metFORMIN (GLUCOPHAGE-XR) 500 MG ER 24hr tablet Take 500 mg by mouth.      mirtazapine (REMERON) 15 MG tablet Take 15 mg by mouth every evening. at bedtime.      nebivoloL (BYSTOLIC) 5 MG Tab Take 5 mg by mouth.      oxybutynin (DITROPAN-XL) 5 MG TR24 Take 5 mg by mouth 2 (two) times daily.      pantoprazole (PROTONIX) 40 MG tablet Take 40 mg by mouth.      potassium chloride (KLOR-CON) 10 MEQ TbSR TAKE ONE TABLET BY MOUTH ONCE A DAY ONLY WHEN TAKING LASIX      primidone (MYSOLINE) 50 MG Tab Take 50 mg by mouth.      QUEtiapine (SEROQUEL) 50 MG tablet Take 50 mg by mouth every evening.      rOPINIRole (REQUIP) 0.5 MG tablet Take 0.5 mg by mouth every evening. at bedtime.      sertraline (ZOLOFT) 100 MG tablet Take 100 mg by mouth.      tamsulosin (FLOMAX) 0.4 mg Cap Take 1 capsule by mouth.      traZODone (DESYREL) 100 MG tablet Take 100 mg by mouth every evening. at bedtime.      traZODone (DESYREL) 50 MG tablet Take 50 mg by mouth nightly as needed.      TRELEGY ELLIPTA 200-62.5-25 mcg  inhaler INHALE ONE PUFF INTO THE LUNGS EVERY DAY      WESTAB PLUS 27 mg iron- 1 mg Tab Take 1 tablet by mouth.         Current Inpatient Medications:    Current Facility-Administered Medications:     0.9%  NaCl infusion, , Intravenous, Continuous, Karmen Hightower MD, Last Rate: 100 mL/hr at 08/01/23 1048, Rate Change at 08/01/23 1048    amLODIPine tablet 10 mg, 10 mg, Oral, Daily, Magdy Espinoza MD, 10 mg at 08/01/23 0929    ARIPiprazole tablet 15 mg, 15 mg, Oral, Daily, Magdy Espinoza MD, 15 mg at 08/01/23 0930    busPIRone tablet 10 mg, 10 mg, Oral, BID, Magdy Espinoza MD, 10 mg at 08/01/23 0929    collagenase ointment, , Topical (Top), Daily, Barbra Perez NP, Given at 07/31/23 1300    diphenoxylate-atropine 2.5-0.025 mg per tablet 1 tablet, 1 tablet, Oral, QID PRN, Magdy Espinoza MD    divalproex EC tablet 1,000 mg, 1,000 mg, Oral, QHS, Magdy Espinoza MD, 1,000 mg at 07/31/23 2051    fenofibrate tablet 145 mg, 145 mg, Oral, Daily, Magdy Espinoza MD, 145 mg at 08/01/23 0930    gabapentin capsule 600 mg, 600 mg, Oral, TID, Magdy Espinoza MD, 600 mg at 08/01/23 1412    glucagon (human recombinant) injection 1 mg, 1 mg, Intramuscular, PRN, Magdy Espinoza MD    glucose chewable tablet 16 g, 16 g, Oral, PRN, Magdy Espinoza MD    glucose chewable tablet 24 g, 24 g, Oral, PRN, Magdy Espinoza MD    insulin aspart U-100 injection 1-10 Units, 1-10 Units, Subcutaneous, QID (AC + HS) PRN, Magdy Espinoza MD    ipratropium 0.02 % nebulizer solution 500 mcg, 500 mcg, Nebulization, Q6H, Magdy Espinoza MD, 500 mcg at 08/01/23 1300    liothyronine tablet 5 mcg, 5 mcg, Oral, QAM, Magdy Espinoza MD, 5 mcg at 07/31/23 0607    mirtazapine tablet 15 mg, 15 mg, Oral, QHS, Magdy Espinoza MD, 15 mg at 07/31/23 2052    oxybutynin 24 hr tablet 5 mg, 5 mg, Oral, BID, Magdy Espinoza MD, 5 mg at 08/01/23 0929    pantoprazole EC tablet 40 mg, 40 mg, Oral, Daily,  Magdy Espinoza MD, 40 mg at 08/01/23 0929    potassium chloride CR tablet 10 mEq, 10 mEq, Oral, Daily, Magdy Espinoza MD, 10 mEq at 08/01/23 0929    primidone tablet 50 mg, 50 mg, Oral, QHS, Magdy Espinoza MD, 50 mg at 07/31/23 2101    QUEtiapine tablet 50 mg, 50 mg, Oral, QHS, Magdy Espinoza MD, 50 mg at 07/31/23 2051    rOPINIRole tablet 0.5 mg, 0.5 mg, Oral, QHS, Magdy Espinoza MD, 0.5 mg at 07/31/23 2051    sertraline tablet 100 mg, 100 mg, Oral, Daily, Magdy Espinoza MD, 100 mg at 08/01/23 0929    sodium chloride 0.9% flush 10 mL, 10 mL, Intravenous, PRN, Magdy Espinoza MD    tamsulosin 24 hr capsule 0.4 mg, 1 capsule, Oral, Daily, Magdy Espinoza MD, 0.4 mg at 08/01/23 0929    traZODone tablet 100 mg, 100 mg, Oral, QHS, Magdy Espinoza MD, 100 mg at 07/31/23 2051         VTE Risk Mitigation (From admission, onward)           Ordered     IP VTE HIGH RISK PATIENT  Once         07/31/23 0101     Place sequential compression device  Until discontinued         07/31/23 0101                    Assessment:   Junctional Bradycardia - asymptomatic    Last dose of BB was yesterday am    Avoid all AVN blocking agents (do not resume home bystolic)    TSH ok, Depakote level ok    Echo with normal LVF in June  Hyponatremia  Bipolar disorder  Schizophrenia  COPD  Atherosclerosis noted on CXR    Plan:   Continue to monitor on telemetry  Hold all AVN blocking agents  Will need 14 day MCT at NE and follow up with EP  Treatment of hyponatremia per primary team                Thank you for your consult.     NIGEL Ross  Cardiology  [facility]  08/01/2023 2:36 PM

## 2023-08-01 NOTE — PROGRESS NOTES
Hospitalist Progress      PATIENT NAME:Diane Larsen  DATE OF SERVICE:8/1/2023      Subjective- patient is seen today, no active complaint.  Patient is stable.  She said she wanted to go home not a nursing home.  Heart rate has been relatively low, in the 30s and 40s..  EKG shows junctional bradycardia.  Cardiologist has been consulted for further management.    ROS negative except for above    Scheduled Meds:   amLODIPine  10 mg Oral Daily    ARIPiprazole  15 mg Oral Daily    busPIRone  10 mg Oral BID    collagenase   Topical (Top) Daily    divalproex  1,000 mg Oral QHS    fenofibrate  145 mg Oral Daily    gabapentin  600 mg Oral TID    hydrALAZINE  50 mg Oral Q8H    ipratropium  500 mcg Nebulization Q6H    liothyronine  5 mcg Oral QAM    mirtazapine  15 mg Oral QHS    oxybutynin  5 mg Oral BID    pantoprazole  40 mg Oral Daily    potassium chloride  10 mEq Oral Daily    primidone  50 mg Oral QHS    QUEtiapine  50 mg Oral QHS    rOPINIRole  0.5 mg Oral QHS    sertraline  100 mg Oral Daily    tamsulosin  1 capsule Oral Daily    traZODone  100 mg Oral QHS     Continuous Infusions:   sodium chloride 0.9% 100 mL/hr at 08/01/23 1048     PRN Meds:.diphenoxylate-atropine 2.5-0.025 mg, glucagon (human recombinant), glucose, glucose, hydrALAZINE, insulin aspart U-100, sodium chloride 0.9%    VITALS:  Temp:  [97.9 °F (36.6 °C)-99.7 °F (37.6 °C)] 98.6 °F (37 °C)  Pulse:  [39-69] 50  Resp:  [18] 18  SpO2:  [93 %-98 %] 95 %  BP: (127-154)/(68-81) 154/75    Intake/Output Summary (Last 24 hours) at 8/1/2023 1635  Last data filed at 8/1/2023 1200  Gross per 24 hour   Intake 2371.88 ml   Output 2800 ml   Net -428.12 ml         [unfilled]    On Exam;    General appearance - no acute distress, awake & alert   HEENT - head atraumatic, PERRL , scleral anicteric,mucous membrane moist  Neck- no jvd, carotid bruits, lymphadenopathy, thromegaly  Pulm-equal chest expansion, normal percussion note, clear to auscultation bilaterally, reduced  "breath sounds, Heart -  Regular rate and rhythm, normal S1, S2, bradycardia  Abdomen - soft, nontender, nondistended,no organomegaly, bowel sounds present.  Extremities - no edema, no cyanosis, no clubbing  Neurologic - Awake, alert and oriented x3,moves all extremities.  Skin - dressing applied to the left foot plantar chronic ulcer      LABORATORY:    CBC:   Lab Results   Component Value Date    WBC 10.49 07/31/2023    RBC 3.62 (L) 07/31/2023    HGB 10.5 (L) 07/31/2023    HCT 32.7 (L) 07/31/2023     07/31/2023     CMP:   Lab Results   Component Value Date     (L) 08/01/2023    K 4.8 08/01/2023    CO2 24 08/01/2023    BUN 29.0 (H) 08/01/2023    CREATININE 1.00 08/01/2023    CALCIUM 9.0 08/01/2023    ALKPHOS 47 07/31/2023    AST 16 07/31/2023    ALT 10 07/31/2023    ALBUMIN 3.3 (L) 07/31/2023    BILITOT 0.4 07/31/2023     Cardiac markers:   Lab Results   Component Value Date    BNP 1,250.4 (H) 05/31/2023       Radiology:  Micro:  No components found for: "BLOODCX", "SPUTUMCX", "URINECX"    Radiology:  [unfilled]          ASSESSMENT AND PLAN:    1. Hyponatremia; unclear etiology, may be related to acute kidney injury with syndrome of inappropriate.  Sodium level is improving.  Restrict oral fluid intake.  Repeat BNP on IV normal saline.    2. Acute kidney injury; secondary to hypovolemia, resolving.  Repeat BNP in the morning.    3. Type 2 diabetes mellitus; continue insulin therapy.  Monitor finger glucose serially.    4. Junctional bradycardia; asymptomatic, discontinue beta-blocker nebivolol, TSH level is normal.  Consult cardiologist due to heart rate in the 30s and 40s.  Monitor heart rate closely.  On telemetry.  Avoid AV andrés blockade medication.    5. History of anxiety and depression/disorder; continue home medications.    6. History of schizophrenia; continue home medications.    7. COPD; not in acute exacerbation; continue bronchodilators.      8. Chronic left foot plantar diabetic ulcer; " continue wound care as appropriate.      9. Hypertension; blood pressure fairly stable, continue amlodipine and hydralazine added.  Hold off on angiotensin receptor blocker due to acute kidney injury.      10. Maintain the patient on DVT prophylaxis by way of Lovenox.        Plans discussed in detail with patient and all questions answered. Discussed with RN caring for patient      Disposition- consider possible discharge home tomorrow.     This note was completed using voice recognition software and transcription errors may occur.           Signed by   Karmen Hightower MD  8/1/2023,   4:35 PM

## 2023-08-01 NOTE — PT/OT/SLP EVAL
Physical Therapy Evaluation    Patient Name:  Diane Larsen   MRN:  [id number]    Recommendations:     Discharge Recommendations: assisted living facility   Discharge Equipment Recommendations:     Barriers to discharge: None    Assessment:     Diane Larsen is a 68 y.o. female admitted with a medical diagnosis of Hyponatremia.  She presents with the following impairments/functional limitations:   weakness, difficulty getting up from chair and getting in/out of bed, difficulty walking.    Rehab Prognosis: Good; patient would benefit from acute skilled PT services to address these deficits and reach maximum level of function.    Recent Surgery: * No surgery found *      Plan:     During this hospitalization, patient to be seen 5 x/week to address the identified rehab impairments via therapeutic exercises, therapeutic activities, gait training and progress toward the following goals:    Plan of Care Expires:       Subjective     Chief Complaint: Pt feels weak  Patient/Family Comments[facility]goals[facility]  Pain/Comfort:       Patients cultural, spiritual, [employer] conflicts given the current situation:      Living Environment:  Assisted Living  Prior to admission, patients level of function was [facility] amb with RW limited community distance.  Equipment used at home:  .  DME owned (not currently used): rolling walker.  Upon discharge, [facility] and limited.    Objective:     Communicated with pt prior to session.  Patient found HOB elevated with    upon PT entry to room.    General Precautions: Standard,    Orthopedic Precautions:    Braces:    Respiratory Status: Room air    30S Chair Rise Test= 0 reps (needs assistance, needs UE support, needs AD)  Exams:  RLE Strength: WFL  LLE Strength: WFL    Functional Mobility:  Bed Mobility:     Rolling Right: minimum assistance  Scooting: minimum assistance  Supine to Sit: minimum assistance  Transfers:     Sit  to Stand:  minimum assistance with rolling walker  Gait: 3 steps to chair Abigail with RW      AM-PAC 6 CLICK MOBILITY  Total Score:11       Treatment & Education:  Fall prevention    Patient left up in chair with all lines intact, call button in reach, and chair alarm on.    GOALS:   Multidisciplinary Problems       Physical Therapy Goals          Problem: Physical Therapy    Goal Priority Disciplines Outcome Goal Variances Interventions   Physical Therapy Goal     PT, PT/OT Ongoing, Progressing     Description: 1.  Pt will be able to get in /out of bed safely with setup and bed rail  2.  Pt will tf to chair/BSC SBA with AAD  3.  Pt will amb in room and halls with AAD SBA  4.  Pt will be I HEP to maintain strength BLEs while inpt  5.  Pt will be educated fall prevention strategies to reduce fall risk  6.  Pt will improve 30s chair rise test from 0 to at least 3 as evidence of reduced fall risk as a result of participating in PT program                       History:     Past Medical History:   Diagnosis Date    Anxiety disorder, unspecified     Bipolar disorder, unspecified     COPD (chronic obstructive pulmonary disease)     Depression     Diabetes mellitus, type 2     Essential (primary) hypertension     Neuropathy     Schizophrenia, unspecified     Seizure disorder        Past Surgical History:   Procedure Laterality Date    BACK SURGERY  2003    jesusita inserted    EXCISION-WIDE LOCAL Left 04/11/2023    left foot       Time Tracking:     PT Received On: 08/01/23  PT Start Time: 0700     PT Stop Time: 0745  PT Total Time (min): 45 min     Billable Minutes: Evaluation 15, Gait Training 15, and Therapeutic Activity 15      08/01/2023

## 2023-08-02 VITALS
OXYGEN SATURATION: 95 % | BODY MASS INDEX: 32.08 KG/M2 | RESPIRATION RATE: 20 BRPM | TEMPERATURE: 99 F | SYSTOLIC BLOOD PRESSURE: 152 MMHG | HEIGHT: 67 IN | HEART RATE: 53 BPM | DIASTOLIC BLOOD PRESSURE: 68 MMHG | WEIGHT: 204.38 LBS

## 2023-08-02 LAB
ANION GAP SERPL CALC-SCNC: 8 MEQ/L
BNP BLD-MCNC: 1931.4 PG/ML
BUN SERPL-MCNC: 18 MG/DL (ref 9.8–20.1)
CALCIUM SERPL-MCNC: 9.1 MG/DL (ref 8.4–10.2)
CHLORIDE SERPL-SCNC: 105 MMOL/L (ref 98–107)
CO2 SERPL-SCNC: 25 MMOL/L (ref 23–31)
CREAT SERPL-MCNC: 0.83 MG/DL (ref 0.55–1.02)
CREAT/UREA NIT SERPL: 22
GFR SERPLBLD CREATININE-BSD FMLA CKD-EPI: >60 MLS/MIN/1.73/M2
GLUCOSE SERPL-MCNC: 95 MG/DL (ref 82–115)
POCT GLUCOSE: 124 MG/DL (ref 70–110)
POTASSIUM SERPL-SCNC: 4 MMOL/L (ref 3.5–5.1)
SODIUM SERPL-SCNC: 138 MMOL/L (ref 136–145)

## 2023-08-02 PROCEDURE — 25000003 PHARM REV CODE 250: Performed by: INTERNAL MEDICINE

## 2023-08-02 PROCEDURE — 97530 THERAPEUTIC ACTIVITIES: CPT | Mod: CQ

## 2023-08-02 PROCEDURE — 93005 ELECTROCARDIOGRAM TRACING: CPT

## 2023-08-02 PROCEDURE — 93010 ELECTROCARDIOGRAM REPORT: CPT | Mod: ,,, | Performed by: INTERNAL MEDICINE

## 2023-08-02 PROCEDURE — 27000221 HC OXYGEN, UP TO 24 HOURS

## 2023-08-02 PROCEDURE — 63600175 PHARM REV CODE 636 W HCPCS: Performed by: INTERNAL MEDICINE

## 2023-08-02 PROCEDURE — 94640 AIRWAY INHALATION TREATMENT: CPT

## 2023-08-02 PROCEDURE — 96361 HYDRATE IV INFUSION ADD-ON: CPT

## 2023-08-02 PROCEDURE — 94761 N-INVAS EAR/PLS OXIMETRY MLT: CPT

## 2023-08-02 PROCEDURE — 80048 BASIC METABOLIC PNL TOTAL CA: CPT | Performed by: INTERNAL MEDICINE

## 2023-08-02 PROCEDURE — 93010 EKG 12-LEAD: ICD-10-PCS | Mod: ,,, | Performed by: INTERNAL MEDICINE

## 2023-08-02 PROCEDURE — 97110 THERAPEUTIC EXERCISES: CPT | Mod: CQ

## 2023-08-02 PROCEDURE — G0378 HOSPITAL OBSERVATION PER HR: HCPCS

## 2023-08-02 PROCEDURE — 25000242 PHARM REV CODE 250 ALT 637 W/ HCPCS: Performed by: INTERNAL MEDICINE

## 2023-08-02 PROCEDURE — 83880 ASSAY OF NATRIURETIC PEPTIDE: CPT | Performed by: INTERNAL MEDICINE

## 2023-08-02 RX ORDER — HYDRALAZINE HYDROCHLORIDE 50 MG/1
50 TABLET, FILM COATED ORAL 3 TIMES DAILY
Qty: 90 TABLET | Refills: 11 | Status: SHIPPED | OUTPATIENT
Start: 2023-08-02 | End: 2024-08-01

## 2023-08-02 RX ADMIN — IPRATROPIUM BROMIDE 500 MCG: 0.5 SOLUTION RESPIRATORY (INHALATION) at 12:08

## 2023-08-02 RX ADMIN — COLLAGENASE SANTYL: 250 OINTMENT TOPICAL at 08:08

## 2023-08-02 RX ADMIN — HYDRALAZINE HYDROCHLORIDE 50 MG: 50 TABLET, FILM COATED ORAL at 06:08

## 2023-08-02 RX ADMIN — ARIPIPRAZOLE 15 MG: 5 TABLET ORAL at 08:08

## 2023-08-02 RX ADMIN — BUSPIRONE HYDROCHLORIDE 10 MG: 5 TABLET ORAL at 08:08

## 2023-08-02 RX ADMIN — GABAPENTIN 600 MG: 300 CAPSULE ORAL at 08:08

## 2023-08-02 RX ADMIN — POTASSIUM CHLORIDE 10 MEQ: 750 TABLET, FILM COATED, EXTENDED RELEASE ORAL at 08:08

## 2023-08-02 RX ADMIN — FENOFIBRATE 145 MG: 145 TABLET, COATED ORAL at 08:08

## 2023-08-02 RX ADMIN — SERTRALINE HYDROCHLORIDE 100 MG: 50 TABLET ORAL at 08:08

## 2023-08-02 RX ADMIN — LIOTHYRONINE SODIUM 5 MCG: 5 TABLET ORAL at 06:08

## 2023-08-02 RX ADMIN — OXYBUTYNIN 5 MG: 5 TABLET, FILM COATED, EXTENDED RELEASE ORAL at 08:08

## 2023-08-02 RX ADMIN — HYDRALAZINE HYDROCHLORIDE 10 MG: 20 INJECTION, SOLUTION INTRAMUSCULAR; INTRAVENOUS at 01:08

## 2023-08-02 RX ADMIN — SODIUM CHLORIDE: 9 INJECTION, SOLUTION INTRAVENOUS at 03:08

## 2023-08-02 RX ADMIN — AMLODIPINE BESYLATE 10 MG: 10 TABLET ORAL at 08:08

## 2023-08-02 RX ADMIN — IPRATROPIUM BROMIDE 500 MCG: 0.5 SOLUTION RESPIRATORY (INHALATION) at 07:08

## 2023-08-02 RX ADMIN — TAMSULOSIN HYDROCHLORIDE 0.4 MG: 0.4 CAPSULE ORAL at 08:08

## 2023-08-02 RX ADMIN — PANTOPRAZOLE SODIUM 40 MG: 40 TABLET, DELAYED RELEASE ORAL at 08:08

## 2023-08-02 NOTE — PLAN OF CARE
Problem: Adult Inpatient Plan of Care  Goal: Plan of Care Review  Outcome: Met  Goal: Patient-Specific Goal (Individualized)  Outcome: Met  Goal: Absence of Hospital-Acquired Illness or Injury  Outcome: Met  Goal: Optimal Comfort and Wellbeing  Outcome: Met  Goal: Readiness for Transition of Care  Outcome: Met     Problem: Diabetes Comorbidity  Goal: Blood Glucose Level Within Targeted Range  Outcome: Met     Problem: Fluid and Electrolyte Imbalance (Acute Kidney Injury/Impairment)  Goal: Fluid and Electrolyte Balance  Outcome: Met     Problem: Oral Intake Inadequate (Acute Kidney Injury/Impairment)  Goal: Optimal Nutrition Intake  Outcome: Met     Problem: Renal Function Impairment (Acute Kidney Injury/Impairment)  Goal: Effective Renal Function  Outcome: Met     Problem: Impaired Wound Healing  Goal: Optimal Wound Healing  Outcome: Met

## 2023-08-02 NOTE — PROGRESS NOTES
[facility]  Medical Surgical Unit    Cardiology  Consult Note    Patient Name: Diane Larsen  MRN: [id number]  Admission Date: 7/30/2023  Hospital Length of Stay: 0 days  Code Status: Full Code   Attending Provider: Karmen Hightower MD   Consulting Provider: NIGEL Ross  Primary Care Physician: NIGEL Avendaño  Principal Problem:Hyponatremia    Patient information was obtained from patient, past medical records, ER records, and primary team.     Subjective:     Chief Complaint:  bradycardia    HPI:  68-year-old female patient unknown to [facility] with the exception of a hospitalization last month.  She would presented to the ED with complaints of weakness.  Was found to be hyponatremic and bradycardic.  Was also noted to be on 2 beta blockers.  She had an echocardiogram during that time revealed preserved left ventricular function.  Heart rate improved once beta-blockers were discontinued.  She again presented to the emergency room on 07/30 with complaints of weakness.  She had also been outside in the heat that day until 6:00 p.m..  Laboratory data revealed sodium 126, creatinine 1.49.  EKG revealed a junctional bradycardia with heart rate in the 40s.  Serum valproic acid level was low at 32.2, TSH 4.8, magnesium 2.0.    Cis was consulted due to bradycardia  Home meds were reviewed, she was on Bystolic.  During this hospitalization she was started on metoprolol and received her last dose at 9:00 a.m. yesterday.  Telemetry was reviewed she fluctuates from sinus rhythm with heart rates in the 60s to 70s to a junctional rhythm with a heart rate in the low to mid 40s.  She did get up in the chair and heart rate did increase with movement.  She denies any lightheadedness, syncope or near-syncope.    She is resting comfortably in bed.  Denies chest pain, shortness O breath, syncope or near-syncope.    8/2: HR and NA improved, NAD, sitting up in chair. Denies CP, SOB, syncope or near syncope. She  states her home meds are prepackaged. Will need to make sure she has no Bystolic in those packages at home      PMH:  Diabetes, hypertension, bipolar, schizophrenia, anxiety, depression, COPD, hyponatremia  PSH:  See below  Social History:  Lives at home alone    Previous Cardiac Diagnostics:   Results for orders placed during the hospital encounter of 05/31/23    Echo    Interpretation Summary  · The left ventricle is normal in size with moderate concentric hypertrophy and normal systolic function.  · The estimated ejection fraction is 58%.  · Grade I left ventricular diastolic dysfunction.  · Mild tricuspid regurgitation.  · Normal right ventricular size with normal right ventricular systolic function.  · Mild aortic stenosis. Peak AV velocity 2.0 m/sec.     Past Medical History:   Diagnosis Date    Anxiety disorder, unspecified     Bipolar disorder, unspecified     COPD (chronic obstructive pulmonary disease)     Depression     Diabetes mellitus, type 2     Essential (primary) hypertension     Neuropathy     Schizophrenia, unspecified     Seizure disorder        Past Surgical History:   Procedure Laterality Date    BACK SURGERY  2003    jesusita inserted    EXCISION-WIDE LOCAL Left 04/11/2023    left foot       Review of patient's allergies indicates:  No Known Allergies    No current facility-administered medications on file prior to encounter.     Current Outpatient Medications on File Prior to Encounter   Medication Sig    amLODIPine (NORVASC) 10 MG tablet Take 10 mg by mouth.    ARIPiprazole (ABILIFY) 15 MG Tab Take 15 mg by mouth.    busPIRone (BUSPAR) 10 MG tablet Take 10 mg by mouth 2 (two) times daily.    collagenase (SANTYL) ointment Apply topically once daily. Apply nickel thick to wound bed, changing daily (Patient not taking: Reported on 7/25/2023)    dapagliflozin (FARXIGA) 10 mg tablet Take 10 mg by mouth once daily.    diphenoxylate-atropine 2.5-0.025 mg (LOMOTIL) 2.5-0.025 mg per tablet Take 1 tablet  by mouth 4 (four) times daily as needed for Diarrhea.    divalproex (DEPAKOTE) 500 MG TbEC Take 1,000 mg by mouth every evening.    fenofibrate (TRICOR) 54 MG tablet TAKE ONE TABLET BY MOUTH ONCE A DAY WITH FOOD    furosemide (LASIX) 20 MG tablet TAKE ONE TABLET BY MOUTH EVERY DAY AS NEEDED FOR FLUID    gabapentin (NEURONTIN) 600 MG tablet Take 600 mg by mouth 3 (three) times daily.    ipratropium (ATROVENT) 0.02 % nebulizer solution Take 500 mcg by nebulization 4 (four) times daily. Rescue    liothyronine (CYTOMEL) 5 MCG Tab Take 5 mcg by mouth every morning.    losartan (COZAAR) 100 MG tablet Take 100 mg by mouth.    metFORMIN (GLUCOPHAGE-XR) 500 MG ER 24hr tablet Take 500 mg by mouth.    mirtazapine (REMERON) 15 MG tablet Take 15 mg by mouth every evening. at bedtime.    nebivoloL (BYSTOLIC) 5 MG Tab Take 5 mg by mouth.    oxybutynin (DITROPAN-XL) 5 MG TR24 Take 5 mg by mouth 2 (two) times daily.    pantoprazole (PROTONIX) 40 MG tablet Take 40 mg by mouth.    potassium chloride (KLOR-CON) 10 MEQ TbSR TAKE ONE TABLET BY MOUTH ONCE A DAY ONLY WHEN TAKING LASIX    primidone (MYSOLINE) 50 MG Tab Take 50 mg by mouth.    QUEtiapine (SEROQUEL) 50 MG tablet Take 50 mg by mouth every evening.    rOPINIRole (REQUIP) 0.5 MG tablet Take 0.5 mg by mouth every evening. at bedtime.    sertraline (ZOLOFT) 100 MG tablet Take 100 mg by mouth.    tamsulosin (FLOMAX) 0.4 mg Cap Take 1 capsule by mouth.    traZODone (DESYREL) 100 MG tablet Take 100 mg by mouth every evening. at bedtime.    traZODone (DESYREL) 50 MG tablet Take 50 mg by mouth nightly as needed.    TRELEGY ELLIPTA 200-62.5-25 mcg inhaler INHALE ONE PUFF INTO THE LUNGS EVERY DAY    WESTAB PLUS 27 mg iron- 1 mg Tab Take 1 tablet by mouth.     Family History       Problem Relation (Age of Onset)    Alzheimer's disease Father    Heart attack Father    Hypertension Mother, Father    Parkinsonism Mother    Stroke Father          Tobacco Use    Smoking status: Every Day      Current packs/day: 0.50     Average packs/day: 0.5 packs/day for 50.0 years (25.0 ttl pk-yrs)     Types: Cigarettes    Smokeless tobacco: Never   Substance and Sexual Activity    Alcohol use: Not Currently    Drug use: Never    Sexual activity: Not on file       Review of Systems   Constitutional:  Positive for fatigue.   Respiratory:  Negative for shortness of breath.    Cardiovascular:  Negative for chest pain, palpitations and leg swelling.   Gastrointestinal: Negative.    Skin:  Positive for wound.   Neurological:  Positive for weakness. Negative for syncope and light-headedness.       Objective:     Vital Signs (Most Recent):  Temp: 99.2 °F (37.3 °C) (08/02/23 0808)  Pulse: (!) 53 (08/02/23 0808)  Resp: 20 (08/02/23 0705)  BP: (!) 159/76 (08/02/23 0808)  SpO2: 95 % (08/02/23 0808) Vital Signs (24h Range):  Temp:  [98.2 °F (36.8 °C)-99.2 °F (37.3 °C)] 99.2 °F (37.3 °C)  Pulse:  [45-63] 53  Resp:  [18-24] 20  SpO2:  [85 %-95 %] 95 %  BP: (149-166)/(72-78) 159/76     Weight: 92.7 kg (204 lb 5.9 oz)  Body mass index is 32.01 kg/m².    SpO2: 95 %         Intake/Output Summary (Last 24 hours) at 8/2/2023 1017  Last data filed at 8/2/2023 0800  Gross per 24 hour   Intake 4694.65 ml   Output 1975 ml   Net 2719.65 ml         Lines/Drains/Airways       Peripheral Intravenous Line  Duration                  Peripheral IV - Single Lumen 20 G Anterior;Distal;Left Forearm -- days                    Significant Labs:  Recent Results (from the past 72 hour(s))   Comprehensive metabolic panel    Collection Time: 07/30/23 11:45 PM   Result Value Ref Range    Sodium Level 126 (L) 136 - 145 mmol/L    Potassium Level 4.9 3.5 - 5.1 mmol/L    Chloride 94 (L) 98 - 107 mmol/L    Carbon Dioxide 22 (L) 23 - 31 mmol/L    Glucose Level 109 82 - 115 mg/dL    Blood Urea Nitrogen 39.0 (H) 9.8 - 20.1 mg/dL    Creatinine 1.49 (H) 0.55 - 1.02 mg/dL    Calcium Level Total 9.3 8.4 - 10.2 mg/dL    Protein Total 7.5 5.8 - 7.6 gm/dL    Albumin Level  3.5 3.4 - 4.8 g/dL    Globulin 4.0 (H) 2.4 - 3.5 gm/dL    Albumin/Globulin Ratio 0.9 (L) 1.1 - 2.0 ratio    Bilirubin Total 0.4 <=1.5 mg/dL    Alkaline Phosphatase 49 40 - 150 unit/L    Alanine Aminotransferase 11 0 - 55 unit/L    Aspartate Aminotransferase 15 5 - 34 unit/L    eGFR 38 mls/min/1.73/m2   CBC with Differential    Collection Time: 07/30/23 11:45 PM   Result Value Ref Range    WBC 11.40 4.50 - 11.50 x10(3)/mcL    RBC 3.59 (L) 4.20 - 5.40 x10(6)/mcL    Hgb 10.6 (L) 12.0 - 16.0 g/dL    Hct 32.1 (L) 37.0 - 47.0 %    MCV 89.4 80.0 - 94.0 fL    MCH 29.5 27.0 - 31.0 pg    MCHC 33.0 33.0 - 36.0 g/dL    RDW 15.1 11.5 - 17.0 %    Platelet 289 130 - 400 x10(3)/mcL    MPV 9.9 7.4 - 10.4 fL    Neut % 61.3 %    Lymph % 30.2 %    Mono % 6.8 %    Eos % 0.9 %    Basophil % 0.4 %    Lymph # 3.44 0.6 - 4.6 x10(3)/mcL    Neut # 7.00 2.1 - 9.2 x10(3)/mcL    Mono # 0.78 0.1 - 1.3 x10(3)/mcL    Eos # 0.10 0 - 0.9 x10(3)/mcL    Baso # 0.04 <=0.2 x10(3)/mcL    IG# 0.04 0 - 0.04 x10(3)/mcL    IG% 0.4 %   Magnesium    Collection Time: 07/30/23 11:45 PM   Result Value Ref Range    Magnesium Level 2.00 1.60 - 2.60 mg/dL   Troponin I    Collection Time: 07/30/23 11:45 PM   Result Value Ref Range    Troponin-I 0.017 0.000 - 0.045 ng/mL   Osmolality, Serum    Collection Time: 07/30/23 11:45 PM   Result Value Ref Range    Osmolality 280 280 - 300 mOsm/kg   Valproic Acid    Collection Time: 07/31/23 12:24 AM   Result Value Ref Range    Valproic Acid Level 32.2 (L) 50.0 - 100.0 ug/ml   Urinalysis, Reflex to Urine Culture    Collection Time: 07/31/23 12:41 AM    Specimen: Urine   Result Value Ref Range    Color, UA Dark Yellow Yellow, Light-Yellow, Dark Yellow, Hannah, Straw    Appearance, UA Slightly Cloudy (A) Clear    Specific Gravity, UA 1.015     pH, UA 6.0 5.0 - 8.5    Protein, UA Negative Negative    Glucose, UA Trace (A) Negative, Normal    Ketones, UA Negative Negative    Blood, UA 2+ (A) Negative    Bilirubin, UA Negative  Negative    Urobilinogen, UA 0.2 0.2, 1.0, Normal    Nitrites, UA Negative Negative    Leukocyte Esterase, UA Negative Negative   Sodium, Random Urine    Collection Time: 07/31/23 12:41 AM   Result Value Ref Range    Urine Sodium 24.0 mmol/L   Urinalysis, Microscopic    Collection Time: 07/31/23 12:41 AM   Result Value Ref Range    Bacteria, UA None Seen None Seen, Rare, Occasional /HPF    RBC, UA 0-2 None Seen, 0-2, 3-5, 0-5 /HPF    WBC, UA 0-2 None Seen, 0-2, 3-5, 0-5 /HPF    Squamous Epithelial Cells, UA Rare None Seen, Rare, Occasional, Occ /HPF   Osmolality, Urine    Collection Time: 07/31/23  1:02 AM   Result Value Ref Range    Urine Osmolality 476 300 - 1,300 mOsm/kg   Comprehensive metabolic panel    Collection Time: 07/31/23  3:51 AM   Result Value Ref Range    Sodium Level 131 (L) 136 - 145 mmol/L    Potassium Level 4.2 3.5 - 5.1 mmol/L    Chloride 97 (L) 98 - 107 mmol/L    Carbon Dioxide 23 23 - 31 mmol/L    Glucose Level 94 82 - 115 mg/dL    Blood Urea Nitrogen 36.0 (H) 9.8 - 20.1 mg/dL    Creatinine 1.25 (H) 0.55 - 1.02 mg/dL    Calcium Level Total 9.0 8.4 - 10.2 mg/dL    Protein Total 6.9 5.8 - 7.6 gm/dL    Albumin Level 3.3 (L) 3.4 - 4.8 g/dL    Globulin 3.6 (H) 2.4 - 3.5 gm/dL    Albumin/Globulin Ratio 0.9 (L) 1.1 - 2.0 ratio    Bilirubin Total 0.4 <=1.5 mg/dL    Alkaline Phosphatase 47 40 - 150 unit/L    Alanine Aminotransferase 10 0 - 55 unit/L    Aspartate Aminotransferase 16 5 - 34 unit/L    eGFR 47 mls/min/1.73/m2   CBC with Differential    Collection Time: 07/31/23  3:51 AM   Result Value Ref Range    WBC 10.49 4.50 - 11.50 x10(3)/mcL    RBC 3.62 (L) 4.20 - 5.40 x10(6)/mcL    Hgb 10.5 (L) 12.0 - 16.0 g/dL    Hct 32.7 (L) 37.0 - 47.0 %    MCV 90.3 80.0 - 94.0 fL    MCH 29.0 27.0 - 31.0 pg    MCHC 32.1 (L) 33.0 - 36.0 g/dL    RDW 15.1 11.5 - 17.0 %    Platelet 253 130 - 400 x10(3)/mcL    MPV 9.8 7.4 - 10.4 fL    Neut % 51.4 %    Lymph % 40.4 %    Mono % 6.6 %    Eos % 0.8 %    Basophil % 0.4 %     Lymph # 4.24 0.6 - 4.6 x10(3)/mcL    Neut # 5.40 2.1 - 9.2 x10(3)/mcL    Mono # 0.69 0.1 - 1.3 x10(3)/mcL    Eos # 0.08 0 - 0.9 x10(3)/mcL    Baso # 0.04 <=0.2 x10(3)/mcL    IG# 0.04 0 - 0.04 x10(3)/mcL    IG% 0.4 %   POCT glucose    Collection Time: 07/31/23  6:10 AM   Result Value Ref Range    POCT Glucose 86 70 - 110 mg/dL   POCT glucose    Collection Time: 07/31/23 11:44 AM   Result Value Ref Range    POCT Glucose 97 70 - 110 mg/dL   POCT glucose    Collection Time: 07/31/23  3:46 PM   Result Value Ref Range    POCT Glucose 138 (H) 70 - 110 mg/dL   POCT glucose    Collection Time: 07/31/23  9:11 PM   Result Value Ref Range    POCT Glucose 111 (H) 70 - 110 mg/dL   Basic Metabolic Panel    Collection Time: 08/01/23  4:21 AM   Result Value Ref Range    Sodium Level 135 (L) 136 - 145 mmol/L    Potassium Level 4.8 3.5 - 5.1 mmol/L    Chloride 104 98 - 107 mmol/L    Carbon Dioxide 24 23 - 31 mmol/L    Glucose Level 85 82 - 115 mg/dL    Blood Urea Nitrogen 29.0 (H) 9.8 - 20.1 mg/dL    Creatinine 1.00 0.55 - 1.02 mg/dL    BUN/Creatinine Ratio 29     Calcium Level Total 9.0 8.4 - 10.2 mg/dL    Anion Gap 7.0 mEq/L    eGFR >60 mls/min/1.73/m2   TSH    Collection Time: 08/01/23  4:22 AM   Result Value Ref Range    Thyroid Stimulating Hormone 4.845 0.350 - 4.940 uIU/mL   POCT glucose    Collection Time: 08/01/23 11:50 AM   Result Value Ref Range    POCT Glucose 114 (H) 70 - 110 mg/dL   POCT glucose    Collection Time: 08/01/23  3:06 PM   Result Value Ref Range    POCT Glucose 128 (H) 70 - 110 mg/dL   POCT glucose    Collection Time: 08/01/23  9:06 PM   Result Value Ref Range    POCT Glucose 183 (H) 70 - 110 mg/dL   Basic Metabolic Panel    Collection Time: 08/02/23  4:37 AM   Result Value Ref Range    Sodium Level 138 136 - 145 mmol/L    Potassium Level 4.0 3.5 - 5.1 mmol/L    Chloride 105 98 - 107 mmol/L    Carbon Dioxide 25 23 - 31 mmol/L    Glucose Level 95 82 - 115 mg/dL    Blood Urea Nitrogen 18.0 9.8 - 20.1 mg/dL     Creatinine 0.83 0.55 - 1.02 mg/dL    BUN/Creatinine Ratio 22     Calcium Level Total 9.1 8.4 - 10.2 mg/dL    Anion Gap 8.0 mEq/L    eGFR >60 mls/min/1.73/m2   BNP    Collection Time: 08/02/23  4:37 AM   Result Value Ref Range    Natriuretic Peptide 1,931.4 (H) <=100.0 pg/mL       Significant Imaging:  Imaging Results              X-Ray Chest 1 View (Final result)  Result time 07/31/23 09:44:52      Final result by Osman Guevara MD (07/31/23 09:44:52)                   Impression:      1. Borderline cardiomegaly  2. Atherosclerosis  3. Thoracic spondylosis and scoliosis      Electronically signed by: Osman Guevara  Date:    07/31/2023  Time:    09:44               Narrative:    EXAMINATION:  XR CHEST 1 VIEW    CLINICAL HISTORY:  , Hypo-osmolality and hyponatremia.    COMPARISON:  05/31/2023    FINDINGS:  An AP view or more reveals the heart to be borderline enlarged.  The trachea is just left of midline.  The patient is rotated on the exam.  Degenerative changes and curvature are noted to the thoracic spine.  No definite infiltrate or effusion is seen.                        Wet Read by Wicho Maravilla MD (07/31/23 00:21:45, [facility] - Emergency Dept, Emergency Medicine)    No consolidations appreciated                                    EKG:    Results for orders placed or performed during the hospital encounter of 07/30/23   EKG 12-lead    Narrative    Test Reason : R00.1,    Vent. Rate : 041 BPM     Atrial Rate : 038 BPM     P-R Int : 000 ms          QRS Dur : 088 ms      QT Int : 464 ms       P-R-T Axes : 000 094 024 degrees     QTc Int : 382 ms    Junctional bradycardia  Rightward axis  Abnormal ECG  When compared with ECG of 31-JUL-2023 00:30,  No significant change was found    Referred By: [email or web address]   SELF           Confirmed By:        Telemetry:  Junctional 46 bpm alternating with Sinus Bradycardia 56 bpm    Physical Exam  Constitutional:       General: She is not in acute distress.      Appearance: She is obese.   HENT:      Mouth/Throat:      Mouth: Mucous membranes are moist.   Eyes:      Extraocular Movements: Extraocular movements intact.   Cardiovascular:      Rate and Rhythm: Regular rhythm. Bradycardia present.      Heart sounds: No murmur heard.  Pulmonary:      Effort: Pulmonary effort is normal.      Breath sounds: Normal breath sounds.   Abdominal:      Palpations: Abdomen is soft.   Musculoskeletal:      Right lower leg: No edema.      Left lower leg: No edema.   Skin:     General: Skin is warm.   Neurological:      General: No focal deficit present.      Mental Status: She is alert.   Psychiatric:         Mood and Affect: Mood normal.         Home Medications:   No current facility-administered medications on file prior to encounter.     Current Outpatient Medications on File Prior to Encounter   Medication Sig Dispense Refill    amLODIPine (NORVASC) 10 MG tablet Take 10 mg by mouth.      ARIPiprazole (ABILIFY) 15 MG Tab Take 15 mg by mouth.      busPIRone (BUSPAR) 10 MG tablet Take 10 mg by mouth 2 (two) times daily.      collagenase (SANTYL) ointment Apply topically once daily. Apply nickel thick to wound bed, changing daily (Patient not taking: Reported on 7/25/2023) 90 g 2    dapagliflozin (FARXIGA) 10 mg tablet Take 10 mg by mouth once daily.      diphenoxylate-atropine 2.5-0.025 mg (LOMOTIL) 2.5-0.025 mg per tablet Take 1 tablet by mouth 4 (four) times daily as needed for Diarrhea.      divalproex (DEPAKOTE) 500 MG TbEC Take 1,000 mg by mouth every evening.      fenofibrate (TRICOR) 54 MG tablet TAKE ONE TABLET BY MOUTH ONCE A DAY WITH FOOD      furosemide (LASIX) 20 MG tablet TAKE ONE TABLET BY MOUTH EVERY DAY AS NEEDED FOR FLUID      gabapentin (NEURONTIN) 600 MG tablet Take 600 mg by mouth 3 (three) times daily.      ipratropium (ATROVENT) 0.02 % nebulizer solution Take 500 mcg by nebulization 4 (four) times daily. Rescue      liothyronine (CYTOMEL) 5 MCG Tab Take 5 mcg by  mouth every morning.      losartan (COZAAR) 100 MG tablet Take 100 mg by mouth.      metFORMIN (GLUCOPHAGE-XR) 500 MG ER 24hr tablet Take 500 mg by mouth.      mirtazapine (REMERON) 15 MG tablet Take 15 mg by mouth every evening. at bedtime.      nebivoloL (BYSTOLIC) 5 MG Tab Take 5 mg by mouth.      oxybutynin (DITROPAN-XL) 5 MG TR24 Take 5 mg by mouth 2 (two) times daily.      pantoprazole (PROTONIX) 40 MG tablet Take 40 mg by mouth.      potassium chloride (KLOR-CON) 10 MEQ TbSR TAKE ONE TABLET BY MOUTH ONCE A DAY ONLY WHEN TAKING LASIX      primidone (MYSOLINE) 50 MG Tab Take 50 mg by mouth.      QUEtiapine (SEROQUEL) 50 MG tablet Take 50 mg by mouth every evening.      rOPINIRole (REQUIP) 0.5 MG tablet Take 0.5 mg by mouth every evening. at bedtime.      sertraline (ZOLOFT) 100 MG tablet Take 100 mg by mouth.      tamsulosin (FLOMAX) 0.4 mg Cap Take 1 capsule by mouth.      traZODone (DESYREL) 100 MG tablet Take 100 mg by mouth every evening. at bedtime.      traZODone (DESYREL) 50 MG tablet Take 50 mg by mouth nightly as needed.      TRELEGY ELLIPTA 200-62.5-25 mcg inhaler INHALE ONE PUFF INTO THE LUNGS EVERY DAY      WESTAB PLUS 27 mg iron- 1 mg Tab Take 1 tablet by mouth.         Current Inpatient Medications:    Current Facility-Administered Medications:     0.9%  NaCl infusion, , Intravenous, Continuous, Karmen Hightower MD, Last Rate: 100 mL/hr at 08/02/23 0402, Rate Verify at 08/02/23 0402    amLODIPine tablet 10 mg, 10 mg, Oral, Daily, Magdy Espinoza MD, 10 mg at 08/02/23 0851    ARIPiprazole tablet 15 mg, 15 mg, Oral, Daily, Magdy Espinoza MD, 15 mg at 08/02/23 0852    busPIRone tablet 10 mg, 10 mg, Oral, BID, Magdy Espinoza MD, 10 mg at 08/02/23 0852    collagenase ointment, , Topical (Top), Daily, Barbra Perez NP, Given at 08/02/23 0853    diphenoxylate-atropine 2.5-0.025 mg per tablet 1 tablet, 1 tablet, Oral, QID PRN, Magdy Espinoza MD    divalproex EC tablet 1,000 mg,  1,000 mg, Oral, QHS, Magdy Espinoza MD, 1,000 mg at 08/01/23 2115    fenofibrate tablet 145 mg, 145 mg, Oral, Daily, Magdy Espinoza MD, 145 mg at 08/02/23 0852    gabapentin capsule 600 mg, 600 mg, Oral, TID, Magdy Espinoza MD, 600 mg at 08/02/23 0852    glucagon (human recombinant) injection 1 mg, 1 mg, Intramuscular, PRN, Magdy Espinoza MD    glucose chewable tablet 16 g, 16 g, Oral, PRN, Magdy Espinoza MD    glucose chewable tablet 24 g, 24 g, Oral, PRN, Magdy Espinoza MD    hydrALAZINE injection 10 mg, 10 mg, Intravenous, Q6H PRN, Karmen Hightower MD, 10 mg at 08/02/23 0104    hydrALAZINE tablet 50 mg, 50 mg, Oral, Q8H, Karmen Hightower MD, 50 mg at 08/02/23 0607    insulin aspart U-100 injection 1-10 Units, 1-10 Units, Subcutaneous, QID (AC + HS) PRN, Magdy Espinoza MD, 1 Units at 08/01/23 2107    ipratropium 0.02 % nebulizer solution 500 mcg, 500 mcg, Nebulization, Q6H, Magdy Espinoza MD, 500 mcg at 08/02/23 0705    liothyronine tablet 5 mcg, 5 mcg, Oral, QAM, Magdy Espinoza MD, 5 mcg at 08/02/23 0607    mirtazapine tablet 15 mg, 15 mg, Oral, QHS, Magdy Espinoza MD, 15 mg at 08/01/23 2115    oxybutynin 24 hr tablet 5 mg, 5 mg, Oral, BID, Magdy Espinoza MD, 5 mg at 08/02/23 0852    pantoprazole EC tablet 40 mg, 40 mg, Oral, Daily, Magdy Espinoza MD, 40 mg at 08/02/23 0852    potassium chloride CR tablet 10 mEq, 10 mEq, Oral, Daily, Magdy Espinoza MD, 10 mEq at 08/02/23 0852    primidone tablet 50 mg, 50 mg, Oral, QHS, Magdy Espinoza MD, 50 mg at 08/01/23 2115    QUEtiapine tablet 50 mg, 50 mg, Oral, QHS, Magdy Espinoza MD, 50 mg at 08/01/23 2115    rOPINIRole tablet 0.5 mg, 0.5 mg, Oral, QHS, Magdy Espinoza MD, 0.5 mg at 08/01/23 2115    sertraline tablet 100 mg, 100 mg, Oral, Daily, Magdy Espinoza MD, 100 mg at 08/02/23 0852    sodium chloride 0.9% flush 10 mL, 10 mL, Intravenous, PRN, Magdy Espinoza MD    tamsulosin 24  hr capsule 0.4 mg, 1 capsule, Oral, Daily, Magdy Espinoza MD, 0.4 mg at 08/02/23 0852    traZODone tablet 100 mg, 100 mg, Oral, QHS, Magdy Espinoza MD, 100 mg at 08/01/23 2115         VTE Risk Mitigation (From admission, onward)           Ordered     IP VTE HIGH RISK PATIENT  Once         07/31/23 0101     Place sequential compression device  Until discontinued         07/31/23 0101                    Assessment:   Junctional Bradycardia alternating with Sinus Bradycardia - asymptomatic    Last dose of BB was 7/31 am    Avoid all AVN blocking agents (do not resume home bystolic)    TSH ok, Depakote level ok    Echo with normal LVF in June  Hyponatremia - improved  Bipolar disorder  Schizophrenia  COPD  Atherosclerosis noted on CXR    Plan:   Continue to monitor on telemetry  Continue to hold all AVN blocking agents and do not resume at DC  She gets meds that are prepackaged - will need assistance in ensuring no BB are in those packets  Will need 14 day MCT at DC and follow up with EP  Treatment of hyponatremia per primary team              Will sign off, contact if needed    NIGEL Ross  Cardiology  [facility] - Medical Surgical Unit  08/02/2023

## 2023-08-02 NOTE — PLAN OF CARE
Problem: Adult Inpatient Plan of Care  Goal: Plan of Care Review  Outcome: Ongoing, Progressing  Goal: Patient-Specific Goal (Individualized)  Outcome: Ongoing, Progressing  Goal: Absence of Hospital-Acquired Illness or Injury  Outcome: Ongoing, Progressing  Intervention: Identify and Manage Fall Risk  Flowsheets (Taken 8/2/2023 0314)  Safety Promotion/Fall Prevention: high risk medications identified  Goal: Optimal Comfort and Wellbeing  Outcome: Ongoing, Progressing  Intervention: Monitor Pain and Promote Comfort  Flowsheets (Taken 8/2/2023 0314)  Pain Management Interventions: care clustered  Intervention: Provide Person-Centered Care  Flowsheets (Taken 8/2/2023 0314)  Trust Relationship/Rapport:   care explained   choices provided  Goal: Readiness for Transition of Care  Outcome: Ongoing, Progressing     Problem: Diabetes Comorbidity  Goal: Blood Glucose Level Within Targeted Range  Outcome: Ongoing, Progressing     Problem: Fluid and Electrolyte Imbalance (Acute Kidney Injury/Impairment)  Goal: Fluid and Electrolyte Balance  Outcome: Ongoing, Progressing     Problem: Oral Intake Inadequate (Acute Kidney Injury/Impairment)  Goal: Optimal Nutrition Intake  Outcome: Ongoing, Progressing     Problem: Renal Function Impairment (Acute Kidney Injury/Impairment)  Goal: Effective Renal Function  Outcome: Ongoing, Progressing     Problem: Impaired Wound Healing  Goal: Optimal Wound Healing  Outcome: Ongoing, Progressing

## 2023-08-02 NOTE — PT/OT/SLP PROGRESS
Physical Therapy Treatment    Patient Name:  Diane Larsen   MRN:  [id number]    Recommendations:     Discharge Recommendations: assisted living facility  Discharge Equipment Recommendations:    Barriers to discharge: None    Assessment:     Diane Larsen is a 68 y.o. female admitted with a medical diagnosis of Hyponatremia.  She presents with the following impairments/functional limitations: weakness, impaired endurance, gait instability, impaired balance, decreased safety awareness.    Pt transferred to chair from bed with min A using RW. She requires frequent cues for safety however did fairly well. She did not attempt gait due to her foot wound.    Rehab Prognosis: Good; patient would benefit from acute skilled PT services to address these deficits and reach maximum level of function.    Recent Surgery: * No surgery found *      Plan:     During this hospitalization, patient to be seen 5 x/week to address the identified rehab impairments via gait training, therapeutic activities, therapeutic exercises and progress toward the following goals:    Plan of Care Expires:       Subjective     Chief Complaint: fatigue  Patient/Family Comments/goals: to return home  Pain/Comfort:         Objective:     Communicated with patient prior to session.  Patient found HOB elevated with telemetry, pulse ox (continuous), peripheral IV, PureWick, oxygen upon PT entry to room.     General Precautions: Standard,    Orthopedic Precautions:    Braces:    Respiratory Status: Nasal cannula, flow 2 L/min     Functional Mobility:  Bed Mobility:     Supine to Sit: contact guard assistance and minimum assistance  Transfers:     Sit to Stand:  minimum assistance with rolling walker  Bed to Chair: minimum assistance with  rolling walker  using  Stand Pivot and Step Transfer  Gait: unable  Balance: min A in supported standing      AM-PAC 6 CLICK MOBILITY          Treatment & Education:  See above, seated BLE exercises while sitting at  EOB    Patient left up in chair with all lines intact, call button in reach, and chair alarm on..    GOALS:   Multidisciplinary Problems       Physical Therapy Goals          Problem: Physical Therapy    Goal Priority Disciplines Outcome Goal Variances Interventions   Physical Therapy Goal     PT, PT/OT Ongoing, Progressing     Description: 1.  Pt will be able to get in /out of bed safely with setup and bed rail  2.  Pt will tf to chair/BSC SBA with AAD  3.  Pt will amb in room and halls with AAD SBA  4.  Pt will be I HEP to maintain strength BLEs while inpt  5.  Pt will be educated fall prevention strategies to reduce fall risk  6.  Pt will improve 30s chair rise test from 0 to at least 3 as evidence of reduced fall risk as a result of participating in PT program                       Time Tracking:     PT Received On: 08/02/23  PT Start Time: 0930     PT Stop Time: 0955  PT Total Time (min): 25 min     Billable Minutes: Therapeutic Activity 15 and Therapeutic Exercise 10    Treatment Type: Treatment  PT/PTA: PTA           08/02/2023

## 2023-08-02 NOTE — NURSING
Patient placed on 2L NC d/t oxygen desaturation. Patient was found to be 85%.  Patient also received PRN hydralazine 10mg IV push for elevated BP.

## 2023-08-02 NOTE — PLAN OF CARE
Routine DC screening done with patient. She lives at home. Caregiver (Melonie Tomlinson) comes Mon-Friday for about 10 hours per day through [facility] sitting agency. On the weekends, she has help per Melonie's daughter through same agency. Est with [facility]. Updates with notice of DC sent in [facility].     Caregivers assist her with all ADLs and provide transportation. Has all DME needed at home- oxygen, nebulizer, walker, shower chair. PCP- Doreen Barnes NP. Fills medications at [facility].     Denies any needs at this time.

## 2023-08-09 ENCOUNTER — HOSPITAL ENCOUNTER (OUTPATIENT)
Dept: WOUND CARE | Facility: HOSPITAL | Age: 68
Discharge: HOME | End: 2023-08-09
Attending: NURSE PRACTITIONER
Payer: MEDICARE

## 2023-08-09 VITALS
WEIGHT: 204 LBS | DIASTOLIC BLOOD PRESSURE: 66 MMHG | BODY MASS INDEX: 32.02 KG/M2 | HEIGHT: 67 IN | HEART RATE: 48 BPM | RESPIRATION RATE: 18 BRPM | SYSTOLIC BLOOD PRESSURE: 152 MMHG

## 2023-08-09 DIAGNOSIS — I87.8 CHRONIC VENOUS STASIS: ICD-10-CM

## 2023-08-09 DIAGNOSIS — S91.302A OPEN WOUND OF PLANTAR ASPECT OF LEFT FOOT: Primary | ICD-10-CM

## 2023-08-09 DIAGNOSIS — Z51.89 ENCOUNTER FOR WOUND RE-CHECK: ICD-10-CM

## 2023-08-09 DIAGNOSIS — L97.525 NON-PRESSURE CHRONIC ULCER OF OTHER PART OF LEFT FOOT WITH MUSCLE INVOLVEMENT WITHOUT EVIDENCE OF NECROSIS: ICD-10-CM

## 2023-08-09 DIAGNOSIS — M14.672 CHARCOT'S JOINT OF LEFT FOOT, NON-DIABETIC: ICD-10-CM

## 2023-08-09 DIAGNOSIS — E11.610 TYPE 2 DIABETES MELLITUS WITH CHARCOT'S JOINT OF LEFT FOOT: ICD-10-CM

## 2023-08-09 PROCEDURE — 99213 OFFICE O/P EST LOW 20 MIN: CPT

## 2023-08-09 PROCEDURE — 27000999 HC MEDICAL RECORD PHOTO DOCUMENTATION

## 2023-08-09 PROCEDURE — 97597 DBRDMT OPN WND 1ST 20 CM/<: CPT

## 2023-08-09 RX ORDER — ALBUTEROL SULFATE 90 UG/1
AEROSOL, METERED RESPIRATORY (INHALATION)
COMMUNITY
Start: 2023-07-31

## 2023-08-09 NOTE — PROGRESS NOTES
Home Health: [facility]  Smoker  [x] Yes  [] No   Last A1C: 6.8 on 23  Last CB  Brooklyn Sukhdeep  [x] Yes [] No            Results: 0/5 left foot   Doppler done in office? [] Yes [x] No   Date:      Right dorsalis:     Right posterior:     Left dorsalis:     Left posterior:  Darco Shoe [x] Yes [] No   Date given: does not like to wear  Is patient eligible for HBO [] Yes [x] No   Start date:   Is the patient eligible for a graft, and/or currently grafting?  [] Yes [x] No    If so, which one/size?        Patient ID: Diane Larsen is a 68 y.o. female.    Chief Complaint: Diabetic Foot Ulcer (Left plantar foot - [place] 2)    Subjective:    HPI  23 (Dr. Lassiter) - 68-year-old white female, who was referred here by Dr. Hank Owen, for an open wound on the left plantar surface, left foot.  She is not a diabetic.  She was admitted into the [facility], for cellulitis and abscess of the plantar surface of the left foot, and she underwent an intraoperative debridement.  He is here for follow up of this new wound.  It appears that she has had some chronic venous stasis over the past several months, both lower legs.  This has not been treated in the past, as far as I know.  While she was in the hospital, she underwent an MRI of the left foot, which showed no evidence of osteomyelitis.  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  She has Pes planus.  She also underwent arterial ultrasound on , which showed patent arteries in both lower extremities.  The venous ultrasound was also negative for DVT.  I have reviewed her records from the hospital.  Pt caregiver states that a blister was noticed on her left foot about 2 weeks ago around 23. The pt opened the blister by scratching and by Monday 4/10/23 the caregiver noted that it needed immediate attention. Pt went to ED in [place] on Monday 4/10/23 and was admitted. Dr. Mckinney debrided the left foot on 23, she was discharged on  4/14/23. She has a follow up with Dr. Mckinney tomorrow 4/19/23. She is currently taking Augmentin PO. She states that she is not diabetic. Her semmes yudi is 1/10 and her blood sugar was 124. She had venous/arterial ultrasounds done during her hospital stay as well as MRI and xrays    5/2/23 - Ms. Larsen returns to clinic today for followup on the open wound to the left mid foot.  Her history includes the left ankle being broken several months ago which left the left foot somewhat deformed.  The wound was surgically debrided by Dr. Hank Owen.  She stayed for 1 week at the hospital in [place], [place] and went home on oral Augmentin has now completed.  Her PCP is from [facility] and she reports that he has stated that they are not to use compression on the leg.  However, ultrasounds were done in April and they were clear.  The caregiver does have concern that she is unable to ambulate and would like a wheelchair obtained for the patient.  They were advised to go to [facility]  to have the paperwork sent to us for prescription.  However, [facility] called and their insurance does not cover the wheelchair.    Today, the left mid foot was assessed and selectively debrided.  Additionally, she does have several scattered wounds on her bilateral lower legs that will be monitored.  She was given Hibiclens and instructed to wash the lower extremities including the foot with a Hibiclens x3 days.    5/9/23 - the patient returns today for followup with her caregiver present.  In addition to the neuropathic wound on the plantar surface of the left foot she had small scattered wounds on her bilateral lower extremities.  She was instructed to cleanse her wounds on her legs with Hibiclens and she did so.  Today, the majority of those small open areas on the legs are now closed and they look significantly improved.  The wound on the left foot was selectively debrided and it does remains stable.  Granular tissue is surface  level so we will begin applying Endoform to the wound bed with the hopes of developing epithelial tissue.  Of note, the patient is still has not received her wheelchair.  That wheelchair was faxed to her DME company of choice and she and the caregiver were instructed that they will need to followup with that company.    5/16/23 - Ms. Larsen returns today for followup in wound check on the wound to the left plantar foot.  This neuropathic wound was selectively debrided today and does remains stable.  We will begin today applying Endoform with the hopes of developing epithelial tissue across the wound bed.  She has an appointment with Doreen Barnes NP, on 05/30/2023 to initiate services  as her PCP because she is not satisfied with her current provider.  She will return to clinic in 1 week for followup.    5/24/23 - the patient returns today for followup on the neuropathic wound to the plantar surface of the left foot.  We began using Endoform at the last visit, however, the patient in her caregiver misunderstood the instructions in removed that product prematurely.  Today, the wound was selectively debrided and we did again place Endoform on the wound to be left in place until Friday.  The wound does appear to be slowly improving.  We will be seeking authorization for grafting if possible.    5/30/23 - the patient returns today for the neuropathic wound to the left foot.  She does report that she fell sometime over the weekend.  She did fall backwards and injured her bottom.  She noted some bruises and pain, however, there are no open wounds.  She did not go to ER.  This morning, her caregiver reports that the patient was found soaked in urine and that urine had dripped down to the wound on the foot.  There does not appear to be any change in the wound.  Today, the patient fell asleep in both the lobby and in the chair while she was receiving care.  She will be seeing a new provider today, Doreen Barnes NP.  We  are hopeful that there may be some medication changes done that will help her in becoming a little more alert.    Today, the wound was assessed and appears to be improving.  A selective debridement was performed and the hyper granulated tissue was chemically cauterized with silver nitrate.  We are still attempting to authorize a graft of some kind.  She does though need 30 days of continuous care so we can hopefully get one of the grafts authorized now.    7/18/23 - Ms. Larsen was last seen in the clinic on 5/30/23. She was admitted to [facility] on 5/31/23 - 6/6/23 with hypotension and low O2 sats. While there it was discovered she had C-Diff. Her caregiver states that she did several rounds of oral antibiotics but is no longer taking anything. She has been using mesalt and silver alginate to dress the wound since being home. She has been diagnosed as a diabetic since her last visit with us. Her A1C on 6/2/23 was 6.8.   Today her wound was assessed in has improved since her last visit.  It was selectively debrided.  There is a light coating of slough over the entire wound bed, therefore Santyl has been ordered for daily application.  Today, we applied mesalt and if she is not able to get Santyl with her insurance she will continue with daily use of mesalt.    Additionally, her caregiver requested that her toenails be trimmed.  Six toenails were debrided off today.    7/25/23 - the patient returns today for followup on a D FU to the left foot.  The wound was assessed and selectively debrided.  The hypergranulated tissue was chemically cauterized using silver nitrate.  The caregiver is still attempting to obtain Santyl that was ordered.  It was initially sent to [facility] in [place] who could not get the product.  The Rx was transferred to [facility] in [place]. She is waiting for this to go through so she can see if the medicine will be covered by the patient's insurance.   The would is improved, however, with the use of  "Mesalt only.  We will continue that product.  Will will also attempt to get her approved for grafts, preferably Apligraft or Theraskin.    8/9/23 - Ms. Larsen returns today for followup on the D FU to the left midfoot.  She reports that she went to ED on 7/30/23 c/o of being weak and that she got hot being outside in the sun. She was dehydrated and bradycardic. She was discharged on 8/2/23.   She saw Dr. Claros on 8/3/23 to have a halter monitor placed. She will wear this for 2 weeks, it will be d/c on 8/17/23 and she will go to [facility] in [place] on 8/28/23 for those results.   Today her wound was assessed and selectively debrided.  The periwound was quite macerated and she was reminded to be changing the dressings daily.  We are continuing to use Santyl on the wound bed.  There appears to be a thick layer of slough across the wound bed and the patient was reminded that she should be using at least a nickel thick layer of the product covered with mesalt.  When she returns in one week if there is still a layer of slough we will use the ultrasonic debrider.        Review of Systems   Constitutional: Negative.    Respiratory: Negative.     Cardiovascular: Negative.    Skin:         As documented in the HPI.   All other systems reviewed and are negative.        Objective:      Vitals:    08/09/23 1321   BP: (!) 152/66   BP Location: Left arm   Patient Position: Sitting   Pulse: (!) 48   Resp: 18   Weight: 92.5 kg (204 lb)   Height: 5' 7" (1.702 m)        Physical Exam  Vitals reviewed.   Constitutional:       Appearance: Normal appearance.   Cardiovascular:      Rate and Rhythm: Normal rate.   Pulmonary:      Effort: Pulmonary effort is normal.   Skin:     General: Skin is warm and dry.      Comments: DFU left foot   Neurological:      Mental Status: She is alert.            Altered Skin Integrity 04/18/23 1351 Left medial Foot (Active)   04/18/23 1351   Altered Skin Integrity Present on Admission - Did Patient arrive to the " hospital with altered skin?: yes   Side: Left   Orientation: medial   Location: Foot   Wound Number:    Is this injury device related?:    Primary Wound Type:    Description of Altered Skin Integrity:    Ankle-Brachial Index:    Pulses:    Removal Indication and Assessment:    Wound Outcome:    (Retired) Wound Length (cm):    (Retired) Wound Width (cm):    (Retired) Depth (cm):    Wound Description (Comments):    Removal Indications:    Dressing Appearance Moist drainage 08/09/23 1321   Drainage Amount Moderate 08/09/23 1321   Drainage Characteristics/Odor Serosanguineous 08/09/23 1321   Appearance Pink;Tan;Moist;Slough 08/09/23 1321   Tissue loss description Full thickness 08/09/23 1321   Periwound Area [place];Moist 08/09/23 1321   Wound Edges Callused;Open 08/09/23 1321   Kovacs Classification (diabetic foot ulcers only) Grade 2 08/09/23 1321   Wound Length (cm) 2.8 cm 08/09/23 1321   Wound Width (cm) 2.5 cm 08/09/23 1321   Wound Depth (cm) 0.2 cm 08/09/23 1321   Wound Volume (cm^3) 1.4 cm^3 08/09/23 1321   Wound Surface Area (cm^2) 7 cm^2 08/09/23 1321   Care Cleansed with:;Wound cleanser 08/09/23 1321   Dressing Applied 08/09/23 1321   Dressing Change Due 08/10/23 08/09/23 1321     8/9/23        Left medial foot (pre)                                    Left medial foot (post)  santyl, malgorzatat, 4x4, kerlix, coban      Assessment:         ICD-10-CM ICD-9-CM   1. Open wound of plantar aspect of left foot  S91.302A 892.0   2. 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 Charcot's joint of left foot, non-diabetic  M14.672 094.0     713.5   4. Type 2 diabetes mellitus with Charcot's joint of left foot  E11.610 250.60     713.5   5. Encounter for wound re-check  Z51.89 V58.89   6. Chronic venous stasis  I87.8 459.81             Procedures:     Debridement     Date/Time: 8/9/2023 1:00 PM     Performed by: Barbra Perez NP  Authorized  "by: Barbra Perez NP    Time out: Immediately prior to procedure a "time out" was called to verify the correct patient, procedure, equipment, support staff and site/side marked as required.     Consent Done?:  Yes (Verbal)     Preparation: Patient was prepped and draped with clean technique    Local anesthesia used?: No       Wound Details:    Location:  Left foot    Location:  Left Plantar    Type of Debridement:  Non-excisional       Length (cm):  2.8       Area (sq cm):  7       Width (cm):  2.5       Percent Debrided (%):  75       Depth (cm):  0.2       Total Area Debrided (sq cm):  5.25    Depth of debridement:  Epidermis/Dermis    Devitalized tissue debrided:  Biofilm, Callus, Exudate and Fibrin    Instruments:  Forceps, Scissors and Curette (Dermal)  Bleeding:  None  Patient tolerance:  Patient tolerated the procedure well with no immediate complications     Excisional debridement performed:  [] Yes [] No   Selective debridement performed:  [x] Yes [] No  Mechanical debridement performed:  [] Yes [] No   Silver nitrate applied :    [] Yes [] No   Labs ordered this visit:   [] Yes [] No   Imaging ordered this visit:   [] Yes [] No   Tissue pathology and/or culture taken:  [] Yes [] No         HOME HEALTH AGENCY:   [facility]     Home Health Services     Since 4/17/2023     [phone]     TIMES PER WEEK/DAYS: 1 x weekly, Fridays only  Patient will come to wound care weekly    Left foot wound: Cleanse with wound cleanser, apply santyl (nickel thick) to wound, cover wound bed with mesalt, 4x4 gauze and wrap with kerlix and coban, change this dressing daily    Follow up in 1 week (on 8/16/2023) for left foot/ ultrasonic debrider.                     "

## 2023-08-09 NOTE — PROCEDURES
"Debridement    Date/Time: 8/9/2023 1:00 PM    Performed by: Barbra Perez NP  Authorized by: Barbra Perez NP    Time out: Immediately prior to procedure a "time out" was called to verify the correct patient, procedure, equipment, support staff and site/side marked as required.    Consent Done?:  Yes (Verbal)    Preparation: Patient was prepped and draped with clean technique    Local anesthesia used?: No      Wound Details:    Location:  Left foot    Location:  Left Plantar    Type of Debridement:  Non-excisional       Length (cm):  2.8       Area (sq cm):  7       Width (cm):  2.5       Percent Debrided (%):  75       Depth (cm):  0.2       Total Area Debrided (sq cm):  5.25    Depth of debridement:  Epidermis/Dermis    Devitalized tissue debrided:  Biofilm, Callus, Exudate and Fibrin    Instruments:  Forceps, Scissors and Curette (Dermal)  Bleeding:  None  Patient tolerance:  Patient tolerated the procedure well with no immediate complications    "

## 2023-08-23 ENCOUNTER — HOSPITAL ENCOUNTER (OUTPATIENT)
Dept: WOUND CARE | Facility: HOSPITAL | Age: 68
Discharge: HOME | End: 2023-08-23
Attending: NURSE PRACTITIONER
Payer: MEDICARE

## 2023-08-23 VITALS
HEART RATE: 58 BPM | RESPIRATION RATE: 20 BRPM | BODY MASS INDEX: 32.02 KG/M2 | DIASTOLIC BLOOD PRESSURE: 72 MMHG | SYSTOLIC BLOOD PRESSURE: 165 MMHG | HEIGHT: 67 IN | WEIGHT: 204 LBS

## 2023-08-23 DIAGNOSIS — M14.672 CHARCOT'S JOINT OF LEFT FOOT, NON-DIABETIC: ICD-10-CM

## 2023-08-23 DIAGNOSIS — Z51.89 ENCOUNTER FOR WOUND RE-CHECK: ICD-10-CM

## 2023-08-23 DIAGNOSIS — S91.302A OPEN WOUND OF PLANTAR ASPECT OF LEFT FOOT: ICD-10-CM

## 2023-08-23 DIAGNOSIS — E11.610 TYPE 2 DIABETES MELLITUS WITH CHARCOT'S JOINT OF LEFT FOOT: ICD-10-CM

## 2023-08-23 DIAGNOSIS — I87.8 CHRONIC VENOUS STASIS: ICD-10-CM

## 2023-08-23 DIAGNOSIS — L97.525 NON-PRESSURE CHRONIC ULCER OF OTHER PART OF LEFT FOOT WITH MUSCLE INVOLVEMENT WITHOUT EVIDENCE OF NECROSIS: Primary | ICD-10-CM

## 2023-08-23 PROCEDURE — 97597 DBRDMT OPN WND 1ST 20 CM/<: CPT

## 2023-08-23 PROCEDURE — 99212 OFFICE O/P EST SF 10 MIN: CPT | Mod: 25

## 2023-08-23 PROCEDURE — 27000999 HC MEDICAL RECORD PHOTO DOCUMENTATION

## 2023-08-23 NOTE — PROGRESS NOTES
Home Health: [facility]  Smoker  [x] Yes  [] No   Last A1C: 6.8 on 23  Last CB, fasting 23  Celia Tarango  [x] Yes [] No            Results: 0/5 left foot   Doppler done in office? [] Yes [x] No     Right dorsalis:     Right posterior:     Left dorsalis:     Left posterior:  Darco Shoe [x] Yes [] No   Date given: does not like to wear  Is patient eligible for HBO [] Yes [x] No  (not a salinas 3)   Is the patient eligible for a graft, and/or currently grafting?  [] Yes [x] No           Patient ID: Diane Larsen is a 68 y.o. female.    Chief Complaint: Diabetic Foot Ulcer (Left plantar foot - salinas 2)    Subjective:    HPI  23 (Dr. Lassiter) - 68-year-old white female, who was referred here by Dr. Hank Owen, for an open wound on the left plantar surface, left foot.  She is not a diabetic.  She was admitted into the [facility], for cellulitis and abscess of the plantar surface of the left foot, and she underwent an intraoperative debridement.  He is here for follow up of this new wound.  It appears that she has had some chronic venous stasis over the past several months, both lower legs.  This has not been treated in the past, as far as I know.  While she was in the hospital, she underwent an MRI of the left foot, which showed no evidence of osteomyelitis.  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  She has Pes planus.  She also underwent arterial ultrasound on , which showed patent arteries in both lower extremities.  The venous ultrasound was also negative for DVT.  I have reviewed her records from the hospital.  Pt caregiver states that a blister was noticed on her left foot about 2 weeks ago around 23. The pt opened the blister by scratching and by Monday 4/10/23 the caregiver noted that it needed immediate attention. Pt went to ED in [place] on Monday 4/10/23 and was admitted. Dr. Mckinney debrided the left foot on 23, she was discharged on 23.  She has a follow up with Dr. Mckinney tomorrow 4/19/23. She is currently taking Augmentin PO. She states that she is not diabetic. Her semmes yudi is 1/10 and her blood sugar was 124. She had venous/arterial ultrasounds done during her hospital stay as well as MRI and xrays    5/2/23 - Ms. Larsen returns to clinic today for followup on the open wound to the left mid foot.  Her history includes the left ankle being broken several months ago which left the left foot somewhat deformed.  The wound was surgically debrided by Dr. Hank Owen.  She stayed for 1 week at the hospital in [place], [place] and went home on oral Augmentin has now completed.  Her PCP is from [facility] and she reports that he has stated that they are not to use compression on the leg.  However, ultrasounds were done in April and they were clear.  The caregiver does have concern that she is unable to ambulate and would like a wheelchair obtained for the patient.  They were advised to go to [facility]  to have the paperwork sent to us for prescription.  However, [facility] called and their insurance does not cover the wheelchair.    Today, the left mid foot was assessed and selectively debrided.  Additionally, she does have several scattered wounds on her bilateral lower legs that will be monitored.  She was given Hibiclens and instructed to wash the lower extremities including the foot with a Hibiclens x3 days.    5/9/23 - the patient returns today for followup with her caregiver present.  In addition to the neuropathic wound on the plantar surface of the left foot she had small scattered wounds on her bilateral lower extremities.  She was instructed to cleanse her wounds on her legs with Hibiclens and she did so.  Today, the majority of those small open areas on the legs are now closed and they look significantly improved.  The wound on the left foot was selectively debrided and it does remains stable.  Granular tissue is surface level so  we will begin applying Endoform to the wound bed with the hopes of developing epithelial tissue.  Of note, the patient is still has not received her wheelchair.  That wheelchair was faxed to her DME company of choice and she and the caregiver were instructed that they will need to followup with that company.    5/16/23 - Ms. Larsen returns today for followup in wound check on the wound to the left plantar foot.  This neuropathic wound was selectively debrided today and does remains stable.  We will begin today applying Endoform with the hopes of developing epithelial tissue across the wound bed.  She has an appointment with Doreen Barnes NP, on 05/30/2023 to initiate services  as her PCP because she is not satisfied with her current provider.  She will return to clinic in 1 week for followup.    5/24/23 - the patient returns today for followup on the neuropathic wound to the plantar surface of the left foot.  We began using Endoform at the last visit, however, the patient in her caregiver misunderstood the instructions in removed that product prematurely.  Today, the wound was selectively debrided and we did again place Endoform on the wound to be left in place until Friday.  The wound does appear to be slowly improving.  We will be seeking authorization for grafting if possible.    5/30/23 - the patient returns today for the neuropathic wound to the left foot.  She does report that she fell sometime over the weekend.  She did fall backwards and injured her bottom.  She noted some bruises and pain, however, there are no open wounds.  She did not go to ER.  This morning, her caregiver reports that the patient was found soaked in urine and that urine had dripped down to the wound on the foot.  There does not appear to be any change in the wound.  Today, the patient fell asleep in both the lobby and in the chair while she was receiving care.  She will be seeing a new provider today, Doreen Barnes NP.  We are  hopeful that there may be some medication changes done that will help her in becoming a little more alert.    Today, the wound was assessed and appears to be improving.  A selective debridement was performed and the hyper granulated tissue was chemically cauterized with silver nitrate.  We are still attempting to authorize a graft of some kind.  She does though need 30 days of continuous care so we can hopefully get one of the grafts authorized now.    7/18/23 - Ms. Larsen was last seen in the clinic on 5/30/23. She was admitted to [facility] on 5/31/23 - 6/6/23 with hypotension and low O2 sats. While there it was discovered she had C-Diff. Her caregiver states that she did several rounds of oral antibiotics but is no longer taking anything. She has been using mesalt and silver alginate to dress the wound since being home. She has been diagnosed as a diabetic since her last visit with us. Her A1C on 6/2/23 was 6.8.   Today her wound was assessed in has improved since her last visit.  It was selectively debrided.  There is a light coating of slough over the entire wound bed, therefore Santyl has been ordered for daily application.  Today, we applied mesalt and if she is not able to get Santyl with her insurance she will continue with daily use of mesalt.    Additionally, her caregiver requested that her toenails be trimmed.  Six toenails were debrided off today.    7/25/23 - the patient returns today for followup on a D FU to the left foot.  The wound was assessed and selectively debrided.  The hypergranulated tissue was chemically cauterized using silver nitrate.  The caregiver is still attempting to obtain Santyl that was ordered.  It was initially sent to [facility] in [place] who could not get the product.  The Rx was transferred to [facility] in [place]. She is waiting for this to go through so she can see if the medicine will be covered by the patient's insurance.   The would is improved, however, with the use of Mesalt  "only.  We will continue that product.  Will will also attempt to get her approved for grafts, preferably Apligraft or Theraskin.    8/9/23 - Ms. Larsen returns today for followup on the D FU to the left midfoot.  She reports that she went to ED on 7/30/23 c/o of being weak and that she got hot being outside in the sun. She was dehydrated and bradycardic. She was discharged on 8/2/23.   She saw Dr. Claros on 8/3/23 to have a halter monitor placed. She will wear this for 2 weeks, it will be d/c on 8/17/23 and she will go to [facility] in [place] on 8/28/23 for those results.   Today her wound was assessed and selectively debrided.  The periwound was quite macerated and she was reminded to be changing the dressings daily.  We are continuing to use Santyl on the wound bed.  There appears to be a thick layer of slough across the wound bed and the patient was reminded that she should be using at least a nickel thick layer of the product covered with mesalt.  When she returns in one week if there is still a layer of slough we will use the ultrasonic debrider.      8/23/23 - The patient returns today for followup on the mid foot diabetic foot ulcer.  She does have pronounced Charcot foot and this disfigured station of the foot causes her multiple problems.  Today, she presents with the wound bed itself improving, however, she had a very thick area of callus surrounding the entire periwound.  The whole area was selectively debrided with a dermal curette.    We will continue to apply Santyl and me salt to this wound.      Review of Systems   Constitutional: Negative.    Respiratory: Negative.     Cardiovascular: Negative.    Skin:         As documented in the HPI.   All other systems reviewed and are negative.        Objective:      Vitals:    08/23/23 1336   BP: (!) 165/72   BP Location: Left arm   Patient Position: Sitting   Pulse: (!) 58   Resp: 20   Weight: 92.5 kg (204 lb)   Height: 5' 7" (1.702 m)      Physical Exam  Vitals " reviewed.   Constitutional:       Appearance: Normal appearance.   Cardiovascular:      Rate and Rhythm: Normal rate.   Pulmonary:      Effort: Pulmonary effort is normal.   Skin:     General: Skin is warm and dry.      Comments: DFU left foot   Neurological:      Mental Status: She is alert.            Altered Skin Integrity 04/18/23 1351 Left medial Foot (Active)   04/18/23 1351   Altered Skin Integrity Present on Admission - Did Patient arrive to the hospital with altered skin?: yes   Side: Left   Orientation: medial   Location: Foot   Wound Number:    Is this injury device related?:    Primary Wound Type:    Description of Altered Skin Integrity:    Ankle-Brachial Index:    Pulses:    Removal Indication and Assessment:    Wound Outcome:    (Retired) Wound Length (cm):    (Retired) Wound Width (cm):    (Retired) Depth (cm):    Wound Description (Comments):    Removal Indications:    Dressing Appearance Moist drainage 08/23/23 1340   Drainage Amount Moderate 08/23/23 1340   Drainage Characteristics/Odor Serosanguineous 08/23/23 1340   Appearance Pink;Moist 08/23/23 1340   Tissue loss description Full thickness 08/23/23 1340   Periwound Area Intact 08/23/23 1340   Wound Edges Open;Callused 08/23/23 1340   Kovacs Classification (diabetic foot ulcers only) Grade 2 08/23/23 1340   Wound Length (cm) 2.5 cm 08/23/23 1340   Wound Width (cm) 2.5 cm 08/23/23 1340   Wound Depth (cm) 0.2 cm 08/23/23 1340   Wound Volume (cm^3) 1.25 cm^3 08/23/23 1340   Wound Surface Area (cm^2) 6.25 cm^2 08/23/23 1340   Care Cleansed with:;Wound cleanser 08/23/23 1340   Dressing Applied 08/23/23 1340   Dressing Change Due 08/24/23 08/23/23 1340       08/23/23      Left medial foot                                            Left medial foot - post carmen.   (Mesalt, 4x4 gauze, kerlix, coban)         Assessment:         ICD-10-CM ICD-9-CM   1. Non-pressure chronic ulcer of other part of left foot with muscle involvement without evidence of  "necrosis  L97.525 707.15   2. Charcot's joint of left foot, non-diabetic  M14.672 094.0     713.5   3. Open wound of plantar aspect of left foot  S91.302A 892.0   4. Type 2 diabetes mellitus with Charcot's joint of left foot  E11.610 250.60     713.5   5. Encounter for wound re-check  Z51.89 V58.89   6. Chronic venous stasis  I87.8 459.81           Procedures:     Debridement     Date/Time: 8/23/2023 1:20 PM     Performed by: Barbra Perez NP  Authorized by: Barbra Perez NP    Time out: Immediately prior to procedure a "time out" was called to verify the correct patient, procedure, equipment, support staff and site/side marked as required.     Consent Done?:  Yes (Verbal)  Local anesthesia used?: No       Wound Details:    Location:  Left foot    Location:  Left Plantar    Type of Debridement:  Non-excisional       Length (cm):  2.5       Area (sq cm):  6.25       Width (cm):  2.5       Percent Debrided (%):  100       Depth (cm):  0.2       Total Area Debrided (sq cm):  6.25    Depth of debridement:  Epidermis/Dermis    Devitalized tissue debrided:  Biofilm, Callus, Exudate and Fibrin    Instruments:  Curette (Dermal)  Bleeding:  None  Patient tolerance:  Patient tolerated the procedure well with no immediate complications     Excisional debridement performed:  [] Yes [] No   Selective debridement performed:  [x] Yes [] No  Mechanical debridement performed:  [] Yes [] No   Silver nitrate applied :    [] Yes [] No   Labs ordered this visit:   [] Yes [] No   Imaging ordered this visit:   [] Yes [] No   Tissue pathology and/or culture taken:  [] Yes [] No       HOME HEALTH AGENCY:   [facility]     Home Health Services     Since 4/17/2023     [phone]     TIMES PER WEEK/DAYS: 1 x weekly, Fridays only  Patient will come to wound care weekly    Left foot wound: Cleanse with wound cleanser, apply santyl (nickel thick) to wound, cover wound bed with mesalt, 4x4 gauze and wrap with kerlix " and coban, change this dressing daily    Follow up in about 1 week (around 8/30/2023) for Let midfoot - DFU .

## 2023-08-23 NOTE — PROCEDURES
"Debridement    Date/Time: 8/23/2023 1:20 PM    Performed by: Barbra Perez NP  Authorized by: Barbra Perez NP    Time out: Immediately prior to procedure a "time out" was called to verify the correct patient, procedure, equipment, support staff and site/side marked as required.    Consent Done?:  Yes (Verbal)  Local anesthesia used?: No      Wound Details:    Location:  Left foot    Location:  Left Plantar    Type of Debridement:  Non-excisional       Length (cm):  2.5       Area (sq cm):  6.25       Width (cm):  2.5       Percent Debrided (%):  100       Depth (cm):  0.2       Total Area Debrided (sq cm):  6.25    Depth of debridement:  Epidermis/Dermis    Devitalized tissue debrided:  Biofilm, Callus, Exudate and Fibrin    Instruments:  Curette (Dermal)  Bleeding:  None  Patient tolerance:  Patient tolerated the procedure well with no immediate complications    "

## 2023-08-30 ENCOUNTER — HOSPITAL ENCOUNTER (OUTPATIENT)
Dept: WOUND CARE | Facility: HOSPITAL | Age: 68
Discharge: HOME | End: 2023-08-30
Attending: NURSE PRACTITIONER
Payer: MEDICARE

## 2023-08-30 VITALS
BODY MASS INDEX: 32.02 KG/M2 | HEIGHT: 67 IN | SYSTOLIC BLOOD PRESSURE: 155 MMHG | WEIGHT: 204 LBS | DIASTOLIC BLOOD PRESSURE: 71 MMHG | HEART RATE: 56 BPM | RESPIRATION RATE: 16 BRPM

## 2023-08-30 DIAGNOSIS — Z51.89 ENCOUNTER FOR WOUND RE-CHECK: ICD-10-CM

## 2023-08-30 DIAGNOSIS — S91.302A OPEN WOUND OF PLANTAR ASPECT OF LEFT FOOT: ICD-10-CM

## 2023-08-30 DIAGNOSIS — E11.610 TYPE 2 DIABETES MELLITUS WITH CHARCOT'S JOINT OF LEFT FOOT: ICD-10-CM

## 2023-08-30 DIAGNOSIS — L97.525 NON-PRESSURE CHRONIC ULCER OF OTHER PART OF LEFT FOOT WITH MUSCLE INVOLVEMENT WITHOUT EVIDENCE OF NECROSIS: Primary | ICD-10-CM

## 2023-08-30 DIAGNOSIS — M14.672 CHARCOT'S JOINT OF LEFT FOOT, NON-DIABETIC: ICD-10-CM

## 2023-08-30 PROCEDURE — 99212 OFFICE O/P EST SF 10 MIN: CPT | Mod: 25

## 2023-08-30 PROCEDURE — 97597 DBRDMT OPN WND 1ST 20 CM/<: CPT

## 2023-08-30 PROCEDURE — 27000999 HC MEDICAL RECORD PHOTO DOCUMENTATION

## 2023-08-30 NOTE — PROGRESS NOTES
Home Health: [facility]  Smoker  [x] Yes  [] No   Last A1C: 6.8 on 23  Last CB, fasting 23  Celia Tarango  [x] Yes [] No            Results: 0/5 left foot   Doppler done in office? [] Yes [x] No     Right dorsalis:     Right posterior:     Left dorsalis:     Left posterior:  Darco Shoe [x] Yes [] No   Date given: does not like to wear  Is patient eligible for HBO [] Yes [x] No  (not a [place] 3)   Is the patient eligible for a graft, and/or currently grafting?  [] Yes [] No   (Awaiting authorization )        Patient ID: Diane Larsen is a 68 y.o. female.    Chief Complaint: Diabetic Foot Ulcer (Left medial foot - sal[place] 2)    Subjective:    HPI  23 (Dr. Lassiter) - 68-year-old white female, who was referred here by Dr. Hank Owen, for an open wound on the left plantar surface, left foot.  She is not a diabetic.  She was admitted into the [facility], for cellulitis and abscess of the plantar surface of the left foot, and she underwent an intraoperative debridement.  He is here for follow up of this new wound.  It appears that she has had some chronic venous stasis over the past several months, both lower legs.  This has not been treated in the past, as far as I know.  While she was in the hospital, she underwent an MRI of the left foot, which showed no evidence of osteomyelitis.  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  She has Pes planus.  She also underwent arterial ultrasound on , which showed patent arteries in both lower extremities.  The venous ultrasound was also negative for DVT.  I have reviewed her records from the hospital.  Pt caregiver states that a blister was noticed on her left foot about 2 weeks ago around 23. The pt opened the blister by scratching and by Monday 4/10/23 the caregiver noted that it needed immediate attention. Pt went to ED in [place] on Monday 4/10/23 and was admitted. Dr. Mckinney debrided the left foot on 23, she was  discharged on 4/14/23. She has a follow up with Dr. Mckinney tomorrow 4/19/23. She is currently taking Augmentin PO. She states that she is not diabetic. Her semmes yudi is 1/10 and her blood sugar was 124. She had venous/arterial ultrasounds done during her hospital stay as well as MRI and xrays    5/2/23 - Ms. Larsen returns to clinic today for followup on the open wound to the left mid foot.  Her history includes the left ankle being broken several months ago which left the left foot somewhat deformed.  The wound was surgically debrided by Dr. Hank Owen.  She stayed for 1 week at the hospital in [place], [place] and went home on oral Augmentin has now completed.  Her PCP is from [facility] and she reports that he has stated that they are not to use compression on the leg.  However, ultrasounds were done in April and they were clear.  The caregiver does have concern that she is unable to ambulate and would like a wheelchair obtained for the patient.  They were advised to go to [employer]  to have the paperwork sent to us for prescription.  However, [facility] called and their insurance does not cover the wheelchair.    Today, the left mid foot was assessed and selectively debrided.  Additionally, she does have several scattered wounds on her bilateral lower legs that will be monitored.  She was given Hibiclens and instructed to wash the lower extremities including the foot with a Hibiclens x3 days.    5/9/23 - the patient returns today for followup with her caregiver present.  In addition to the neuropathic wound on the plantar surface of the left foot she had small scattered wounds on her bilateral lower extremities.  She was instructed to cleanse her wounds on her legs with Hibiclens and she did so.  Today, the majority of those small open areas on the legs are now closed and they look significantly improved.  The wound on the left foot was selectively debrided and it does remains stable.  Granular  tissue is surface level so we will begin applying Endoform to the wound bed with the hopes of developing epithelial tissue.  Of note, the patient is still has not received her wheelchair.  That wheelchair was faxed to her DME company of choice and she and the caregiver were instructed that they will need to followup with that company.    5/16/23 - Ms. Larsen returns today for followup in wound check on the wound to the left plantar foot.  This neuropathic wound was selectively debrided today and does remains stable.  We will begin today applying Endoform with the hopes of developing epithelial tissue across the wound bed.  She has an appointment with Doreen Barnes NP, on 05/30/2023 to initiate services  as her PCP because she is not satisfied with her current provider.  She will return to clinic in 1 week for followup.    5/24/23 - the patient returns today for followup on the neuropathic wound to the plantar surface of the left foot.  We began using Endoform at the last visit, however, the patient in her caregiver misunderstood the instructions in removed that product prematurely.  Today, the wound was selectively debrided and we did again place Endoform on the wound to be left in place until Friday.  The wound does appear to be slowly improving.  We will be seeking authorization for grafting if possible.    5/30/23 - the patient returns today for the neuropathic wound to the left foot.  She does report that she fell sometime over the weekend.  She did fall backwards and injured her bottom.  She noted some bruises and pain, however, there are no open wounds.  She did not go to ER.  This morning, her caregiver reports that the patient was found soaked in urine and that urine had dripped down to the wound on the foot.  There does not appear to be any change in the wound.  Today, the patient fell asleep in both the lobby and in the chair while she was receiving care.  She will be seeing a new provider today,  Doreen Barnes, JOLENE.  We are hopeful that there may be some medication changes done that will help her in becoming a little more alert.    Today, the wound was assessed and appears to be improving.  A selective debridement was performed and the hyper granulated tissue was chemically cauterized with silver nitrate.  We are still attempting to authorize a graft of some kind.  She does though need 30 days of continuous care so we can hopefully get one of the grafts authorized now.    7/18/23 - Ms. Larsen was last seen in the clinic on 5/30/23. She was admitted to [facility] on 5/31/23 - 6/6/23 with hypotension and low O2 sats. While there it was discovered she had C-Diff. Her caregiver states that she did several rounds of oral antibiotics but is no longer taking anything. She has been using mesalt and silver alginate to dress the wound since being home. She has been diagnosed as a diabetic since her last visit with us. Her A1C on 6/2/23 was 6.8.   Today her wound was assessed in has improved since her last visit.  It was selectively debrided.  There is a light coating of slough over the entire wound bed, therefore Santyl has been ordered for daily application.  Today, we applied mesalt and if she is not able to get Santyl with her insurance she will continue with daily use of mesalt.    Additionally, her caregiver requested that her toenails be trimmed.  Six toenails were debrided off today.    7/25/23 - the patient returns today for followup on a D FU to the left foot.  The wound was assessed and selectively debrided.  The hypergranulated tissue was chemically cauterized using silver nitrate.  The caregiver is still attempting to obtain Santyl that was ordered.  It was initially sent to [facility] in [place] who could not get the product.  The Rx was transferred to [employer] [facility] in [place]. She is waiting for this to go through so she can see if the medicine will be covered by the patient's insurance.   The would is improved,  however, with the use of Mesalt only.  We will continue that product.  Will will also attempt to get her approved for grafts, preferably Apligraft or Theraskin.    8/9/23 - Ms. Larsen returns today for followup on the D FU to the left midfoot.  She reports that she went to ED on 7/30/23 c/o of being weak and that she got hot being outside in the sun. She was dehydrated and bradycardic. She was discharged on 8/2/23.   She saw Dr. Claros on 8/3/23 to have a halter monitor placed. She will wear this for 2 weeks, it will be d/c on 8/17/23 and she will go to [facility] in [place] on 8/28/23 for those results.   Today her wound was assessed and selectively debrided.  The periwound was quite macerated and she was reminded to be changing the dressings daily.  We are continuing to use Santyl on the wound bed.  There appears to be a thick layer of slough across the wound bed and the patient was reminded that she should be using at least a nickel thick layer of the product covered with mesalt.  When she returns in one week if there is still a layer of slough we will use the ultrasonic debrider.      8/23/23 - The patient returns today for followup on the mid foot diabetic foot ulcer.  She does have pronounced Charcot foot and this disfigured station of the foot causes her multiple problems.  Today, she presents with the wound bed itself improving, however, she had a very thick area of callus surrounding the entire periwound.  The whole area was selectively debrided with a dermal curette.    We will continue to apply Santyl and me salt to this wound.    8/30/23 - Ms. Larsen is seen today for the D FU to her mid foot.  Today again there was a very thick area of callus surrounding the wound.  The wound bed itself looks stable but was in need of a selective debridement.  We discussed the possibility of authorize in grafts, however, she has a 3000 dollar detectable that has not been met.  We also discussed the possibility hyperbarics and she  "is considering that option.      Review of Systems   Constitutional: Negative.    Respiratory: Negative.     Cardiovascular: Negative.    Skin:         As documented in the HPI.   All other systems reviewed and are negative.        Objective:      Vitals:    08/30/23 1323   BP: (!) 155/71   Pulse: (!) 56   Resp: 16   Weight: 92.5 kg (204 lb)   Height: 5' 7" (1.702 m)      Physical Exam  Vitals reviewed.   Constitutional:       Appearance: Normal appearance.   Cardiovascular:      Rate and Rhythm: Normal rate.   Pulmonary:      Effort: Pulmonary effort is normal.   Skin:     General: Skin is warm and dry.      Comments: DFU left foot   Neurological:      Mental Status: She is alert.            Altered Skin Integrity 04/18/23 1351 Left medial Foot (Active)   04/18/23 1351   Altered Skin Integrity Present on Admission - Did Patient arrive to the hospital with altered skin?: yes   Side: Left   Orientation: medial   Location: Foot   Wound Number:    Is this injury device related?:    Primary Wound Type:    Description of Altered Skin Integrity:    Ankle-Brachial Index:    Pulses:    Removal Indication and Assessment:    Wound Outcome:    (Retired) Wound Length (cm):    (Retired) Wound Width (cm):    (Retired) Depth (cm):    Wound Description (Comments):    Removal Indications:    Description of Altered Skin Integrity Full thickness tissue loss. Subcutaneous fat may be visible but bone, tendon or muscle are not exposed 08/30/23 1324   Dressing Appearance Intact;Moist drainage 08/30/23 1324   Drainage Amount Moderate 08/30/23 1324   Drainage Characteristics/Odor Serosanguineous 08/30/23 1324   Appearance Slough;Moist;Intact 08/30/23 1324   Tissue loss description Full thickness 08/30/23 1324   Periwound Area Intact;Dry 08/30/23 1324   Wound Edges Callused;Defined 08/30/23 1324   Kovacs Classification (diabetic foot ulcers only) Grade 2 08/30/23 1324   Wound Length (cm) 2.5 cm 08/30/23 1324   Wound Width (cm) 2.4 cm " "08/30/23 1324   Wound Depth (cm) 0.2 cm 08/30/23 1324   Wound Volume (cm^3) 1.2 cm^3 08/30/23 1324   Wound Surface Area (cm^2) 6 cm^2 08/30/23 1324   Care Cleansed with:;Debrided 08/30/23 1324   Dressing Applied 08/30/23 1324   Dressing Change Due 08/31/23 08/30/23 1324            8/30/23  Left medial foot- Pre  (Mesalt, 4x4, kerlix, coban)                          Post    TR      Assessment:         ICD-10-CM ICD-9-CM   1. 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 Charcot's joint of left foot, non-diabetic  M14.672 094.0     713.5   3. Open wound of plantar aspect of left foot  S91.302A 892.0   4. Type 2 diabetes mellitus with Charcot's joint of left foot  E11.610 250.60     713.5   5. Encounter for wound re-check  Z51.89 V58.89             Procedures:     Debridement     Date/Time: 8/30/2023 1:00 PM     Performed by: Barbra Perez NP  Authorized by: Barbra Perez NP    Time out: Immediately prior to procedure a "time out" was called to verify the correct patient, procedure, equipment, support staff and site/side marked as required.     Consent Done?:  Yes (Verbal)     Preparation: Patient was prepped and draped with clean technique    Local anesthesia used?: No       Wound Details:    Location:  Left foot    Location:  Left Midfoot    Type of Debridement:  Non-excisional       Length (cm):  2.5       Area (sq cm):  6       Width (cm):  2.4       Percent Debrided (%):  100       Depth (cm):  0.2       Total Area Debrided (sq cm):  6    Depth of debridement:  Epidermis/Dermis    Devitalized tissue debrided:  Biofilm, Callus, Exudate and Slough    Instruments:  Curette (Dermal)  Bleeding:  Minimal  Method Used:  Silver Nitrate      Silver nitrate applied in treatment of hypergranulated tissue and bleeding.    Excisional debridement performed:  [] Yes [] No   Selective debridement performed:  [x] Yes [] No   Mechanical " debridement performed:  [] Yes [] No   Silver nitrate applied :    [x] Yes [] No   Labs ordered this visit:   [] Yes [] No   Imaging ordered this visit:   [] Yes [] No   Tissue pathology and/or culture taken:  [] Yes [] No       HOME HEALTH AGENCY:   [facility]     Since 4/17/2023     [phone]     TIMES PER WEEK/DAYS: 1 x weekly, Fridays only  Patient will come to wound care weekly    Left foot wound: Cleanse with wound cleanser, apply santyl (nickel thick) to wound, cover wound bed with mesalt, 4x4 gauze and wrap with kerlix and coban, change this dressing daily    Follow up in about 1 week (around 9/6/2023) for left foot.

## 2023-08-30 NOTE — PROCEDURES
"Debridement    Date/Time: 8/30/2023 1:00 PM    Performed by: Barbra Perez NP  Authorized by: Barbra Perez NP    Time out: Immediately prior to procedure a "time out" was called to verify the correct patient, procedure, equipment, support staff and site/side marked as required.    Consent Done?:  Yes (Verbal)    Preparation: Patient was prepped and draped with clean technique    Local anesthesia used?: No      Wound Details:    Location:  Left foot    Location:  Left Midfoot    Type of Debridement:  Non-excisional       Length (cm):  2.5       Area (sq cm):  6       Width (cm):  2.4       Percent Debrided (%):  100       Depth (cm):  0.2       Total Area Debrided (sq cm):  6    Depth of debridement:  Epidermis/Dermis    Devitalized tissue debrided:  Biofilm, Callus, Exudate and Slough    Instruments:  Curette (Dermal)  Bleeding:  Minimal  Method Used:  Silver Nitrate     Silver nitrate applied in treatment of hypergranulated tissue and bleeding.    "

## 2023-09-06 ENCOUNTER — HOSPITAL ENCOUNTER (OUTPATIENT)
Dept: WOUND CARE | Facility: HOSPITAL | Age: 68
Discharge: HOME | End: 2023-09-06
Attending: NURSE PRACTITIONER
Payer: MEDICARE

## 2023-09-06 VITALS
BODY MASS INDEX: 32.02 KG/M2 | WEIGHT: 204 LBS | DIASTOLIC BLOOD PRESSURE: 73 MMHG | SYSTOLIC BLOOD PRESSURE: 164 MMHG | HEIGHT: 67 IN | HEART RATE: 49 BPM | RESPIRATION RATE: 18 BRPM

## 2023-09-06 DIAGNOSIS — E11.610 TYPE 2 DIABETES MELLITUS WITH CHARCOT'S JOINT OF LEFT FOOT: ICD-10-CM

## 2023-09-06 DIAGNOSIS — M14.672 CHARCOT'S JOINT OF LEFT FOOT, NON-DIABETIC: ICD-10-CM

## 2023-09-06 DIAGNOSIS — S91.302A OPEN WOUND OF PLANTAR ASPECT OF LEFT FOOT: ICD-10-CM

## 2023-09-06 DIAGNOSIS — L97.525 NON-PRESSURE CHRONIC ULCER OF OTHER PART OF LEFT FOOT WITH MUSCLE INVOLVEMENT WITHOUT EVIDENCE OF NECROSIS: Primary | ICD-10-CM

## 2023-09-06 PROCEDURE — 27000999 HC MEDICAL RECORD PHOTO DOCUMENTATION

## 2023-09-06 PROCEDURE — 99212 OFFICE O/P EST SF 10 MIN: CPT | Mod: 25

## 2023-09-06 PROCEDURE — 97597 DBRDMT OPN WND 1ST 20 CM/<: CPT

## 2023-09-06 NOTE — PROGRESS NOTES
Home Health: [facility]  Smoker  [x] Yes  [] No   Last A1C: 6.8 on 6/2/23  Last CBG: Did not check today 09/06/23  Celia Tarango  [x] Yes [] No            Results: 0/5 left foot   Doppler done in office? [] Yes [x] No  Darco Shoe [x] Yes [] No   Date given: does not like to wear  Is patient eligible for HBO [] Yes [x] No  (not a [place] 3)   Is the patient eligible for a graft, and/or currently grafting?  [x] Yes [] No (deductible to high for the patient though)         Patient ID: Diane Larsen is a 68 y.o. female.    Chief Complaint: Diabetic Foot Ulcer (Left medial foot - [place] 2)    Subjective:    HPI  4/18/23 (Dr. Lassiter) - 68-year-old white female, who was referred here by Dr. Hank Owen, for an open wound on the left plantar surface, left foot.  She is not a diabetic.  She was admitted into the [facility], for cellulitis and abscess of the plantar surface of the left foot, and she underwent an intraoperative debridement.  He is here for follow up of this new wound.  It appears that she has had some chronic venous stasis over the past several months, both lower legs.  This has not been treated in the past, as far as I know.  While she was in the hospital, she underwent an MRI of the left foot, which showed no evidence of osteomyelitis.  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  She has Pes planus.  She also underwent arterial ultrasound on April 10th, which showed patent arteries in both lower extremities.  The venous ultrasound was also negative for DVT.  I have reviewed her records from the hospital.  Pt caregiver states that a blister was noticed on her left foot about 2 weeks ago around 4/4/23. The pt opened the blister by scratching and by Monday 4/10/23 the caregiver noted that it needed immediate attention. Pt went to ED in [place] on Monday 4/10/23 and was admitted. Dr. Mckinney debrided the left foot on 4/11/23, she was discharged on 4/14/23. She has a follow up with Dr. Mckinney  tomorrow 4/19/23. She is currently taking Augmentin PO. She states that she is not diabetic. Her semmes yudi is 1/10 and her blood sugar was 124. She had venous/arterial ultrasounds done during her hospital stay as well as MRI and xrays    5/2/23 - Ms. Larsen returns to clinic today for followup on the open wound to the left mid foot.  Her history includes the left ankle being broken several months ago which left the left foot somewhat deformed.  The wound was surgically debrided by Dr. Hank Owen.  She stayed for 1 week at the hospital in [place], [place] and went home on oral Augmentin has now completed.  Her PCP is from [facility] and she reports that he has stated that they are not to use compression on the leg.  However, ultrasounds were done in April and they were clear.  The caregiver does have concern that she is unable to ambulate and would like a wheelchair obtained for the patient.  They were advised to go to [facility]  to have the paperwork sent to us for prescription.  However, [facility] called and their insurance does not cover the wheelchair.    Today, the left mid foot was assessed and selectively debrided.  Additionally, she does have several scattered wounds on her bilateral lower legs that will be monitored.  She was given Hibiclens and instructed to wash the lower extremities including the foot with a Hibiclens x3 days.    5/9/23 - the patient returns today for followup with her caregiver present.  In addition to the neuropathic wound on the plantar surface of the left foot she had small scattered wounds on her bilateral lower extremities.  She was instructed to cleanse her wounds on her legs with Hibiclens and she did so.  Today, the majority of those small open areas on the legs are now closed and they look significantly improved.  The wound on the left foot was selectively debrided and it does remains stable.  Granular tissue is surface level so we will begin applying Endoform to  the wound bed with the hopes of developing epithelial tissue.  Of note, the patient is still has not received her wheelchair.  That wheelchair was faxed to her DME company of choice and she and the caregiver were instructed that they will need to followup with that company.    5/16/23 - Ms. Larsen returns today for followup in wound check on the wound to the left plantar foot.  This neuropathic wound was selectively debrided today and does remains stable.  We will begin today applying Endoform with the hopes of developing epithelial tissue across the wound bed.  She has an appointment with Doreen Barnes NP, on 05/30/2023 to initiate services  as her PCP because she is not satisfied with her current provider.  She will return to clinic in 1 week for followup.    5/24/23 - the patient returns today for followup on the neuropathic wound to the plantar surface of the left foot.  We began using Endoform at the last visit, however, the patient in her caregiver misunderstood the instructions in removed that product prematurely.  Today, the wound was selectively debrided and we did again place Endoform on the wound to be left in place until Friday.  The wound does appear to be slowly improving.  We will be seeking authorization for grafting if possible.    5/30/23 - the patient returns today for the neuropathic wound to the left foot.  She does report that she fell sometime over the weekend.  She did fall backwards and injured her bottom.  She noted some bruises and pain, however, there are no open wounds.  She did not go to ER.  This morning, her caregiver reports that the patient was found soaked in urine and that urine had dripped down to the wound on the foot.  There does not appear to be any change in the wound.  Today, the patient fell asleep in both the lobby and in the chair while she was receiving care.  She will be seeing a new provider today, Doreen Barnes NP.  We are hopeful that there may be some  medication changes done that will help her in becoming a little more alert.    Today, the wound was assessed and appears to be improving.  A selective debridement was performed and the hyper granulated tissue was chemically cauterized with silver nitrate.  We are still attempting to authorize a graft of some kind.  She does though need 30 days of continuous care so we can hopefully get one of the grafts authorized now.    7/18/23 - Ms. Larsen was last seen in the clinic on 5/30/23. She was admitted to [facility] on 5/31/23 - 6/6/23 with hypotension and low O2 sats. While there it was discovered she had C-Diff. Her caregiver states that she did several rounds of oral antibiotics but is no longer taking anything. She has been using mesalt and silver alginate to dress the wound since being home. She has been diagnosed as a diabetic since her last visit with us. Her A1C on 6/2/23 was 6.8.   Today her wound was assessed in has improved since her last visit.  It was selectively debrided.  There is a light coating of slough over the entire wound bed, therefore Santyl has been ordered for daily application.  Today, we applied mesalt and if she is not able to get Santyl with her insurance she will continue with daily use of mesalt.    Additionally, her caregiver requested that her toenails be trimmed.  Six toenails were debrided off today.    7/25/23 - the patient returns today for followup on a D FU to the left foot.  The wound was assessed and selectively debrided.  The hypergranulated tissue was chemically cauterized using silver nitrate.  The caregiver is still attempting to obtain Santyl that was ordered.  It was initially sent to [facility] in [place] who could not get the product.  The Rx was transferred to [facility] [facility] [facility] in [place]. She is waiting for this to go through so she can see if the medicine will be covered by the patient's insurance.   The would is improved, however, with the use of Mesalt only.  We will continue that  product.  Will will also attempt to get her approved for grafts, preferably Apligraft or Theraskin.    8/9/23 - Ms. Larsen returns today for followup on the D FU to the left midfoot.  She reports that she went to ED on 7/30/23 c/o of being weak and that she got hot being outside in the sun. She was dehydrated and bradycardic. She was discharged on 8/2/23.   She saw Dr. Claros on 8/3/23 to have a halter monitor placed. She will wear this for 2 weeks, it will be d/c on 8/17/23 and she will go to [facility] in [place] on 8/28/23 for those results.   Today her wound was assessed and selectively debrided.  The periwound was quite macerated and she was reminded to be changing the dressings daily.  We are continuing to use Santyl on the wound bed.  There appears to be a thick layer of slough across the wound bed and the patient was reminded that she should be using at least a nickel thick layer of the product covered with mesalt.  When she returns in one week if there is still a layer of slough we will use the ultrasonic debrider.      8/23/23 - The patient returns today for followup on the mid foot diabetic foot ulcer.  She does have pronounced Charcot foot and this disfigured station of the foot causes her multiple problems.  Today, she presents with the wound bed itself improving, however, she had a very thick area of callus surrounding the entire periwound.  The whole area was selectively debrided with a dermal curette.    We will continue to apply Santyl and me salt to this wound.    8/30/23 - Ms. Larsen is seen today for the D FU to her mid foot.  Today again there was a very thick area of callus surrounding the wound.  The wound bed itself looks stable but was in need of a selective debridement.  We discussed the possibility of authorize in grafts, however, she has a 3000 dollar detectable that has not been met.  We also discussed the possibility hyperbarics and she is considering that option.    9/6/23 - Ms. Larsen returns  "today with her caregiver.  The wound remains stable although there is again a very thick area of callus surrounding the wound margins.  The wound was selectively debrided.  There was an area of that margin that seemed to have pulled away from the epithelial tissue.  This had to be cleft off which subsequently enlarged the wound substantially.  The patient and I had a lengthy discussion regarding the importance of offloading even when she is in her home.  She does have some health little receive issues and does not fully comprehend the necessity of offloading.  She does have a Darco shoe with PEG assist that she refuses to wear.  We will consider discussion of a total contact cast at her next visit.      Review of Systems   Constitutional: Negative.    Respiratory: Negative.     Cardiovascular: Negative.    Skin:         As documented in the HPI.   All other systems reviewed and are negative.        Objective:      Vitals:    09/06/23 1347   BP: (!) 164/73   BP Location: Right arm   Patient Position: Sitting   Pulse: (!) 49   Resp: 18   Weight: 92.5 kg (204 lb)   Height: 5' 7" (1.702 m)      Physical Exam  Vitals reviewed.   Constitutional:       Appearance: Normal appearance.   Cardiovascular:      Rate and Rhythm: Normal rate.   Pulmonary:      Effort: Pulmonary effort is normal.   Skin:     General: Skin is warm and dry.      Comments: DFU left foot   Neurological:      Mental Status: She is alert.            Altered Skin Integrity 04/18/23 1351 Left medial Foot (Active)   04/18/23 1351   Altered Skin Integrity Present on Admission - Did Patient arrive to the hospital with altered skin?: yes   Side: Left   Orientation: medial   Location: Foot   Wound Number:    Is this injury device related?:    Primary Wound Type:    Description of Altered Skin Integrity:    Ankle-Brachial Index:    Pulses:    Removal Indication and Assessment:    Wound Outcome:    (Retired) Wound Length (cm):    (Retired) Wound Width (cm):  " "  (Retired) Depth (cm):    Wound Description (Comments):    Removal Indications:    Dressing Appearance Moist drainage 09/06/23 1348   Drainage Amount Moderate 09/06/23 1348   Drainage Characteristics/Odor Serosanguineous 09/06/23 1348   Appearance Pink;Moist 09/06/23 1348   Tissue loss description Full thickness 09/06/23 1348   Periwound Area Intact 09/06/23 1348   Wound Edges Open 09/06/23 1348   Kovacs Classification (diabetic foot ulcers only) Grade 2 09/06/23 1348   Wound Length (cm) 3.5 cm 09/06/23 1348   Wound Width (cm) 2.3 cm 09/06/23 1348   Wound Depth (cm) 0.2 cm 09/06/23 1348   Wound Volume (cm^3) 1.61 cm^3 09/06/23 1348   Wound Surface Area (cm^2) 8.05 cm^2 09/06/23 1348   Care Cleansed with:;Wound cleanser 09/06/23 1348   Dressing Applied 09/06/23 1348   Dressing Change Due 09/07/23 09/06/23 1348     09/06/23      Left medial foot - pre                                    Left medial foot - post carmen.   (Santyl, mesalt, 4x4 gauze, kerlix, coban)         Assessment:         ICD-10-CM ICD-9-CM   1. 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 Charcot's joint of left foot, non-diabetic  M14.672 094.0     713.5   3. Open wound of plantar aspect of left foot  S91.302A 892.0   4. Type 2 diabetes mellitus with Charcot's joint of left foot  E11.610 250.60     713.5           Procedures:       Debridement     Date/Time: 9/6/2023 1:20 PM     Performed by: Barbra Perez NP  Authorized by: Barbra Perez NP    Time out: Immediately prior to procedure a "time out" was called to verify the correct patient, procedure, equipment, support staff and site/side marked as required.     Consent Done?:  Yes (Verbal)     Preparation: Patient was prepped and draped with clean technique    Local anesthesia used?: No       Wound Details:    Location:  Left foot    Location:  Left Midfoot    Type of Debridement:  Non-excisional       Length " (cm):  3.5       Area (sq cm):  8.05       Width (cm):  2.3       Percent Debrided (%):  100       Depth (cm):  0.2       Total Area Debrided (sq cm):  8.05    Depth of debridement:  Epidermis/Dermis    Devitalized tissue debrided:  Biofilm, Callus, Exudate and Fibrin    Instruments:  Curette, Forceps and Scissors (Dermal)  Bleeding:  None  Patient tolerance:  Patient tolerated the procedure well with no immediate complications    Excisional debridement performed:  [] Yes [] No   Selective debridement performed:  [x] Yes [] No   Mechanical debridement performed:  [] Yes [] No   Silver nitrate applied :    [] Yes [] No   Labs ordered this visit:   [] Yes [] No   Imaging ordered this visit:   [] Yes [] No   Tissue pathology and/or culture taken:  [] Yes [] No       HOME HEALTH AGENCY:   [facility]     Since 4/17/2023     [phone]     TIMES PER WEEK/DAYS: 1 x weekly, Fridays only  Patient will come to wound care weekly    Left foot wound: Cleanse with wound cleanser, apply santyl (nickel thick) to wound, cover wound bed with mesalt, 4x4 gauze and wrap with kerlix and coban, change this dressing daily    Follow up in about 2 weeks (around 9/20/2023) for Left medial foot - DFU .

## 2023-09-06 NOTE — PROCEDURES
"Debridement    Date/Time: 9/6/2023 1:20 PM    Performed by: Barbra Perez NP  Authorized by: Barbra Perez NP    Time out: Immediately prior to procedure a "time out" was called to verify the correct patient, procedure, equipment, support staff and site/side marked as required.    Consent Done?:  Yes (Verbal)    Preparation: Patient was prepped and draped with clean technique    Local anesthesia used?: No      Wound Details:    Location:  Left foot    Location:  Left Midfoot    Type of Debridement:  Non-excisional       Length (cm):  3.5       Area (sq cm):  8.05       Width (cm):  2.3       Percent Debrided (%):  100       Depth (cm):  0.2       Total Area Debrided (sq cm):  8.05    Depth of debridement:  Epidermis/Dermis    Devitalized tissue debrided:  Biofilm, Callus, Exudate and Fibrin    Instruments:  Curette, Forceps and Scissors (Dermal)  Bleeding:  None  Patient tolerance:  Patient tolerated the procedure well with no immediate complications    "

## 2023-09-20 ENCOUNTER — HOSPITAL ENCOUNTER (OUTPATIENT)
Dept: WOUND CARE | Facility: HOSPITAL | Age: 68
Discharge: HOME | End: 2023-09-20
Attending: NURSE PRACTITIONER
Payer: MEDICARE

## 2023-09-20 ENCOUNTER — HOSPITAL ENCOUNTER (OUTPATIENT)
Dept: RADIOLOGY | Facility: HOSPITAL | Age: 68
Discharge: HOME | End: 2023-09-20
Attending: NURSE PRACTITIONER
Payer: MEDICARE

## 2023-09-20 VITALS
DIASTOLIC BLOOD PRESSURE: 88 MMHG | HEART RATE: 59 BPM | WEIGHT: 204 LBS | SYSTOLIC BLOOD PRESSURE: 174 MMHG | BODY MASS INDEX: 32.02 KG/M2 | HEIGHT: 67 IN | RESPIRATION RATE: 18 BRPM

## 2023-09-20 DIAGNOSIS — R60.9 EDEMA, UNSPECIFIED TYPE: Primary | ICD-10-CM

## 2023-09-20 DIAGNOSIS — I87.8 CHRONIC VENOUS STASIS: ICD-10-CM

## 2023-09-20 DIAGNOSIS — L97.525 NON-PRESSURE CHRONIC ULCER OF OTHER PART OF LEFT FOOT WITH MUSCLE INVOLVEMENT WITHOUT EVIDENCE OF NECROSIS: ICD-10-CM

## 2023-09-20 DIAGNOSIS — M14.672 CHARCOT'S JOINT OF LEFT FOOT, NON-DIABETIC: ICD-10-CM

## 2023-09-20 DIAGNOSIS — R60.9 EDEMA, UNSPECIFIED TYPE: ICD-10-CM

## 2023-09-20 DIAGNOSIS — S91.302A OPEN WOUND OF PLANTAR ASPECT OF LEFT FOOT: ICD-10-CM

## 2023-09-20 PROCEDURE — 99213 OFFICE O/P EST LOW 20 MIN: CPT

## 2023-09-20 PROCEDURE — 93971 EXTREMITY STUDY: CPT | Mod: TC,LT

## 2023-09-20 PROCEDURE — 97597 DBRDMT OPN WND 1ST 20 CM/<: CPT

## 2023-09-20 PROCEDURE — 27000999 HC MEDICAL RECORD PHOTO DOCUMENTATION

## 2023-09-20 RX ORDER — LANCETS 33 GAUGE
EACH MISCELLANEOUS
COMMUNITY
Start: 2023-09-15 | End: 2023-10-18

## 2023-09-20 RX ORDER — ALBUTEROL SULFATE 0.83 MG/ML
SOLUTION RESPIRATORY (INHALATION)
COMMUNITY
Start: 2023-09-05

## 2023-09-20 RX ORDER — LOSARTAN POTASSIUM 100 MG/1
100 TABLET ORAL
COMMUNITY
Start: 2023-09-05

## 2023-09-20 RX ORDER — BLOOD-GLUCOSE METER
EACH MISCELLANEOUS
COMMUNITY
Start: 2023-09-15 | End: 2023-10-18

## 2023-09-20 RX ORDER — CEPHALEXIN 500 MG/1
500 TABLET ORAL 4 TIMES DAILY
Qty: 40 TABLET | Refills: 0 | Status: SHIPPED | OUTPATIENT
Start: 2023-09-20 | End: 2023-09-27

## 2023-09-20 RX ORDER — NEBIVOLOL 5 MG/1
5 TABLET ORAL
COMMUNITY
Start: 2023-09-05

## 2023-09-20 NOTE — PROGRESS NOTES
Home Health: [facility]  Smoker  [x] Yes  [] No   Last A1C: 6.8 on 23  Last CB after breakfast 23  Celia Tarango  [x] Yes [] No            Results: 0/5 left foot   Doppler done in office? [x] Yes [] No on 23  Posterior tibial: monophasic  Dorsalis pedal: monophasic  Darco Shoe [x] Yes [] No   Date given: does not like to wear  Is patient eligible for HBO [] Yes [x] No  (not a [place] 3)   Is the patient eligible for a graft, and/or currently grafting?  [x] Yes [] No (deductible to high for the patient though)      Patient ID: Diane Larsen is a 68 y.o. female.    Chief Complaint: Diabetic Foot Ulcer (Left medial foot - sal[place] 2)    Subjective:    HPI  23 (Dr. Lassiter) - 68-year-old white female, who was referred here by Dr. Hank Owen, for an open wound on the left plantar surface, left foot.  She is not a diabetic.  She was admitted into the [facility], for cellulitis and abscess of the plantar surface of the left foot, and she underwent an intraoperative debridement.  He is here for follow up of this new wound.  It appears that she has had some chronic venous stasis over the past several months, both lower legs.  This has not been treated in the past, as far as I know.  While she was in the hospital, she underwent an MRI of the left foot, which showed no evidence of osteomyelitis.  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  She has Pes planus.  She also underwent arterial ultrasound on , which showed patent arteries in both lower extremities.  The venous ultrasound was also negative for DVT.  I have reviewed her records from the hospital.  Pt caregiver states that a blister was noticed on her left foot about 2 weeks ago around 23. The pt opened the blister by scratching and by Monday 4/10/23 the caregiver noted that it needed immediate attention. Pt went to ED in [place] on Monday 4/10/23 and was admitted. Dr. Mckinney debrided the left foot on 23,  she was discharged on 4/14/23. She has a follow up with Dr. Mckinney tomorrow 4/19/23. She is currently taking Augmentin PO. She states that she is not diabetic. Her semmes yudi is 1/10 and her blood sugar was 124. She had venous/arterial ultrasounds done during her hospital stay as well as MRI and xrays    5/2/23 - Ms. Larsen returns to clinic today for followup on the open wound to the left mid foot.  Her history includes the left ankle being broken several months ago which left the left foot somewhat deformed.  The wound was surgically debrided by Dr. Hank Owen.  She stayed for 1 week at the hospital in [place], [place] and went home on oral Augmentin has now completed.  Her PCP is from [facility] and she reports that he has stated that they are not to use compression on the leg.  However, ultrasounds were done in April and they were clear.  The caregiver does have concern that she is unable to ambulate and would like a wheelchair obtained for the patient.  They were advised to go to [place] [facility]  to have the paperwork sent to us for prescription.  However, [facility] called and their insurance does not cover the wheelchair.    Today, the left mid foot was assessed and selectively debrided.  Additionally, she does have several scattered wounds on her bilateral lower legs that will be monitored.  She was given Hibiclens and instructed to wash the lower extremities including the foot with a Hibiclens x3 days.    5/9/23 - the patient returns today for followup with her caregiver present.  In addition to the neuropathic wound on the plantar surface of the left foot she had small scattered wounds on her bilateral lower extremities.  She was instructed to cleanse her wounds on her legs with Hibiclens and she did so.  Today, the majority of those small open areas on the legs are now closed and they look significantly improved.  The wound on the left foot was selectively debrided and it does remains stable.   Granular tissue is surface level so we will begin applying Endoform to the wound bed with the hopes of developing epithelial tissue.  Of note, the patient is still has not received her wheelchair.  That wheelchair was faxed to her DME company of choice and she and the caregiver were instructed that they will need to followup with that company.    5/16/23 - Ms. Larsen returns today for followup in wound check on the wound to the left plantar foot.  This neuropathic wound was selectively debrided today and does remains stable.  We will begin today applying Endoform with the hopes of developing epithelial tissue across the wound bed.  She has an appointment with Doreen Barnes NP, on 05/30/2023 to initiate services  as her PCP because she is not satisfied with her current provider.  She will return to clinic in 1 week for followup.    5/24/23 - the patient returns today for followup on the neuropathic wound to the plantar surface of the left foot.  We began using Endoform at the last visit, however, the patient in her caregiver misunderstood the instructions in removed that product prematurely.  Today, the wound was selectively debrided and we did again place Endoform on the wound to be left in place until Friday.  The wound does appear to be slowly improving.  We will be seeking authorization for grafting if possible.    5/30/23 - the patient returns today for the neuropathic wound to the left foot.  She does report that she fell sometime over the weekend.  She did fall backwards and injured her bottom.  She noted some bruises and pain, however, there are no open wounds.  She did not go to ER.  This morning, her caregiver reports that the patient was found soaked in urine and that urine had dripped down to the wound on the foot.  There does not appear to be any change in the wound.  Today, the patient fell asleep in both the lobby and in the chair while she was receiving care.  She will be seeing a new provider  today, Doreen Barnes NP.  We are hopeful that there may be some medication changes done that will help her in becoming a little more alert.    Today, the wound was assessed and appears to be improving.  A selective debridement was performed and the hyper granulated tissue was chemically cauterized with silver nitrate.  We are still attempting to authorize a graft of some kind.  She does though need 30 days of continuous care so we can hopefully get one of the grafts authorized now.    7/18/23 - Ms. Larsen was last seen in the clinic on 5/30/23. She was admitted to [facility] on 5/31/23 - 6/6/23 with hypotension and low O2 sats. While there it was discovered she had C-Diff. Her caregiver states that she did several rounds of oral antibiotics but is no longer taking anything. She has been using mesalt and silver alginate to dress the wound since being home. She has been diagnosed as a diabetic since her last visit with us. Her A1C on 6/2/23 was 6.8.   Today her wound was assessed in has improved since her last visit.  It was selectively debrided.  There is a light coating of slough over the entire wound bed, therefore Santyl has been ordered for daily application.  Today, we applied mesalt and if she is not able to get Santyl with her insurance she will continue with daily use of mesalt.    Additionally, her caregiver requested that her toenails be trimmed.  Six toenails were debrided off today.    7/25/23 - the patient returns today for followup on a D FU to the left foot.  The wound was assessed and selectively debrided.  The hypergranulated tissue was chemically cauterized using silver nitrate.  The caregiver is still attempting to obtain Santyl that was ordered.  It was initially sent to [facility] in [place] who could not get the product.  The Rx was transferred to [facility] in [place]. She is waiting for this to go through so she can see if the medicine will be covered by the patient's insurance.   The would is  improved, however, with the use of Mesalt only.  We will continue that product.  Will will also attempt to get her approved for grafts, preferably Apligraft or Theraskin.    8/9/23 - Ms. Larsen returns today for followup on the D FU to the left midfoot.  She reports that she went to ED on 7/30/23 c/o of being weak and that she got hot being outside in the sun. She was dehydrated and bradycardic. She was discharged on 8/2/23.   She saw Dr. Claros on 8/3/23 to have a halter monitor placed. She will wear this for 2 weeks, it will be d/c on 8/17/23 and she will go to [facility] in [place] on 8/28/23 for those results.   Today her wound was assessed and selectively debrided.  The periwound was quite macerated and she was reminded to be changing the dressings daily.  We are continuing to use Santyl on the wound bed.  There appears to be a thick layer of slough across the wound bed and the patient was reminded that she should be using at least a nickel thick layer of the product covered with mesalt.  When she returns in one week if there is still a layer of slough we will use the ultrasonic debrider.      8/23/23 - The patient returns today for followup on the mid foot diabetic foot ulcer.  She does have pronounced Charcot foot and this disfigured station of the foot causes her multiple problems.  Today, she presents with the wound bed itself improving, however, she had a very thick area of callus surrounding the entire periwound.  The whole area was selectively debrided with a dermal curette.    We will continue to apply Santyl and me salt to this wound.    8/30/23 - Ms. Larsen is seen today for the D FU to her mid foot.  Today again there was a very thick area of callus surrounding the wound.  The wound bed itself looks stable but was in need of a selective debridement.  We discussed the possibility of authorize in grafts, however, she has a 3000 dollar detectable that has not been met.  We also discussed the possibility  hyperbarics and she is considering that option.    9/6/23 - Ms. Larsen returns today with her caregiver.  The wound remains stable although there is again a very thick area of callus surrounding the wound margins.  The wound was selectively debrided.  There was an area of that margin that seemed to have pulled away from the epithelial tissue.  This had to be cleft off which subsequently enlarged the wound substantially.  The patient and I had a lengthy discussion regarding the importance of offloading even when she is in her home.  She does have some health little receive issues and does not fully comprehend the necessity of offloading.  She does have a Darco shoe with PEG assist that she refuses to wear.  We will consider discussion of a total contact cast at her next visit.    9/20/23 - the patient returns today for followup on a D FU to the left mid foot.  Of concern, however, is the swelling and warmth of the left lower extremity.  Although she does not have specific complaints of pain, she is mentally incompetent and therefore her interpretation of her pain level can not be crusted or assumed.  She is being sent to [facility] in [place] to have an ultrasound done to rule out DVT.  Today the wound was selectively debrided.  Again, there was a large amount of callus built around the wound was.  The wound bed itself is clean and epithelial tissue is moving in word.  We will continue with daily application of me salt covered with a 4 x 4 and a gentle border dressing.  She no longer need Santyl at this time so we will hold that product.  Her caregiver was notified that if the ultrasound is negative for DVT that she is to  the prescription for Keflex which was sent to her local pharmacy.  Of note, her caregiver states that she has a follow up on 10/18/23 with Dr. Claros to get results from halter monitor.  They are instructed to call Dr. Claros's office to let them know about the left leg swelling frequently to see if  "they can get a sooner appt.      Review of Systems   Constitutional: Negative.    Respiratory: Negative.     Cardiovascular: Negative.    Skin:         As documented in the HPI.   All other systems reviewed and are negative.        Objective:      Vitals:    09/20/23 0951   BP: (!) 174/88   BP Location: Left arm   Patient Position: Sitting   Pulse: (!) 59   Resp: 18   Weight: 92.5 kg (204 lb)   Height: 5' 7" (1.702 m)      Physical Exam  Vitals reviewed.   Constitutional:       Appearance: Normal appearance.   Cardiovascular:      Rate and Rhythm: Normal rate.   Pulmonary:      Effort: Pulmonary effort is normal.   Skin:     General: Skin is warm and dry.      Comments: DFU left foot   Neurological:      Mental Status: She is alert.            Altered Skin Integrity 04/18/23 1351 Left medial Foot (Active)   04/18/23 1351   Altered Skin Integrity Present on Admission - Did Patient arrive to the hospital with altered skin?: yes   Side: Left   Orientation: medial   Location: Foot   Wound Number:    Is this injury device related?:    Primary Wound Type:    Description of Altered Skin Integrity:    Ankle-Brachial Index:    Pulses:    Removal Indication and Assessment:    Wound Outcome:    (Retired) Wound Length (cm):    (Retired) Wound Width (cm):    (Retired) Depth (cm):    Wound Description (Comments):    Removal Indications:    Dressing Appearance Moist drainage 09/20/23 0956   Drainage Amount Moderate 09/20/23 0956   Drainage Characteristics/Odor Serosanguineous 09/20/23 0956   Appearance Pink;Red;Moist 09/20/23 0956   Tissue loss description Full thickness 09/20/23 0956   Periwound Area Dry;Edematous;Redness;Warm 09/20/23 0956   Wound Edges Callused;Defined 09/20/23 0956   Kovacs Classification (diabetic foot ulcers only) Grade 2 09/20/23 0956   Wound Length (cm) 3 cm 09/20/23 0956   Wound Width (cm) 2 cm 09/20/23 0956   Wound Depth (cm) 0.2 cm 09/20/23 0956   Wound Volume (cm^3) 1.2 cm^3 09/20/23 0956   Wound " "Surface Area (cm^2) 6 cm^2 09/20/23 0956   Care Cleansed with:;Wound cleanser 09/20/23 0956   Dressing Applied 09/20/23 0956   Dressing Change Due 09/21/23 09/20/23 0956 9/20/23       Left medial foot (pre)                                            Left medial foot (post)                                                                             (Meslat, 4x4, kerlix, tape)           Left lower leg (edema)                                  Left lower leg (edema)        Assessment:         ICD-10-CM ICD-9-CM   1. Edema, unspecified type  R60.9 782.3           Procedures:     Debridement     Date/Time: 9/20/2023 9:44 AM     Performed by: Barbra Perez NP  Authorized by: Barbra Perez NP    Time out: Immediately prior to procedure a "time out" was called to verify the correct patient, procedure, equipment, support staff and site/side marked as required.     Consent Done?:  Yes (Verbal)     Preparation: Patient was prepped and draped with clean technique       Wound Details:    Location:  Left foot    Location:  Left Midfoot    Type of Debridement:  Non-excisional       Length (cm):  3       Area (sq cm):  6       Width (cm):  2       Percent Debrided (%):  100       Depth (cm):  0.2       Total Area Debrided (sq cm):  6    Depth of debridement:  Epidermis/Dermis    Devitalized tissue debrided:  Biofilm, Callus and Exudate    Instruments:  Curette (Dermal)  Bleeding:  Minimal  Method Used:  Silver Nitrate  Patient tolerance:  Patient tolerated the procedure well with no immediate complications    Excisional debridement performed:  [] Yes [] No   Selective debridement performed:  [x] Yes [] No   Mechanical debridement performed:  [] Yes [] No   Silver nitrate applied :    [x] Yes [] No   Labs ordered this visit:   [] Yes [] No   Imaging ordered this visit:   [] Yes [] No   Tissue pathology and/or culture taken:  [] Yes [] No       HOME HEALTH AGENCY:   [facility]     Home Health Services "     Since 4/17/2023     [phone]     TIMES PER WEEK/DAYS: 1 x weekly, Fridays only  Patient will come to wound care weekly    Left foot wound: Cleanse with wound cleanser, cover wound bed with mesalt, 4x4 gauze and wrap with kerlix and secure with tape, change this dressing daily    Follow up in 1 week (on 9/27/2023) for left foot.

## 2023-09-20 NOTE — PROCEDURES
"Debridement    Date/Time: 9/20/2023 9:44 AM    Performed by: Barbra Perez NP  Authorized by: Barbra Perez NP    Time out: Immediately prior to procedure a "time out" was called to verify the correct patient, procedure, equipment, support staff and site/side marked as required.    Consent Done?:  Yes (Verbal)    Preparation: Patient was prepped and draped with clean technique      Wound Details:    Location:  Left foot    Location:  Left Midfoot    Type of Debridement:  Non-excisional       Length (cm):  3       Area (sq cm):  6       Width (cm):  2       Percent Debrided (%):  100       Depth (cm):  0.2       Total Area Debrided (sq cm):  6    Depth of debridement:  Epidermis/Dermis    Devitalized tissue debrided:  Biofilm, Callus and Exudate    Instruments:  Curette (Dermal)  Bleeding:  Minimal  Method Used:  Silver Nitrate  Patient tolerance:  Patient tolerated the procedure well with no immediate complications    "

## 2023-09-27 ENCOUNTER — HOSPITAL ENCOUNTER (OUTPATIENT)
Dept: WOUND CARE | Facility: HOSPITAL | Age: 68
Discharge: HOME | End: 2023-09-27
Attending: NURSE PRACTITIONER
Payer: MEDICARE

## 2023-09-27 VITALS
BODY MASS INDEX: 32.02 KG/M2 | SYSTOLIC BLOOD PRESSURE: 134 MMHG | RESPIRATION RATE: 18 BRPM | WEIGHT: 204 LBS | HEIGHT: 67 IN | HEART RATE: 48 BPM | DIASTOLIC BLOOD PRESSURE: 60 MMHG

## 2023-09-27 DIAGNOSIS — S91.302A OPEN WOUND OF PLANTAR ASPECT OF LEFT FOOT: ICD-10-CM

## 2023-09-27 DIAGNOSIS — L97.525 NON-PRESSURE CHRONIC ULCER OF OTHER PART OF LEFT FOOT WITH MUSCLE INVOLVEMENT WITHOUT EVIDENCE OF NECROSIS: Primary | ICD-10-CM

## 2023-09-27 DIAGNOSIS — E11.610 TYPE 2 DIABETES MELLITUS WITH CHARCOT'S JOINT OF LEFT FOOT: ICD-10-CM

## 2023-09-27 DIAGNOSIS — I87.8 CHRONIC VENOUS STASIS: ICD-10-CM

## 2023-09-27 DIAGNOSIS — R60.9 EDEMA, UNSPECIFIED TYPE: ICD-10-CM

## 2023-09-27 DIAGNOSIS — M14.672 CHARCOT'S JOINT OF LEFT FOOT, NON-DIABETIC: ICD-10-CM

## 2023-09-27 PROCEDURE — 97597 DBRDMT OPN WND 1ST 20 CM/<: CPT

## 2023-09-27 PROCEDURE — 99213 OFFICE O/P EST LOW 20 MIN: CPT | Mod: 25

## 2023-09-27 PROCEDURE — 27000999 HC MEDICAL RECORD PHOTO DOCUMENTATION

## 2023-09-27 RX ORDER — CEPHALEXIN 500 MG/1
CAPSULE ORAL
COMMUNITY
Start: 2023-09-20 | End: 2023-10-25

## 2023-09-27 NOTE — PROGRESS NOTES
Home Health: [facility]  Smoker  [x] Yes  [] No   Last A1C: 6.8 on 23  Last CB after breakfast 23  Celia Tarango  [x] Yes [] No            Results: 0/5 left foot   Doppler done in office? [x] Yes [] No on 23  Posterior tibial: monophasic  Dorsalis pedal: monophasic  Darco Shoe [x] Yes [] No   Date given: does not like to wear  Is patient eligible for HBO [] Yes [x] No  (not a salinas 3)   Is the patient eligible for a graft, and/or currently grafting?  [x] Yes [] No (deductible to high for the patient though)   Right calf: 23.3 cm  Right ankle: 36.2  Left calf: 40 cm  Left ankle: 24 cm       Patient ID: Diane Larsen is a 68 y.o. female.    Chief Complaint: Diabetic Foot Ulcer (Left medial foot - salinas 2)    Subjective:    HPI  23 (Dr. Lassiter) - 68-year-old white female, who was referred here by Dr. Hank Owen, for an open wound on the left plantar surface, left foot.  She is not a diabetic.  She was admitted into the [facility], for cellulitis and abscess of the plantar surface of the left foot, and she underwent an intraoperative debridement.  He is here for follow up of this new wound.  It appears that she has had some chronic venous stasis over the past several months, both lower legs.  This has not been treated in the past, as far as I know.  While she was in the hospital, she underwent an MRI of the left foot, which showed no evidence of osteomyelitis.  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  She has Pes planus.  She also underwent arterial ultrasound on , which showed patent arteries in both lower extremities.  The venous ultrasound was also negative for DVT.  I have reviewed her records from the hospital.  Pt caregiver states that a blister was noticed on her left foot about 2 weeks ago around 23. The pt opened the blister by scratching and by Monday 4/10/23 the caregiver noted that it needed immediate attention. Pt went to ED in [place]  on Monday 4/10/23 and was admitted. Dr. Mckinney debrided the left foot on 4/11/23, she was discharged on 4/14/23. She has a follow up with Dr. Mckinney tomorrow 4/19/23. She is currently taking Augmentin PO. She states that she is not diabetic. Her semmes yudi is 1/10 and her blood sugar was 124. She had venous/arterial ultrasounds done during her hospital stay as well as MRI and xrays    5/2/23 - Ms. Larsen returns to clinic today for followup on the open wound to the left mid foot.  Her history includes the left ankle being broken several months ago which left the left foot somewhat deformed.  The wound was surgically debrided by Dr. Hank Owen.  She stayed for 1 week at the hospital in [place], [place] and went home on oral Augmentin has now completed.  Her PCP is from [facility] and she reports that he has stated that they are not to use compression on the leg.  However, ultrasounds were done in April and they were clear.  The caregiver does have concern that she is unable to ambulate and would like a wheelchair obtained for the patient.  They were advised to go to [employer]  to have the paperwork sent to us for prescription.  However, [facility] called and their insurance does not cover the wheelchair.    Today, the left mid foot was assessed and selectively debrided.  Additionally, she does have several scattered wounds on her bilateral lower legs that will be monitored.  She was given Hibiclens and instructed to wash the lower extremities including the foot with a Hibiclens x3 days.    5/9/23 - the patient returns today for followup with her caregiver present.  In addition to the neuropathic wound on the plantar surface of the left foot she had small scattered wounds on her bilateral lower extremities.  She was instructed to cleanse her wounds on her legs with Hibiclens and she did so.  Today, the majority of those small open areas on the legs are now closed and they look significantly improved.  The  wound on the left foot was selectively debrided and it does remains stable.  Granular tissue is surface level so we will begin applying Endoform to the wound bed with the hopes of developing epithelial tissue.  Of note, the patient is still has not received her wheelchair.  That wheelchair was faxed to her [employer] company of choice and she and the caregiver were instructed that they will need to followup with that company.    5/16/23 - Ms. Larsen returns today for followup in wound check on the wound to the left plantar foot.  This neuropathic wound was selectively debrided today and does remains stable.  We will begin today applying Endoform with the hopes of developing epithelial tissue across the wound bed.  She has an appointment with Doreen Barnes NP, on 05/30/2023 to initiate services  as her PCP because she is not satisfied with her current provider.  She will return to clinic in 1 week for followup.    5/24/23 - the patient returns today for followup on the neuropathic wound to the plantar surface of the left foot.  We began using Endoform at the last visit, however, the patient in her caregiver misunderstood the instructions in removed that product prematurely.  Today, the wound was selectively debrided and we did again place Endoform on the wound to be left in place until Friday.  The wound does appear to be slowly improving.  We will be seeking authorization for grafting if possible.    5/30/23 - the patient returns today for the neuropathic wound to the left foot.  She does report that she fell sometime over the weekend.  She did fall backwards and injured her bottom.  She noted some bruises and pain, however, there are no open wounds.  She did not go to ER.  This morning, her caregiver reports that the patient was found soaked in urine and that urine had dripped down to the wound on the foot.  There does not appear to be any change in the wound.  Today, the patient fell asleep in both the lobby and in the  chair while she was receiving care.  She will be seeing a new provider today, Doreen Barnes NP.  We are hopeful that there may be some medication changes done that will help her in becoming a little more alert.    Today, the wound was assessed and appears to be improving.  A selective debridement was performed and the hyper granulated tissue was chemically cauterized with silver nitrate.  We are still attempting to authorize a graft of some kind.  She does though need 30 days of continuous care so we can hopefully get one of the grafts authorized now.    7/18/23 - Ms. Larsen was last seen in the clinic on 5/30/23. She was admitted to [facility] on 5/31/23 - 6/6/23 with hypotension and low O2 sats. While there it was discovered she had C-Diff. Her caregiver states that she did several rounds of oral antibiotics but is no longer taking anything. She has been using mesalt and silver alginate to dress the wound since being home. She has been diagnosed as a diabetic since her last visit with us. Her A1C on 6/2/23 was 6.8.   Today her wound was assessed in has improved since her last visit.  It was selectively debrided.  There is a light coating of slough over the entire wound bed, therefore Santyl has been ordered for daily application.  Today, we applied mesalt and if she is not able to get Santyl with her insurance she will continue with daily use of mesalt.    Additionally, her caregiver requested that her toenails be trimmed.  Six toenails were debrided off today.    7/25/23 - the patient returns today for followup on a D FU to the left foot.  The wound was assessed and selectively debrided.  The hypergranulated tissue was chemically cauterized using silver nitrate.  The caregiver is still attempting to obtain Santyl that was ordered.  It was initially sent to [facility] in [place] who could not get the product.  The Rx was transferred to [facility] in [place]. She is waiting for this to go through so she can see if the  medicine will be covered by the patient's insurance.   The would is improved, however, with the use of Mesalt only.  We will continue that product.  Will will also attempt to get her approved for grafts, preferably Apligraft or Theraskin.    8/9/23 - Ms. Larsen returns today for followup on the D FU to the left midfoot.  She reports that she went to ED on 7/30/23 c/o of being weak and that she got hot being outside in the sun. She was dehydrated and bradycardic. She was discharged on 8/2/23.   She saw Dr. Claros on 8/3/23 to have a halter monitor placed. She will wear this for 2 weeks, it will be d/c on 8/17/23 and she will go to [facility] in [place] on 8/28/23 for those results.   Today her wound was assessed and selectively debrided.  The periwound was quite macerated and she was reminded to be changing the dressings daily.  We are continuing to use Santyl on the wound bed.  There appears to be a thick layer of slough across the wound bed and the patient was reminded that she should be using at least a nickel thick layer of the product covered with mesalt.  When she returns in one week if there is still a layer of slough we will use the ultrasonic debrider.      8/23/23 - The patient returns today for followup on the mid foot diabetic foot ulcer.  She does have pronounced Charcot foot and this disfigured station of the foot causes her multiple problems.  Today, she presents with the wound bed itself improving, however, she had a very thick area of callus surrounding the entire periwound.  The whole area was selectively debrided with a dermal curette.    We will continue to apply Santyl and me salt to this wound.    8/30/23 - Ms. Larsen is seen today for the D FU to her mid foot.  Today again there was a very thick area of callus surrounding the wound.  The wound bed itself looks stable but was in need of a selective debridement.  We discussed the possibility of authorize in grafts, however, she has a 3000 dollar detectable  that has not been met.  We also discussed the possibility hyperbarics and she is considering that option.    9/6/23 - Ms. Larsen returns today with her caregiver.  The wound remains stable although there is again a very thick area of callus surrounding the wound margins.  The wound was selectively debrided.  There was an area of that margin that seemed to have pulled away from the epithelial tissue.  This had to be cleft off which subsequently enlarged the wound substantially.  The patient and I had a lengthy discussion regarding the importance of offloading even when she is in her home.  She does have some health little receive issues and does not fully comprehend the necessity of offloading.  She does have a Darco shoe with PEG assist that she refuses to wear.  We will consider discussion of a total contact cast at her next visit.    9/20/23 - the patient returns today for followup on a D FU to the left mid foot.  Of concern, however, is the swelling and warmth of the left lower extremity.  Although she does not have specific complaints of pain, she is mentally incompetent and therefore her interpretation of her pain level can not be crusted or assumed.  She is being sent to [facility] in [place] to have an ultrasound done to rule out DVT.  Today the wound was selectively debrided.  Again, there was a large amount of callus built around the wound was.  The wound bed itself is clean and epithelial tissue is moving in word.  We will continue with daily application of me salt covered with a 4 x 4 and a gentle border dressing.  She no longer need Santyl at this time so we will hold that product.  Her caregiver was notified that if the ultrasound is negative for DVT that she is to  the prescription for Keflex which was sent to her local pharmacy.  Of note, her caregiver states that she has a follow up on 10/18/23 with Dr. Claros to get results from halter monitor.  They are instructed to call Dr. Claros's office to let them  "know about the left leg swelling frequently to see if they can get a sooner appt.     9/27/23 - Ms. Larsen returns to clinic today for follow up on the DFU to the left mid foot.  Again today the wound margin is very thickly callused.  The wound size has not changed significantly.  The wound was selectively debrided and we will begin application of Endoform to the wound bed.  Of note, an U/S was done on the left leg to r/o DVT.  She began taking Keflex on 9/20/23 once that U/S was normal.  She continues to take that medication.  However, today again both legs are extremely edematous.  Bilateral arterial/venous US have been scheduled on 10/9/23 @ 11:30 to address the edema. She does have a f/up appt with [facility] in [place] for her halter monitor results on 10/18/23.     Review of Systems   Constitutional: Negative.    Respiratory: Negative.     Cardiovascular: Negative.    Skin:         As documented in the HPI.   All other systems reviewed and are negative.        Objective:      Vitals:    09/27/23 1019   BP: 134/60   BP Location: Right arm   Patient Position: Sitting   Pulse: (!) 48   Resp: 18   Weight: 92.5 kg (204 lb)   Height: 5' 7" (1.702 m)      Physical Exam  Vitals reviewed.   Constitutional:       Appearance: Normal appearance.   Cardiovascular:      Rate and Rhythm: Normal rate.   Pulmonary:      Effort: Pulmonary effort is normal.   Skin:     General: Skin is warm and dry.      Comments: DFU left foot   Neurological:      Mental Status: She is alert.            Altered Skin Integrity 04/18/23 1351 Left medial Foot (Active)   04/18/23 1351   Altered Skin Integrity Present on Admission - Did Patient arrive to the hospital with altered skin?: yes   Side: Left   Orientation: medial   Location: Foot   Wound Number:    Is this injury device related?:    Primary Wound Type:    Description of Altered Skin Integrity:    Ankle-Brachial Index:    Pulses:    Removal Indication and Assessment:    Wound Outcome:  "   (Retired) Wound Length (cm):    (Retired) Wound Width (cm):    (Retired) Depth (cm):    Wound Description (Comments):    Removal Indications:    Dressing Appearance Moist drainage 09/27/23 1020   Drainage Amount Moderate 09/27/23 1020   Drainage Characteristics/Odor Serosanguineous 09/27/23 1020   Appearance Pink;Red;Moist 09/27/23 1020   Tissue loss description Full thickness 09/27/23 1020   Periwound Area Dry;[place] 09/27/23 1020   Wound Edges Callused;Defined 09/27/23 1020   Kovacs Classification (diabetic foot ulcers only) Grade 2 09/27/23 1020   Wound Length (cm) 3 cm 09/27/23 1020   Wound Width (cm) 2 cm 09/27/23 1020   Wound Depth (cm) 0.2 cm 09/27/23 1020   Wound Volume (cm^3) 1.2 cm^3 09/27/23 1020   Wound Surface Area (cm^2) 6 cm^2 09/27/23 1020   Care Cleansed with:;Wound cleanser 09/27/23 1020   Dressing Applied 09/27/23 1020   Dressing Change Due 09/28/23 09/27/23 1020            Left lower leg (edema)                                  Left lower leg (edema)    9/27/23          Left medial foot (pre)                                         Left medial foot (post)                                                                           (endoform, adaptic, benzoin, steri strips, silver alginate, 4x4, kerlix, tape)       Left lower leg edema                                Right lower leg Edema    Assessment:         ICD-10-CM ICD-9-CM   1. 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 Open wound of plantar aspect of left foot  S91.302A 892.0   3. Charcot's joint of left foot, non-diabetic  M14.672 094.0     713.5   4. Chronic venous stasis  I87.8 459.81   5. Type 2 diabetes mellitus with Charcot's joint of left foot  E11.610 250.60     713.5   6. Edema, unspecified type  R60.9 782.3           Procedures:     Debridement     Date/Time: 9/27/2023 9:20 AM     Performed by: Barbra Perez NP  Authorized by: Barbra Perez,  "NP    Time out: Immediately prior to procedure a "time out" was called to verify the correct patient, procedure, equipment, support staff and site/side marked as required.     Consent Done?:  Yes (Verbal)     Preparation: Patient was prepped and draped with clean technique    Local anesthesia used?: No       Wound Details:    Location:  Left foot    Location:  Left Midfoot    Type of Debridement:  Non-excisional       Length (cm):  3       Area (sq cm):  6       Width (cm):  2       Percent Debrided (%):  100       Depth (cm):  0.2       Total Area Debrided (sq cm):  6    Depth of debridement:  Epidermis/Dermis    Devitalized tissue debrided:  Biofilm, Callus and Exudate    Instruments:  Curette (Dermal)  Bleeding:  Minimal  Hemostasis Achieved: Yes  Method Used:  Pressure  Patient tolerance:  Patient tolerated the procedure well with no immediate complications    Excisional debridement performed:  [] Yes [] No   Selective debridement performed:  [x] Yes [] No   Mechanical debridement performed:  [] Yes [] No   Silver nitrate applied :    [] Yes [] No   Labs ordered this visit:   [] Yes [] No   Imaging ordered this visit:   [] Yes [] No   Tissue pathology and/or culture taken:  [] Yes [] No       HOME HEALTH AGENCY:   [facility]     Home Health Services     Since 4/17/2023     [phone]     TIMES PER WEEK/DAYS: 1 x weekly, Fridays only  Patient will come to wound care weekly    Left foot wound: **Do Note remove below steri strips** until Saturday (may change kerlix, 4x4, and silver alginate daily if needed). On Saturday, remove entire dressing, cleanse with wound cleanser and apply silver alginate to wound bed, cover with 4x4 gauze and wrap with kerlix, secure with tape and change daily.     Follow up in 1 week (on 10/4/2023) for left foot.          "

## 2023-09-27 NOTE — PROCEDURES
"Debridement    Date/Time: 9/27/2023 9:20 AM    Performed by: Barbra Perez NP  Authorized by: Barbra Perez NP    Time out: Immediately prior to procedure a "time out" was called to verify the correct patient, procedure, equipment, support staff and site/side marked as required.    Consent Done?:  Yes (Verbal)    Preparation: Patient was prepped and draped with clean technique    Local anesthesia used?: No      Wound Details:    Location:  Left foot    Location:  Left Midfoot    Type of Debridement:  Non-excisional       Length (cm):  3       Area (sq cm):  6       Width (cm):  2       Percent Debrided (%):  100       Depth (cm):  0.2       Total Area Debrided (sq cm):  6    Depth of debridement:  Epidermis/Dermis    Devitalized tissue debrided:  Biofilm, Callus and Exudate    Instruments:  Curette (Dermal)  Bleeding:  Minimal  Hemostasis Achieved: Yes  Method Used:  Pressure  Patient tolerance:  Patient tolerated the procedure well with no immediate complications    "

## 2023-10-04 ENCOUNTER — HOSPITAL ENCOUNTER (OUTPATIENT)
Dept: WOUND CARE | Facility: HOSPITAL | Age: 68
Discharge: HOME | End: 2023-10-04
Attending: NURSE PRACTITIONER
Payer: MEDICARE

## 2023-10-04 VITALS
HEIGHT: 67 IN | SYSTOLIC BLOOD PRESSURE: 132 MMHG | HEART RATE: 72 BPM | RESPIRATION RATE: 18 BRPM | DIASTOLIC BLOOD PRESSURE: 63 MMHG | WEIGHT: 204 LBS | BODY MASS INDEX: 32.02 KG/M2

## 2023-10-04 DIAGNOSIS — E11.610 TYPE 2 DIABETES MELLITUS WITH CHARCOT'S JOINT OF LEFT FOOT: ICD-10-CM

## 2023-10-04 DIAGNOSIS — L97.525 NON-PRESSURE CHRONIC ULCER OF OTHER PART OF LEFT FOOT WITH MUSCLE INVOLVEMENT WITHOUT EVIDENCE OF NECROSIS: Primary | ICD-10-CM

## 2023-10-04 DIAGNOSIS — R60.9 EDEMA, UNSPECIFIED TYPE: ICD-10-CM

## 2023-10-04 DIAGNOSIS — I87.8 CHRONIC VENOUS STASIS: ICD-10-CM

## 2023-10-04 DIAGNOSIS — S91.302A OPEN WOUND OF PLANTAR ASPECT OF LEFT FOOT: ICD-10-CM

## 2023-10-04 DIAGNOSIS — M14.672 CHARCOT'S JOINT OF LEFT FOOT, NON-DIABETIC: ICD-10-CM

## 2023-10-04 PROCEDURE — 17250 CHEM CAUT OF GRANLTJ TISSUE: CPT | Mod: 59

## 2023-10-04 PROCEDURE — 27000999 HC MEDICAL RECORD PHOTO DOCUMENTATION

## 2023-10-04 PROCEDURE — 97597 DBRDMT OPN WND 1ST 20 CM/<: CPT

## 2023-10-04 PROCEDURE — 99212 OFFICE O/P EST SF 10 MIN: CPT | Mod: 25

## 2023-10-04 NOTE — PROGRESS NOTES
Home Health: [facility]  Smoker  [x] Yes  [] No   Last A1C: 6.8 on 23  Last CB after breakfast 10/4/23  Celia Tarango  [x] Yes [] No            Results: 0/5 left foot   Doppler done in office? [x] Yes [] No on 23  Posterior tibial: monophasic  Dorsalis pedal: monophasic  Darco Shoe [x] Yes [] No   Date given: does not like to wear  Is patient eligible for HBO [] Yes [x] No  (not a salinas 3)   Is the patient eligible for a graft, and/or currently grafting?  [x] Yes [] No (deductible to high for the patient though)   Right calf: 23.3 cm  Right ankle: 36.2  Left calf: 40 cm  Left ankle: 24 cm       Patient ID: Diane Larsen is a 68 y.o. female.    Chief Complaint: Diabetic Foot Ulcer ((Left medial foot - salinas 2)    Subjective:    HPI  23 (Dr. Lassiter) - 68-year-old white female, who was referred here by Dr. Hank Owen, for an open wound on the left plantar surface, left foot.  She is not a diabetic.  She was admitted into the [facility], for cellulitis and abscess of the plantar surface of the left foot, and she underwent an intraoperative debridement.  He is here for follow up of this new wound.  It appears that she has had some chronic venous stasis over the past several months, both lower legs.  This has not been treated in the past, as far as I know.  While she was in the hospital, she underwent an MRI of the left foot, which showed no evidence of osteomyelitis.  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  She has Pes planus.  She also underwent arterial ultrasound on , which showed patent arteries in both lower extremities.  The venous ultrasound was also negative for DVT.  I have reviewed her records from the hospital.  Pt caregiver states that a blister was noticed on her left foot about 2 weeks ago around 23. The pt opened the blister by scratching and by Monday 4/10/23 the caregiver noted that it needed immediate attention. Pt went to ED in  [place] on Monday 4/10/23 and was admitted. Dr. Mckinney debrided the left foot on 4/11/23, she was discharged on 4/14/23. She has a follow up with Dr. Mckinney tomorrow 4/19/23. She is currently taking Augmentin PO. She states that she is not diabetic. Her semmes yudi is 1/10 and her blood sugar was 124. She had venous/arterial ultrasounds done during her hospital stay as well as MRI and xrays    5/2/23 - Ms. Larsen returns to clinic today for followup on the open wound to the left mid foot.  Her history includes the left ankle being broken several months ago which left the left foot somewhat deformed.  The wound was surgically debrided by Dr. Hank Owen.  She stayed for 1 week at the hospital in [place], [place] and went home on oral Augmentin has now completed.  Her PCP is from [facility] and she reports that he has stated that they are not to use compression on the leg.  However, ultrasounds were done in April and they were clear.  The caregiver does have concern that she is unable to ambulate and would like a wheelchair obtained for the patient.  They were advised to go to [employer]  to have the paperwork sent to us for prescription.  However, [facility] called and their insurance does not cover the wheelchair.    Today, the left mid foot was assessed and selectively debrided.  Additionally, she does have several scattered wounds on her bilateral lower legs that will be monitored.  She was given Hibiclens and instructed to wash the lower extremities including the foot with a Hibiclens x3 days.    5/9/23 - the patient returns today for followup with her caregiver present.  In addition to the neuropathic wound on the plantar surface of the left foot she had small scattered wounds on her bilateral lower extremities.  She was instructed to cleanse her wounds on her legs with Hibiclens and she did so.  Today, the majority of those small open areas on the legs are now closed and they look significantly improved.   The wound on the left foot was selectively debrided and it does remains stable.  Granular tissue is surface level so we will begin applying Endoform to the wound bed with the hopes of developing epithelial tissue.  Of note, the patient is still has not received her wheelchair.  That wheelchair was faxed to her [employer] company of choice and she and the caregiver were instructed that they will need to followup with that company.    5/16/23 - Ms. Larsen returns today for followup in wound check on the wound to the left plantar foot.  This neuropathic wound was selectively debrided today and does remains stable.  We will begin today applying Endoform with the hopes of developing epithelial tissue across the wound bed.  She has an appointment with Doreen Barnes NP, on 05/30/2023 to initiate services  as her PCP because she is not satisfied with her current provider.  She will return to clinic in 1 week for followup.    5/24/23 - the patient returns today for followup on the neuropathic wound to the plantar surface of the left foot.  We began using Endoform at the last visit, however, the patient in her caregiver misunderstood the instructions in removed that product prematurely.  Today, the wound was selectively debrided and we did again place Endoform on the wound to be left in place until Friday.  The wound does appear to be slowly improving.  We will be seeking authorization for grafting if possible.    5/30/23 - the patient returns today for the neuropathic wound to the left foot.  She does report that she fell sometime over the weekend.  She did fall backwards and injured her bottom.  She noted some bruises and pain, however, there are no open wounds.  She did not go to ER.  This morning, her caregiver reports that the patient was found soaked in urine and that urine had dripped down to the wound on the foot.  There does not appear to be any change in the wound.  Today, the patient fell asleep in both the lobby and in  the chair while she was receiving care.  She will be seeing a new provider today, Doreen Barnes NP.  We are hopeful that there may be some medication changes done that will help her in becoming a little more alert.    Today, the wound was assessed and appears to be improving.  A selective debridement was performed and the hyper granulated tissue was chemically cauterized with silver nitrate.  We are still attempting to authorize a graft of some kind.  She does though need 30 days of continuous care so we can hopefully get one of the grafts authorized now.    7/18/23 - Ms. Larsen was last seen in the clinic on 5/30/23. She was admitted to [facility] on 5/31/23 - 6/6/23 with hypotension and low O2 sats. While there it was discovered she had C-Diff. Her caregiver states that she did several rounds of oral antibiotics but is no longer taking anything. She has been using mesalt and silver alginate to dress the wound since being home. She has been diagnosed as a diabetic since her last visit with us. Her A1C on 6/2/23 was 6.8.   Today her wound was assessed in has improved since her last visit.  It was selectively debrided.  There is a light coating of slough over the entire wound bed, therefore Santyl has been ordered for daily application.  Today, we applied mesalt and if she is not able to get Santyl with her insurance she will continue with daily use of mesalt.    Additionally, her caregiver requested that her toenails be trimmed.  Six toenails were debrided off today.    7/25/23 - the patient returns today for followup on a D FU to the left foot.  The wound was assessed and selectively debrided.  The hypergranulated tissue was chemically cauterized using silver nitrate.  The caregiver is still attempting to obtain Santyl that was ordered.  It was initially sent to [facility] in [place] who could not get the product.  The Rx was transferred to [facility] in [place]. She is waiting for this to go through so she can see if  the medicine will be covered by the patient's insurance.   The would is improved, however, with the use of Mesalt only.  We will continue that product.  Will will also attempt to get her approved for grafts, preferably Apligraft or Theraskin.    8/9/23 - Ms. Larsen returns today for followup on the D FU to the left midfoot.  She reports that she went to ED on 7/30/23 c/o of being weak and that she got hot being outside in the sun. She was dehydrated and bradycardic. She was discharged on 8/2/23.   She saw Dr. Claros on 8/3/23 to have a halter monitor placed. She will wear this for 2 weeks, it will be d/c on 8/17/23 and she will go to [facility] in [place] on 8/28/23 for those results.   Today her wound was assessed and selectively debrided.  The periwound was quite macerated and she was reminded to be changing the dressings daily.  We are continuing to use Santyl on the wound bed.  There appears to be a thick layer of slough across the wound bed and the patient was reminded that she should be using at least a nickel thick layer of the product covered with mesalt.  When she returns in one week if there is still a layer of slough we will use the ultrasonic debrider.      8/23/23 - The patient returns today for followup on the mid foot diabetic foot ulcer.  She does have pronounced Charcot foot and this disfigured station of the foot causes her multiple problems.  Today, she presents with the wound bed itself improving, however, she had a very thick area of callus surrounding the entire periwound.  The whole area was selectively debrided with a dermal curette.    We will continue to apply Santyl and me salt to this wound.    8/30/23 - Ms. Larsen is seen today for the D FU to her mid foot.  Today again there was a very thick area of callus surrounding the wound.  The wound bed itself looks stable but was in need of a selective debridement.  We discussed the possibility of authorize in grafts, however, she has a 3000 dollar  detectable that has not been met.  We also discussed the possibility hyperbarics and she is considering that option.    9/6/23 - Ms. Larsen returns today with her caregiver.  The wound remains stable although there is again a very thick area of callus surrounding the wound margins.  The wound was selectively debrided.  There was an area of that margin that seemed to have pulled away from the epithelial tissue.  This had to be cleft off which subsequently enlarged the wound substantially.  The patient and I had a lengthy discussion regarding the importance of offloading even when she is in her home.  She does have some health little receive issues and does not fully comprehend the necessity of offloading.  She does have a Darco shoe with PEG assist that she refuses to wear.  We will consider discussion of a total contact cast at her next visit.    9/20/23 - the patient returns today for followup on a D FU to the left mid foot.  Of concern, however, is the swelling and warmth of the left lower extremity.  Although she does not have specific complaints of pain, she is mentally incompetent and therefore her interpretation of her pain level can not be crusted or assumed.  She is being sent to [facility] in [place] to have an ultrasound done to rule out DVT.  Today the wound was selectively debrided.  Again, there was a large amount of callus built around the wound was.  The wound bed itself is clean and epithelial tissue is moving in word.  We will continue with daily application of me salt covered with a 4 x 4 and a gentle border dressing.  She no longer need Santyl at this time so we will hold that product.  Her caregiver was notified that if the ultrasound is negative for DVT that she is to  the prescription for Keflex which was sent to her local pharmacy.  Of note, her caregiver states that she has a follow up on 10/18/23 with Dr. Claros to get results from halter monitor.  They are instructed to call Dr. Claros's office  "to let them know about the left leg swelling frequently to see if they can get a sooner appt.     9/27/23 - Ms. Larsen returns to clinic today for follow up on the DFU to the left mid foot.  Again today the wound margin is very thickly callused.  The wound size has not changed significantly.  The wound was selectively debrided and we will begin application of Endoform to the wound bed.  Of note, an U/S was done on the left leg to r/o DVT.  She began taking Keflex on 9/20/23 once that U/S was normal.  She continues to take that medication.  However, today again both legs are extremely edematous.  Bilateral arterial/venous US have been scheduled on 10/9/23 @ 11:30 to address the edema. She does have a f/up appt with [facility] in [place] for her halter monitor results on 10/18/23.    10/4/23 - The patient returns to clinic today for the DFU to the left mid foot.  She and her caregiver report that on Saturday, 9/30/23, the patient stepped on an unknown object while ambulating on her driveway.  This caught the edge of the wound, ripping the skin causing damage.  The wound was assessed today and selectively debrided.  This area of damaged tissue was removed.  The wound does look slightly improved in spite of this injury. We will continue to use Endoform which is assisting with progress.   She is scheduled for an ultrasound on 10/9/23 at [facility]; She has a follow up with cardiology in [place]  on 10/18/23 but does not know the name of the Dr.      Review of Systems   Constitutional: Negative.    Respiratory: Negative.     Cardiovascular: Negative.    Skin:         As documented in the HPI.   All other systems reviewed and are negative.        Objective:      Vitals:    10/04/23 1055   BP: 132/63   Pulse: 72   Resp: 18   Weight: 92.5 kg (204 lb)   Height: 5' 7" (1.702 m)      Physical Exam  Vitals reviewed.   Constitutional:       Appearance: Normal appearance.   Cardiovascular:      Rate and Rhythm: Normal rate.   Pulmonary:      " Effort: Pulmonary effort is normal.   Skin:     General: Skin is warm and dry.      Comments: DFU left foot   Neurological:      Mental Status: She is alert.            Altered Skin Integrity 04/18/23 1351 Left medial Foot (Active)   04/18/23 1351   Altered Skin Integrity Present on Admission - Did Patient arrive to the hospital with altered skin?: yes   Side: Left   Orientation: medial   Location: Foot   Wound Number:    Is this injury device related?:    Primary Wound Type:    Description of Altered Skin Integrity:    Ankle-Brachial Index:    Pulses:    Removal Indication and Assessment:    Wound Outcome:    (Retired) Wound Length (cm):    (Retired) Wound Width (cm):    (Retired) Depth (cm):    Wound Description (Comments):    Removal Indications:    Description of Altered Skin Integrity Full thickness tissue loss. Subcutaneous fat may be visible but bone, tendon or muscle are not exposed 10/04/23 1056   Dressing Appearance Moist drainage;Intact 10/04/23 1056   Drainage Amount Moderate 10/04/23 1056   Drainage Characteristics/Odor Serosanguineous 10/04/23 1056   Appearance Intact;Slough;Moist;Granulating 10/04/23 1056   Tissue loss description Full thickness 10/04/23 1056   Periwound Area Intact;Dry 10/04/23 1056   Wound Edges Defined;Callused 10/04/23 1056   Kovacs Classification (diabetic foot ulcers only) Grade 2 10/04/23 1056   Wound Length (cm) 2.8 cm 10/04/23 1056   Wound Width (cm) 2.5 cm 10/04/23 1056   Wound Depth (cm) 0.2 cm 10/04/23 1056   Wound Volume (cm^3) 1.4 cm^3 10/04/23 1056   Wound Surface Area (cm^2) 7 cm^2 10/04/23 1056   Care Cleansed with:;Debrided 10/04/23 1056   Dressing Applied 10/04/23 1056   Periwound Care Absorptive dressing applied 10/04/23 1056   Off Loading Football dressing 10/04/23 1056   Dressing Change Due 10/07/23 10/04/23 1056     10/4/23        Left medial foot (pre)                                  Post  endoform, adaptic, benzoin, steri strips, silver alginate, 4x4, kerlix,  "tape  TR  Assessment:         ICD-10-CM ICD-9-CM   1. 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 Open wound of plantar aspect of left foot  S91.302A 892.0   3. Charcot's joint of left foot, non-diabetic  M14.672 094.0     713.5   4. Chronic venous stasis  I87.8 459.81   5. Type 2 diabetes mellitus with Charcot's joint of left foot  E11.610 250.60     713.5   6. Edema, unspecified type  R60.9 782.3         Procedures:     Debridement     Date/Time: 10/4/2023 10:06 AM     Performed by: Barbra Perez NP  Authorized by: Barbra Perez NP    Time out: Immediately prior to procedure a "time out" was called to verify the correct patient, procedure, equipment, support staff and site/side marked as required.        Preparation: Patient was prepped and draped with clean technique    Local anesthesia used?: No       Wound Details:    Location:  Left foot    Location:  Left Midfoot    Type of Debridement:  Non-excisional       Length (cm):  2.8       Area (sq cm):  7       Width (cm):  2.5       Percent Debrided (%):  100       Depth (cm):  0.2       Total Area Debrided (sq cm):  7    Depth of debridement:  Epidermis/Dermis    Devitalized tissue debrided:  Biofilm, Callus, Exudate and Fibrin    Instruments:  Curette, Forceps and Scissors (Dermal)  Bleeding:  Minimal  Hemostasis Achieved: Yes  Method Used:  Pressure  Patient tolerance:  Patient tolerated the procedure well with no immediate complications      Excisional debridement performed:  [] Yes [] No   Selective debridement performed:  [x] Yes [] No (dermal, forceps, scissors)  Mechanical debridement performed:  [] Yes [] No   Silver nitrate applied :    [x] Yes [] No   Labs ordered this visit:   [] Yes [] No   Imaging ordered this visit:   [] Yes [] No   Tissue pathology and/or culture taken:  [] Yes [] No       HOME HEALTH AGENCY:   [facility]     Home Health " Services     Since 4/17/2023     [phone]     TIMES PER WEEK/DAYS: 1 x weekly, Fridays only  Patient will come to wound care weekly    Left foot wound: **Do Note remove below steri strips** until Saturday (may change kerlix, 4x4, and silver alginate daily if needed). On Saturday, remove entire dressing, cleanse with wound cleanser and apply silver alginate to wound bed, cover with 4x4 gauze and wrap with kerlix, secure with tape and change daily.     Follow up in about 1 week (around 10/11/2023) for Left foot.

## 2023-10-04 NOTE — PROCEDURES
"Debridement    Date/Time: 10/4/2023 10:06 AM    Performed by: Barbra Perez NP  Authorized by: Barbra Perez NP    Time out: Immediately prior to procedure a "time out" was called to verify the correct patient, procedure, equipment, support staff and site/side marked as required.      Preparation: Patient was prepped and draped with clean technique    Local anesthesia used?: No      Wound Details:    Location:  Left foot    Location:  Left Midfoot    Type of Debridement:  Non-excisional       Length (cm):  2.8       Area (sq cm):  7       Width (cm):  2.5       Percent Debrided (%):  100       Depth (cm):  0.2       Total Area Debrided (sq cm):  7    Depth of debridement:  Epidermis/Dermis    Devitalized tissue debrided:  Biofilm, Callus, Exudate and Fibrin    Instruments:  Curette, Forceps and Scissors (Dermal)  Bleeding:  Minimal  Hemostasis Achieved: Yes  Method Used:  Pressure  Patient tolerance:  Patient tolerated the procedure well with no immediate complications    "

## 2023-10-09 ENCOUNTER — HOSPITAL ENCOUNTER (OUTPATIENT)
Dept: RADIOLOGY | Facility: HOSPITAL | Age: 68
Discharge: HOME | End: 2023-10-09
Attending: NURSE PRACTITIONER
Payer: MEDICARE

## 2023-10-09 DIAGNOSIS — R60.9 EDEMA: ICD-10-CM

## 2023-10-18 ENCOUNTER — HOSPITAL ENCOUNTER (OUTPATIENT)
Dept: WOUND CARE | Facility: HOSPITAL | Age: 68
Discharge: HOME | End: 2023-10-18
Attending: NURSE PRACTITIONER
Payer: MEDICARE

## 2023-10-18 VITALS
BODY MASS INDEX: 32.02 KG/M2 | SYSTOLIC BLOOD PRESSURE: 139 MMHG | WEIGHT: 204 LBS | HEIGHT: 67 IN | RESPIRATION RATE: 18 BRPM | DIASTOLIC BLOOD PRESSURE: 63 MMHG | HEART RATE: 44 BPM

## 2023-10-18 DIAGNOSIS — L97.525 NON-PRESSURE CHRONIC ULCER OF OTHER PART OF LEFT FOOT WITH MUSCLE INVOLVEMENT WITHOUT EVIDENCE OF NECROSIS: Primary | ICD-10-CM

## 2023-10-18 DIAGNOSIS — E11.610 TYPE 2 DIABETES MELLITUS WITH CHARCOT'S JOINT OF LEFT FOOT: ICD-10-CM

## 2023-10-18 DIAGNOSIS — I87.8 CHRONIC VENOUS STASIS: ICD-10-CM

## 2023-10-18 DIAGNOSIS — S91.302A OPEN WOUND OF PLANTAR ASPECT OF LEFT FOOT: ICD-10-CM

## 2023-10-18 DIAGNOSIS — R60.9 EDEMA, UNSPECIFIED TYPE: ICD-10-CM

## 2023-10-18 DIAGNOSIS — M14.672 CHARCOT'S JOINT OF LEFT FOOT, NON-DIABETIC: ICD-10-CM

## 2023-10-18 PROCEDURE — 27000999 HC MEDICAL RECORD PHOTO DOCUMENTATION

## 2023-10-18 PROCEDURE — 99212 OFFICE O/P EST SF 10 MIN: CPT | Mod: 25

## 2023-10-18 PROCEDURE — 97597 DBRDMT OPN WND 1ST 20 CM/<: CPT

## 2023-10-18 NOTE — PROGRESS NOTES
Home Health: [facility]  Smoker  [x] Yes  [] No   Last A1C: 6.8 on 23  Last CB after breakfast 10/4/23  Celia Tarango  [x] Yes [] No            Results: 0/5 left foot   Doppler done in office? [x] Yes [] No on 23  Posterior tibial: monophasic  Dorsalis pedal: monophasic  Darco Shoe [x] Yes [] No   Date given: does not like to wear  Is patient eligible for HBO [] Yes [x] No  (not a salinas 3)   Is the patient eligible for a graft, and/or currently grafting?  [x] Yes [] No (deductible to high for the patient though)   Right calf: 23.3 cm  Right ankle: 36.2  Left calf: 40 cm  Left ankle: 24 cm       Patient ID: Diane Larsen is a 68 y.o. female.    Chief Complaint: Diabetic Foot Ulcer (Left medial foot - salinas 2)    Subjective:    HPI  23 (Dr. Lassiter) - 68-year-old white female, who was referred here by Dr. Hank Owen, for an open wound on the left plantar surface, left foot.  She is not a diabetic.  She was admitted into the [facility], for cellulitis and abscess of the plantar surface of the left foot, and she underwent an intraoperative debridement.  He is here for follow up of this new wound.  It appears that she has had some chronic venous stasis over the past several months, both lower legs.  This has not been treated in the past, as far as I know.  While she was in the hospital, she underwent an MRI of the left foot, which showed no evidence of osteomyelitis.  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  She has Pes planus.  She also underwent arterial ultrasound on , which showed patent arteries in both lower extremities.  The venous ultrasound was also negative for DVT.  I have reviewed her records from the hospital.  Pt caregiver states that a blister was noticed on her left foot about 2 weeks ago around 23. The pt opened the blister by scratching and by Monday 4/10/23 the caregiver noted that it needed immediate attention. Pt went to ED in [place]  on Monday 4/10/23 and was admitted. Dr. Mckinney debrided the left foot on 4/11/23, she was discharged on 4/14/23. She has a follow up with Dr. Mckinney tomorrow 4/19/23. She is currently taking Augmentin PO. She states that she is not diabetic. Her semmes yudi is 1/10 and her blood sugar was 124. She had venous/arterial ultrasounds done during her hospital stay as well as MRI and xrays    5/2/23 - Ms. Larsen returns to clinic today for followup on the open wound to the left mid foot.  Her history includes the left ankle being broken several months ago which left the left foot somewhat deformed.  The wound was surgically debrided by Dr. Hank Owen.  She stayed for 1 week at the hospital in [place], [place] and went home on oral Augmentin has now completed.  Her PCP is from [facility] and she reports that he has stated that they are not to use compression on the leg.  However, ultrasounds were done in April and they were clear.  The caregiver does have concern that she is unable to ambulate and would like a wheelchair obtained for the patient.  They were advised to go to [employer]  to have the paperwork sent to us for prescription.  However, [facility] called and their insurance does not cover the wheelchair.    Today, the left mid foot was assessed and selectively debrided.  Additionally, she does have several scattered wounds on her bilateral lower legs that will be monitored.  She was given Hibiclens and instructed to wash the lower extremities including the foot with a Hibiclens x3 days.    5/9/23 - the patient returns today for followup with her caregiver present.  In addition to the neuropathic wound on the plantar surface of the left foot she had small scattered wounds on her bilateral lower extremities.  She was instructed to cleanse her wounds on her legs with Hibiclens and she did so.  Today, the majority of those small open areas on the legs are now closed and they look significantly improved.  The  wound on the left foot was selectively debrided and it does remains stable.  Granular tissue is surface level so we will begin applying Endoform to the wound bed with the hopes of developing epithelial tissue.  Of note, the patient is still has not received her wheelchair.  That wheelchair was faxed to her [employer] company of choice and she and the caregiver were instructed that they will need to followup with that company.    5/16/23 - Ms. Larsen returns today for followup in wound check on the wound to the left plantar foot.  This neuropathic wound was selectively debrided today and does remains stable.  We will begin today applying Endoform with the hopes of developing epithelial tissue across the wound bed.  She has an appointment with Doreen Barnes NP, on 05/30/2023 to initiate services  as her PCP because she is not satisfied with her current provider.  She will return to clinic in 1 week for followup.    5/24/23 - the patient returns today for followup on the neuropathic wound to the plantar surface of the left foot.  We began using Endoform at the last visit, however, the patient in her caregiver misunderstood the instructions in removed that product prematurely.  Today, the wound was selectively debrided and we did again place Endoform on the wound to be left in place until Friday.  The wound does appear to be slowly improving.  We will be seeking authorization for grafting if possible.    5/30/23 - the patient returns today for the neuropathic wound to the left foot.  She does report that she fell sometime over the weekend.  She did fall backwards and injured her bottom.  She noted some bruises and pain, however, there are no open wounds.  She did not go to ER.  This morning, her caregiver reports that the patient was found soaked in urine and that urine had dripped down to the wound on the foot.  There does not appear to be any change in the wound.  Today, the patient fell asleep in both the lobby and in the  chair while she was receiving care.  She will be seeing a new provider today, Doreen Barnes NP.  We are hopeful that there may be some medication changes done that will help her in becoming a little more alert.    Today, the wound was assessed and appears to be improving.  A selective debridement was performed and the hyper granulated tissue was chemically cauterized with silver nitrate.  We are still attempting to authorize a graft of some kind.  She does though need 30 days of continuous care so we can hopefully get one of the grafts authorized now.    7/18/23 - Ms. Larsen was last seen in the clinic on 5/30/23. She was admitted to [facility] on 5/31/23 - 6/6/23 with hypotension and low O2 sats. While there it was discovered she had C-Diff. Her caregiver states that she did several rounds of oral antibiotics but is no longer taking anything. She has been using mesalt and silver alginate to dress the wound since being home. She has been diagnosed as a diabetic since her last visit with us. Her A1C on 6/2/23 was 6.8.   Today her wound was assessed in has improved since her last visit.  It was selectively debrided.  There is a light coating of slough over the entire wound bed, therefore Santyl has been ordered for daily application.  Today, we applied mesalt and if she is not able to get Santyl with her insurance she will continue with daily use of mesalt.    Additionally, her caregiver requested that her toenails be trimmed.  Six toenails were debrided off today.    7/25/23 - the patient returns today for followup on a D FU to the left foot.  The wound was assessed and selectively debrided.  The hypergranulated tissue was chemically cauterized using silver nitrate.  The caregiver is still attempting to obtain Santyl that was ordered.  It was initially sent to [facility] in [place] who could not get the product.  The Rx was transferred to [facility] in [place]. She is waiting for this to go through so she can see if the  medicine will be covered by the patient's insurance.   The would is improved, however, with the use of Mesalt only.  We will continue that product.  Will will also attempt to get her approved for grafts, preferably Apligraft or Theraskin.    8/9/23 - Ms. Larsen returns today for followup on the D FU to the left midfoot.  She reports that she went to ED on 7/30/23 c/o of being weak and that she got hot being outside in the sun. She was dehydrated and bradycardic. She was discharged on 8/2/23.   She saw Dr. Claros on 8/3/23 to have a halter monitor placed. She will wear this for 2 weeks, it will be d/c on 8/17/23 and she will go to [facility] in [place] on 8/28/23 for those results.   Today her wound was assessed and selectively debrided.  The periwound was quite macerated and she was reminded to be changing the dressings daily.  We are continuing to use Santyl on the wound bed.  There appears to be a thick layer of slough across the wound bed and the patient was reminded that she should be using at least a nickel thick layer of the product covered with mesalt.  When she returns in one week if there is still a layer of slough we will use the ultrasonic debrider.      8/23/23 - The patient returns today for followup on the mid foot diabetic foot ulcer.  She does have pronounced Charcot foot and this disfigured station of the foot causes her multiple problems.  Today, she presents with the wound bed itself improving, however, she had a very thick area of callus surrounding the entire periwound.  The whole area was selectively debrided with a dermal curette.    We will continue to apply Santyl and me salt to this wound.    8/30/23 - Ms. Larsen is seen today for the D FU to her mid foot.  Today again there was a very thick area of callus surrounding the wound.  The wound bed itself looks stable but was in need of a selective debridement.  We discussed the possibility of authorize in grafts, however, she has a 3000 dollar detectable  that has not been met.  We also discussed the possibility hyperbarics and she is considering that option.    9/6/23 - Ms. Larsen returns today with her caregiver.  The wound remains stable although there is again a very thick area of callus surrounding the wound margins.  The wound was selectively debrided.  There was an area of that margin that seemed to have pulled away from the epithelial tissue.  This had to be cleft off which subsequently enlarged the wound substantially.  The patient and I had a lengthy discussion regarding the importance of offloading even when she is in her home.  She does have some health little receive issues and does not fully comprehend the necessity of offloading.  She does have a Darco shoe with PEG assist that she refuses to wear.  We will consider discussion of a total contact cast at her next visit.    9/20/23 - the patient returns today for followup on a D FU to the left mid foot.  Of concern, however, is the swelling and warmth of the left lower extremity.  Although she does not have specific complaints of pain, she is mentally incompetent and therefore her interpretation of her pain level can not be crusted or assumed.  She is being sent to [facility] in [place] to have an ultrasound done to rule out DVT.  Today the wound was selectively debrided.  Again, there was a large amount of callus built around the wound was.  The wound bed itself is clean and epithelial tissue is moving in word.  We will continue with daily application of me salt covered with a 4 x 4 and a gentle border dressing.  She no longer need Santyl at this time so we will hold that product.  Her caregiver was notified that if the ultrasound is negative for DVT that she is to  the prescription for Keflex which was sent to her local pharmacy.  Of note, her caregiver states that she has a follow up on 10/18/23 with Dr. Claros to get results from halter monitor.  They are instructed to call Dr. Claros's office to let them  know about the left leg swelling frequently to see if they can get a sooner appt.     9/27/23 - Ms. Larsen returns to clinic today for follow up on the DFU to the left mid foot.  Again today the wound margin is very thickly callused.  The wound size has not changed significantly.  The wound was selectively debrided and we will begin application of Endoform to the wound bed.  Of note, an U/S was done on the left leg to r/o DVT.  She began taking Keflex on 9/20/23 once that U/S was normal.  She continues to take that medication.  However, today again both legs are extremely edematous.  Bilateral arterial/venous US have been scheduled on 10/9/23 @ 11:30 to address the edema. She does have a f/up appt with [facility] in [place] for her halter monitor results on 10/18/23.    10/4/23 - The patient returns to clinic today for the DFU to the left mid foot.  She and her caregiver report that on Saturday, 9/30/23, the patient stepped on an unknown object while ambulating on her driveway.  This caught the edge of the wound, ripping the skin causing damage.  The wound was assessed today and selectively debrided.  This area of damaged tissue was removed.  The wound does look slightly improved in spite of this injury. We will continue to use Endoform which is assisting with progress.   She is scheduled for an ultrasound on 10/9/23 at [facility]; She has a follow up with cardiology in [place]  on 10/18/23 but does not know the name of the Dr.     10/18/23 - Ms. Larsen returns today with her caregiver for f/up on the DFU to the left mid foot.  Again today there were a thick band of callus surrounding the entire wound.  The wound bed has decreased in size slightly.  It was selectively debrided today and we will continue application of Endoform to the wound bed.  Of note, the patient's caregiver states that the ultrasound appt that was scheduled for 10/9/23 has been rescheduled to 10/23 due to a miscommunication about the time is was scheduled  "for on 10/9/23.   She has a cardiology appt this afternoon     Review of Systems   Constitutional: Negative.    Respiratory: Negative.     Cardiovascular: Negative.    Skin:         As documented in the HPI.   All other systems reviewed and are negative.        Objective:      Vitals:    10/18/23 1329   BP: 139/63   Pulse: (!) 44   Resp: 18   Weight: 92.5 kg (204 lb)   Height: 5' 7" (1.702 m)      Physical Exam  Vitals reviewed.   Constitutional:       Appearance: Normal appearance.   Cardiovascular:      Rate and Rhythm: Normal rate.   Pulmonary:      Effort: Pulmonary effort is normal.   Skin:     General: Skin is warm and dry.      Comments: DFU left foot   Neurological:      Mental Status: She is alert.            Altered Skin Integrity 04/18/23 1351 Left medial Foot (Active)   04/18/23 1351   Altered Skin Integrity Present on Admission - Did Patient arrive to the hospital with altered skin?: yes   Side: Left   Orientation: medial   Location: Foot   Wound Number:    Is this injury device related?:    Primary Wound Type:    Description of Altered Skin Integrity:    Ankle-Brachial Index:    Pulses:    Removal Indication and Assessment:    Wound Outcome:    (Retired) Wound Length (cm):    (Retired) Wound Width (cm):    (Retired) Depth (cm):    Wound Description (Comments):    Removal Indications:    Description of Altered Skin Integrity Full thickness tissue loss. Subcutaneous fat may be visible but bone, tendon or muscle are not exposed 10/18/23 1310   Dressing Appearance Moist drainage;Intact 10/18/23 1310   Drainage Amount Moderate 10/18/23 1310   Drainage Characteristics/Odor Serosanguineous 10/18/23 1310   Appearance Intact;Moist;Pink;Granulating 10/18/23 1310   Tissue loss description Full thickness 10/18/23 1310   Periwound Area Intact;Dry 10/18/23 1310   Wound Edges Defined 10/18/23 1310   Kovacs Classification (diabetic foot ulcers only) Grade 2 10/18/23 1310   Wound Length (cm) 2.4 cm 10/18/23 1310 " "  Wound Width (cm) 2.4 cm 10/18/23 1310   Wound Depth (cm) 0.2 cm 10/18/23 1310   Wound Volume (cm^3) 1.152 cm^3 10/18/23 1310   Wound Surface Area (cm^2) 5.76 cm^2 10/18/23 1310   Care Cleansed with:;Debrided 10/18/23 1310   Dressing Applied 10/18/23 1310   Periwound Care Absorptive dressing applied 10/18/23 1310   Dressing Change Due 10/21/23 10/18/23 1310     10/18/23          Left medial foot (pre)                                  Post  endoform, silver alginate, 4x4, kerlix, tape  TR      Assessment:         ICD-10-CM ICD-9-CM   1. 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 Open wound of plantar aspect of left foot  S91.302A 892.0   3. Charcot's joint of left foot, non-diabetic  M14.672 094.0     713.5   4. Chronic venous stasis  I87.8 459.81   5. Type 2 diabetes mellitus with Charcot's joint of left foot  E11.610 250.60     713.5   6. Edema, unspecified type  R60.9 782.3           Procedures:      Debridement     Date/Time: 10/18/2023 12:54 PM     Performed by: Barbra Perez NP  Authorized by: Barbra Perez NP    Time out: Immediately prior to procedure a "time out" was called to verify the correct patient, procedure, equipment, support staff and site/side marked as required.     Consent Done?:  Yes (Verbal)     Preparation: Patient was prepped and draped with clean technique       Wound Details:    Location:  Left foot    Location:  Left Midfoot    Type of Debridement:  Non-excisional       Length (cm):  2.4       Area (sq cm):  5.76       Width (cm):  2.4       Percent Debrided (%):  100       Depth (cm):  0.2       Total Area Debrided (sq cm):  5.76    Depth of debridement:  Epidermis/Dermis    Devitalized tissue debrided:  Biofilm, Callus, Exudate and Fibrin    Instruments:  Curette (Dermal)  Bleeding:  None  Patient tolerance:  Patient tolerated the procedure well with no immediate complications      Excisional " debridement performed:  [] Yes [] No   Selective debridement performed:  [x] Yes [] No   Mechanical debridement performed:  [] Yes [] No   Silver nitrate applied :    [] Yes [] No   Labs ordered this visit:   [] Yes [] No   Imaging ordered this visit:   [] Yes [] No   Tissue pathology and/or culture taken:  [] Yes [] No       HOME HEALTH AGENCY:   [facility]     Since 4/17/2023     [phone]     TIMES PER WEEK/DAYS: 1 x weekly, Fridays only  Patient will come to wound care weekly    Left foot wound: **Do Note remove below steri strips** until Saturday (may change kerlix, 4x4, and silver alginate daily if needed). On Saturday, remove entire dressing, cleanse with wound cleanser and apply silver alginate to wound bed, cover with 4x4 gauze and wrap with kerlix, secure with tape and change daily.     Follow up in about 1 week (around 10/25/2023) for left foot.

## 2023-10-18 NOTE — PROCEDURES
"Debridement    Date/Time: 10/18/2023 12:54 PM    Performed by: Barbra Perez NP  Authorized by: Barbra Perez NP    Time out: Immediately prior to procedure a "time out" was called to verify the correct patient, procedure, equipment, support staff and site/side marked as required.    Consent Done?:  Yes (Verbal)    Preparation: Patient was prepped and draped with clean technique      Wound Details:    Location:  Left foot    Location:  Left Midfoot    Type of Debridement:  Non-excisional       Length (cm):  2.4       Area (sq cm):  5.76       Width (cm):  2.4       Percent Debrided (%):  100       Depth (cm):  0.2       Total Area Debrided (sq cm):  5.76    Depth of debridement:  Epidermis/Dermis    Devitalized tissue debrided:  Biofilm, Callus, Exudate and Fibrin    Instruments:  Curette (Dermal)  Bleeding:  None  Patient tolerance:  Patient tolerated the procedure well with no immediate complications    "

## 2023-10-23 ENCOUNTER — HOSPITAL ENCOUNTER (OUTPATIENT)
Dept: RADIOLOGY | Facility: HOSPITAL | Age: 68
Discharge: HOME | End: 2023-10-23
Attending: NURSE PRACTITIONER
Payer: MEDICARE

## 2023-10-23 PROCEDURE — 93970 EXTREMITY STUDY: CPT | Mod: TC

## 2023-10-23 PROCEDURE — 93926 LOWER EXTREMITY STUDY: CPT | Mod: TC,RT

## 2023-10-23 PROCEDURE — 93926 LOWER EXTREMITY STUDY: CPT | Mod: TC,LT

## 2023-10-25 ENCOUNTER — HOSPITAL ENCOUNTER (OUTPATIENT)
Dept: WOUND CARE | Facility: HOSPITAL | Age: 68
Discharge: HOME | End: 2023-10-25
Attending: NURSE PRACTITIONER
Payer: MEDICARE

## 2023-10-25 VITALS
SYSTOLIC BLOOD PRESSURE: 113 MMHG | HEART RATE: 52 BPM | BODY MASS INDEX: 32.02 KG/M2 | WEIGHT: 204 LBS | HEIGHT: 67 IN | DIASTOLIC BLOOD PRESSURE: 56 MMHG | RESPIRATION RATE: 19 BRPM

## 2023-10-25 DIAGNOSIS — M14.672 CHARCOT'S JOINT OF LEFT FOOT, NON-DIABETIC: ICD-10-CM

## 2023-10-25 DIAGNOSIS — E11.610 TYPE 2 DIABETES MELLITUS WITH CHARCOT'S JOINT OF LEFT FOOT: ICD-10-CM

## 2023-10-25 DIAGNOSIS — I87.8 CHRONIC VENOUS STASIS: ICD-10-CM

## 2023-10-25 DIAGNOSIS — S91.302A OPEN WOUND OF PLANTAR ASPECT OF LEFT FOOT: ICD-10-CM

## 2023-10-25 DIAGNOSIS — R60.9 EDEMA, UNSPECIFIED TYPE: ICD-10-CM

## 2023-10-25 DIAGNOSIS — Z51.89 ENCOUNTER FOR WOUND RE-CHECK: ICD-10-CM

## 2023-10-25 DIAGNOSIS — L97.525 NON-PRESSURE CHRONIC ULCER OF OTHER PART OF LEFT FOOT WITH MUSCLE INVOLVEMENT WITHOUT EVIDENCE OF NECROSIS: Primary | ICD-10-CM

## 2023-10-25 PROCEDURE — 27000999 HC MEDICAL RECORD PHOTO DOCUMENTATION

## 2023-10-25 PROCEDURE — 99213 OFFICE O/P EST LOW 20 MIN: CPT | Mod: 25

## 2023-10-25 PROCEDURE — 97597 DBRDMT OPN WND 1ST 20 CM/<: CPT

## 2023-10-25 NOTE — PROGRESS NOTES
Home Health: [facility]  Smoker  [x] Yes  [] No   Last A1C: 6.8 on 23  Last CB after breakfast 10/4/23  Celia Tarango  [x] Yes [] No            Results: 0/5 left foot   Doppler done in office? [x] Yes [] No on 23  Posterior tibial: monophasic  Dorsalis pedal: monophasic  Darco Shoe [x] Yes [] No   Date given: does not like to wear  Is patient eligible for HBO [] Yes [x] No  (not a salinas 3)   Is the patient eligible for a graft, and/or currently grafting?  [x] Yes [] No (deductible to high for the patient though)   Right calf: 23.3 cm  Right ankle: 36.2  Left calf: 40 cm  Left ankle: 24 cm       Patient ID: Diane Larsen is a 68 y.o. female.    Chief Complaint: Diabetic Foot Ulcer (Left medial foot - salinas 2)    Subjective:    HPI  23 (Dr. Lassiter) - 68-year-old white female, who was referred here by Dr. Hank Owen, for an open wound on the left plantar surface, left foot.  She is not a diabetic.  She was admitted into the [facility], for cellulitis and abscess of the plantar surface of the left foot, and she underwent an intraoperative debridement.  He is here for follow up of this new wound.  It appears that she has had some chronic venous stasis over the past several months, both lower legs.  This has not been treated in the past, as far as I know.  While she was in the hospital, she underwent an MRI of the left foot, which showed no evidence of osteomyelitis.  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  She has Pes planus.  She also underwent arterial ultrasound on , which showed patent arteries in both lower extremities.  The venous ultrasound was also negative for DVT.  I have reviewed her records from the hospital.  Pt caregiver states that a blister was noticed on her left foot about 2 weeks ago around 23. The pt opened the blister by scratching and by Monday 4/10/23 the caregiver noted that it needed immediate attention. Pt went to ED in [place]  on Monday 4/10/23 and was admitted. Dr. Mckinney debrided the left foot on 4/11/23, she was discharged on 4/14/23. She has a follow up with Dr. Mckinney tomorrow 4/19/23. She is currently taking Augmentin PO. She states that she is not diabetic. Her semmes yudi is 1/10 and her blood sugar was 124. She had venous/arterial ultrasounds done during her hospital stay as well as MRI and xrays    5/2/23 - Ms. Larsen returns to clinic today for followup on the open wound to the left mid foot.  Her history includes the left ankle being broken several months ago which left the left foot somewhat deformed.  The wound was surgically debrided by Dr. Hank Owen.  She stayed for 1 week at the hospital in [place], [place] and went home on oral Augmentin has now completed.  Her PCP is from [facility] and she reports that he has stated that they are not to use compression on the leg.  However, ultrasounds were done in April and they were clear.  The caregiver does have concern that she is unable to ambulate and would like a wheelchair obtained for the patient.  They were advised to go to [employer]  to have the paperwork sent to us for prescription.  However, [facility] called and their insurance does not cover the wheelchair.    Today, the left mid foot was assessed and selectively debrided.  Additionally, she does have several scattered wounds on her bilateral lower legs that will be monitored.  She was given Hibiclens and instructed to wash the lower extremities including the foot with a Hibiclens x3 days.    5/9/23 - the patient returns today for followup with her caregiver present.  In addition to the neuropathic wound on the plantar surface of the left foot she had small scattered wounds on her bilateral lower extremities.  She was instructed to cleanse her wounds on her legs with Hibiclens and she did so.  Today, the majority of those small open areas on the legs are now closed and they look significantly improved.  The  wound on the left foot was selectively debrided and it does remains stable.  Granular tissue is surface level so we will begin applying Endoform to the wound bed with the hopes of developing epithelial tissue.  Of note, the patient is still has not received her wheelchair.  That wheelchair was faxed to her [employer] company of choice and she and the caregiver were instructed that they will need to followup with that company.    5/16/23 - Ms. Larsen returns today for followup in wound check on the wound to the left plantar foot.  This neuropathic wound was selectively debrided today and does remains stable.  We will begin today applying Endoform with the hopes of developing epithelial tissue across the wound bed.  She has an appointment with Doreen Barnes NP, on 05/30/2023 to initiate services  as her PCP because she is not satisfied with her current provider.  She will return to clinic in 1 week for followup.    5/24/23 - the patient returns today for followup on the neuropathic wound to the plantar surface of the left foot.  We began using Endoform at the last visit, however, the patient in her caregiver misunderstood the instructions in removed that product prematurely.  Today, the wound was selectively debrided and we did again place Endoform on the wound to be left in place until Friday.  The wound does appear to be slowly improving.  We will be seeking authorization for grafting if possible.    5/30/23 - the patient returns today for the neuropathic wound to the left foot.  She does report that she fell sometime over the weekend.  She did fall backwards and injured her bottom.  She noted some bruises and pain, however, there are no open wounds.  She did not go to ER.  This morning, her caregiver reports that the patient was found soaked in urine and that urine had dripped down to the wound on the foot.  There does not appear to be any change in the wound.  Today, the patient fell asleep in both the lobby and in the  chair while she was receiving care.  She will be seeing a new provider today, Doreen Barnes NP.  We are hopeful that there may be some medication changes done that will help her in becoming a little more alert.    Today, the wound was assessed and appears to be improving.  A selective debridement was performed and the hyper granulated tissue was chemically cauterized with silver nitrate.  We are still attempting to authorize a graft of some kind.  She does though need 30 days of continuous care so we can hopefully get one of the grafts authorized now.    7/18/23 - Ms. Larsen was last seen in the clinic on 5/30/23. She was admitted to [facility] on 5/31/23 - 6/6/23 with hypotension and low O2 sats. While there it was discovered she had C-Diff. Her caregiver states that she did several rounds of oral antibiotics but is no longer taking anything. She has been using mesalt and silver alginate to dress the wound since being home. She has been diagnosed as a diabetic since her last visit with us. Her A1C on 6/2/23 was 6.8.   Today her wound was assessed in has improved since her last visit.  It was selectively debrided.  There is a light coating of slough over the entire wound bed, therefore Santyl has been ordered for daily application.  Today, we applied mesalt and if she is not able to get Santyl with her insurance she will continue with daily use of mesalt.    Additionally, her caregiver requested that her toenails be trimmed.  Six toenails were debrided off today.    7/25/23 - the patient returns today for followup on a D FU to the left foot.  The wound was assessed and selectively debrided.  The hypergranulated tissue was chemically cauterized using silver nitrate.  The caregiver is still attempting to obtain Santyl that was ordered.  It was initially sent to [facility] "[employer] in [place] who could not get the product.  The Rx was transferred to [facility] in [place]. She is waiting for this to go through so she can see if the  medicine will be covered by the patient's insurance.   The would is improved, however, with the use of Mesalt only.  We will continue that product.  Will will also attempt to get her approved for grafts, preferably Apligraft or Theraskin.    8/9/23 - Ms. Larsen returns today for followup on the D FU to the left midfoot.  She reports that she went to ED on 7/30/23 c/o of being weak and that she got hot being outside in the sun. She was dehydrated and bradycardic. She was discharged on 8/2/23.   She saw Dr. Claros on 8/3/23 to have a halter monitor placed. She will wear this for 2 weeks, it will be d/c on 8/17/23 and she will go to [facility] in [place] on 8/28/23 for those results.   Today her wound was assessed and selectively debrided.  The periwound was quite macerated and she was reminded to be changing the dressings daily.  We are continuing to use Santyl on the wound bed.  There appears to be a thick layer of slough across the wound bed and the patient was reminded that she should be using at least a nickel thick layer of the product covered with mesalt.  When she returns in one week if there is still a layer of slough we will use the ultrasonic debrider.      8/23/23 - The patient returns today for followup on the mid foot diabetic foot ulcer.  She does have pronounced Charcot foot and this disfigured station of the foot causes her multiple problems.  Today, she presents with the wound bed itself improving, however, she had a very thick area of callus surrounding the entire periwound.  The whole area was selectively debrided with a dermal curette.    We will continue to apply Santyl and me salt to this wound.    8/30/23 - Ms. Larsen is seen today for the D FU to her mid foot.  Today again there was a very thick area of callus surrounding the wound.  The wound bed itself looks stable but was in need of a selective debridement.  We discussed the possibility of authorize in grafts, however, she has a 3000 dollar detectable  that has not been met.  We also discussed the possibility hyperbarics and she is considering that option.    9/6/23 - Ms. Larsen returns today with her caregiver.  The wound remains stable although there is again a very thick area of callus surrounding the wound margins.  The wound was selectively debrided.  There was an area of that margin that seemed to have pulled away from the epithelial tissue.  This had to be cleft off which subsequently enlarged the wound substantially.  The patient and I had a lengthy discussion regarding the importance of offloading even when she is in her home.  She does have some health little receive issues and does not fully comprehend the necessity of offloading.  She does have a Darco shoe with PEG assist that she refuses to wear.  We will consider discussion of a total contact cast at her next visit.    9/20/23 - the patient returns today for followup on a D FU to the left mid foot.  Of concern, however, is the swelling and warmth of the left lower extremity.  Although she does not have specific complaints of pain, she is mentally incompetent and therefore her interpretation of her pain level can not be crusted or assumed.  She is being sent to [facility] in [place] to have an ultrasound done to rule out DVT.  Today the wound was selectively debrided.  Again, there was a large amount of callus built around the wound was.  The wound bed itself is clean and epithelial tissue is moving in word.  We will continue with daily application of me salt covered with a 4 x 4 and a gentle border dressing.  She no longer need Santyl at this time so we will hold that product.  Her caregiver was notified that if the ultrasound is negative for DVT that she is to  the prescription for Keflex which was sent to her local pharmacy.  Of note, her caregiver states that she has a follow up on 10/18/23 with Dr. Claros to get results from halter monitor.  They are instructed to call Dr. Claros's office to let them  know about the left leg swelling frequently to see if they can get a sooner appt.     9/27/23 - Ms. Larsen returns to clinic today for follow up on the DFU to the left mid foot.  Again today the wound margin is very thickly callused.  The wound size has not changed significantly.  The wound was selectively debrided and we will begin application of Endoform to the wound bed.  Of note, an U/S was done on the left leg to r/o DVT.  She began taking Keflex on 9/20/23 once that U/S was normal.  She continues to take that medication.  However, today again both legs are extremely edematous.  Bilateral arterial/venous US have been scheduled on 10/9/23 @ 11:30 to address the edema. She does have a f/up appt with [facility] in [place] for her halter monitor results on 10/18/23.    10/4/23 - The patient returns to clinic today for the DFU to the left mid foot.  She and her caregiver report that on Saturday, 9/30/23, the patient stepped on an unknown object while ambulating on her driveway.  This caught the edge of the wound, ripping the skin causing damage.  The wound was assessed today and selectively debrided.  This area of damaged tissue was removed.  The wound does look slightly improved in spite of this injury. We will continue to use Endoform which is assisting with progress.   She is scheduled for an ultrasound on 10/9/23 at [facility]; She has a follow up with cardiology in [place]  on 10/18/23 but does not know the name of the Dr.     10/18/23 - Ms. Larsen returns today with her caregiver for f/up on the DFU to the left mid foot.  Again today there were a thick band of callus surrounding the entire wound.  The wound bed has decreased in size slightly.  It was selectively debrided today and we will continue application of Endoform to the wound bed.  Of note, the patient's caregiver states that the ultrasound appt that was scheduled for 10/9/23 has been rescheduled to 10/23 due to a miscommunication about the time is was scheduled  "for on 10/9/23.   She has a cardiology appt this afternoon    10/25/23 - The patient returns today for f/up on the DFU to the left mid foot.  An U/S had been done on 10/23/23.  Her arterial system is normal.  She does have venous reflux in the left greater saphenous vein.  This generally has not been an issue for the patient and it likely is unrelated to the wound.  However, today she did present with multiple scattered open blisters on the left lower extremity.  Her caregiver reports that these are a results of not using her lotion.  We will monitor and if they do not resolve she will be referred for a vascular consult.   Today the DFU was selectively debrided and remains stable, with little change in size.  She has completed her PO Keflex.  She is scheduled for a pacemaker replacement on 11/2/23.    Review of Systems   Constitutional: Negative.    Respiratory: Negative.     Cardiovascular: Negative.    Skin:         As documented in the HPI.   All other systems reviewed and are negative.        Objective:      Vitals:    10/25/23 1203   BP: (!) 113/56   BP Location: Left arm   Patient Position: Sitting   Pulse: (!) 52   Resp: 19   Weight: 92.5 kg (204 lb)   Height: 5' 7" (1.702 m)      Physical Exam  Vitals reviewed.   Constitutional:       Appearance: Normal appearance.   Cardiovascular:      Rate and Rhythm: Normal rate.   Pulmonary:      Effort: Pulmonary effort is normal.   Skin:     General: Skin is warm and dry.      Comments: DFU left foot   Neurological:      Mental Status: She is alert.            Altered Skin Integrity 04/18/23 1351 Left medial Foot (Active)   04/18/23 1351   Altered Skin Integrity Present on Admission - Did Patient arrive to the hospital with altered skin?: yes   Side: Left   Orientation: medial   Location: Foot   Wound Number:    Is this injury device related?:    Primary Wound Type:    Description of Altered Skin Integrity:    Ankle-Brachial Index:    Pulses:    Removal Indication and " "Assessment:    Wound Outcome:    (Retired) Wound Length (cm):    (Retired) Wound Width (cm):    (Retired) Depth (cm):    Wound Description (Comments):    Removal Indications:    Dressing Appearance Moist drainage 10/25/23 1207   Drainage Amount Moderate 10/25/23 1207   Drainage Characteristics/Odor Serosanguineous 10/25/23 1207   Appearance Moist;Pink;Granulating 10/25/23 1207   Tissue loss description Full thickness 10/25/23 1207   Periwound Area Dry 10/25/23 1207   Wound Edges Callused 10/25/23 1207   Kovacs Classification (diabetic foot ulcers only) Grade 2 10/25/23 1207   Wound Length (cm) 2.6 cm 10/25/23 1207   Wound Width (cm) 2.5 cm 10/25/23 1207   Wound Depth (cm) 0.2 cm 10/25/23 1207   Wound Volume (cm^3) 1.3 cm^3 10/25/23 1207   Wound Surface Area (cm^2) 6.5 cm^2 10/25/23 1207   Care Cleansed with:;Wound cleanser 10/25/23 1207   Dressing Applied;Other (comment) 10/25/23 1207   Dressing Change Due 10/28/23 10/25/23 1207       10/25/23      Left medial foot       Left medial foot - post   Endoform, 4x4 gauze, kerlix, tape   CT      Left lower leg (monitoring only)      Assessment:         ICD-10-CM ICD-9-CM   1. 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 Open wound of plantar aspect of left foot  S91.302A 892.0   3. Charcot's joint of left foot, non-diabetic  M14.672 094.0     713.5   4. Chronic venous stasis  I87.8 459.81   5. Edema, unspecified type  R60.9 782.3   6. Encounter for wound re-check  Z51.89 V58.89   7. Type 2 diabetes mellitus with Charcot's joint of left foot  E11.610 250.60     713.5             Procedures:       Debridement     Date/Time: 10/25/2023 11:20 AM     Performed by: Barbra Perez NP  Authorized by: Barbra Perez NP    Time out: Immediately prior to procedure a "time out" was called to verify the correct patient, procedure, equipment, support staff and site/side marked as required.   "   Consent Done?:  Yes (Verbal)     Preparation: Patient was prepped and draped with clean technique    Local anesthesia used?: No       Wound Details:    Location:  Left foot    Location:  Left Midfoot    Type of Debridement:  Non-excisional       Length (cm):  2.6       Area (sq cm):  6.5       Width (cm):  2.5       Percent Debrided (%):  100       Depth (cm):  0.2       Total Area Debrided (sq cm):  6.5    Depth of debridement:  Epidermis/Dermis    Devitalized tissue debrided:  Biofilm, Callus, Exudate and Fibrin    Instruments:  Curette (Dermal)  Bleeding:  None  Patient tolerance:  Patient tolerated the procedure well with no immediate complications      Excisional debridement performed:  [] Yes [] No   Selective debridement performed:  [x] Yes [] No   Mechanical debridement performed:  [] Yes [] No   Silver nitrate applied :    [] Yes [] No   Labs ordered this visit:   [] Yes [] No   Imaging ordered this visit:   [] Yes [] No   Tissue pathology and/or culture taken:  [] Yes [] No       HOME HEALTH AGENCY:   [facility]     Home Health Services     Since 4/17/2023     [phone]     TIMES PER WEEK/DAYS: 1 x weekly, Fridays only  Patient will come to wound care weekly  Left medial foot wound: Cleanse with wound cleanser, apply endoform, 4x4 gauze, kerlix, and tape to be changed on 10/28/23 and 11/1/23. On 11/4/23, cleanse with wound cleanser, apply silver alginate, 4x4 gauze, kerlix, and tape to be changed daily     Follow up in 2 weeks (on 11/8/2023) for left foot .

## 2023-10-25 NOTE — PROCEDURES
"Debridement    Date/Time: 10/25/2023 11:20 AM    Performed by: Barbra Perez NP  Authorized by: Barbra Perez NP    Time out: Immediately prior to procedure a "time out" was called to verify the correct patient, procedure, equipment, support staff and site/side marked as required.    Consent Done?:  Yes (Verbal)    Preparation: Patient was prepped and draped with clean technique    Local anesthesia used?: No      Wound Details:    Location:  Left foot    Location:  Left Midfoot    Type of Debridement:  Non-excisional       Length (cm):  2.6       Area (sq cm):  6.5       Width (cm):  2.5       Percent Debrided (%):  100       Depth (cm):  0.2       Total Area Debrided (sq cm):  6.5    Depth of debridement:  Epidermis/Dermis    Devitalized tissue debrided:  Biofilm, Callus, Exudate and Fibrin    Instruments:  Curette (Dermal)  Bleeding:  None  Patient tolerance:  Patient tolerated the procedure well with no immediate complications    " Nutrition Care Plan    Nutrition Diagnosis:   Inadequate intake related to decreased appetite, fevers, small cell lung cancer on chemotherapy as evidenced by weight loss of 2.5 kg (3.8%) x2 months with variable intakes prior to admission.    Intervention:  General/healthful diet:  Current diet Cardiac - will liberalize to Regular to optimize intakes  -- Continue to encourage intake as tolerated.   has been bringing in food from outside to improve intake  -- Declines scheduled snacks at this time     Commercial beverage:  Declines trialing supplements.  Has tried Ensure in the past - doesn't really like    Monitoring and Evaluation:  Amount of food:  Goal: patient to consume and tolerate >/= 75% of most meals

## 2023-10-30 PROBLEM — N17.9 AKI (ACUTE KIDNEY INJURY): Status: RESOLVED | Noted: 2023-07-31 | Resolved: 2023-10-30

## 2023-11-02 ENCOUNTER — HOSPITAL ENCOUNTER (OUTPATIENT)
Facility: HOSPITAL | Age: 68
Discharge: HOME | End: 2023-11-02
Attending: INTERNAL MEDICINE | Admitting: INTERNAL MEDICINE
Payer: MEDICARE

## 2023-11-02 VITALS
BODY MASS INDEX: 33.73 KG/M2 | HEIGHT: 66 IN | WEIGHT: 209.88 LBS | RESPIRATION RATE: 13 BRPM | SYSTOLIC BLOOD PRESSURE: 144 MMHG | TEMPERATURE: 98 F | OXYGEN SATURATION: 96 % | DIASTOLIC BLOOD PRESSURE: 80 MMHG | HEART RATE: 64 BPM

## 2023-11-02 DIAGNOSIS — R00.1 BRADYCARDIA, SINUS: ICD-10-CM

## 2023-11-02 DIAGNOSIS — I49.9 ARRHYTHMIA: ICD-10-CM

## 2023-11-02 DIAGNOSIS — I49.5 SSS (SICK SINUS SYNDROME): ICD-10-CM

## 2023-11-02 PROBLEM — I44.30 AV BLOCK: Status: ACTIVE | Noted: 2023-11-02

## 2023-11-02 LAB — POCT GLUCOSE: 99 MG/DL (ref 70–110)

## 2023-11-02 PROCEDURE — 93005 ELECTROCARDIOGRAM TRACING: CPT | Mod: 59

## 2023-11-02 PROCEDURE — 99152 MOD SED SAME PHYS/QHP 5/>YRS: CPT | Performed by: INTERNAL MEDICINE

## 2023-11-02 PROCEDURE — C1898 LEAD, PMKR, OTHER THAN TRANS: HCPCS | Performed by: INTERNAL MEDICINE

## 2023-11-02 PROCEDURE — 63600175 PHARM REV CODE 636 W HCPCS: Performed by: INTERNAL MEDICINE

## 2023-11-02 PROCEDURE — C1785 PMKR, DUAL, RATE-RESP: HCPCS | Performed by: INTERNAL MEDICINE

## 2023-11-02 PROCEDURE — 25500020 PHARM REV CODE 255: Performed by: INTERNAL MEDICINE

## 2023-11-02 PROCEDURE — 25000003 PHARM REV CODE 250: Performed by: INTERNAL MEDICINE

## 2023-11-02 PROCEDURE — 33208 INSRT HEART PM ATRIAL & VENT: CPT | Performed by: INTERNAL MEDICINE

## 2023-11-02 PROCEDURE — 99153 MOD SED SAME PHYS/QHP EA: CPT | Performed by: INTERNAL MEDICINE

## 2023-11-02 DEVICE — PACING LEAD
Type: IMPLANTABLE DEVICE | Site: HEART | Status: FUNCTIONAL
Brand: TENDRIL™

## 2023-11-02 DEVICE — PULSE GENERATOR
Type: IMPLANTABLE DEVICE | Site: HEART | Status: FUNCTIONAL
Brand: ASSURITY MRI™

## 2023-11-02 RX ORDER — ACETAMINOPHEN 325 MG/1
650 TABLET ORAL EVERY 4 HOURS PRN
Status: DISCONTINUED | OUTPATIENT
Start: 2023-11-02 | End: 2023-11-02 | Stop reason: HOSPADM

## 2023-11-02 RX ORDER — CEFAZOLIN SODIUM 1 G/3ML
INJECTION, POWDER, FOR SOLUTION INTRAMUSCULAR; INTRAVENOUS
Status: DISCONTINUED | OUTPATIENT
Start: 2023-11-02 | End: 2023-11-02 | Stop reason: HOSPADM

## 2023-11-02 RX ORDER — SODIUM CHLORIDE 9 MG/ML
INJECTION, SOLUTION INTRAVENOUS CONTINUOUS
Status: ACTIVE | OUTPATIENT
Start: 2023-11-02

## 2023-11-02 RX ORDER — LIDOCAINE HYDROCHLORIDE 10 MG/ML
INJECTION INFILTRATION; PERINEURAL
Status: DISCONTINUED | OUTPATIENT
Start: 2023-11-02 | End: 2023-11-02 | Stop reason: HOSPADM

## 2023-11-02 RX ORDER — MIDAZOLAM HYDROCHLORIDE 1 MG/ML
INJECTION INTRAMUSCULAR; INTRAVENOUS
Status: DISCONTINUED | OUTPATIENT
Start: 2023-11-02 | End: 2023-11-02 | Stop reason: HOSPADM

## 2023-11-02 RX ORDER — MORPHINE SULFATE 4 MG/ML
2 INJECTION, SOLUTION INTRAMUSCULAR; INTRAVENOUS EVERY 4 HOURS PRN
Status: DISCONTINUED | OUTPATIENT
Start: 2023-11-02 | End: 2023-11-02 | Stop reason: HOSPADM

## 2023-11-02 RX ORDER — CEFAZOLIN SODIUM 2 G/50ML
2 SOLUTION INTRAVENOUS ONCE
Status: DISPENSED | OUTPATIENT
Start: 2023-11-02

## 2023-11-02 RX ORDER — HYDROCODONE BITARTRATE AND ACETAMINOPHEN 5; 325 MG/1; MG/1
1 TABLET ORAL EVERY 4 HOURS PRN
Status: DISCONTINUED | OUTPATIENT
Start: 2023-11-02 | End: 2023-11-02 | Stop reason: HOSPADM

## 2023-11-02 RX ORDER — FENTANYL CITRATE 50 UG/ML
INJECTION, SOLUTION INTRAMUSCULAR; INTRAVENOUS
Status: DISCONTINUED | OUTPATIENT
Start: 2023-11-02 | End: 2023-11-02 | Stop reason: HOSPADM

## 2023-11-02 NOTE — DISCHARGE SUMMARY
[facility] - Cath Lab Services  Discharge Note  Short Stay    Procedure(s) (LRB):  INSERTION, CARDIAC PACEMAKER, DUAL CHAMBER (N/A)      OUTCOME: Patient tolerated treatment/procedure well without complication and is now ready for discharge.    DISPOSITION: Home or Self Care    FINAL DIAGNOSIS:  SSS (sick sinus syndrome)    FOLLOWUP: In clinic    DISCHARGE INSTRUCTIONS:  No discharge procedures on file.     TIME SPENT ON DISCHARGE: 15 minutes

## 2023-11-08 ENCOUNTER — HOSPITAL ENCOUNTER (OUTPATIENT)
Dept: WOUND CARE | Facility: HOSPITAL | Age: 68
Discharge: HOME | End: 2023-11-08
Attending: NURSE PRACTITIONER
Payer: MEDICARE

## 2023-11-08 VITALS
BODY MASS INDEX: 33.59 KG/M2 | WEIGHT: 209 LBS | SYSTOLIC BLOOD PRESSURE: 160 MMHG | HEIGHT: 66 IN | RESPIRATION RATE: 20 BRPM | DIASTOLIC BLOOD PRESSURE: 74 MMHG | HEART RATE: 60 BPM

## 2023-11-08 DIAGNOSIS — L23.1 ALLERGIC CONTACT DERMATITIS DUE TO ADHESIVES: ICD-10-CM

## 2023-11-08 DIAGNOSIS — R21 RASH IN ADULT: ICD-10-CM

## 2023-11-08 DIAGNOSIS — L97.525 NON-PRESSURE CHRONIC ULCER OF OTHER PART OF LEFT FOOT WITH MUSCLE INVOLVEMENT WITHOUT EVIDENCE OF NECROSIS: Primary | ICD-10-CM

## 2023-11-08 DIAGNOSIS — I87.8 CHRONIC VENOUS STASIS: ICD-10-CM

## 2023-11-08 DIAGNOSIS — M14.672 CHARCOT'S JOINT OF LEFT FOOT, NON-DIABETIC: ICD-10-CM

## 2023-11-08 DIAGNOSIS — S91.302A OPEN WOUND OF PLANTAR ASPECT OF LEFT FOOT: ICD-10-CM

## 2023-11-08 PROCEDURE — 27000999 HC MEDICAL RECORD PHOTO DOCUMENTATION

## 2023-11-08 PROCEDURE — 99212 OFFICE O/P EST SF 10 MIN: CPT | Mod: 25

## 2023-11-08 PROCEDURE — 97597 DBRDMT OPN WND 1ST 20 CM/<: CPT

## 2023-11-08 RX ORDER — MUPIROCIN 20 MG/G
OINTMENT TOPICAL DAILY
Qty: 30 G | Refills: 1 | Status: SHIPPED | OUTPATIENT
Start: 2023-11-08

## 2023-11-08 RX ORDER — DOXYCYCLINE 100 MG/1
CAPSULE ORAL
COMMUNITY
Start: 2023-11-02 | End: 2023-11-15

## 2023-11-08 RX ORDER — HYDROCODONE BITARTRATE AND ACETAMINOPHEN 5; 325 MG/1; MG/1
TABLET ORAL
COMMUNITY
Start: 2023-11-02

## 2023-11-08 NOTE — PROGRESS NOTES
Home Health: [facility]  Smoker  [x] Yes  [] No   Last A1C: 6.8 on 6/2/23  Last CBG: Did not check today, 11/08/23  Celia Tarango  [x] Yes [] No            Results: 0/5 left foot   Doppler done in office? [x] Yes [] No on 9/20/23  Posterior tibial: monophasic  Dorsalis pedal: monophasic  Darco Shoe [x] Yes [] No   Date given: does not like to wear  Is patient eligible for HBO [] Yes [x] No  (not a salinas 3)   Is the patient eligible for a graft, and/or currently grafting?  [x] Yes [] No (deductible to high for the patient though)   Right calf: 23.3 cm  Right ankle: 36.2  Left calf: 40 cm  Left ankle: 24 cm    --------------------------------------------------------------------------------------------------------------------------------------------------------------------------------------------------------------------------------------------------------------     Patient ID: Diane Larsen is a 68 y.o. female.    Chief Complaint: Diabetic Foot Ulcer (Left medial foot - salinas 2) and Venous Ulcer (Left lower leg - scattered )    Subjective:    HPI  7/18/23 - Ms. Larsen was last seen in the clinic on 5/30/23. She was admitted to [facility] on 5/31/23 - 6/6/23 with hypotension and low O2 sats. While there it was discovered she had C-Diff. Her caregiver states that she did several rounds of oral antibiotics but is no longer taking anything. She has been using mesalt and silver alginate to dress the wound since being home. She has been diagnosed as a diabetic since her last visit with us. Her A1C on 6/2/23 was 6.8.   Today her wound was assessed in has improved since her last visit.  It was selectively debrided.  There is a light coating of slough over the entire wound bed, therefore Santyl has been ordered for daily application.  Today, we applied mesalt and if she is not able to get Santyl with her insurance she will continue with daily use of mesalt.    Additionally, her caregiver requested that her  toenails be trimmed.  Six toenails were debrided off today.    7/25/23 - the patient returns today for followup on a D FU to the left foot.  The wound was assessed and selectively debrided.  The hypergranulated tissue was chemically cauterized using silver nitrate.  The caregiver is still attempting to obtain Santyl that was ordered.  It was initially sent to [facility] in [place] who could not get the product.  The Rx was transferred to [facility] in [place]. She is waiting for this to go through so she can see if the medicine will be covered by the patient's insurance.   The would is improved, however, with the use of Mesalt only.  We will continue that product.  Will will also attempt to get her approved for grafts, preferably Apligraft or Theraskin.    8/9/23 - Ms. Larsen returns today for followup on the D FU to the left midfoot.  She reports that she went to ED on 7/30/23 c/o of being weak and that she got hot being outside in the sun. She was dehydrated and bradycardic. She was discharged on 8/2/23.   She saw Dr. Claros on 8/3/23 to have a halter monitor placed. She will wear this for 2 weeks, it will be d/c on 8/17/23 and she will go to [facility] in [place] on 8/28/23 for those results.   Today her wound was assessed and selectively debrided.  The periwound was quite macerated and she was reminded to be changing the dressings daily.  We are continuing to use Santyl on the wound bed.  There appears to be a thick layer of slough across the wound bed and the patient was reminded that she should be using at least a nickel thick layer of the product covered with mesalt.  When she returns in one week if there is still a layer of slough we will use the ultrasonic debrider.      8/23/23 - The patient returns today for followup on the mid foot diabetic foot ulcer.  She does have pronounced Charcot foot and this disfigured station of the foot causes her multiple problems.  Today, she presents with the wound bed itself  improving, however, she had a very thick area of callus surrounding the entire periwound.  The whole area was selectively debrided with a dermal curette.    We will continue to apply Santyl and me salt to this wound.    8/30/23 - Ms. Larsen is seen today for the D FU to her mid foot.  Today again there was a very thick area of callus surrounding the wound.  The wound bed itself looks stable but was in need of a selective debridement.  We discussed the possibility of authorize in grafts, however, she has a 3000 dollar detectable that has not been met.  We also discussed the possibility hyperbarics and she is considering that option.    9/6/23 - Ms. Larsen returns today with her caregiver.  The wound remains stable although there is again a very thick area of callus surrounding the wound margins.  The wound was selectively debrided.  There was an area of that margin that seemed to have pulled away from the epithelial tissue.  This had to be cleft off which subsequently enlarged the wound substantially.  The patient and I had a lengthy discussion regarding the importance of offloading even when she is in her home.  She does have some health little receive issues and does not fully comprehend the necessity of offloading.  She does have a Darco shoe with PEG assist that she refuses to wear.  We will consider discussion of a total contact cast at her next visit.    9/20/23 - the patient returns today for followup on a D FU to the left mid foot.  Of concern, however, is the swelling and warmth of the left lower extremity.  Although she does not have specific complaints of pain, she is mentally incompetent and therefore her interpretation of her pain level can not be crusted or assumed.  She is being sent to [facility] in [place] to have an ultrasound done to rule out DVT.  Today the wound was selectively debrided.  Again, there was a large amount of callus built around the wound was.  The wound bed itself is clean and epithelial  tissue is moving in word.  We will continue with daily application of me salt covered with a 4 x 4 and a gentle border dressing.  She no longer need Santyl at this time so we will hold that product.  Her caregiver was notified that if the ultrasound is negative for DVT that she is to  the prescription for Keflex which was sent to her local pharmacy.  Of note, her caregiver states that she has a follow up on 10/18/23 with Dr. Claros to get results from halter monitor.  They are instructed to call Dr. Claros's office to let them know about the left leg swelling frequently to see if they can get a sooner appt.     9/27/23 - Ms. Larsen returns to clinic today for follow up on the DFU to the left mid foot.  Again today the wound margin is very thickly callused.  The wound size has not changed significantly.  The wound was selectively debrided and we will begin application of Endoform to the wound bed.  Of note, an U/S was done on the left leg to r/o DVT.  She began taking Keflex on 9/20/23 once that U/S was normal.  She continues to take that medication.  However, today again both legs are extremely edematous.  Bilateral arterial/venous US have been scheduled on 10/9/23 @ 11:30 to address the edema. She does have a f/up appt with [facility] in [place] for her halter monitor results on 10/18/23.    10/4/23 - The patient returns to clinic today for the DFU to the left mid foot.  She and her caregiver report that on Saturday, 9/30/23, the patient stepped on an unknown object while ambulating on her driveway.  This caught the edge of the wound, ripping the skin causing damage.  The wound was assessed today and selectively debrided.  This area of damaged tissue was removed.  The wound does look slightly improved in spite of this injury. We will continue to use Endoform which is assisting with progress.   She is scheduled for an ultrasound on 10/9/23 at [facility]; She has a follow up with cardiology in [place]  on 10/18/23 but does not  know the name of the Dr.     10/18/23 - Ms. Larsen returns today with her caregiver for f/up on the DFU to the left mid foot.  Again today there were a thick band of callus surrounding the entire wound.  The wound bed has decreased in size slightly.  It was selectively debrided today and we will continue application of Endoform to the wound bed.  Of note, the patient's caregiver states that the ultrasound appt that was scheduled for 10/9/23 has been rescheduled to 10/23 due to a miscommunication about the time is was scheduled for on 10/9/23.   She has a cardiology appt this afternoon    10/25/23 - The patient returns today for f/up on the DFU to the left mid foot.  An U/S had been done on 10/23/23.  Her arterial system is normal.  She does have venous reflux in the left greater saphenous vein.  This generally has not been an issue for the patient and it likely is unrelated to the wound.  However, today she did present with multiple scattered open blisters on the left lower extremity.  Her caregiver reports that these are a results of not using her lotion.  We will monitor and if they do not resolve she will be referred for a vascular consult.   Today the DFU was selectively debrided and remains stable, with little change in size.  She has completed her PO Keflex.  She is scheduled for a pacemaker replacement on 11/2/23.    11/8/23 - Ms. Larsen returns to clinic today for followup on the D FU to the left mid foot.  She did have a cardiovascular pacemaker placed on 11/02-23 by Dr. Hancock.  She was started on doxycycline following that procedure.  Today, she presents with a rash on several parts of her body.  After assessment and consideration of the placement of the rashes, it is likely due to the adhesive used to perform the EKG as there are 2 areas of rash on the back as well as areas under the breast.  She was instructed to begin application of Calmoseptine which was provided on all areas of the rash.  She also  "presents with multiple scattered fibrin covered wounds on the left lower extremity.  Her caretaker reports that she has been out of Aquaphor and has likely been scratching these areas.  Were cleaned up and we will continue to monitor them.  Mupirocin was called out for application to these very small open areas.  The large D FU to the left mid foot was selectively debrided.  It again had a large area of hyperkeratotic tissue at the wound margin.  This was selectively debrided off and the wound is clean with no signs and symptoms of infection.      Review of Systems   Constitutional: Negative.    Respiratory: Negative.     Cardiovascular: Negative.    Skin:         As documented in the HPI.   All other systems reviewed and are negative.        Objective:      Vitals:    11/08/23 1127   BP: (!) 160/74   BP Location: Right arm   Patient Position: Sitting   Pulse: 60   Resp: 20   Weight: 94.8 kg (209 lb)   Height: 5' 6" (1.676 m)      Physical Exam  Vitals reviewed.   Constitutional:       Appearance: Normal appearance.   Cardiovascular:      Rate and Rhythm: Normal rate.   Pulmonary:      Effort: Pulmonary effort is normal.   Skin:     General: Skin is warm and dry.      Comments: DFU left foot   Neurological:      Mental Status: She is alert.            Altered Skin Integrity 04/18/23 1351 Left medial Foot (Active)   04/18/23 1351   Altered Skin Integrity Present on Admission - Did Patient arrive to the hospital with altered skin?: yes   Side: Left   Orientation: medial   Location: Foot   Wound Number:    Is this injury device related?:    Primary Wound Type:    Description of Altered Skin Integrity:    Ankle-Brachial Index:    Pulses:    Removal Indication and Assessment:    Wound Outcome:    (Retired) Wound Length (cm):    (Retired) Wound Width (cm):    (Retired) Depth (cm):    Wound Description (Comments):    Removal Indications:    Dressing Appearance Moist drainage 11/08/23 1128   Drainage Amount Moderate " 11/08/23 1128   Drainage Characteristics/Odor Serosanguineous 11/08/23 1128   Appearance Pink;Moist 11/08/23 1128   Tissue loss description Full thickness 11/08/23 1128   Periwound Area Intact 11/08/23 1128   Wound Edges Open;Callused 11/08/23 1128   Kovacs Classification (diabetic foot ulcers only) Grade 2 11/08/23 1128   Wound Length (cm) 2.5 cm 11/08/23 1128   Wound Width (cm) 2.3 cm 11/08/23 1128   Wound Depth (cm) 0.2 cm 11/08/23 1128   Wound Volume (cm^3) 1.15 cm^3 11/08/23 1128   Wound Surface Area (cm^2) 5.75 cm^2 11/08/23 1128   Care Cleansed with:;Wound cleanser 11/08/23 1128   Dressing Applied 11/08/23 1128   Dressing Change Due 11/08/23 11/08/23 1128   10/25/23      11/08/23      Left medial foot - pre carmen                            Left medial foot - post carmen.   (Endoform, 4x4 gauze, kerlix, tape)       Left lower leg (monitoring)                          Left lower leg (monitoring)   (NICOLAS)       Right upper back - rash                               Left upper abdomen - rash   (Calmoseptine)   ML  Assessment:           ICD-10-CM ICD-9-CM   1. 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 Open wound of plantar aspect of left foot  S91.302A 892.0   3. Charcot's joint of left foot, non-diabetic  M14.672 094.0     713.5   4. Chronic venous stasis  I87.8 459.81   5. Rash in adult  R21 782.1   6. Allergic contact dermatitis due to adhesives  L23.1 692.4           Procedures:     Debridement     Date/Time: 11/8/2023 11:00 AM     Performed by: Barbra Perez NP  Authorized by: Barbra Perez NP    Consent Done?:  Yes (Verbal)     Preparation: Patient was prepped and draped with clean technique       Wound Details:    Location:  Left foot    Location:  Left Midfoot    Type of Debridement:  Non-excisional       Length (cm):  2.5       Area (sq cm):  5.75       Width (cm):  2.3       Percent Debrided (%):  100       Depth  (cm):  0.2       Total Area Debrided (sq cm):  5.75    Depth of debridement:  Epidermis/Dermis    Devitalized tissue debrided:  Biofilm, Callus, Exudate and Fibrin    Instruments:  Curette (Dermal)  Bleeding:  Minimal  Hemostasis Achieved: Yes  Method Used:  Pressure  Patient tolerance:  Patient tolerated the procedure well with no immediate complications       Excisional debridement performed:  [] Yes [] No   Selective debridement performed:  [x] Yes [] No   Mechanical debridement performed:  [] Yes [] No   Silver nitrate applied :    [] Yes [] No   Labs ordered this visit:   [] Yes [] No   Imaging ordered this visit:   [] Yes [] No   Tissue pathology and/or culture taken:  [] Yes [] No           HOME HEALTH AGENCY:   [facility]     Home Health Services     Since 4/17/2023     [phone]     TIMES PER WEEK/DAYS: 1 x weekly, Fridays only  Left medial foot wound: Cleanse with wound cleanser and apply endoform to the wound bed, cover with 4x4 gauze and wrap foot lightly in kerlix using tape to secure - to be changed every other day.       Follow up in about 1 week (around 11/15/2023).

## 2023-11-08 NOTE — PROCEDURES
Debridement    Date/Time: 11/8/2023 11:00 AM    Performed by: Barbra Perez NP  Authorized by: Barbra Perez NP    Consent Done?:  Yes (Verbal)    Preparation: Patient was prepped and draped with clean technique      Wound Details:    Location:  Left foot    Location:  Left Midfoot    Type of Debridement:  Non-excisional       Length (cm):  2.5       Area (sq cm):  5.75       Width (cm):  2.3       Percent Debrided (%):  100       Depth (cm):  0.2       Total Area Debrided (sq cm):  5.75    Depth of debridement:  Epidermis/Dermis    Devitalized tissue debrided:  Biofilm, Callus, Exudate and Fibrin    Instruments:  Curette (Dermal)  Bleeding:  Minimal  Hemostasis Achieved: Yes  Method Used:  Pressure  Patient tolerance:  Patient tolerated the procedure well with no immediate complications

## 2023-11-15 ENCOUNTER — HOSPITAL ENCOUNTER (OUTPATIENT)
Dept: WOUND CARE | Facility: HOSPITAL | Age: 68
Discharge: HOME | End: 2023-11-15
Attending: NURSE PRACTITIONER
Payer: MEDICARE

## 2023-11-15 VITALS
SYSTOLIC BLOOD PRESSURE: 151 MMHG | HEIGHT: 66 IN | DIASTOLIC BLOOD PRESSURE: 67 MMHG | BODY MASS INDEX: 33.59 KG/M2 | HEART RATE: 60 BPM | RESPIRATION RATE: 19 BRPM | WEIGHT: 209 LBS

## 2023-11-15 DIAGNOSIS — R60.9 EDEMA, UNSPECIFIED TYPE: ICD-10-CM

## 2023-11-15 DIAGNOSIS — S91.302A OPEN WOUND OF PLANTAR ASPECT OF LEFT FOOT: ICD-10-CM

## 2023-11-15 DIAGNOSIS — I87.8 CHRONIC VENOUS STASIS: ICD-10-CM

## 2023-11-15 DIAGNOSIS — L23.1 ALLERGIC CONTACT DERMATITIS DUE TO ADHESIVES: ICD-10-CM

## 2023-11-15 DIAGNOSIS — M14.672 CHARCOT'S JOINT OF LEFT FOOT, NON-DIABETIC: ICD-10-CM

## 2023-11-15 DIAGNOSIS — E11.610 TYPE 2 DIABETES MELLITUS WITH CHARCOT'S JOINT OF LEFT FOOT: ICD-10-CM

## 2023-11-15 DIAGNOSIS — R21 RASH IN ADULT: ICD-10-CM

## 2023-11-15 DIAGNOSIS — L97.525 NON-PRESSURE CHRONIC ULCER OF OTHER PART OF LEFT FOOT WITH MUSCLE INVOLVEMENT WITHOUT EVIDENCE OF NECROSIS: Primary | ICD-10-CM

## 2023-11-15 PROCEDURE — 27000999 HC MEDICAL RECORD PHOTO DOCUMENTATION

## 2023-11-15 PROCEDURE — 99212 OFFICE O/P EST SF 10 MIN: CPT | Mod: 25

## 2023-11-15 PROCEDURE — 97597 DBRDMT OPN WND 1ST 20 CM/<: CPT

## 2023-11-15 RX ORDER — VALACYCLOVIR HYDROCHLORIDE 1 G/1
TABLET, FILM COATED ORAL
COMMUNITY
Start: 2023-11-08 | End: 2024-01-08

## 2023-11-15 NOTE — PROGRESS NOTES
[facility]  Smoker  [x] Yes  [] No   Last A1C: 6.8 on 6/2/23  Last CBG: 160's-200, 11/14/23  Celia Tarango  [x] Yes [] No            Results: 0/5 left foot   Doppler done in office? [x] Yes [] No on 9/20/23  Posterior tibial: monophasic  Dorsalis pedal: monophasic  Darco Shoe [x] Yes [] No   Date given: does not like to wear  Is patient eligible for HBO [] Yes [x] No  (not a [place] 3)   Is the patient eligible for a graft, and/or currently grafting?  [x] Yes [] No (deductible to high for the patient though)   Right calf: 23.3 cm  Right ankle: 36.2  Left calf: 40 cm  Left ankle: 24 cm    --------------------------------------------------------------------------------------------------------------------------------------------------------------------------------------------------------------------------------------------------------------     Patient ID: Diane Larsen is a 68 y.o. female.    Chief Complaint: Diabetic Foot Ulcer (Left medial foot - salinas 2)    Subjective:    HPI  7/18/23 - Ms. Larsen was last seen in the clinic on 5/30/23. She was admitted to [facility] on 5/31/23 - 6/6/23 with hypotension and low O2 sats. While there it was discovered she had C-Diff. Her caregiver states that she did several rounds of oral antibiotics but is no longer taking anything. She has been using mesalt and silver alginate to dress the wound since being home. She has been diagnosed as a diabetic since her last visit with us. Her A1C on 6/2/23 was 6.8.   Today her wound was assessed in has improved since her last visit.  It was selectively debrided.  There is a light coating of slough over the entire wound bed, therefore Santyl has been ordered for daily application.  Today, we applied mesalt and if she is not able to get Santyl with her insurance she will continue with daily use of mesalt.    Additionally, her caregiver requested that her toenails be trimmed.  Six toenails were debrided off  today.    7/25/23 - the patient returns today for followup on a D FU to the left foot.  The wound was assessed and selectively debrided.  The hypergranulated tissue was chemically cauterized using silver nitrate.  The caregiver is still attempting to obtain Santyl that was ordered.  It was initially sent to [facility] in [place] who could not get the product.  The Rx was transferred to [facility] in [place]. She is waiting for this to go through so she can see if the medicine will be covered by the patient's insurance.   The would is improved, however, with the use of Mesalt only.  We will continue that product.  Will will also attempt to get her approved for grafts, preferably Apligraft or Theraskin.    8/9/23 - Ms. Larsen returns today for followup on the D FU to the left midfoot.  She reports that she went to ED on 7/30/23 c/o of being weak and that she got hot being outside in the sun. She was dehydrated and bradycardic. She was discharged on 8/2/23.   She saw Dr. Claros on 8/3/23 to have a halter monitor placed. She will wear this for 2 weeks, it will be d/c on 8/17/23 and she will go to [facility] in [place] on 8/28/23 for those results.   Today her wound was assessed and selectively debrided.  The periwound was quite macerated and she was reminded to be changing the dressings daily.  We are continuing to use Santyl on the wound bed.  There appears to be a thick layer of slough across the wound bed and the patient was reminded that she should be using at least a nickel thick layer of the product covered with mesalt.  When she returns in one week if there is still a layer of slough we will use the ultrasonic debrider.      8/23/23 - The patient returns today for followup on the mid foot diabetic foot ulcer.  She does have pronounced Charcot foot and this disfigured station of the foot causes her multiple problems.  Today, she presents with the wound bed itself improving, however, she had a very thick area of callus  surrounding the entire periwound.  The whole area was selectively debrided with a dermal curette.    We will continue to apply Santyl and me salt to this wound.    8/30/23 - Ms. Larsen is seen today for the D FU to her mid foot.  Today again there was a very thick area of callus surrounding the wound.  The wound bed itself looks stable but was in need of a selective debridement.  We discussed the possibility of authorize in grafts, however, she has a 3000 dollar detectable that has not been met.  We also discussed the possibility hyperbarics and she is considering that option.    9/6/23 - Ms. Larsen returns today with her caregiver.  The wound remains stable although there is again a very thick area of callus surrounding the wound margins.  The wound was selectively debrided.  There was an area of that margin that seemed to have pulled away from the epithelial tissue.  This had to be cleft off which subsequently enlarged the wound substantially.  The patient and I had a lengthy discussion regarding the importance of offloading even when she is in her home.  She does have some health little receive issues and does not fully comprehend the necessity of offloading.  She does have a Darco shoe with PEG assist that she refuses to wear.  We will consider discussion of a total contact cast at her next visit.    9/20/23 - the patient returns today for followup on a D FU to the left mid foot.  Of concern, however, is the swelling and warmth of the left lower extremity.  Although she does not have specific complaints of pain, she is mentally incompetent and therefore her interpretation of her pain level can not be crusted or assumed.  She is being sent to [facility] in [place] to have an ultrasound done to rule out DVT.  Today the wound was selectively debrided.  Again, there was a large amount of callus built around the wound was.  The wound bed itself is clean and epithelial tissue is moving in word.  We will continue with daily  application of me salt covered with a 4 x 4 and a gentle border dressing.  She no longer need Santyl at this time so we will hold that product.  Her caregiver was notified that if the ultrasound is negative for DVT that she is to  the prescription for Keflex which was sent to her local pharmacy.  Of note, her caregiver states that she has a follow up on 10/18/23 with Dr. Claros to get results from halter monitor.  They are instructed to call Dr. Claros's office to let them know about the left leg swelling frequently to see if they can get a sooner appt.     9/27/23 - Ms. Larsen returns to clinic today for follow up on the DFU to the left mid foot.  Again today the wound margin is very thickly callused.  The wound size has not changed significantly.  The wound was selectively debrided and we will begin application of Endoform to the wound bed.  Of note, an U/S was done on the left leg to r/o DVT.  She began taking Keflex on 9/20/23 once that U/S was normal.  She continues to take that medication.  However, today again both legs are extremely edematous.  Bilateral arterial/venous US have been scheduled on 10/9/23 @ 11:30 to address the edema. She does have a f/up appt with [facility] in [place] for her halter monitor results on 10/18/23.    10/4/23 - The patient returns to clinic today for the DFU to the left mid foot.  She and her caregiver report that on Saturday, 9/30/23, the patient stepped on an unknown object while ambulating on her driveway.  This caught the edge of the wound, ripping the skin causing damage.  The wound was assessed today and selectively debrided.  This area of damaged tissue was removed.  The wound does look slightly improved in spite of this injury. We will continue to use Endoform which is assisting with progress.   She is scheduled for an ultrasound on 10/9/23 at [facility]; She has a follow up with cardiology in [place]  on 10/18/23 but does not know the name of the Dr.     10/18/23 - Ms. Larsen  returns today with her caregiver for f/up on the DFU to the left mid foot.  Again today there were a thick band of callus surrounding the entire wound.  The wound bed has decreased in size slightly.  It was selectively debrided today and we will continue application of Endoform to the wound bed.  Of note, the patient's caregiver states that the ultrasound appt that was scheduled for 10/9/23 has been rescheduled to 10/23 due to a miscommunication about the time is was scheduled for on 10/9/23.   She has a cardiology appt this afternoon    10/25/23 - The patient returns today for f/up on the DFU to the left mid foot.  An U/S had been done on 10/23/23.  Her arterial system is normal.  She does have venous reflux in the left greater saphenous vein.  This generally has not been an issue for the patient and it likely is unrelated to the wound.  However, today she did present with multiple scattered open blisters on the left lower extremity.  Her caregiver reports that these are a results of not using her lotion.  We will monitor and if they do not resolve she will be referred for a vascular consult.   Today the DFU was selectively debrided and remains stable, with little change in size.  She has completed her PO Keflex.  She is scheduled for a pacemaker replacement on 11/2/23.    11/8/23 - Ms. Larsen returns to clinic today for followup on the D FU to the left mid foot.  She did have a cardiovascular pacemaker placed on 11/02-23 by Dr. Hancock.  She was started on doxycycline following that procedure.  Today, she presents with a rash on several parts of her body.  After assessment and consideration of the placement of the rashes, it is likely due to the adhesive used to perform the EKG as there are 2 areas of rash on the back as well as areas under the breast.  She was instructed to begin application of Calmoseptine which was provided on all areas of the rash.  She also presents with multiple scattered fibrin covered wounds  "on the left lower extremity.  Her caretaker reports that she has been out of Aquaphor and has likely been scratching these areas.  Were cleaned up and we will continue to monitor them.  Mupirocin was called out for application to these very small open areas.  The large D FU to the left mid foot was selectively debrided.  It again had a large area of hyperkeratotic tissue at the wound margin.  This was selectively debrided off and the wound is clean with no signs and symptoms of infection.    11/15/23 - The patient returns today for f/up on the DFU to the left mid foot.  Today there was an impressive decrease in callus around the wound margin.  She reports that she has been using her wheelchair much more.  That wound was selectively debrided.  She also had a rash at her last visit which had cleared up with the use of Calmoseptine.  The scattered areas on the left lower extremity are also resolved with the use of Aquaphor.  She will return to clinic in two weeks.      Review of Systems   Constitutional: Negative.    Respiratory: Negative.     Cardiovascular: Negative.    Skin:         As documented in the HPI.   All other systems reviewed and are negative.        Objective:      Vitals:    11/15/23 1101   BP: (!) 151/67   Pulse: 60   Resp: 19   Weight: 94.8 kg (209 lb)   Height: 5' 6" (1.676 m)      Physical Exam  Vitals reviewed.   Constitutional:       Appearance: Normal appearance.   Cardiovascular:      Rate and Rhythm: Normal rate.   Pulmonary:      Effort: Pulmonary effort is normal.   Skin:     General: Skin is warm and dry.      Comments: DFU left foot   Neurological:      Mental Status: She is alert.            Altered Skin Integrity 04/18/23 1351 Left medial Foot (Active)   04/18/23 1351   Altered Skin Integrity Present on Admission - Did Patient arrive to the hospital with altered skin?: yes   Side: Left   Orientation: medial   Location: Foot   Wound Number:    Is this injury device related?:    Primary " Wound Type:    Description of Altered Skin Integrity:    Ankle-Brachial Index:    Pulses:    Removal Indication and Assessment:    Wound Outcome:    (Retired) Wound Length (cm):    (Retired) Wound Width (cm):    (Retired) Depth (cm):    Wound Description (Comments):    Removal Indications:    Description of Altered Skin Integrity Full thickness tissue loss. Subcutaneous fat may be visible but bone, tendon or muscle are not exposed 11/15/23 1102   Dressing Appearance Moist drainage;Intact 11/15/23 1102   Drainage Amount Moderate 11/15/23 1102   Drainage Characteristics/Odor Serosanguineous 11/15/23 1102   Appearance Intact;Granulating;Epithelialization;Moist 11/15/23 1102   Tissue loss description Full thickness 11/15/23 1102   Periwound Area Intact;Dry 11/15/23 1102   Wound Edges Callused;Defined 11/15/23 1102   Kovacs Classification (diabetic foot ulcers only) Grade 2 11/15/23 1102   Wound Length (cm) 2.5 cm 11/15/23 1102   Wound Width (cm) 2 cm 11/15/23 1102   Wound Depth (cm) 0.2 cm 11/15/23 1102   Wound Volume (cm^3) 1 cm^3 11/15/23 1102   Wound Surface Area (cm^2) 5 cm^2 11/15/23 1102   Care Cleansed with:;Other (see comments) 11/15/23 1102   Dressing Applied;Other (comment) 11/15/23 1102   Dressing Change Due 11/16/23 11/15/23 1102     11/15/23      Left medial foot - pre carmen                            Left medial foot - post carmen.   Endoform, 4x4 gauze, kerlix, tape, F tubigrip       Left lower leg (rash)- Monitor  NICOLAS, bandaid  Assessment:           ICD-10-CM ICD-9-CM   1. 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 Open wound of plantar aspect of left foot  S91.302A 892.0   3. Charcot's joint of left foot, non-diabetic  M14.672 094.0     713.5   4. Chronic venous stasis  I87.8 459.81   5. Rash in adult  R21 782.1   6. Allergic contact dermatitis due to adhesives  L23.1 692.4   7. Edema, unspecified type  R60.9 782.3   8. Type  "2 diabetes mellitus with Charcot's joint of left foot  E11.610 250.60     713.5             Procedures:        Debridement     Date/Time: 11/15/2023 10:25 AM     Performed by: Barbra Perez NP  Authorized by: Barbra Perez NP    Time out: Immediately prior to procedure a "time out" was called to verify the correct patient, procedure, equipment, support staff and site/side marked as required.        Preparation: Patient was prepped and draped with clean technique    Local anesthesia used?: No       Wound Details:    Location:  Left foot    Location:  Left Midfoot    Type of Debridement:  Non-excisional       Length (cm):  2.5       Area (sq cm):  5       Width (cm):  2       Percent Debrided (%):  100       Depth (cm):  0.2       Total Area Debrided (sq cm):  5    Depth of debridement:  Epidermis/Dermis    Devitalized tissue debrided:  Biofilm, Callus, Clots, Exudate and Fibrin    Instruments:  Curette (Dermal)  Bleeding:  None       Excisional debridement performed:  [] Yes [] No   Selective debridement performed:  [x] Yes [] No    Mechanical debridement performed:  [] Yes [] No   Silver nitrate application          [] Yes [] No   Labs ordered this visit:   [] Yes [] No   Imaging ordered this visit:   [] Yes [] No   Tissue pathology and/or culture taken:  [] Yes [] No           HOME HEALTH AGENCY:   [facility]     Home Health Services     Since 4/17/2023     [phone]     TIMES PER WEEK/DAYS: 1 x weekly, Fridays only  Left medial foot wound: Cleanse with wound cleanser and apply endoform to the wound bed, cover with 4x4 gauze and wrap foot lightly in kerlix using tape to secure - to be changed every other day.       Follow up in about 13 days (around 11/28/2023).          "

## 2023-11-15 NOTE — PROCEDURES
"Debridement    Date/Time: 11/15/2023 10:25 AM    Performed by: Barbra Perez NP  Authorized by: Barbra Perez NP    Time out: Immediately prior to procedure a "time out" was called to verify the correct patient, procedure, equipment, support staff and site/side marked as required.      Preparation: Patient was prepped and draped with clean technique    Local anesthesia used?: No      Wound Details:    Location:  Left foot    Location:  Left Midfoot    Type of Debridement:  Non-excisional       Length (cm):  2.5       Area (sq cm):  5       Width (cm):  2       Percent Debrided (%):  100       Depth (cm):  0.2       Total Area Debrided (sq cm):  5    Depth of debridement:  Epidermis/Dermis    Devitalized tissue debrided:  Biofilm, Callus, Clots, Exudate and Fibrin    Instruments:  Curette (Dermal)  Bleeding:  None    "

## 2023-12-05 ENCOUNTER — HOSPITAL ENCOUNTER (OUTPATIENT)
Dept: WOUND CARE | Facility: HOSPITAL | Age: 68
Discharge: HOME | End: 2023-12-05
Attending: NURSE PRACTITIONER
Payer: MEDICARE

## 2023-12-05 VITALS
HEIGHT: 66 IN | BODY MASS INDEX: 33.59 KG/M2 | WEIGHT: 209 LBS | DIASTOLIC BLOOD PRESSURE: 89 MMHG | HEART RATE: 64 BPM | RESPIRATION RATE: 18 BRPM | SYSTOLIC BLOOD PRESSURE: 172 MMHG

## 2023-12-05 DIAGNOSIS — S91.302A OPEN WOUND OF PLANTAR ASPECT OF LEFT FOOT: ICD-10-CM

## 2023-12-05 DIAGNOSIS — I87.8 CHRONIC VENOUS STASIS: ICD-10-CM

## 2023-12-05 DIAGNOSIS — L97.525 NON-PRESSURE CHRONIC ULCER OF OTHER PART OF LEFT FOOT WITH MUSCLE INVOLVEMENT WITHOUT EVIDENCE OF NECROSIS: Primary | ICD-10-CM

## 2023-12-05 DIAGNOSIS — M14.672 CHARCOT'S JOINT OF LEFT FOOT, NON-DIABETIC: ICD-10-CM

## 2023-12-05 PROCEDURE — 97597 DBRDMT OPN WND 1ST 20 CM/<: CPT | Mod: 59

## 2023-12-05 PROCEDURE — 27000999 HC MEDICAL RECORD PHOTO DOCUMENTATION

## 2023-12-05 PROCEDURE — 17250 CHEM CAUT OF GRANLTJ TISSUE: CPT | Mod: 59

## 2023-12-05 PROCEDURE — 99215 OFFICE O/P EST HI 40 MIN: CPT | Mod: 25

## 2023-12-05 NOTE — PROGRESS NOTES
Home Health: [facility]  Smoker  [x] Yes  [] No   Last A1C: 6.8 on 23  Last CB-250, 23  Celia Tarango  [x] Yes [] No            Results: 0/5 left foot   Doppler done in office? [x] Yes [] No on 23  Posterior tibial: monophasic  Dorsalis pedal: monophasic  Darco Shoe [x] Yes [] No   Date given: does not like to wear  Is patient eligible for HBO [] Yes [x] No  (not a [place] 3)   Is the patient eligible for a graft, and/or currently grafting?  [x] Yes [] No (deductible to high for the patient though)   Right calf: 35.8 cm  Right ankle: 22.5 cm  Left calf: 41.6 cm  Left ankle: 24 cm    --------------------------------------------------------------------------------------------------------------------------------------------------------------------------------------------------------------------------------------------------------------     Patient ID: Diane Larsen is a 68 y.o. female.    Chief Complaint: Diabetic Foot Ulcer ((Left medial foot - salinas 2)    Subjective:    HPI  23 - Ms. Larsen was last seen in the clinic on 23. She was admitted to [facility] on 23 - 23 with hypotension and low O2 sats. While there it was discovered she had C-Diff. Her caregiver states that she did several rounds of oral antibiotics but is no longer taking anything. She has been using mesalt and silver alginate to dress the wound since being home. She has been diagnosed as a diabetic since her last visit with us. Her A1C on 23 was 6.8.   Today her wound was assessed in has improved since her last visit.  It was selectively debrided.  There is a light coating of slough over the entire wound bed, therefore Santyl has been ordered for daily application.  Today, we applied mesalt and if she is not able to get Santyl with her insurance she will continue with daily use of mesalt.    Additionally, her caregiver requested that her toenails be trimmed.  Six toenails were debrided off  today.    7/25/23 - the patient returns today for followup on a D FU to the left foot.  The wound was assessed and selectively debrided.  The hypergranulated tissue was chemically cauterized using silver nitrate.  The caregiver is still attempting to obtain Santyl that was ordered.  It was initially sent to [facility] in [place] who could not get the product.  The Rx was transferred to [facility] in [place]. She is waiting for this to go through so she can see if the medicine will be covered by the patient's insurance.   The would is improved, however, with the use of Mesalt only.  We will continue that product.  Will will also attempt to get her approved for grafts, preferably Apligraft or Theraskin.    8/9/23 - Ms. Larsen returns today for followup on the D FU to the left midfoot.  She reports that she went to ED on 7/30/23 c/o of being weak and that she got hot being outside in the sun. She was dehydrated and bradycardic. She was discharged on 8/2/23.   She saw Dr. Claros on 8/3/23 to have a halter monitor placed. She will wear this for 2 weeks, it will be d/c on 8/17/23 and she will go to [facility] in [place] on 8/28/23 for those results.   Today her wound was assessed and selectively debrided.  The periwound was quite macerated and she was reminded to be changing the dressings daily.  We are continuing to use Santyl on the wound bed.  There appears to be a thick layer of slough across the wound bed and the patient was reminded that she should be using at least a nickel thick layer of the product covered with mesalt.  When she returns in one week if there is still a layer of slough we will use the ultrasonic debrider.      8/23/23 - The patient returns today for followup on the mid foot diabetic foot ulcer.  She does have pronounced Charcot foot and this disfigured station of the foot causes her multiple problems.  Today, she presents with the wound bed itself improving, however, she had a very thick area of callus  surrounding the entire periwound.  The whole area was selectively debrided with a dermal curette.    We will continue to apply Santyl and me salt to this wound.    8/30/23 - Ms. Larsen is seen today for the D FU to her mid foot.  Today again there was a very thick area of callus surrounding the wound.  The wound bed itself looks stable but was in need of a selective debridement.  We discussed the possibility of authorize in grafts, however, she has a 3000 dollar detectable that has not been met.  We also discussed the possibility hyperbarics and she is considering that option.    9/6/23 - Ms. Larsen returns today with her caregiver.  The wound remains stable although there is again a very thick area of callus surrounding the wound margins.  The wound was selectively debrided.  There was an area of that margin that seemed to have pulled away from the epithelial tissue.  This had to be cleft off which subsequently enlarged the wound substantially.  The patient and I had a lengthy discussion regarding the importance of offloading even when she is in her home.  She does have some health little receive issues and does not fully comprehend the necessity of offloading.  She does have a Darco shoe with PEG assist that she refuses to wear.  We will consider discussion of a total contact cast at her next visit.    9/20/23 - the patient returns today for followup on a D FU to the left mid foot.  Of concern, however, is the swelling and warmth of the left lower extremity.  Although she does not have specific complaints of pain, she is mentally incompetent and therefore her interpretation of her pain level can not be crusted or assumed.  She is being sent to [facility] in [place] to have an ultrasound done to rule out DVT.  Today the wound was selectively debrided.  Again, there was a large amount of callus built around the wound was.  The wound bed itself is clean and epithelial tissue is moving in word.  We will continue with daily  application of me salt covered with a 4 x 4 and a gentle border dressing.  She no longer need Santyl at this time so we will hold that product.  Her caregiver was notified that if the ultrasound is negative for DVT that she is to  the prescription for Keflex which was sent to her local pharmacy.  Of note, her caregiver states that she has a follow up on 10/18/23 with Dr. Claros to get results from halter monitor.  They are instructed to call Dr. Claros's office to let them know about the left leg swelling frequently to see if they can get a sooner appt.     9/27/23 - Ms. Larsen returns to clinic today for follow up on the DFU to the left mid foot.  Again today the wound margin is very thickly callused.  The wound size has not changed significantly.  The wound was selectively debrided and we will begin application of Endoform to the wound bed.  Of note, an U/S was done on the left leg to r/o DVT.  She began taking Keflex on 9/20/23 once that U/S was normal.  She continues to take that medication.  However, today again both legs are extremely edematous.  Bilateral arterial/venous US have been scheduled on 10/9/23 @ 11:30 to address the edema. She does have a f/up appt with [facility] in [place] for her halter monitor results on 10/18/23.    10/4/23 - The patient returns to clinic today for the DFU to the left mid foot.  She and her caregiver report that on Saturday, 9/30/23, the patient stepped on an unknown object while ambulating on her driveway.  This caught the edge of the wound, ripping the skin causing damage.  The wound was assessed today and selectively debrided.  This area of damaged tissue was removed.  The wound does look slightly improved in spite of this injury. We will continue to use Endoform which is assisting with progress.   She is scheduled for an ultrasound on 10/9/23 at [facility] [place]; She has a follow up with cardiology in [place]  on 10/18/23 but does not know the name of the Dr.     10/18/23 - Ms. Larsen  returns today with her caregiver for f/up on the DFU to the left mid foot.  Again today there were a thick band of callus surrounding the entire wound.  The wound bed has decreased in size slightly.  It was selectively debrided today and we will continue application of Endoform to the wound bed.  Of note, the patient's caregiver states that the ultrasound appt that was scheduled for 10/9/23 has been rescheduled to 10/23 due to a miscommunication about the time is was scheduled for on 10/9/23.   She has a cardiology appt this afternoon    10/25/23 - The patient returns today for f/up on the DFU to the left mid foot.  An U/S had been done on 10/23/23.  Her arterial system is normal.  She does have venous reflux in the left greater saphenous vein.  This generally has not been an issue for the patient and it likely is unrelated to the wound.  However, today she did present with multiple scattered open blisters on the left lower extremity.  Her caregiver reports that these are a results of not using her lotion.  We will monitor and if they do not resolve she will be referred for a vascular consult.   Today the DFU was selectively debrided and remains stable, with little change in size.  She has completed her PO Keflex.  She is scheduled for a pacemaker replacement on 11/2/23.    11/8/23 - Ms. Larsen returns to clinic today for followup on the D FU to the left mid foot.  She did have a cardiovascular pacemaker placed on 11/02-23 by Dr. Hancock.  She was started on doxycycline following that procedure.  Today, she presents with a rash on several parts of her body.  After assessment and consideration of the placement of the rashes, it is likely due to the adhesive used to perform the EKG as there are 2 areas of rash on the back as well as areas under the breast.  She was instructed to begin application of Calmoseptine which was provided on all areas of the rash.  She also presents with multiple scattered fibrin covered wounds  "on the left lower extremity.  Her caretaker reports that she has been out of Aquaphor and has likely been scratching these areas.  Were cleaned up and we will continue to monitor them.  Mupirocin was called out for application to these very small open areas.  The large D FU to the left mid foot was selectively debrided.  It again had a large area of hyperkeratotic tissue at the wound margin.  This was selectively debrided off and the wound is clean with no signs and symptoms of infection.    11/15/23 - The patient returns today for f/up on the DFU to the left mid foot.  Today there was an impressive decrease in callus around the wound margin.  She reports that she has been using her wheelchair much more.  That wound was selectively debrided.  She also had a rash at her last visit which had cleared up with the use of Calmoseptine.  The scattered areas on the left lower extremity are also resolved with the use of Aquaphor.  She will return to clinic in two weeks.    12/5/23 - Ms. Larsen is seen today for f/up on the D FU to the left mid foot.  Today, she has again developed a very thick callous at the periwound which had to be selectively debrided off.  Additionally, she did have a layer of slough in the wound bed that had develop, however, there was a slight decrease in size.  There are no signs and symptoms of infection.  Of note, her caregiver reports that she has not been consistently using her wheelchair as expected.  Additionally, she states that patient's blood sugar has been running from 200-250 the past couple of weeks.      Review of Systems   Constitutional: Negative.    Respiratory: Negative.     Cardiovascular: Negative.    Skin:         As documented in the HPI.   All other systems reviewed and are negative.        Objective:      Vitals:    12/05/23 1006   BP: (!) 172/89   BP Location: Right arm   Patient Position: Sitting   Pulse: 64   Resp: 18   Weight: 94.8 kg (209 lb)   Height: 5' 6" (1.676 m)    "   Physical Exam  Vitals reviewed.   Constitutional:       Appearance: Normal appearance.   Cardiovascular:      Rate and Rhythm: Normal rate.   Pulmonary:      Effort: Pulmonary effort is normal.   Skin:     General: Skin is warm and dry.      Comments: DFU left foot   Neurological:      Mental Status: She is alert.            Altered Skin Integrity 04/18/23 1351 Left medial Foot (Active)   04/18/23 1351   Altered Skin Integrity Present on Admission - Did Patient arrive to the hospital with altered skin?: yes   Side: Left   Orientation: medial   Location: Foot   Wound Number:    Is this injury device related?:    Primary Wound Type:    Description of Altered Skin Integrity:    Ankle-Brachial Index:    Pulses:    Removal Indication and Assessment:    Wound Outcome:    (Retired) Wound Length (cm):    (Retired) Wound Width (cm):    (Retired) Depth (cm):    Wound Description (Comments):    Removal Indications:    Dressing Appearance Moist drainage 12/05/23 1010   Drainage Amount Moderate 12/05/23 1010   Drainage Characteristics/Odor Serosanguineous 12/05/23 1010   Appearance Pink;Red;Tan;Slough;Moist 12/05/23 1010   Tissue loss description Full thickness 12/05/23 1010   Periwound Area Dry;[place] 12/05/23 1010   Wound Edges Callused;Defined 12/05/23 1010   Kovacs Classification (diabetic foot ulcers only) Grade 2 12/05/23 1010   Wound Length (cm) 2.4 cm 12/05/23 1010   Wound Width (cm) 1.8 cm 12/05/23 1010   Wound Depth (cm) 0.2 cm 12/05/23 1010   Wound Volume (cm^3) 0.864 cm^3 12/05/23 1010   Wound Surface Area (cm^2) 4.32 cm^2 12/05/23 1010   Care Cleansed with:;Wound cleanser 12/05/23 1010   Dressing Applied 12/05/23 1010   Dressing Change Due 12/07/23 12/05/23 1010     12/5/23      Left medial foot (pre)                                   Left medial foot (post)                                                                       (Silver alginate, 4x4, kerlix, tape)  Assessment:           ICD-10-CM ICD-9-CM   1.  "Non-pressure chronic ulcer of other part of left foot with muscle involvement without evidence of necrosis  L97.525 707.15   2. Open wound of plantar aspect of left foot  S91.302A 892.0   3. Charcot's joint of left foot, non-diabetic  M14.672 094.0     713.5   4. Chronic venous stasis  I87.8 459.81         Procedures:     Debridement     Date/Time: 12/5/2023 9:52 AM     Performed by: Barbra Perez NP  Authorized by: Barbra Perez NP    Time out: Immediately prior to procedure a "time out" was called to verify the correct patient, procedure, equipment, support staff and site/side marked as required.     Consent Done?:  Yes (Verbal)     Preparation: Patient was prepped and draped with clean technique    Local anesthesia used?: No       Wound Details:    Location:  Left foot    Location:  Left Midfoot    Type of Debridement:  Non-excisional       Length (cm):  2.4       Area (sq cm):  4.32       Width (cm):  1.8       Percent Debrided (%):  100       Depth (cm):  0.2       Total Area Debrided (sq cm):  4.32    Depth of debridement:  Epidermis/Dermis    Devitalized tissue debrided:  Biofilm, Callus, Exudate and Fibrin    Instruments:  Curette (Dermal)  Bleeding:  None  Patient tolerance:  Patient tolerated the procedure well with no immediate complications       Excisional debridement performed:  [] Yes [] No   Selective debridement performed:  [x] Yes [] No    Mechanical debridement performed:  [] Yes [] No   Silver nitrate application          [] Yes [] No   Labs ordered this visit:   [] Yes [] No   Imaging ordered this visit:   [] Yes [] No   Tissue pathology and/or culture taken:  [] Yes [] No           HOME HEALTH AGENCY:   [facility]     Home Health Services     Since 4/17/2023     [phone]     TIMES PER WEEK/DAYS: 1 x weekly, Fridays only  Left medial foot wound: Cleanse with wound cleanser and apply silver alginate to the wound bed, cover with 4x4 gauze and wrap foot lightly in " kerlix using tape to secure - to be changed every other day.       Follow up in 1 week (on 12/12/2023) for left foot.

## 2023-12-05 NOTE — PROCEDURES
"Debridement    Date/Time: 12/5/2023 9:52 AM    Performed by: Barbra Perez NP  Authorized by: Barbra Perez NP    Time out: Immediately prior to procedure a "time out" was called to verify the correct patient, procedure, equipment, support staff and site/side marked as required.    Consent Done?:  Yes (Verbal)    Preparation: Patient was prepped and draped with clean technique    Local anesthesia used?: No      Wound Details:    Location:  Left foot    Location:  Left Midfoot    Type of Debridement:  Non-excisional       Length (cm):  2.4       Area (sq cm):  4.32       Width (cm):  1.8       Percent Debrided (%):  100       Depth (cm):  0.2       Total Area Debrided (sq cm):  4.32    Depth of debridement:  Epidermis/Dermis    Devitalized tissue debrided:  Biofilm, Callus, Exudate and Fibrin    Instruments:  Curette (Dermal)  Bleeding:  None  Patient tolerance:  Patient tolerated the procedure well with no immediate complications    "

## 2023-12-13 ENCOUNTER — HOSPITAL ENCOUNTER (OUTPATIENT)
Dept: WOUND CARE | Facility: HOSPITAL | Age: 68
Discharge: HOME | End: 2023-12-13
Attending: NURSE PRACTITIONER
Payer: MEDICARE

## 2023-12-13 VITALS
DIASTOLIC BLOOD PRESSURE: 67 MMHG | HEART RATE: 65 BPM | BODY MASS INDEX: 33.59 KG/M2 | HEIGHT: 66 IN | RESPIRATION RATE: 18 BRPM | WEIGHT: 209 LBS | SYSTOLIC BLOOD PRESSURE: 145 MMHG

## 2023-12-13 DIAGNOSIS — L97.525 NON-PRESSURE CHRONIC ULCER OF OTHER PART OF LEFT FOOT WITH MUSCLE INVOLVEMENT WITHOUT EVIDENCE OF NECROSIS: Primary | ICD-10-CM

## 2023-12-13 DIAGNOSIS — L60.2 ONYCHAUXIS: ICD-10-CM

## 2023-12-13 DIAGNOSIS — M14.672 CHARCOT'S JOINT OF LEFT FOOT, NON-DIABETIC: ICD-10-CM

## 2023-12-13 DIAGNOSIS — S91.302A OPEN WOUND OF PLANTAR ASPECT OF LEFT FOOT: ICD-10-CM

## 2023-12-13 DIAGNOSIS — I87.8 CHRONIC VENOUS STASIS: ICD-10-CM

## 2023-12-13 PROCEDURE — 97597 DBRDMT OPN WND 1ST 20 CM/<: CPT | Mod: 59

## 2023-12-13 PROCEDURE — 11719 TRIM NAIL(S) ANY NUMBER: CPT

## 2023-12-13 PROCEDURE — 99212 OFFICE O/P EST SF 10 MIN: CPT | Mod: 25

## 2023-12-13 PROCEDURE — 27000999 HC MEDICAL RECORD PHOTO DOCUMENTATION

## 2023-12-13 RX ORDER — TIRZEPATIDE 2.5 MG/.5ML
INJECTION, SOLUTION SUBCUTANEOUS
COMMUNITY
Start: 2023-12-08 | End: 2024-03-01

## 2023-12-13 NOTE — PROCEDURES
Debridement    Date/Time: 12/13/2023 10:38 AM    Performed by: Barbra Perez NP  Authorized by: Barbra Perez NP    Consent Done?:  Yes (Verbal)    Preparation: Patient was prepped and draped with clean technique    Local anesthesia used?: No      Wound Details:    Location:  Left foot    Location:  Left Midfoot    Type of Debridement:  Non-excisional       Length (cm):  2.4       Area (sq cm):  4.56       Width (cm):  1.9       Percent Debrided (%):  100       Depth (cm):  0.2       Total Area Debrided (sq cm):  4.56    Depth of debridement:  Epidermis/Dermis    Devitalized tissue debrided:  Biofilm, Callus, Exudate, Fibrin and Slough    Instruments:  Curette and Other (Dermal, [place])  Patient tolerance:  Patient tolerated the procedure well with no immediate complications

## 2023-12-13 NOTE — PROGRESS NOTES
Home Health: [facility]  Smoker  [x] Yes  [] No   Last A1C: 6.8 on 23  Last CB- fasting 23  Celia Tarango  [x] Yes [] No            Results: 0/5 left foot   Doppler done in office? [x] Yes [] No on 23  Posterior tibial: monophasic  Dorsalis pedal: monophasic  Darco Shoe [x] Yes [] No   Date given: does not like to wear  Is patient eligible for HBO [] Yes [x] No  (not a [place] 3)   Is the patient eligible for a graft, and/or currently grafting?  [x] Yes [] No (deductible to high for the patient though)   Right calf: 35.8 cm  Right ankle: 22.5 cm  Left calf: 41.6 cm  Left ankle: 24 cm    --------------------------------------------------------------------------------------------------------------------------------------------------------------------------------------------------------------------------------------------------------------     Patient ID: Diane Larsen is a 68 y.o. female.    Chief Complaint: Diabetic Foot Ulcer ((Left medial foot - salinas 2)    Subjective:    HPI  23 - Ms. Larsen was last seen in the clinic on 23. She was admitted to [facility] on 23 - 23 with hypotension and low O2 sats. While there it was discovered she had C-Diff. Her caregiver states that she did several rounds of oral antibiotics but is no longer taking anything. She has been using mesalt and silver alginate to dress the wound since being home. She has been diagnosed as a diabetic since her last visit with us. Her A1C on 23 was 6.8.   Today her wound was assessed in has improved since her last visit.  It was selectively debrided.  There is a light coating of slough over the entire wound bed, therefore Santyl has been ordered for daily application.  Today, we applied mesalt and if she is not able to get Santyl with her insurance she will continue with daily use of mesalt.    Additionally, her caregiver requested that her toenails be trimmed.  Six toenails were  debrided off today.    7/25/23 - the patient returns today for followup on a D FU to the left foot.  The wound was assessed and selectively debrided.  The hypergranulated tissue was chemically cauterized using silver nitrate.  The caregiver is still attempting to obtain Santyl that was ordered.  It was initially sent to [facility] in [place] who could not get the product.  The Rx was transferred to [facility] in [place]. She is waiting for this to go through so she can see if the medicine will be covered by the patient's insurance.   The would is improved, however, with the use of Mesalt only.  We will continue that product.  Will will also attempt to get her approved for grafts, preferably Apligraft or Theraskin.    8/9/23 - Ms. Larsen returns today for followup on the D FU to the left midfoot.  She reports that she went to ED on 7/30/23 c/o of being weak and that she got hot being outside in the sun. She was dehydrated and bradycardic. She was discharged on 8/2/23.   She saw Dr. Claros on 8/3/23 to have a halter monitor placed. She will wear this for 2 weeks, it will be d/c on 8/17/23 and she will go to [facility] in [place] on 8/28/23 for those results.   Today her wound was assessed and selectively debrided.  The periwound was quite macerated and she was reminded to be changing the dressings daily.  We are continuing to use Santyl on the wound bed.  There appears to be a thick layer of slough across the wound bed and the patient was reminded that she should be using at least a nickel thick layer of the product covered with mesalt.  When she returns in one week if there is still a layer of slough we will use the ultrasonic debrider.      8/23/23 - The patient returns today for followup on the mid foot diabetic foot ulcer.  She does have pronounced Charcot foot and this disfigured station of the foot causes her multiple problems.  Today, she presents with the wound bed itself improving, however, she had a very thick area of  callus surrounding the entire periwound.  The whole area was selectively debrided with a dermal curette.    We will continue to apply Santyl and me salt to this wound.    8/30/23 - Ms. Larsen is seen today for the D FU to her mid foot.  Today again there was a very thick area of callus surrounding the wound.  The wound bed itself looks stable but was in need of a selective debridement.  We discussed the possibility of authorize in grafts, however, she has a 3000 dollar detectable that has not been met.  We also discussed the possibility hyperbarics and she is considering that option.    9/6/23 - Ms. Larsen returns today with her caregiver.  The wound remains stable although there is again a very thick area of callus surrounding the wound margins.  The wound was selectively debrided.  There was an area of that margin that seemed to have pulled away from the epithelial tissue.  This had to be cleft off which subsequently enlarged the wound substantially.  The patient and I had a lengthy discussion regarding the importance of offloading even when she is in her home.  She does have some health little receive issues and does not fully comprehend the necessity of offloading.  She does have a Darco shoe with PEG assist that she refuses to wear.  We will consider discussion of a total contact cast at her next visit.    9/20/23 - the patient returns today for followup on a D FU to the left mid foot.  Of concern, however, is the swelling and warmth of the left lower extremity.  Although she does not have specific complaints of pain, she is mentally incompetent and therefore her interpretation of her pain level can not be crusted or assumed.  She is being sent to [facility] in [place] to have an ultrasound done to rule out DVT.  Today the wound was selectively debrided.  Again, there was a large amount of callus built around the wound was.  The wound bed itself is clean and epithelial tissue is moving in word.  We will continue with  daily application of me salt covered with a 4 x 4 and a gentle border dressing.  She no longer need Santyl at this time so we will hold that product.  Her caregiver was notified that if the ultrasound is negative for DVT that she is to  the prescription for Keflex which was sent to her local pharmacy.  Of note, her caregiver states that she has a follow up on 10/18/23 with Dr. Claros to get results from halter monitor.  They are instructed to call Dr. Claros's office to let them know about the left leg swelling frequently to see if they can get a sooner appt.     9/27/23 - Ms. Larsen returns to clinic today for follow up on the DFU to the left mid foot.  Again today the wound margin is very thickly callused.  The wound size has not changed significantly.  The wound was selectively debrided and we will begin application of Endoform to the wound bed.  Of note, an U/S was done on the left leg to r/o DVT.  She began taking Keflex on 9/20/23 once that U/S was normal.  She continues to take that medication.  However, today again both legs are extremely edematous.  Bilateral arterial/venous US have been scheduled on 10/9/23 @ 11:30 to address the edema. She does have a f/up appt with [facility] in [place] for her halter monitor results on 10/18/23.    10/4/23 - The patient returns to clinic today for the DFU to the left mid foot.  She and her caregiver report that on Saturday, 9/30/23, the patient stepped on an unknown object while ambulating on her driveway.  This caught the edge of the wound, ripping the skin causing damage.  The wound was assessed today and selectively debrided.  This area of damaged tissue was removed.  The wound does look slightly improved in spite of this injury. We will continue to use Endoform which is assisting with progress.   She is scheduled for an ultrasound on 10/9/23 at [facility]; She has a follow up with cardiology in [place]  on 10/18/23 but does not know the name of the Dr.     10/18/23 - Ms.  Chava returns today with her caregiver for f/up on the DFU to the left mid foot.  Again today there were a thick band of callus surrounding the entire wound.  The wound bed has decreased in size slightly.  It was selectively debrided today and we will continue application of Endoform to the wound bed.  Of note, the patient's caregiver states that the ultrasound appt that was scheduled for 10/9/23 has been rescheduled to 10/23 due to a miscommunication about the time is was scheduled for on 10/9/23.   She has a cardiology appt this afternoon    10/25/23 - The patient returns today for f/up on the DFU to the left mid foot.  An U/S had been done on 10/23/23.  Her arterial system is normal.  She does have venous reflux in the left greater saphenous vein.  This generally has not been an issue for the patient and it likely is unrelated to the wound.  However, today she did present with multiple scattered open blisters on the left lower extremity.  Her caregiver reports that these are a results of not using her lotion.  We will monitor and if they do not resolve she will be referred for a vascular consult.   Today the DFU was selectively debrided and remains stable, with little change in size.  She has completed her PO Keflex.  She is scheduled for a pacemaker replacement on 11/2/23.    11/8/23 - Ms. Larsen returns to clinic today for followup on the D FU to the left mid foot.  She did have a cardiovascular pacemaker placed on 11/02-23 by Dr. Hancock.  She was started on doxycycline following that procedure.  Today, she presents with a rash on several parts of her body.  After assessment and consideration of the placement of the rashes, it is likely due to the adhesive used to perform the EKG as there are 2 areas of rash on the back as well as areas under the breast.  She was instructed to begin application of Calmoseptine which was provided on all areas of the rash.  She also presents with multiple scattered fibrin covered  wounds on the left lower extremity.  Her caretaker reports that she has been out of Aquaphor and has likely been scratching these areas.  Were cleaned up and we will continue to monitor them.  Mupirocin was called out for application to these very small open areas.  The large D FU to the left mid foot was selectively debrided.  It again had a large area of hyperkeratotic tissue at the wound margin.  This was selectively debrided off and the wound is clean with no signs and symptoms of infection.    11/15/23 - The patient returns today for f/up on the DFU to the left mid foot.  Today there was an impressive decrease in callus around the wound margin.  She reports that she has been using her wheelchair much more.  That wound was selectively debrided.  She also had a rash at her last visit which had cleared up with the use of Calmoseptine.  The scattered areas on the left lower extremity are also resolved with the use of Aquaphor.  She will return to clinic in two weeks.    12/5/23 - Ms. Larsen is seen today for f/up on the D FU to the left mid foot.  Today, she has again developed a very thick callous at the periwound which had to be selectively debrided off.  Additionally, she did have a layer of slough in the wound bed that had develop, however, there was a slight decrease in size.  There are no signs and symptoms of infection.  Of note, her caregiver reports that she has not been consistently using her wheelchair as expected.  Additionally, she states that patient's blood sugar has been running from 200-250 the past couple of weeks.    12/13/23 - Ms. Larsen returns today with her caregiver for the DFU to the left mid foot.  A selective debridement was performed with removal of slough across the wound bed and a thickened callus.  The wound remains basically unchanged. The patient requested that her toe nails be cut - nails x 9 were trimmed.       Review of Systems   Constitutional: Negative.    Respiratory: Negative.   "   Cardiovascular: Negative.    Skin:         As documented in the HPI.   All other systems reviewed and are negative.        Objective:      Vitals:    12/13/23 1139   BP: (!) 145/67   Pulse: 65   Resp: 18   Weight: 94.8 kg (209 lb)   Height: 5' 6" (1.676 m)      Physical Exam  Vitals reviewed.   Constitutional:       Appearance: Normal appearance.   Cardiovascular:      Rate and Rhythm: Normal rate.   Pulmonary:      Effort: Pulmonary effort is normal.   Skin:     General: Skin is warm and dry.      Comments: DFU left foot   Neurological:      Mental Status: She is alert.            Altered Skin Integrity 04/18/23 1351 Left medial Foot (Active)   04/18/23 1351   Altered Skin Integrity Present on Admission - Did Patient arrive to the hospital with altered skin?: yes   Side: Left   Orientation: medial   Location: Foot   Wound Number:    Is this injury device related?:    Primary Wound Type:    Description of Altered Skin Integrity:    Ankle-Brachial Index:    Pulses:    Removal Indication and Assessment:    Wound Outcome:    (Retired) Wound Length (cm):    (Retired) Wound Width (cm):    (Retired) Depth (cm):    Wound Description (Comments):    Removal Indications:    Description of Altered Skin Integrity Full thickness tissue loss. Subcutaneous fat may be visible but bone, tendon or muscle are not exposed 12/13/23 1130   Dressing Appearance Moist drainage 12/13/23 1130   Drainage Amount Moderate 12/13/23 1130   Drainage Characteristics/Odor Serosanguineous 12/13/23 1130   Appearance Intact;Slough;Not granulating 12/13/23 1130   Tissue loss description Full thickness 12/13/23 1130   Periwound Area Intact;Dry 12/13/23 1130   Wound Edges Callused 12/13/23 1130   Kovacs Classification (diabetic foot ulcers only) Grade 2 12/13/23 1130   Wound Length (cm) 2.4 cm 12/13/23 1130   Wound Width (cm) 1.9 cm 12/13/23 1130   Wound Depth (cm) 0.2 cm 12/13/23 1130   Wound Volume (cm^3) 0.912 cm^3 12/13/23 1130   Wound Surface Area " (cm^2) 4.56 cm^2 12/13/23 1130   Care Cleansed with:;Other (see comments) 12/13/23 1130   Dressing Applied;Other (comment) 12/13/23 1130   Dressing Change Due 12/14/23 12/13/23 1130 12/13/23      Left medial foot (pre)                                   Left medial foot (post)                                                                       (mesalt, 4x4, kerlix, tape)  TR  Assessment:           ICD-10-CM ICD-9-CM   1. 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 Open wound of plantar aspect of left foot  S91.302A 892.0   3. Charcot's joint of left foot, non-diabetic  M14.672 094.0     713.5   4. 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           Procedures:     Debridement     Date/Time: 12/13/2023 10:38 AM     Performed by: Barbra Perez NP  Authorized by: Barbra Perez NP    Consent Done?:  Yes (Verbal)     Preparation: Patient was prepped and draped with clean technique    Local anesthesia used?: No       Wound Details:    Location:  Left foot    Location:  Left Midfoot    Type of Debridement:  Non-excisional       Length (cm):  2.4       Area (sq cm):  4.56       Width (cm):  1.9       Percent Debrided (%):  100       Depth (cm):  0.2       Total Area Debrided (sq cm):  4.56    Depth of debridement:  Epidermis/Dermis    Devitalized tissue debrided:  Biofilm, Callus, Exudate, Fibrin and Slough    Instruments:  Curette and Other (Dermal, [place])  Patient tolerance:  Patient tolerated the procedure well with no immediate complications          Excisional debridement performed:  [] Yes [] No   Selective debridement performed:  [x] Yes [] No  Mechanical debridement performed:  [] Yes [] No   Silver nitrate application          [] Yes [] No   Labs ordered this visit:   [] Yes [] No   Imaging ordered this visit:   [] Yes [] No   Tissue pathology and/or culture taken:  [] Yes [] No           HOME  HEALTH AGENCY:   [facility]     Home Health Services     Since 4/17/2023     [phone]     TIMES PER WEEK/DAYS: 1 x weekly, Fridays only  Left medial foot wound: Cleanse with wound cleanser and apply Santyl (nickel thick) to the wound bed, cover with mesalt, dress with 4x4 gauze,  kerlix use tape to secure - to be changed daily.       Follow up in about 1 week (around 12/20/2023) for left foot.

## 2023-12-18 ENCOUNTER — HOSPITAL ENCOUNTER (OUTPATIENT)
Dept: WOUND CARE | Facility: HOSPITAL | Age: 68
Discharge: HOME | End: 2023-12-18
Attending: NURSE PRACTITIONER
Payer: MEDICARE

## 2023-12-18 VITALS
DIASTOLIC BLOOD PRESSURE: 69 MMHG | BODY MASS INDEX: 33.59 KG/M2 | HEIGHT: 66 IN | WEIGHT: 209 LBS | HEART RATE: 70 BPM | SYSTOLIC BLOOD PRESSURE: 151 MMHG | RESPIRATION RATE: 22 BRPM

## 2023-12-18 DIAGNOSIS — L97.525 NON-PRESSURE CHRONIC ULCER OF OTHER PART OF LEFT FOOT WITH MUSCLE INVOLVEMENT WITHOUT EVIDENCE OF NECROSIS: Primary | ICD-10-CM

## 2023-12-18 DIAGNOSIS — S91.302A OPEN WOUND OF PLANTAR ASPECT OF LEFT FOOT: ICD-10-CM

## 2023-12-18 DIAGNOSIS — I87.8 CHRONIC VENOUS STASIS: ICD-10-CM

## 2023-12-18 DIAGNOSIS — M14.672 CHARCOT'S JOINT OF LEFT FOOT, NON-DIABETIC: ICD-10-CM

## 2023-12-18 PROCEDURE — 27000999 HC MEDICAL RECORD PHOTO DOCUMENTATION

## 2023-12-18 PROCEDURE — 97597 DBRDMT OPN WND 1ST 20 CM/<: CPT | Mod: 59

## 2023-12-18 PROCEDURE — 99212 OFFICE O/P EST SF 10 MIN: CPT | Mod: 25

## 2023-12-18 RX ORDER — NYSTATIN 100000 U/G
CREAM TOPICAL 2 TIMES DAILY
COMMUNITY
Start: 2023-12-14

## 2023-12-18 NOTE — PROGRESS NOTES
[facility]  Smoker  [x] Yes  [] No   Last A1C: 6.8 on 23  Last CB- fasting 23  Celia Tarango  [x] Yes [] No            Results: 0/5 left foot   Doppler done in office? [x] Yes [] No on 23  Posterior tibial: monophasic  Dorsalis pedal: monophasic  Darco Shoe [x] Yes [] No   Date given: does not like to wear  Is patient eligible for HBO [] Yes [x] No  (not a [place] [facility])   Is the patient eligible for a graft, and/or currently grafting?  [x] Yes [] No (deductible to high for the patient though)   Right calf: 35.8 cm  Right ankle: 22.5 cm  Left calf: 41.6 cm  Left ankle: 24 cm    --------------------------------------------------------------------------------------------------------------------------------------------------------------------------------------------------------------------------------------------------------------     Patient ID: Diane Larsen is a 68 y.o. female.    Chief Complaint: Non-healing Wound Follow Up (Left foot)    Subjective:    HPI  23 - Ms. Larsen was last seen in the clinic on 23. She was admitted to [facility] on 23 - 23 with hypotension and low O2 sats. While there it was discovered she had C-Diff. Her caregiver states that she did several rounds of oral antibiotics but is no longer taking anything. She has been using mesalt and silver alginate to dress the wound since being home. She has been diagnosed as a diabetic since her last visit with us. Her A1C on 23 was 6.8.   Today her wound was assessed in has improved since her last visit.  It was selectively debrided.  There is a light coating of slough over the entire wound bed, therefore Santyl has been ordered for daily application.  Today, we applied mesalt and if she is not able to get Santyl with her insurance she will continue with daily use of mesalt.    Additionally, her caregiver requested that her toenails be trimmed.  Six toenails were debrided off  today.    7/25/23 - the patient returns today for followup on a D FU to the left foot.  The wound was assessed and selectively debrided.  The hypergranulated tissue was chemically cauterized using silver nitrate.  The caregiver is still attempting to obtain Santyl that was ordered.  [facility] [place] [facility] the product.  The Rx was transferred to [facility] in [place]. She is waiting for this to go through so she can see if the medicine will be covered by the patient's insurance.   The would is improved, however, with the use of Mesalt only.  We will continue that product.  Will will also attempt to get her approved for grafts, preferably Apligraft or Theraskin.    8/9/23 - Ms. Larsen returns today for followup on the D FU to the left midfoot.  She reports that she went to ED on 7/30/23 c/o of being weak and that she got hot being outside in the sun. She was dehydrated and bradycardic. She was discharged on 8/2/23.   She saw Dr. Claros on 8/3/23 to have a halter monitor placed. She will wear this for 2 weeks, it will be d/c on 8/17/23 and she will go to [facility] in [place] on 8/28/23 for those results.   Today her wound was assessed and selectively debrided.  The periwound was quite macerated and she was reminded to be changing the dressings daily.  We are continuing to use Santyl on the wound bed.  There appears to be a thick layer of slough across the wound bed and the patient was reminded that she should be using at least a nickel thick layer of the product covered with mesalt.  When she returns in one week if there is still a layer of slough we will use the ultrasonic debrider.      8/23/23 - The patient returns today for followup on the mid foot diabetic foot ulcer.  She does have pronounced Charcot foot and this disfigured station of the foot causes her multiple problems.  Today, she presents with the wound bed itself improving, however, she had a very thick area of callus  surrounding the entire periwound.  The whole area was selectively debrided with a dermal curette.    We will continue to apply Santyl and me salt to this wound.    8/30/23 - Ms. Larsen is seen today for the D FU to her mid foot.  Today again there was a very thick area of callus surrounding the wound.  The wound bed itself looks stable but was in need of a selective debridement.  We discussed the possibility of authorize in grafts, however, she has a 3000 dollar detectable that has not been met.  We also discussed the possibility hyperbarics and she is considering that option.    9/6/23 - Ms. Larsen returns today with her caregiver.  The wound remains stable although there is again a very thick area of callus surrounding the wound margins.  The wound was selectively debrided.  There was an area of that margin that seemed to have pulled away from the epithelial tissue.  This had to be cleft off which subsequently enlarged the wound substantially.  The patient and I had a lengthy discussion regarding the importance of offloading even when she is in her home.  She does have some health little receive issues and does not fully comprehend the necessity of offloading.  She does have a Darco shoe with PEG assist that she refuses to wear.  We will consider discussion of a total contact cast at her next visit.    9/20/23 - the patient returns today for followup on a D FU to the left mid foot.  Of concern, however, is the swelling and warmth of the left lower extremity.  Although she does not have specific complaints of pain, she is mentally incompetent and therefore her interpretation of her pain level can not be crusted or assumed.  She is being sent to [facility] in [place] to have an ultrasound done to rule out DVT.  Today the wound was selectively debrided.  Again, there was a large amount of callus built around the wound was.  The wound bed itself is clean and epithelial tissue is moving in word.  We will continue with daily  application of me salt covered with a 4 x 4 and a gentle border dressing.  She no longer need Santyl at this time so we will hold that product.  Her caregiver was notified that if the ultrasound is negative for DVT that she is to  the prescription for Keflex which was sent to her local pharmacy.  Of note, her caregiver states that she has a follow up on 10/18/23 with Dr. Claros to get results from halter monitor.  They are instructed to call Dr. Claros's office to let them know about the left leg swelling frequently to see if they can get a sooner appt.     9/27/23 - Ms. Larsen returns to clinic today for follow up on the DFU to the left mid foot.  Again today the wound margin is very thickly callused.  The wound size has not changed significantly.  The wound was selectively debrided and we will begin application of Endoform to the wound bed.  Of note, an U/S was done on the left leg to r/o DVT.  She began taking Keflex on 9/20/23 once that U/S was normal.  She continues to take that medication.  However, today again both legs are extremely edematous.  Bilateral arterial/venous US have been scheduled on 10/9/23 @ 11:30 to address the edema. She does have a f/up appt with [facility] in [place] for her halter monitor results on 10/18/23.    10/4/23 - The patient returns to clinic today for the DFU to the left mid foot.  She and her caregiver report that on Saturday, 9/30/23, the patient stepped on an unknown object while ambulating on her driveway.  This caught the edge of the wound, ripping the skin causing damage.  The wound was assessed today and selectively debrided.  This area of damaged tissue was removed.  The wound does look slightly improved in spite of this injury. We will continue to use Endoform which is assisting with progress.   She is scheduled for an ultrasound on 10/9/23 at [facility]; She has a follow up with cardiology in [place]  on 10/18/23 but does not know the name of the Dr.     10/18/23 - Ms. Larsen  returns today with her caregiver for f/up on the DFU to the left mid foot.  Again today there were a thick band of callus surrounding the entire wound.  The wound bed has decreased in size slightly.  It was selectively debrided today and we will continue application of Endoform to the wound bed.  Of note, the patient's caregiver states that the ultrasound appt that was scheduled for 10/9/23 has been rescheduled to 10/23 due to a miscommunication about the time is was scheduled for on 10/9/23.   She has a cardiology appt this afternoon    10/25/23 - The patient returns today for f/up on the DFU to the left mid foot.  An U/S had been done on 10/23/23.  Her arterial system is normal.  She does have venous reflux in the left greater saphenous vein.  This generally has not been an issue for the patient and it likely is unrelated to the wound.  However, today she did present with multiple scattered open blisters on the left lower extremity.  Her caregiver reports that these are a results of not using her lotion.  We will monitor and if they do not resolve she will be referred for a vascular consult.   Today the DFU was selectively debrided and remains stable, with little change in size.  She has completed her PO Keflex.  She is scheduled for a pacemaker replacement on 11/2/23.    11/8/23 - Ms. Larsen returns to clinic today for followup on the D FU to the left mid foot.  She did have a cardiovascular pacemaker placed on 11/02-23 by Dr. Hancock.  She was started on doxycycline following that procedure.  Today, she presents with a rash on several parts of her body.  After assessment and consideration of the placement of the rashes, it is likely due to the adhesive used to perform the EKG as there are 2 areas of rash on the back as well as areas under the breast.  She was instructed to begin application of Calmoseptine which was provided on all areas of the rash.  She also presents with multiple scattered fibrin covered wounds  on the left lower extremity.  Her caretaker reports that she has been out of Aquaphor and has likely been scratching these areas.  Were cleaned up and we will continue to monitor them.  Mupirocin was called out for application to these very small open areas.  The large D FU to the left mid foot was selectively debrided.  It again had a large area of hyperkeratotic tissue at the wound margin.  This was selectively debrided off and the wound is clean with no signs and symptoms of infection.    11/15/23 - The patient returns today for f/up on the DFU to the left mid foot.  Today there was an impressive decrease in callus around the wound margin.  She reports that she has been using her wheelchair much more.  That wound was selectively debrided.  She also had a rash at her last visit which had cleared up with the use of Calmoseptine.  The scattered areas on the left lower extremity are also resolved with the use of Aquaphor.  She will return to clinic in two weeks.    12/5/23 - Ms. Larsen is seen today for f/up on the D FU to the left mid foot.  Today, she has again developed a very thick callous at the periwound which had to be selectively debrided off.  Additionally, she did have a layer of slough in the wound bed that had develop, however, there was a slight decrease in size.  There are no signs and symptoms of infection.  Of note, her caregiver reports that she has not been consistently using her wheelchair as expected.  Additionally, she states that patient's blood sugar has been running from 200-250 the past couple of weeks.    12/13/23 - Ms. Larsen returns today with her caregiver for the DFU to the left mid foot.  A selective debridement was performed with removal of slough across the wound bed and a thickened callus.  The wound remains basically unchanged. The patient requested that her toe nails be cut - nails x 9 were trimmed.     12/18/23 - the patient returns to clinic today for the D FU to the left mid foot.  " Today a selective debridement was performed for removal of the callus and slough that was in the wound bed.  Again, the wound is reasonable unchanged but stable.  There is a small amount of redness around the periwound.  Today we will hold the Santyl and she will begin applying mupirocin ointment to the wound bed covered with me salt and a gentle border dressing.      Review of Systems   Constitutional: Negative.    Respiratory: Negative.     Cardiovascular: Negative.    Skin:         As documented in the HPI.   All other systems reviewed and are negative.        Objective:      Vitals:    12/18/23 1122   BP: (!) 151/69   BP Location: Left arm   Pulse: 70   Resp: (!) 22   Weight: 94.8 kg (209 lb)   Height: 5' 6" (1.676 m)      Physical Exam  Vitals reviewed.   Constitutional:       Appearance: Normal appearance.   Cardiovascular:      Rate and Rhythm: Normal rate.   Pulmonary:      Effort: Pulmonary effort is normal.   Skin:     General: Skin is warm and dry.      Comments: DFU left foot   Neurological:      Mental Status: She is alert.            Altered Skin Integrity 04/18/23 1351 Left medial Foot (Active)   04/18/23 1351   Altered Skin Integrity Present on Admission - Did Patient arrive to the hospital with altered skin?: yes   Side: Left   Orientation: medial   Location: Foot   Wound Number:    Is this injury device related?:    Primary Wound Type:    Description of Altered Skin Integrity:    Ankle-Brachial Index:    Pulses:    Removal Indication and Assessment:    Wound Outcome:    (Retired) Wound Length (cm):    (Retired) Wound Width (cm):    (Retired) Depth (cm):    Wound Description (Comments):    Removal Indications:    Dressing Appearance Moist drainage 12/18/23 1119   Drainage Amount Moderate 12/18/23 1119   Drainage Characteristics/Odor Serosanguineous 12/18/23 1119   Appearance Dressing in place, unable to visualize;Intact;Moist 12/18/23 1119   Tissue loss description Full thickness 12/18/23 1119 "   Periwound Area Intact;[place] 12/18/23 1119   Wound Edges Open 12/18/23 1119   Wound Length (cm) 2 cm 12/18/23 1119   Wound Width (cm) 1.9 cm 12/18/23 1119   Wound Depth (cm) 0.2 cm 12/18/23 1119   Wound Volume (cm^3) 0.76 cm^3 12/18/23 1119   Wound Surface Area (cm^2) 3.8 cm^2 12/18/23 1119   Care Cleansed with:;Wound cleanser 12/18/23 1119   Dressing Applied;Other (comment) 12/18/23 1119       12/18/23     Left medial foot (pre)    Post (triple ointment, mesalt, 4x4 gauze, kerlix)    Assessment:           ICD-10-CM ICD-9-CM   1. 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 Open wound of plantar aspect of left foot  S91.302A 892.0   3. Charcot's joint of left foot, non-diabetic  M14.672 094.0     713.5   4. Chronic venous stasis  I87.8 459.81             Procedures:     Debridement     Date/Time: 12/18/2023 11:12 AM     Performed by: Barbra Perez NP  Authorized by: Barbra Perez NP    Consent Done?:  Yes (Verbal)     Preparation: Patient was prepped and draped with clean technique    Local anesthesia used?: No       Wound Details:    Location:  Left foot    Location:  Left Midfoot    Type of Debridement:  Non-excisional       Length (cm):  2       Area (sq cm):  3.8       Width (cm):  1.9       Percent Debrided (%):  100       Depth (cm):  0.2       Total Area Debrided (sq cm):  3.8    Depth of debridement:  Epidermis/Dermis    Devitalized tissue debrided:  Biofilm, Callus, Exudate, Slough and Fibrin    Instruments:  Curette (Dermal)  Bleeding:  Minimal  Hemostasis Achieved: Yes  Method Used:  Silver Nitrate  Patient tolerance:  Patient tolerated the procedure well with no immediate complications       Excisional debridement performed:  [] Yes [] No   Selective debridement performed:  [x] Yes [] No  Mechanical debridement performed:  [] Yes [] No   Silver nitrate application          [] Yes [] No   Labs ordered this  visit:   [] Yes [] No   Imaging ordered this visit:   [] Yes [] No   Tissue pathology and/or culture taken:  [] Yes [] No           HOME HEALTH AGENCY:   [facility]     Home Health Services     Since 4/17/2023     [phone]     TIMES PER WEEK/DAYS: 1 x weekly, Fridays only  Left medial foot wound: Cleanse with wound cleanser and apply mupirocin,to the wound bed, cover with mesalt, dress with 4x4 gauze,  kerlix use tape to secure - to be changed daily.       Follow up in 11 days (on 12/29/2023) for Left foot .

## 2024-01-08 ENCOUNTER — HOSPITAL ENCOUNTER (OUTPATIENT)
Dept: WOUND CARE | Facility: HOSPITAL | Age: 69
Discharge: HOME | End: 2024-01-08
Attending: FAMILY MEDICINE
Payer: MEDICARE

## 2024-01-08 VITALS — RESPIRATION RATE: 18 BRPM | WEIGHT: 209 LBS | BODY MASS INDEX: 33.59 KG/M2 | HEIGHT: 66 IN

## 2024-01-08 DIAGNOSIS — L97.525 NON-PRESSURE CHRONIC ULCER OF OTHER PART OF LEFT FOOT WITH MUSCLE INVOLVEMENT WITHOUT EVIDENCE OF NECROSIS: Primary | ICD-10-CM

## 2024-01-08 DIAGNOSIS — M14.672 CHARCOT'S JOINT OF LEFT FOOT, NON-DIABETIC: ICD-10-CM

## 2024-01-08 DIAGNOSIS — I87.8 CHRONIC VENOUS STASIS: ICD-10-CM

## 2024-01-08 DIAGNOSIS — S91.302A OPEN WOUND OF PLANTAR ASPECT OF LEFT FOOT: ICD-10-CM

## 2024-01-08 PROCEDURE — 99212 OFFICE O/P EST SF 10 MIN: CPT

## 2024-01-08 PROCEDURE — 27000999 HC MEDICAL RECORD PHOTO DOCUMENTATION

## 2024-01-08 PROCEDURE — 97597 DBRDMT OPN WND 1ST 20 CM/<: CPT

## 2024-01-08 RX ORDER — COLLAGENASE SANTYL 250 [ARB'U]/G
OINTMENT TOPICAL
COMMUNITY
Start: 2024-01-02

## 2024-01-08 NOTE — PROGRESS NOTES
[employer]: [facility]  Smoker  [x] Yes  [] No   Last A1C: 6.8 on 6/2/23 (getting info)   Last CBG: Did NOT check today, 01/08/24  Celia Tarango  [x] Yes [] No            Results: 0/5 left foot   Doppler done in office? [x] Yes [] No on 9/20/23  Posterior tibial: monophasic  Dorsalis pedal: monophasic  Darco Shoe [x] Yes [] No   Date given: does not like to wear  Is patient eligible for HBO [] Yes [x] No  (not a [place] 3)   Is the patient eligible for a graft, and/or currently grafting?  [x] Yes [] No (deductible to high for the patient though)   Right calf: 37 cm  Right ankle: 23 cm  Left calf: 43 cm  Left ankle: 23.5 cm    --------------------------------------------------------------------------------------------------------------------------------------------------------------------------------------------------------     Patient ID: Diane Larsen is a 68 y.o. female.    Chief Complaint: Diabetic Foot Ulcer (Left medial foot - salinas 2 )    Subjective:    HPI  7/18/23 - Ms. Larsen was last seen in the clinic on 5/30/23. She was admitted to [facility] on 5/31/23 - 6/6/23 with hypotension and low O2 sats. While there it was discovered she had C-Diff. Her caregiver states that she did several rounds of oral antibiotics but is no longer taking anything. She has been using mesalt and silver alginate to dress the wound since being home. She has been diagnosed as a diabetic since her last visit with us. Her A1C on 6/2/23 was 6.8.   Today her wound was assessed in has improved since her last visit.  It was selectively debrided.  There is a light coating of slough over the entire wound bed, therefore Santyl has been ordered for daily application.  Today, we applied mesalt and if she is not able to get Santyl with her insurance she will continue with daily use of mesalt.    Additionally, her caregiver requested that her toenails be trimmed.  Six toenails were debrided off today.    7/25/23 - the  patient returns today for followup on a D FU to the left foot.  The wound was assessed and selectively debrided.  The hypergranulated tissue was chemically cauterized using silver nitrate.  The caregiver is still attempting to obtain Santyl that was ordered.  It was initially sent to [facility] in [place] who could not get the product.  The Rx was transferred to [facility] in [place]. She is waiting for this to go through so she can see if the medicine will be covered by the patient's insurance.   The would is improved, however, with the use of Mesalt only.  We will continue that product.  Will will also attempt to get her approved for grafts, preferably Apligraft or Theraskin.    8/9/23 - Ms. Larsen returns today for followup on the D FU to the left midfoot.  She reports that she went to ED on 7/30/23 c/o of being weak and that she got hot being outside in the sun. She was dehydrated and bradycardic. She was discharged on 8/2/23.   She saw Dr. Claros on 8/3/23 to have a halter monitor placed. She will wear this for 2 weeks, it will be d/c on 8/17/23 and she will go to [facility] in [place] on 8/28/23 for those results.   Today her wound was assessed and selectively debrided.  The periwound was quite macerated and she was reminded to be changing the dressings daily.  We are continuing to use Santyl on the wound bed.  There appears to be a thick layer of slough across the wound bed and the patient was reminded that she should be using at least a nickel thick layer of the product covered with mesalt.  When she returns in one week if there is still a layer of slough we will use the ultrasonic debrider.      8/23/23 - The patient returns today for followup on the mid foot diabetic foot ulcer.  She does have pronounced Charcot foot and this disfigured station of the foot causes her multiple problems.  Today, she presents with the wound bed itself improving, however, she had a very thick area of callus surrounding the entire  periwound.  The whole area was selectively debrided with a dermal curette.    We will continue to apply Santyl and me salt to this wound.    8/30/23 - Ms. Larsen is seen today for the D FU to her mid foot.  Today again there was a very thick area of callus surrounding the wound.  The wound bed itself looks stable but was in need of a selective debridement.  We discussed the possibility of authorize in grafts, however, she has a 3000 dollar detectable that has not been met.  We also discussed the possibility hyperbarics and she is considering that option.    9/6/23 - Ms. Larsen returns today with her caregiver.  The wound remains stable although there is again a very thick area of callus surrounding the wound margins.  The wound was selectively debrided.  There was an area of that margin that seemed to have pulled away from the epithelial tissue.  This had to be cleft off which subsequently enlarged the wound substantially.  The patient and I had a lengthy discussion regarding the importance of offloading even when she is in her home.  She does have some health little receive issues and does not fully comprehend the necessity of offloading.  She does have a Darco shoe with PEG assist that she refuses to wear.  We will consider discussion of a total contact cast at her next visit.    9/20/23 - the patient returns today for followup on a D FU to the left mid foot.  Of concern, however, is the swelling and warmth of the left lower extremity.  Although she does not have specific complaints of pain, she is mentally incompetent and therefore her interpretation of her pain level can not be crusted or assumed.  She is being sent to [facility] in [place] to have an ultrasound done to rule out DVT.  Today the wound was selectively debrided.  Again, there was a large amount of callus built around the wound was.  The wound bed itself is clean and epithelial tissue is moving in word.  We will continue with daily application of me salt  covered with a 4 x 4 and a gentle border dressing.  She no longer need Santyl at this time so we will hold that product.  Her caregiver was notified that if the ultrasound is negative for DVT that she is to  the prescription for Keflex which was sent to her local pharmacy.  Of note, her caregiver states that she has a follow up on 10/18/23 with Dr. Claros to get results from halter monitor.  They are instructed to call Dr. Claros's office to let them know about the left leg swelling frequently to see if they can get a sooner appt.     9/27/23 - Ms. Larsen returns to clinic today for follow up on the DFU to the left mid foot.  Again today the wound margin is very thickly callused.  The wound size has not changed significantly.  The wound was selectively debrided and we will begin application of Endoform to the wound bed.  Of note, an U/S was done on the left leg to r/o DVT.  She began taking Keflex on 9/20/23 once that U/S was normal.  She continues to take that medication.  However, today again both legs are extremely edematous.  Bilateral arterial/venous US have been scheduled on 10/9/23 @ 11:30 to address the edema. She does have a f/up appt with [facility] in [place] for her halter monitor results on 10/18/23.    10/4/23 - The patient returns to clinic today for the DFU to the left mid foot.  She and her caregiver report that on Saturday, 9/30/23, the patient stepped on an unknown object while ambulating on her driveway.  This caught the edge of the wound, ripping the skin causing damage.  The wound was assessed today and selectively debrided.  This area of damaged tissue was removed.  The wound does look slightly improved in spite of this injury. We will continue to use Endoform which is assisting with progress.   She is scheduled for an ultrasound on 10/9/23 at [facility]; She has a follow up with cardiology in [place]  on 10/18/23 but does not know the name of the Dr.     10/18/23 - Ms. Larsen returns today with her  caregiver for f/up on the DFU to the left mid foot.  Again today there were a thick band of callus surrounding the entire wound.  The wound bed has decreased in size slightly.  It was selectively debrided today and we will continue application of Endoform to the wound bed.  Of note, the patient's caregiver states that the ultrasound appt that was scheduled for 10/9/23 has been rescheduled to 10/23 due to a miscommunication about the time is was scheduled for on 10/9/23.   She has a cardiology appt this afternoon    10/25/23 - The patient returns today for f/up on the DFU to the left mid foot.  An U/S had been done on 10/23/23.  Her arterial system is normal.  She does have venous reflux in the left greater saphenous vein.  This generally has not been an issue for the patient and it likely is unrelated to the wound.  However, today she did present with multiple scattered open blisters on the left lower extremity.  Her caregiver reports that these are a results of not using her lotion.  We will monitor and if they do not resolve she will be referred for a vascular consult.   Today the DFU was selectively debrided and remains stable, with little change in size.  She has completed her PO Keflex.  She is scheduled for a pacemaker replacement on 11/2/23.    11/8/23 - Ms. Larsen returns to clinic today for followup on the D FU to the left mid foot.  She did have a cardiovascular pacemaker placed on 11/02-23 by Dr. Hancock.  She was started on doxycycline following that procedure.  Today, she presents with a rash on several parts of her body.  After assessment and consideration of the placement of the rashes, it is likely due to the adhesive used to perform the EKG as there are 2 areas of rash on the back as well as areas under the breast.  She was instructed to begin application of Calmoseptine which was provided on all areas of the rash.  She also presents with multiple scattered fibrin covered wounds on the left lower  extremity.  Her caretaker reports that she has been out of Aquaphor and has likely been scratching these areas.  Were cleaned up and we will continue to monitor them.  Mupirocin was called out for application to these very small open areas.  The large D FU to the left mid foot was selectively debrided.  It again had a large area of hyperkeratotic tissue at the wound margin.  This was selectively debrided off and the wound is clean with no signs and symptoms of infection.    11/15/23 - The patient returns today for f/up on the DFU to the left mid foot.  Today there was an impressive decrease in callus around the wound margin.  She reports that she has been using her wheelchair much more.  That wound was selectively debrided.  She also had a rash at her last visit which had cleared up with the use of Calmoseptine.  The scattered areas on the left lower extremity are also resolved with the use of Aquaphor.  She will return to clinic in two weeks.    12/5/23 - Ms. Larsen is seen today for f/up on the D FU to the left mid foot.  Today, she has again developed a very thick callous at the periwound which had to be selectively debrided off.  Additionally, she did have a layer of slough in the wound bed that had develop, however, there was a slight decrease in size.  There are no signs and symptoms of infection.  Of note, her caregiver reports that she has not been consistently using her wheelchair as expected.  Additionally, she states that patient's blood sugar has been running from 200-250 the past couple of weeks.    12/13/23 - Ms. Larsen returns today with her caregiver for the DFU to the left mid foot.  A selective debridement was performed with removal of slough across the wound bed and a thickened callus.  The wound remains basically unchanged. The patient requested that her toe nails be cut - nails x 9 were trimmed.     12/18/23 - the patient returns to clinic today for the D FU to the left mid foot.  Today a  "selective debridement was performed for removal of the callus and slough that was in the wound bed.  Again, the wound is reasonable unchanged but stable.  There is a small amount of redness around the periwound.  Today we will hold the Santyl and she will begin applying mupirocin ointment to the wound bed covered with me salt and a gentle border dressing.    1/8/23 - Ms. Larsen returns for f/up on the DFU to the L mid foot.  She has been using mupirocin daily since 12/18/23.  Today there is no erythema and no S & S of infection. We will d/c the mupirocin and apply mesalt only to the wound bed.  A selective debridement was performed to remove the very thickened callus and the patient was reminded again that she should be offloading this foot and using her wheelchair for ambulation.    Review of Systems   Constitutional: Negative.    Respiratory: Negative.     Cardiovascular: Negative.    Skin:         As documented in the HPI.   All other systems reviewed and are negative.        Objective:      Vitals:    01/08/24 1005   Resp: 18   Weight: 94.8 kg (209 lb)   Height: 5' 6" (1.676 m)      Physical Exam  Vitals reviewed.   Constitutional:       Appearance: Normal appearance.   Cardiovascular:      Rate and Rhythm: Normal rate.   Pulmonary:      Effort: Pulmonary effort is normal.   Skin:     General: Skin is warm and dry.      Comments: DFU left foot   Neurological:      Mental Status: She is alert.            Altered Skin Integrity 04/18/23 1351 Left medial Foot (Active)   04/18/23 1351   Altered Skin Integrity Present on Admission - Did Patient arrive to the hospital with altered skin?: yes   Side: Left   Orientation: medial   Location: Foot   Wound Number:    Is this injury device related?:    Primary Wound Type:    Description of Altered Skin Integrity:    Ankle-Brachial Index:    Pulses:    Removal Indication and Assessment:    Wound Outcome:    (Retired) Wound Length (cm):    (Retired) Wound Width (cm):  " "  (Retired) Depth (cm):    Wound Description (Comments):    Removal Indications:    Dressing Appearance Moist drainage 01/08/24 [facility]   Drainage Amount Moderate 01/08/24 [facility]   Drainage Characteristics/Odor Serosanguineous;No odor 01/08/24 [facility]   Appearance Pink;Moist;Slough 01/08/24 [facility]   Tissue loss description Full thickness 01/08/24 [facility]   Periwound Area Intact;Redness 01/08/24 [facility]   Wound Edges Open;Callused 01/08/24 [facility]   Kovacs Classification (diabetic foot ulcers only) Grade 2 01/08/24 [facility]   Wound Length (cm) 2.4 cm 01/08/24 [facility]   Wound Width (cm) 1.8 cm 01/08/24 [facility]   Wound Depth (cm) 0.2 cm 01/08/24 [facility]   Wound Volume (cm^3) 0.864 cm^3 01/08/24 [facility]   Wound Surface Area (cm^2) 4.32 cm^2 01/08/24 [facility]   Care Cleansed with:;Wound cleanser 01/08/24 [facility]   Dressing Applied 01/08/24 [facility]   Dressing Change Due 01/09/24 01/08/24 [facility]       01/08/24      Left medial foot - pre carmen.                           Left medial foot - post carmen.   (Mesalt, silver alginate, 4x4 gauze, kerlix, coban)   ML  Assessment:           ICD-10-CM ICD-9-CM   1. 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 Open wound of plantar aspect of left foot  S91.302A 892.0   3. Charcot's joint of left foot, non-diabetic  M14.672 094.0     713.5   4. Chronic venous stasis  I87.8 459.81           Procedures:     Debridement     Date/Time: 1/8/2024 9:18 AM     Performed by: Barbra Perez NP  Authorized by: Barbra Perez NP    Time out: Immediately prior to procedure a "time out" was called to verify the correct patient, procedure, equipment, support staff and site/side marked as required.     Consent Done?:  Yes (Verbal)     Preparation: Patient was prepped and draped with clean technique       Wound Details:    Location:  Left foot    Location:  Left Midfoot    Type of Debridement:  Non-excisional       Length (cm):  2.4       Area (sq cm):  4.32      "  Width (cm):  1.8       Percent Debrided (%):  100       Depth (cm):  0.2       Total Area Debrided (sq cm):  4.32    Depth of debridement:  Epidermis/Dermis    Devitalized tissue debrided:  Biofilm, Callus and Exudate    Instruments:  Blade (#15)  Bleeding:  None  Patient tolerance:  Patient tolerated the procedure well with no immediate complications       Excisional debridement performed:  [] Yes [] No   Selective debridement performed:  [x] Yes [] No  Mechanical debridement performed:  [] Yes [] No   Silver nitrate application          [] Yes [] No   Labs ordered this visit:   [] Yes [] No   Imaging ordered this visit:   [] Yes [] No   Tissue pathology and/or culture taken:  [] Yes [] No           HOME HEALTH AGENCY:   [facility]     Home Health Services     Since 4/17/2023     [phone]     TIMES PER WEEK/DAYS: 1 x weekly, Fridays only  Left medial foot wound: Cleanse with wound cleanser and apply mesalt to the wound bed, cover with silver alginate and 4x4 gauze, wrap foot lightly in kerlix and coban - to be changed daily.       Follow up in about 8 days (around 1/16/2024) for Left midfoot .

## 2024-01-08 NOTE — PROCEDURES
"Debridement    Date/Time: 1/8/2024 9:18 AM    Performed by: Barbra Perez NP  Authorized by: Barbra Perez NP    Time out: Immediately prior to procedure a "time out" was called to verify the correct patient, procedure, equipment, support staff and site/side marked as required.    Consent Done?:  Yes (Verbal)    Preparation: Patient was prepped and draped with clean technique      Wound Details:    Location:  Left foot    Location:  Left Midfoot    Type of Debridement:  Non-excisional       Length (cm):  2.4       Area (sq cm):  4.32       Width (cm):  1.8       Percent Debrided (%):  100       Depth (cm):  0.2       Total Area Debrided (sq cm):  4.32    Depth of debridement:  Epidermis/Dermis    Devitalized tissue debrided:  Biofilm, Callus and Exudate    Instruments:  Blade (#15)  Bleeding:  None  Patient tolerance:  Patient tolerated the procedure well with no immediate complications    "

## 2024-01-22 ENCOUNTER — HOSPITAL ENCOUNTER (OUTPATIENT)
Dept: WOUND CARE | Facility: HOSPITAL | Age: 69
Discharge: HOME | End: 2024-01-22
Attending: FAMILY MEDICINE
Payer: MEDICARE

## 2024-01-22 VITALS
RESPIRATION RATE: 18 BRPM | HEART RATE: 80 BPM | BODY MASS INDEX: 33.59 KG/M2 | HEIGHT: 66 IN | SYSTOLIC BLOOD PRESSURE: 146 MMHG | DIASTOLIC BLOOD PRESSURE: 67 MMHG | WEIGHT: 209 LBS

## 2024-01-22 DIAGNOSIS — I87.8 CHRONIC VENOUS STASIS: ICD-10-CM

## 2024-01-22 DIAGNOSIS — S91.302A OPEN WOUND OF PLANTAR ASPECT OF LEFT FOOT: ICD-10-CM

## 2024-01-22 DIAGNOSIS — Z51.89 ENCOUNTER FOR WOUND RE-CHECK: ICD-10-CM

## 2024-01-22 DIAGNOSIS — M14.672 CHARCOT'S JOINT OF LEFT FOOT, NON-DIABETIC: ICD-10-CM

## 2024-01-22 DIAGNOSIS — E11.610 TYPE 2 DIABETES MELLITUS WITH CHARCOT'S JOINT OF LEFT FOOT: ICD-10-CM

## 2024-01-22 DIAGNOSIS — L97.525 NON-PRESSURE CHRONIC ULCER OF OTHER PART OF LEFT FOOT WITH MUSCLE INVOLVEMENT WITHOUT EVIDENCE OF NECROSIS: Primary | ICD-10-CM

## 2024-01-22 PROCEDURE — 27000999 HC MEDICAL RECORD PHOTO DOCUMENTATION

## 2024-01-22 PROCEDURE — 99213 OFFICE O/P EST LOW 20 MIN: CPT | Mod: 25

## 2024-01-22 PROCEDURE — 97597 DBRDMT OPN WND 1ST 20 CM/<: CPT

## 2024-01-22 RX ORDER — LANCETS 33 GAUGE
EACH MISCELLANEOUS
COMMUNITY
Start: 2024-01-13

## 2024-01-22 RX ORDER — BLOOD-GLUCOSE METER
EACH MISCELLANEOUS
COMMUNITY
Start: 2024-01-13

## 2024-01-22 NOTE — PROGRESS NOTES
BitWall: [facility]  Smoker  [x] Yes  [] No   Last A1C: 6.8 on 6/2/23 (getting info)   Last CBG: Did NOT check today, 01/08/24  Celia Tarango  [x] Yes [] No            Results: 0/5 left foot   Doppler done in office? [x] Yes [] No on 9/20/23  Posterior tibial: monophasic  Dorsalis pedal: monophasic  Darco Shoe [x] Yes [] No   Date given: does not like to wear  Is patient eligible for HBO [] Yes [x] No  (not a [place] 3)   Is the patient eligible for a graft, and/or currently grafting?  [x] Yes [] No (deductible to high for the patient though)   Right calf: 30 cm  Right ankle: 21.6 cm  Left calf: 40 cm  Left ankle: 23.4 cm    --------------------------------------------------------------------------------------------------------------------------------------------------------------------------------------------------------     Patient ID: Diane Larsen is a 68 y.o. female.    Chief Complaint: Diabetic Foot Ulcer ((Left medial foot - salinas 2 )/ /)    Subjective:    HPI  7/18/23 - Ms. Larsen was last seen in the clinic on 5/30/23. She was admitted to [facility] on 5/31/23 - 6/6/23 with hypotension and low O2 sats. While there it was discovered she had C-Diff. Her caregiver states that she did several rounds of oral antibiotics but is no longer taking anything. She has been using mesalt and silver alginate to dress the wound since being home. She has been diagnosed as a diabetic since her last visit with us. Her A1C on 6/2/23 was 6.8.   Today her wound was assessed in has improved since her last visit.  It was selectively debrided.  There is a light coating of slough over the entire wound bed, therefore Santyl has been ordered for daily application.  Today, we applied mesalt and if she is not able to get Santyl with her insurance she will continue with daily use of mesalt.    Additionally, her caregiver requested that her toenails be trimmed.  Six toenails were debrided off today.    7/25/23 -  the patient returns today for followup on a D FU to the left foot.  The wound was assessed and selectively debrided.  The hypergranulated tissue was chemically cauterized using silver nitrate.  The caregiver is still attempting to obtain Santyl that was ordered.  It was initially sent to [facility] in [place] who could not get the product.  The Rx was transferred to [facility] in [place]. She is waiting for this to go through so she can see if the medicine will be covered by the patient's insurance.   The would is improved, however, with the use of Mesalt only.  We will continue that product.  Will will also attempt to get her approved for grafts, preferably Apligraft or Theraskin.    8/9/23 - Ms. Larsen returns today for followup on the D FU to the left midfoot.  She reports that she went to ED on 7/30/23 c/o of being weak and that she got hot being outside in the sun. She was dehydrated and bradycardic. She was discharged on 8/2/23.   She saw Dr. Claros on 8/3/23 to have a halter monitor placed. She will wear this for 2 weeks, it will be d/c on 8/17/23 and she will go to [facility] in [place] on 8/28/23 for those results.   Today her wound was assessed and selectively debrided.  The periwound was quite macerated and she was reminded to be changing the dressings daily.  We are continuing to use Santyl on the wound bed.  There appears to be a thick layer of slough across the wound bed and the patient was reminded that she should be using at least a nickel thick layer of the product covered with mesalt.  When she returns in one week if there is still a layer of slough we will use the ultrasonic debrider.      8/23/23 - The patient returns today for followup on the mid foot diabetic foot ulcer.  She does have pronounced Charcot foot and this disfigured station of the foot causes her multiple problems.  Today, she presents with the wound bed itself improving, however, she had a very thick area of callus surrounding the entire  periwound.  The whole area was selectively debrided with a dermal curette.    We will continue to apply Santyl and me salt to this wound.    8/30/23 - Ms. Larsen is seen today for the D FU to her mid foot.  Today again there was a very thick area of callus surrounding the wound.  The wound bed itself looks stable but was in need of a selective debridement.  We discussed the possibility of authorize in grafts, however, she has a 3000 dollar detectable that has not been met.  We also discussed the possibility hyperbarics and she is considering that option.    9/6/23 - Ms. Larsen returns today with her caregiver.  The wound remains stable although there is again a very thick area of callus surrounding the wound margins.  The wound was selectively debrided.  There was an area of that margin that seemed to have pulled away from the epithelial tissue.  This had to be cleft off which subsequently enlarged the wound substantially.  The patient and I had a lengthy discussion regarding the importance of offloading even when she is in her home.  She does have some health little receive issues and does not fully comprehend the necessity of offloading.  She does have a Darco shoe with PEG assist that she refuses to wear.  We will consider discussion of a total contact cast at her next visit.    9/20/23 - the patient returns today for followup on a D FU to the left mid foot.  Of concern, however, is the swelling and warmth of the left lower extremity.  Although she does not have specific complaints of pain, she is mentally incompetent and therefore her interpretation of her pain level can not be crusted or assumed.  She is being sent to [facility] in [place] to have an ultrasound done to rule out DVT.  Today the wound was selectively debrided.  Again, there was a large amount of callus built around the wound was.  The wound bed itself is clean and epithelial tissue is moving in word.  We will continue with daily application of me salt  covered with a 4 x 4 and a gentle border dressing.  She no longer need Santyl at this time so we will hold that product.  Her caregiver was notified that if the ultrasound is negative for DVT that she is to  the prescription for Keflex which was sent to her local pharmacy.  Of note, her caregiver states that she has a follow up on 10/18/23 with Dr. Claros to get results from halter monitor.  They are instructed to call Dr. Claros's office to let them know about the left leg swelling frequently to see if they can get a sooner appt.     9/27/23 - Ms. Larsen returns to clinic today for follow up on the DFU to the left mid foot.  Again today the wound margin is very thickly callused.  The wound size has not changed significantly.  The wound was selectively debrided and we will begin application of Endoform to the wound bed.  Of note, an U/S was done on the left leg to r/o DVT.  She began taking Keflex on 9/20/23 once that U/S was normal.  She continues to take that medication.  However, today again both legs are extremely edematous.  Bilateral arterial/venous US have been scheduled on 10/9/23 @ 11:30 to address the edema. She does have a f/up appt with [facility] in [place] for her halter monitor results on 10/18/23.    10/4/23 - The patient returns to clinic today for the DFU to the left mid foot.  She and her caregiver report that on Saturday, 9/30/23, the patient stepped on an unknown object while ambulating on her driveway.  This caught the edge of the wound, ripping the skin causing damage.  The wound was assessed today and selectively debrided.  This area of damaged tissue was removed.  The wound does look slightly improved in spite of this injury. We will continue to use Endoform which is assisting with progress.   She is scheduled for an ultrasound on 10/9/23 at [facility]; She has a follow up with cardiology in [place]  on 10/18/23 but does not know the name of the Dr.     10/18/23 - Ms. Larsen returns today with her  caregiver for f/up on the DFU to the left mid foot.  Again today there were a thick band of callus surrounding the entire wound.  The wound bed has decreased in size slightly.  It was selectively debrided today and we will continue application of Endoform to the wound bed.  Of note, the patient's caregiver states that the ultrasound appt that was scheduled for 10/9/23 has been rescheduled to 10/23 due to a miscommunication about the time is was scheduled for on 10/9/23.   She has a cardiology appt this afternoon    10/25/23 - The patient returns today for f/up on the DFU to the left mid foot.  An U/S had been done on 10/23/23.  Her arterial system is normal.  She does have venous reflux in the left greater saphenous vein.  This generally has not been an issue for the patient and it likely is unrelated to the wound.  However, today she did present with multiple scattered open blisters on the left lower extremity.  Her caregiver reports that these are a results of not using her lotion.  We will monitor and if they do not resolve she will be referred for a vascular consult.   Today the DFU was selectively debrided and remains stable, with little change in size.  She has completed her PO Keflex.  She is scheduled for a pacemaker replacement on 11/2/23.    11/8/23 - Ms. Larsen returns to clinic today for followup on the D FU to the left mid foot.  She did have a cardiovascular pacemaker placed on 11/02-23 by Dr. Hancock.  She was started on doxycycline following that procedure.  Today, she presents with a rash on several parts of her body.  After assessment and consideration of the placement of the rashes, it is likely due to the adhesive used to perform the EKG as there are 2 areas of rash on the back as well as areas under the breast.  She was instructed to begin application of Calmoseptine which was provided on all areas of the rash.  She also presents with multiple scattered fibrin covered wounds on the left lower  extremity.  Her caretaker reports that she has been out of Aquaphor and has likely been scratching these areas.  Were cleaned up and we will continue to monitor them.  Mupirocin was called out for application to these very small open areas.  The large D FU to the left mid foot was selectively debrided.  It again had a large area of hyperkeratotic tissue at the wound margin.  This was selectively debrided off and the wound is clean with no signs and symptoms of infection.    11/15/23 - The patient returns today for f/up on the DFU to the left mid foot.  Today there was an impressive decrease in callus around the wound margin.  She reports that she has been using her wheelchair much more.  That wound was selectively debrided.  She also had a rash at her last visit which had cleared up with the use of Calmoseptine.  The scattered areas on the left lower extremity are also resolved with the use of Aquaphor.  She will return to clinic in two weeks.    12/5/23 - Ms. Larsen is seen today for f/up on the D FU to the left mid foot.  Today, she has again developed a very thick callous at the periwound which had to be selectively debrided off.  Additionally, she did have a layer of slough in the wound bed that had develop, however, there was a slight decrease in size.  There are no signs and symptoms of infection.  Of note, her caregiver reports that she has not been consistently using her wheelchair as expected.  Additionally, she states that patient's blood sugar has been running from 200-250 the past couple of weeks.    12/13/23 - Ms. Larsen returns today with her caregiver for the DFU to the left mid foot.  A selective debridement was performed with removal of slough across the wound bed and a thickened callus.  The wound remains basically unchanged. The patient requested that her toe nails be cut - nails x 9 were trimmed.     12/18/23 - the patient returns to clinic today for the D FU to the left mid foot.  Today a  "selective debridement was performed for removal of the callus and slough that was in the wound bed.  Again, the wound is reasonable unchanged but stable.  There is a small amount of redness around the periwound.  Today we will hold the Santyl and she will begin applying mupirocin ointment to the wound bed covered with me salt and a gentle border dressing.    1/8/23 - Ms. Larsen returns for f/up on the DFU to the L mid foot.  She has been using mupirocin daily since 12/18/23.  Today there is no erythema and no S & S of infection. We will d/c the mupirocin and apply mesalt only to the wound bed.  A selective debridement was performed to remove the very thickened callus and the patient was reminded again that she should be offloading this foot and using her wheelchair for ambulation.    1/22/24 - Ms. Larsen is seen today for f/up on the DFU to the left mid foot.  Today, as is customary for this wound, there is a very thick callus around the entire periwound.  A curette was used to remove that calluse.  Forceps and scissors were also used to trim the edge of the wound where a small amount of undermining was beginning to develop.  Of note, her caregiver states that patient has been having diarrhea on and off for a couple of weeks and they think it may be related to the Mounjaro shot. She called the patients PCP and is waiting for a phone call back    Review of Systems   Constitutional: Negative.    Respiratory: Negative.     Cardiovascular: Negative.    Skin:         As documented in the HPI.   All other systems reviewed and are negative.        Objective:      Vitals:    01/22/24 1027   BP: (!) 146/67   BP Location: Right arm   Patient Position: Sitting   Pulse: 80   Resp: 18   Weight: 94.8 kg (209 lb)   Height: 5' 6" (1.676 m)      Physical Exam  Vitals reviewed.   Constitutional:       Appearance: Normal appearance.   Cardiovascular:      Rate and Rhythm: Normal rate.   Pulmonary:      Effort: Pulmonary effort is " normal.   Skin:     General: Skin is warm and dry.      Comments: DFU left foot   Neurological:      Mental Status: She is alert.            Altered Skin Integrity 04/18/23 1351 Left medial Foot (Active)   04/18/23 1351   Altered Skin Integrity Present on Admission - Did Patient arrive to the hospital with altered skin?: yes   Side: Left   Orientation: medial   Location: Foot   Wound Number:    Is this injury device related?:    Primary Wound Type:    Description of Altered Skin Integrity:    Ankle-Brachial Index:    Pulses:    Removal Indication and Assessment:    Wound Outcome:    (Retired) Wound Length (cm):    (Retired) Wound Width (cm):    (Retired) Depth (cm):    Wound Description (Comments):    Removal Indications:    Dressing Appearance Moist drainage 01/22/24 1027   Drainage Amount Moderate 01/22/24 1027   Drainage Characteristics/Odor Serosanguineous 01/22/24 1027   Appearance Pink;Tan;Slough;Moist 01/22/24 1027   Tissue loss description Full thickness 01/22/24 1027   Periwound Area Dry 01/22/24 1027   Wound Edges Callused;Open 01/22/24 1027   Kovacs Classification (diabetic foot ulcers only) Grade 2 01/22/24 1027   Wound Length (cm) 2.5 cm 01/22/24 1027   Wound Width (cm) 1.8 cm 01/22/24 1027   Wound Depth (cm) 0.2 cm 01/22/24 1027   Wound Volume (cm^3) 0.9 cm^3 01/22/24 1027   Wound Surface Area (cm^2) 4.5 cm^2 01/22/24 1027   Care Cleansed with:;Wound cleanser 01/22/24 1027   Dressing Applied 01/22/24 1027   Dressing Change Due 01/23/24 01/22/24 1027     ML    1/22/24         Left medial foot (pre)                                      Left medial foot (post)                                                                        (Mesalt, silver alginate, 4x4, kerlix, coban)  Assessment:           ICD-10-CM ICD-9-CM   1. 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 Open wound of plantar aspect of left foot  S91.302A 892.0  "  3. Charcot's joint of left foot, non-diabetic  M14.672 094.0     713.5   4. Chronic venous stasis  I87.8 459.81   5. Type 2 diabetes mellitus with Charcot's joint of left foot  E11.610 250.60     713.5   6. Encounter for wound re-check  Z51.89 V58.89             Procedures:     Debridement     Date/Time: 1/22/2024 10:14 AM     Performed by: Barbra Perez NP  Authorized by: Barbra Perez NP    Time out: Immediately prior to procedure a "time out" was called to verify the correct patient, procedure, equipment, support staff and site/side marked as required.     Consent Done?:  Yes (Verbal)     Preparation: Patient was prepped and draped with clean technique       Wound Details:    Location:  Left foot    Location:  Left Midfoot    Type of Debridement:  Non-excisional       Length (cm):  2.5       Area (sq cm):  4.5       Width (cm):  1.8       Percent Debrided (%):  100       Depth (cm):  0.2       Total Area Debrided (sq cm):  4.5    Depth of debridement:  Epidermis/Dermis    Devitalized tissue debrided:  Biofilm, Callus, Exudate, Fibrin and Slough    Instruments:  Forceps, Scissors, Curette and Blade (#15)  Bleeding:  Minimal  Method Used:  Pressure  Patient tolerance:  Patient tolerated the procedure well with no immediate complications       Excisional debridement performed:  [] Yes [] No   Selective debridement performed:  [x] Yes [] No  Mechanical debridement performed:  [] Yes [] No   Silver nitrate application          [] Yes [] No   Labs ordered this visit:   [] Yes [] No   Imaging ordered this visit:   [] Yes [] No   Tissue pathology and/or culture taken:  [] Yes [] No           HOME HEALTH AGENCY:   [facility]     Home Health Services     Since 4/17/2023     [phone]     TIMES PER WEEK/DAYS: 1 x weekly, Fridays only  Left medial foot wound: Cleanse with wound cleanser and apply mesalt to the wound bed, cover with silver alginate and 4x4 gauze, wrap foot lightly in kerlix " and coban - to be changed daily.       Follow up in about 9 days (around 1/31/2024) for left foot.

## 2024-01-22 NOTE — PROCEDURES
"Debridement    Date/Time: 1/22/2024 10:14 AM    Performed by: Barbra Perez NP  Authorized by: Barbra Perez NP    Time out: Immediately prior to procedure a "time out" was called to verify the correct patient, procedure, equipment, support staff and site/side marked as required.    Consent Done?:  Yes (Verbal)    Preparation: Patient was prepped and draped with clean technique      Wound Details:    Location:  Left foot    Location:  Left Midfoot    Type of Debridement:  Non-excisional       Length (cm):  2.5       Area (sq cm):  4.5       Width (cm):  1.8       Percent Debrided (%):  100       Depth (cm):  0.2       Total Area Debrided (sq cm):  4.5    Depth of debridement:  Epidermis/Dermis    Devitalized tissue debrided:  Biofilm, Callus, Exudate, Fibrin and Slough    Instruments:  Forceps, Scissors, Curette and Blade (#15)  Bleeding:  Minimal  Method Used:  Pressure  Patient tolerance:  Patient tolerated the procedure well with no immediate complications    "

## 2024-01-31 ENCOUNTER — HOSPITAL ENCOUNTER (OUTPATIENT)
Dept: WOUND CARE | Facility: HOSPITAL | Age: 69
Discharge: HOME | End: 2024-01-31
Attending: FAMILY MEDICINE
Payer: MEDICARE

## 2024-01-31 VITALS
HEART RATE: 94 BPM | RESPIRATION RATE: 18 BRPM | HEIGHT: 66 IN | DIASTOLIC BLOOD PRESSURE: 74 MMHG | SYSTOLIC BLOOD PRESSURE: 154 MMHG | WEIGHT: 209 LBS | BODY MASS INDEX: 33.59 KG/M2

## 2024-01-31 DIAGNOSIS — L97.521 DIABETIC ULCER OF LEFT FOOT ASSOCIATED WITH TYPE 2 DIABETES MELLITUS, LIMITED TO BREAKDOWN OF SKIN, UNSPECIFIED PART OF FOOT: ICD-10-CM

## 2024-01-31 DIAGNOSIS — S91.302A OPEN WOUND OF PLANTAR ASPECT OF LEFT FOOT: Primary | ICD-10-CM

## 2024-01-31 DIAGNOSIS — E11.610 TYPE 2 DIABETES MELLITUS WITH CHARCOT'S JOINT OF LEFT FOOT: ICD-10-CM

## 2024-01-31 DIAGNOSIS — E11.621 DIABETIC ULCER OF LEFT FOOT ASSOCIATED WITH TYPE 2 DIABETES MELLITUS, LIMITED TO BREAKDOWN OF SKIN, UNSPECIFIED PART OF FOOT: ICD-10-CM

## 2024-01-31 PROCEDURE — 27000999 HC MEDICAL RECORD PHOTO DOCUMENTATION

## 2024-01-31 PROCEDURE — 99213 OFFICE O/P EST LOW 20 MIN: CPT | Mod: 25

## 2024-01-31 PROCEDURE — 97597 DBRDMT OPN WND 1ST 20 CM/<: CPT

## 2024-01-31 RX ORDER — METHYLPREDNISOLONE 4 MG/1
TABLET ORAL
COMMUNITY
Start: 2024-01-30 | End: 2024-03-11

## 2024-01-31 NOTE — PROCEDURES
"Debridement    Date/Time: 1/31/2024 8:46 AM    Performed by: Samir Lassiter MD  Authorized by: Samir Lassiter MD    Time out: Immediately prior to procedure a "time out" was called to verify the correct patient, procedure, equipment, support staff and site/side marked as required.    Consent Done?:  Yes (Verbal)    Preparation: Patient was prepped and draped with aseptic technique    Local anesthesia used?: No      Wound Details:    Location:  Left foot    Location:  Left Plantar    Type of Debridement:  Non-excisional       Length (cm):  2.5       Area (sq cm):  5       Width (cm):  2       Percent Debrided (%):  100       Depth (cm):  0.5       Total Area Debrided (sq cm):  5    Depth of debridement:  Epidermis/Dermis    Tissue debrided:  Epidermis    Devitalized tissue debrided:  Biofilm, Callus, Exudate, Slough and Necrotic/Eschar    Instruments:  Scissors, Forceps and Curette (dermal)  Bleeding:  None  Patient tolerance:  Patient tolerated the procedure well with no immediate complications  1st Wound Pain Assessment: 0    "

## 2024-01-31 NOTE — PROGRESS NOTES
Rule.: [employer]  Smoker  [x] Yes  [] No   Last A1C: 6.8 on 6/2/23 (getting info)   Last CBG: Did NOT check today, 01/08/24  Celia Tarango  [x] Yes [] No            Results: 0/5 left foot   Doppler done in office? [x] Yes [] No on 9/20/23  Posterior tibial: monophasic  Dorsalis pedal: monophasic  Darco Shoe [x] Yes [] No   Date given: does not like to wear  Is patient eligible for HBO [] Yes [x] No  (not a [place] 3)   Is the patient eligible for a graft, and/or currently grafting?  [x] Yes [] No (deductible to high for the patient though)   Right calf: 34 cm  Right ankle: 22.3 cm  Left calf: 38.5 cm  Left ankle: 24.5 cm    NOTES:  Patient was prescribed methylprednisone yesterday for respiratory trouble. There is quite a bit of undermining to her wound today  --------------------------------------------------------------------------------------------------------------------------------------------------------------------------------------------------------     Patient ID: Diane Larsen is a 68 y.o. female.    Chief Complaint: Diabetic Foot Ulcer (Left medial foot - salinas 2 )    Subjective:    HPI  7/18/23 - Ms. Larsen was last seen in the clinic on 5/30/23. She was admitted to [facility] on 5/31/23 - 6/6/23 with hypotension and low O2 sats. While there it was discovered she had C-Diff. Her caregiver states that she did several rounds of oral antibiotics but is no longer taking anything. She has been using mesalt and silver alginate to dress the wound since being home. She has been diagnosed as a diabetic since her last visit with us. Her A1C on 6/2/23 was 6.8.   Today her wound was assessed in has improved since her last visit.  It was selectively debrided.  There is a light coating of slough over the entire wound bed, therefore Santyl has been ordered for daily application.  Today, we applied mesalt and if she is not able to get Santyl with her insurance she will continue with daily use  of mesalt.    Additionally, her caregiver requested that her toenails be trimmed.  Six toenails were debrided off today.    7/25/23 - the patient returns today for followup on a D FU to the left foot.  The wound was assessed and selectively debrided.  The hypergranulated tissue was chemically cauterized using silver nitrate.  The caregiver is still attempting to obtain Santyl that was ordered.  It was initially sent to [facility] in [place] who could not get the product.  The Rx was transferred to [facility] in [place]. She is waiting for this to go through so she can see if the medicine will be covered by the patient's insurance.   The would is improved, however, with the use of Mesalt only.  We will continue that product.  Will will also attempt to get her approved for grafts, preferably Apligraft or Theraskin.    8/9/23 - Ms. Larsen returns today for followup on the D FU to the left midfoot.  She reports that she went to ED on 7/30/23 c/o of being weak and that she got hot being outside in the sun. She was dehydrated and bradycardic. She was discharged on 8/2/23.   She saw Dr. Claros on 8/3/23 to have a halter monitor placed. She will wear this for 2 weeks, it will be d/c on 8/17/23 and she will go to [facility] in [place] on 8/28/23 for those results.   Today her wound was assessed and selectively debrided.  The periwound was quite macerated and she was reminded to be changing the dressings daily.  We are continuing to use Santyl on the wound bed.  There appears to be a thick layer of slough across the wound bed and the patient was reminded that she should be using at least a nickel thick layer of the product covered with mesalt.  When she returns in one week if there is still a layer of slough we will use the ultrasonic debrider.      8/23/23 - The patient returns today for followup on the mid foot diabetic foot ulcer.  She does have pronounced Charcot foot and this disfigured station of the foot causes her multiple  problems.  Today, she presents with the wound bed itself improving, however, she had a very thick area of callus surrounding the entire periwound.  The whole area was selectively debrided with a dermal curette.    We will continue to apply Santyl and me salt to this wound.    8/30/23 - Ms. Larsen is seen today for the D FU to her mid foot.  Today again there was a very thick area of callus surrounding the wound.  The wound bed itself looks stable but was in need of a selective debridement.  We discussed the possibility of authorize in grafts, however, she has a 3000 dollar detectable that has not been met.  We also discussed the possibility hyperbarics and she is considering that option.    9/6/23 - Ms. Larsen returns today with her caregiver.  The wound remains stable although there is again a very thick area of callus surrounding the wound margins.  The wound was selectively debrided.  There was an area of that margin that seemed to have pulled away from the epithelial tissue.  This had to be cleft off which subsequently enlarged the wound substantially.  The patient and I had a lengthy discussion regarding the importance of offloading even when she is in her home.  She does have some health little receive issues and does not fully comprehend the necessity of offloading.  She does have a Darco shoe with PEG assist that she refuses to wear.  We will consider discussion of a total contact cast at her next visit.    9/20/23 - the patient returns today for followup on a D FU to the left mid foot.  Of concern, however, is the swelling and warmth of the left lower extremity.  Although she does not have specific complaints of pain, she is mentally incompetent and therefore her interpretation of her pain level can not be crusted or assumed.  She is being sent to [facility] in [place] to have an ultrasound done to rule out DVT.  Today the wound was selectively debrided.  Again, there was a large amount of callus built around the  wound was.  The wound bed itself is clean and epithelial tissue is moving in word.  We will continue with daily application of me salt covered with a 4 x 4 and a gentle border dressing.  She no longer need Santyl at this time so we will hold that product.  Her caregiver was notified that if the ultrasound is negative for DVT that she is to  the prescription for Keflex which was sent to her local pharmacy.  Of note, her caregiver states that she has a follow up on 10/18/23 with Dr. Claros to get results from halter monitor.  They are instructed to call Dr. Claros's office to let them know about the left leg swelling frequently to see if they can get a sooner appt.     9/27/23 - Ms. Larsen returns to clinic today for follow up on the DFU to the left mid foot.  Again today the wound margin is very thickly callused.  The wound size has not changed significantly.  The wound was selectively debrided and we will begin application of Endoform to the wound bed.  Of note, an U/S was done on the left leg to r/o DVT.  She began taking Keflex on 9/20/23 once that U/S was normal.  She continues to take that medication.  However, today again both legs are extremely edematous.  Bilateral arterial/venous US have been scheduled on 10/9/23 @ 11:30 to address the edema. She does have a f/up appt with [facility] in [place] for her halter monitor results on 10/18/23.    10/4/23 - The patient returns to clinic today for the DFU to the left mid foot.  She and her caregiver report that on Saturday, 9/30/23, the patient stepped on an unknown object while ambulating on her driveway.  This caught the edge of the wound, ripping the skin causing damage.  The wound was assessed today and selectively debrided.  This area of damaged tissue was removed.  The wound does look slightly improved in spite of this injury. We will continue to use Endoform which is assisting with progress.   She is scheduled for an ultrasound on 10/9/23 at [facility]; She has a follow  up with cardiology in [place]  on 10/18/23 but does not know the name of the Dr.     10/18/23 - Ms. Larsen returns today with her caregiver for f/up on the DFU to the left mid foot.  Again today there were a thick band of callus surrounding the entire wound.  The wound bed has decreased in size slightly.  It was selectively debrided today and we will continue application of Endoform to the wound bed.  Of note, the patient's caregiver states that the ultrasound appt that was scheduled for 10/9/23 has been rescheduled to 10/23 due to a miscommunication about the time is was scheduled for on 10/9/23.   She has a cardiology appt this afternoon    10/25/23 - The patient returns today for f/up on the DFU to the left mid foot.  An U/S had been done on 10/23/23.  Her arterial system is normal.  She does have venous reflux in the left greater saphenous vein.  This generally has not been an issue for the patient and it likely is unrelated to the wound.  However, today she did present with multiple scattered open blisters on the left lower extremity.  Her caregiver reports that these are a results of not using her lotion.  We will monitor and if they do not resolve she will be referred for a vascular consult.   Today the DFU was selectively debrided and remains stable, with little change in size.  She has completed her PO Keflex.  She is scheduled for a pacemaker replacement on 11/2/23.    11/8/23 - Ms. Larsen returns to clinic today for followup on the D FU to the left mid foot.  She did have a cardiovascular pacemaker placed on 11/02-23 by Dr. Hancock.  She was started on doxycycline following that procedure.  Today, she presents with a rash on several parts of her body.  After assessment and consideration of the placement of the rashes, it is likely due to the adhesive used to perform the EKG as there are 2 areas of rash on the back as well as areas under the breast.  She was instructed to begin application of Calmoseptine  which was provided on all areas of the rash.  She also presents with multiple scattered fibrin covered wounds on the left lower extremity.  Her caretaker reports that she has been out of Aquaphor and has likely been scratching these areas.  Were cleaned up and we will continue to monitor them.  Mupirocin was called out for application to these very small open areas.  The large D FU to the left mid foot was selectively debrided.  It again had a large area of hyperkeratotic tissue at the wound margin.  This was selectively debrided off and the wound is clean with no signs and symptoms of infection.    11/15/23 - The patient returns today for f/up on the DFU to the left mid foot.  Today there was an impressive decrease in callus around the wound margin.  She reports that she has been using her wheelchair much more.  That wound was selectively debrided.  She also had a rash at her last visit which had cleared up with the use of Calmoseptine.  The scattered areas on the left lower extremity are also resolved with the use of Aquaphor.  She will return to clinic in two weeks.    12/5/23 - Ms. Larsen is seen today for f/up on the D FU to the left mid foot.  Today, she has again developed a very thick callous at the periwound which had to be selectively debrided off.  Additionally, she did have a layer of slough in the wound bed that had develop, however, there was a slight decrease in size.  There are no signs and symptoms of infection.  Of note, her caregiver reports that she has not been consistently using her wheelchair as expected.  Additionally, she states that patient's blood sugar has been running from 200-250 the past couple of weeks.    12/13/23 - Ms. Larsen returns today with her caregiver for the DFU to the left mid foot.  A selective debridement was performed with removal of slough across the wound bed and a thickened callus.  The wound remains basically unchanged. The patient requested that her toe nails be cut -  nails x 9 were trimmed.     12/18/23 - the patient returns to clinic today for the D FU to the left mid foot.  Today a selective debridement was performed for removal of the callus and slough that was in the wound bed.  Again, the wound is reasonable unchanged but stable.  There is a small amount of redness around the periwound.  Today we will hold the Santyl and she will begin applying mupirocin ointment to the wound bed covered with me salt and a gentle border dressing.    1/8/23 - Ms. Larsen returns for f/up on the DFU to the L mid foot.  She has been using mupirocin daily since 12/18/23.  Today there is no erythema and no S & S of infection. We will d/c the mupirocin and apply mesalt only to the wound bed.  A selective debridement was performed to remove the very thickened callus and the patient was reminded again that she should be offloading this foot and using her wheelchair for ambulation.    1/22/24 - Ms. Larsen is seen today for f/up on the DFU to the left mid foot.  Today, as is customary for this wound, there is a very thick callus around the entire periwound.  A curette was used to remove that calluse.  Forceps and scissors were also used to trim the edge of the wound where a small amount of undermining was beginning to develop.  Of note, her caregiver states that patient has been having diarrhea on and off for a couple of weeks and they think it may be related to the Mounjaro shot. She called the patients PCP and is waiting for a phone call back    January 31, 2024:  68-year-old white female, diabetic, who is here for follow up of her left plantar diabetic foot ulcer.  It has deteriorated somewhat today.  There is some undermining.  There is lots of callus formation.  The wound bed itself does not look bad, no obvious secondary infection.  He has been using Mesalt.    Review of Systems   Constitutional: Negative.    Respiratory: Negative.     Cardiovascular: Negative.    Skin:         As documented in  "the HPI.   All other systems reviewed and are negative.        Objective:      Vitals:    01/31/24 0852   BP: (!) 154/74   BP Location: Left arm   Patient Position: Sitting   Pulse: 94   Resp: 18   Weight: 94.8 kg (209 lb)   Height: 5' 6" (1.676 m)      Physical Exam  Vitals reviewed.   Constitutional:       Appearance: Normal appearance.   Cardiovascular:      Rate and Rhythm: Normal rate.   Pulmonary:      Effort: Pulmonary effort is normal.   Skin:     General: Skin is warm and dry.      Comments: There is obvious Charcot foot.  The left plantar foot ulcer reveals callus, which was debrided with a dermal curette.  There is also some undermining, which I excised with forceps and scissors.  I removed a small amount of biofilm and exudate on the wound bed itself.  She tolerated this well, with no pain or bleeding.   Neurological:      Mental Status: She is alert.            Altered Skin Integrity 04/18/23 1351 Left medial Foot (Active)   04/18/23 1351   Altered Skin Integrity Present on Admission - Did Patient arrive to the hospital with altered skin?: yes   Side: Left   Orientation: medial   Location: Foot   Wound Number:    Is this injury device related?:    Primary Wound Type:    Description of Altered Skin Integrity:    Ankle-Brachial Index:    Pulses:    Removal Indication and Assessment:    Wound Outcome:    (Retired) Wound Length (cm):    (Retired) Wound Width (cm):    (Retired) Depth (cm):    Wound Description (Comments):    Removal Indications:    Dressing Appearance Moist drainage 01/31/24 0853   Drainage Amount Moderate 01/31/24 0853   Drainage Characteristics/Odor Serosanguineous 01/31/24 0853   Appearance Pink;Tan;Moist;Slough 01/31/24 0853   Tissue loss description Full thickness 01/31/24 0853   Periwound Area Dry 01/31/24 0853   Wound Edges Callused;Open 01/31/24 0853   Kovacs Classification (diabetic foot ulcers only) Grade 2 01/31/24 0853   Wound Length (cm) 2.5 cm 01/31/24 0853   Wound Width (cm) 2 " "cm 01/31/24 0853   Wound Depth (cm) 0.5 cm 01/31/24 0853   Wound Volume (cm^3) 2.5 cm^3 01/31/24 0853   Wound Surface Area (cm^2) 5 cm^2 01/31/24 0853   Undermining (depth (cm)/location) from 6-9 oclock, deepest is 2.4 cm @ 6 oclock 01/31/24 0853   Care Cleansed with:;Wound cleanser 01/31/24 0853   Dressing Applied 01/31/24 0853   Dressing Change Due 02/01/24 01/31/24 0853       Debridement     Date/Time: 1/31/2024 8:46 AM     Performed by: Samir Lassiter MD  Authorized by: Samir Lassiter MD    Time out: Immediately prior to procedure a "time out" was called to verify the correct patient, procedure, equipment, support staff and site/side marked as required.     Consent Done?:  Yes (Verbal)     Preparation: Patient was prepped and draped with aseptic technique    Local anesthesia used?: No       Wound Details:    Location:  Left foot    Location:  Left Plantar    Type of Debridement:  Non-excisional       Length (cm):  2.5       Area (sq cm):  5       Width (cm):  2       Percent Debrided (%):  100       Depth (cm):  0.5       Total Area Debrided (sq cm):  5    Depth of debridement:  Epidermis/Dermis    Tissue debrided:  Epidermis    Devitalized tissue debrided:  Biofilm, Callus, Exudate, Slough and Necrotic/Eschar    Instruments:  Scissors, Forceps and Curette (dermal)  Bleeding:  None  Patient tolerance:  Patient tolerated the procedure well with no immediate complications  1st Wound Pain Assessment: 0        ML    1/22/24         Left medial foot (pre)                                      Left medial foot (post)                                                                        (Mesalt, silver alginate, 4x4, kerlix, coban)    1/31/24        Left medial foot (pre)                                     Left medial foot (post)                                                                         (mesalt, silver alginate, 4x4, kerlix, coban)  Assessment:           ICD-10-CM ICD-9-CM   1. Open wound of " plantar aspect of left foot  S91.302A 892.0   2. Type 2 diabetes mellitus with Charcot's joint of left foot  E11.610 250.60     713.5   3. Diabetic ulcer of left foot associated with type 2 diabetes mellitus, limited to breakdown of skin, unspecified part of foot  E11.621 250.80    L97.521 707.15             Procedures:     Selective - dermal, forceps, scissors    Excisional debridement performed:  [] Yes [x] No   Selective debridement performed:  [x] Yes [] No  Mechanical debridement performed:  [] Yes [x] No   Silver nitrate application          [] Yes [x] No   Labs ordered this visit:   [] Yes [] No   Imaging ordered this visit:   [] Yes [] No   Tissue pathology and/or culture taken:  [] Yes [] No           HOME HEALTH AGENCY:   [facility]     Home Health Services     Since 4/17/2023     [phone]     TIMES PER WEEK/DAYS: 1 x weekly, Fridays only  Left medial foot wound: Cleanse with wound cleanser and apply mesalt to the wound bed, cover with silver alginate and 4x4 gauze, wrap foot lightly in kerlix and coban - to be changed daily.       Follow up in 1 week (on 2/7/2024) for left foot.

## 2024-02-07 ENCOUNTER — HOSPITAL ENCOUNTER (OUTPATIENT)
Dept: WOUND CARE | Facility: HOSPITAL | Age: 69
Discharge: HOME | End: 2024-02-07
Attending: NURSE PRACTITIONER
Payer: MEDICARE

## 2024-02-07 VITALS
WEIGHT: 209 LBS | RESPIRATION RATE: 18 BRPM | HEIGHT: 66 IN | HEART RATE: 78 BPM | DIASTOLIC BLOOD PRESSURE: 68 MMHG | BODY MASS INDEX: 33.59 KG/M2 | SYSTOLIC BLOOD PRESSURE: 148 MMHG

## 2024-02-07 DIAGNOSIS — E11.610 TYPE 2 DIABETES MELLITUS WITH CHARCOT'S JOINT OF LEFT FOOT: ICD-10-CM

## 2024-02-07 DIAGNOSIS — L97.525 NON-PRESSURE CHRONIC ULCER OF OTHER PART OF LEFT FOOT WITH MUSCLE INVOLVEMENT WITHOUT EVIDENCE OF NECROSIS: ICD-10-CM

## 2024-02-07 DIAGNOSIS — S91.302A OPEN WOUND OF PLANTAR ASPECT OF LEFT FOOT: Primary | ICD-10-CM

## 2024-02-07 DIAGNOSIS — E11.621 DIABETIC ULCER OF LEFT FOOT ASSOCIATED WITH TYPE 2 DIABETES MELLITUS, LIMITED TO BREAKDOWN OF SKIN, UNSPECIFIED PART OF FOOT: ICD-10-CM

## 2024-02-07 DIAGNOSIS — M14.672 CHARCOT'S JOINT OF LEFT FOOT, NON-DIABETIC: ICD-10-CM

## 2024-02-07 DIAGNOSIS — L97.521 DIABETIC ULCER OF LEFT FOOT ASSOCIATED WITH TYPE 2 DIABETES MELLITUS, LIMITED TO BREAKDOWN OF SKIN, UNSPECIFIED PART OF FOOT: ICD-10-CM

## 2024-02-07 PROCEDURE — 99213 OFFICE O/P EST LOW 20 MIN: CPT | Mod: 25

## 2024-02-07 PROCEDURE — 27000999 HC MEDICAL RECORD PHOTO DOCUMENTATION

## 2024-02-07 PROCEDURE — 97597 DBRDMT OPN WND 1ST 20 CM/<: CPT

## 2024-02-07 NOTE — PROCEDURES
"Debridement    Date/Time: 2/7/2024 1:55 PM    Performed by: Barbra Perez NP  Authorized by: Barbra Perez NP    Time out: Immediately prior to procedure a "time out" was called to verify the correct patient, procedure, equipment, support staff and site/side marked as required.    Consent Done?:  Yes (Verbal)    Preparation: Patient was prepped and draped with clean technique    Local anesthesia used?: No      Wound Details:    Location:  Left foot    Location:  Left Plantar    Type of Debridement:  Non-excisional       Length (cm):  2.7       Area (sq cm):  6.75       Width (cm):  2.5       Percent Debrided (%):  100       Depth (cm):  0.2       Total Area Debrided (sq cm):  6.75    Depth of debridement:  Epidermis/Dermis    Devitalized tissue debrided:  Biofilm, Callus, Exudate and Fibrin    Instruments:  Curette (Dermal)  Bleeding:  None  Patient tolerance:  Patient tolerated the procedure well with no immediate complications    "

## 2024-02-07 NOTE — PROGRESS NOTES
[facility]  Smoker  [x] Yes  [] No   Last A1C: 6.8 on 23 (getting info)   Last CB with coffee 24  Celia Tarango  [x] Yes [] No            Results: 0/5 left foot   Doppler done in office? [x] Yes [] No on 23  Posterior tibial: monophasic  Dorsalis pedal: monophasic  Darco Shoe [x] Yes [] No   Date given: does not like to wear  Is patient eligible for HBO [] Yes [x] No  (not a sal[place] 3)   Is the patient eligible for a graft, and/or currently grafting?  [x] Yes [] No (deductible to high for the patient though)   Right calf: 34 cm  Right ankle: 21.2 cm  Left calf: 37.8 cm  Left ankle: 24.5 cm    --------------------------------------------------------------------------------------------------------------------------------------------------------------------------------------------------------     Patient ID: Diane Larsen is a 68 y.o. female.    Chief Complaint: Diabetic Foot Ulcer (Left medial foot - salinas 2 )    Subjective:    HPI  23 - Ms. Larsen was last seen in the clinic on 23. She was admitted to [facility] on 23 - 23 with hypotension and low O2 sats. While there it was discovered she had C-Diff. Her caregiver states that she did several rounds of oral antibiotics but is no longer taking anything. She has been using mesalt and silver alginate to dress the wound since being home. She has been diagnosed as a diabetic since her last visit with us. Her A1C on 23 was 6.8.   Today her wound was assessed in has improved since her last visit.  It was selectively debrided.  There is a light coating of slough over the entire wound bed, therefore Santyl has been ordered for daily application.  Today, we applied mesalt and if she is not able to get Santyl with her insurance she will continue with daily use of mesalt.    Additionally, her caregiver requested that her toenails be trimmed.  Six toenails were debrided off today.    23 - the patient  returns today for followup on a D FU to the left foot.  The wound was assessed and selectively debrided.  The hypergranulated tissue was chemically cauterized using silver nitrate.  The caregiver is still attempting to obtain Santyl that was ordered.  It was initially sent to [facility] in [place] who could not get the product.  The Rx was transferred to [facility] in [place]. She is waiting for this to go through so she can see if the medicine will be covered by the patient's insurance.   The would is improved, however, with the use of Mesalt only.  We will continue that product.  Will will also attempt to get her approved for grafts, preferably Apligraft or Theraskin.    8/9/23 - Ms. Larsen returns today for followup on the D FU to the left midfoot.  She reports that she went to ED on 7/30/23 c/o of being weak and that she got hot being outside in the sun. She was dehydrated and bradycardic. She was discharged on 8/2/23.   She saw Dr. Claros on 8/3/23 to have a halter monitor placed. She will wear this for 2 weeks, it will be d/c on 8/17/23 and she will go to [facility] in [place] on 8/28/23 for those results.   Today her wound was assessed and selectively debrided.  The periwound was quite macerated and she was reminded to be changing the dressings daily.  We are continuing to use Santyl on the wound bed.  There appears to be a thick layer of slough across the wound bed and the patient was reminded that she should be using at least a nickel thick layer of the product covered with mesalt.  When she returns in one week if there is still a layer of slough we will use the ultrasonic debrider.      8/23/23 - The patient returns today for followup on the mid foot diabetic foot ulcer.  She does have pronounced Charcot foot and this disfigured station of the foot causes her multiple problems.  Today, she presents with the wound bed itself improving, however, she had a very thick area of callus surrounding the entire periwound.  The  whole area was selectively debrided with a dermal curette.    We will continue to apply Santyl and me salt to this wound.    8/30/23 - Ms. Larsen is seen today for the D FU to her mid foot.  Today again there was a very thick area of callus surrounding the wound.  The wound bed itself looks stable but was in need of a selective debridement.  We discussed the possibility of authorize in grafts, however, she has a 3000 dollar detectable that has not been met.  We also discussed the possibility hyperbarics and she is considering that option.    9/6/23 - Ms. Larsen returns today with her caregiver.  The wound remains stable although there is again a very thick area of callus surrounding the wound margins.  The wound was selectively debrided.  There was an area of that margin that seemed to have pulled away from the epithelial tissue.  This had to be cleft off which subsequently enlarged the wound substantially.  The patient and I had a lengthy discussion regarding the importance of offloading even when she is in her home.  She does have some health little receive issues and does not fully comprehend the necessity of offloading.  She does have a Darco shoe with PEG assist that she refuses to wear.  We will consider discussion of a total contact cast at her next visit.    9/20/23 - the patient returns today for followup on a D FU to the left mid foot.  Of concern, however, is the swelling and warmth of the left lower extremity.  Although she does not have specific complaints of pain, she is mentally incompetent and therefore her interpretation of her pain level can not be crusted or assumed.  She is being sent to [facility] in [place] to have an ultrasound done to rule out DVT.  Today the wound was selectively debrided.  Again, there was a large amount of callus built around the wound was.  The wound bed itself is clean and epithelial tissue is moving in word.  We will continue with daily application of me salt covered with a 4  x 4 and a gentle border dressing.  She no longer need Santyl at this time so we will hold that product.  Her caregiver was notified that if the ultrasound is negative for DVT that she is to  the prescription for Keflex which was sent to her local pharmacy.  Of note, her caregiver states that she has a follow up on 10/18/23 with Dr. Claros to get results from halter monitor.  They are instructed to call Dr. Claros's office to let them know about the left leg swelling frequently to see if they can get a sooner appt.     9/27/23 - Ms. Larsen returns to clinic today for follow up on the DFU to the left mid foot.  Again today the wound margin is very thickly callused.  The wound size has not changed significantly.  The wound was selectively debrided and we will begin application of Endoform to the wound bed.  Of note, an U/S was done on the left leg to r/o DVT.  She began taking Keflex on 9/20/23 once that U/S was normal.  She continues to take that medication.  However, today again both legs are extremely edematous.  Bilateral arterial/venous US have been scheduled on 10/9/23 @ 11:30 to address the edema. She does have a f/up appt with [facility] in [place] for her halter monitor results on 10/18/23.    10/4/23 - The patient returns to clinic today for the DFU to the left mid foot.  She and her caregiver report that on Saturday, 9/30/23, the patient stepped on an unknown object while ambulating on her driveway.  This caught the edge of the wound, ripping the skin causing damage.  The wound was assessed today and selectively debrided.  This area of damaged tissue was removed.  The wound does look slightly improved in spite of this injury. We will continue to use Endoform which is assisting with progress.   She is scheduled for an ultrasound on 10/9/23 at [facility]; She has a follow up with cardiology in [place]  on 10/18/23 but does not know the name of the Dr.     10/18/23 - Ms. Larsen returns today with her caregiver for f/up on  the DFU to the left mid foot.  Again today there were a thick band of callus surrounding the entire wound.  The wound bed has decreased in size slightly.  It was selectively debrided today and we will continue application of Endoform to the wound bed.  Of note, the patient's caregiver states that the ultrasound appt that was scheduled for 10/9/23 has been rescheduled to 10/23 due to a miscommunication about the time is was scheduled for on 10/9/23.   She has a cardiology appt this afternoon    10/25/23 - The patient returns today for f/up on the DFU to the left mid foot.  An U/S had been done on 10/23/23.  Her arterial system is normal.  She does have venous reflux in the left greater saphenous vein.  This generally has not been an issue for the patient and it likely is unrelated to the wound.  However, today she did present with multiple scattered open blisters on the left lower extremity.  Her caregiver reports that these are a results of not using her lotion.  We will monitor and if they do not resolve she will be referred for a vascular consult.   Today the DFU was selectively debrided and remains stable, with little change in size.  She has completed her PO Keflex.  She is scheduled for a pacemaker replacement on 11/2/23.    11/8/23 - Ms. Larsen returns to clinic today for followup on the D FU to the left mid foot.  She did have a cardiovascular pacemaker placed on 11/02-23 by Dr. Hancock.  She was started on doxycycline following that procedure.  Today, she presents with a rash on several parts of her body.  After assessment and consideration of the placement of the rashes, it is likely due to the adhesive used to perform the EKG as there are 2 areas of rash on the back as well as areas under the breast.  She was instructed to begin application of Calmoseptine which was provided on all areas of the rash.  She also presents with multiple scattered fibrin covered wounds on the left lower extremity.  Her caretaker  reports that she has been out of Aquaphor and has likely been scratching these areas.  Were cleaned up and we will continue to monitor them.  Mupirocin was called out for application to these very small open areas.  The large D FU to the left mid foot was selectively debrided.  It again had a large area of hyperkeratotic tissue at the wound margin.  This was selectively debrided off and the wound is clean with no signs and symptoms of infection.    11/15/23 - The patient returns today for f/up on the DFU to the left mid foot.  Today there was an impressive decrease in callus around the wound margin.  She reports that she has been using her wheelchair much more.  That wound was selectively debrided.  She also had a rash at her last visit which had cleared up with the use of Calmoseptine.  The scattered areas on the left lower extremity are also resolved with the use of Aquaphor.  She will return to clinic in two weeks.    12/5/23 - Ms. Larsen is seen today for f/up on the D FU to the left mid foot.  Today, she has again developed a very thick callous at the periwound which had to be selectively debrided off.  Additionally, she did have a layer of slough in the wound bed that had develop, however, there was a slight decrease in size.  There are no signs and symptoms of infection.  Of note, her caregiver reports that she has not been consistently using her wheelchair as expected.  Additionally, she states that patient's blood sugar has been running from 200-250 the past couple of weeks.    12/13/23 - Ms. Larsen returns today with her caregiver for the DFU to the left mid foot.  A selective debridement was performed with removal of slough across the wound bed and a thickened callus.  The wound remains basically unchanged. The patient requested that her toe nails be cut - nails x 9 were trimmed.     12/18/23 - the patient returns to clinic today for the D FU to the left mid foot.  Today a selective debridement was  performed for removal of the callus and slough that was in the wound bed.  Again, the wound is reasonable unchanged but stable.  There is a small amount of redness around the periwound.  Today we will hold the Santyl and she will begin applying mupirocin ointment to the wound bed covered with me salt and a gentle border dressing.    1/8/23 - Ms. Larsen returns for f/up on the DFU to the L mid foot.  She has been using mupirocin daily since 12/18/23.  Today there is no erythema and no S & S of infection. We will d/c the mupirocin and apply mesalt only to the wound bed.  A selective debridement was performed to remove the very thickened callus and the patient was reminded again that she should be offloading this foot and using her wheelchair for ambulation.    1/22/24 - Ms. Larsen is seen today for f/up on the DFU to the left mid foot.  Today, as is customary for this wound, there is a very thick callus around the entire periwound.  A curette was used to remove that calluse.  Forceps and scissors were also used to trim the edge of the wound where a small amount of undermining was beginning to develop.  Of note, her caregiver states that patient has been having diarrhea on and off for a couple of weeks and they think it may be related to the Mounjaro shot. She called the patients PCP and is waiting for a phone call back    January 31, 2024:  68-year-old white female, diabetic, who is here for follow up of her left plantar diabetic foot ulcer.  It has deteriorated somewhat today.  There is some undermining.  There is lots of callus formation.  The wound bed itself does not look bad, no obvious secondary infection.  He has been using Mesalt.    2/7/24 - Ms. Larsen returns for f/up on the large DFU to the plantar surface of the left foot.  Again today there is a heavy, thickened callus around the wound margin.  This was selectively debrided off.  The wound bed is clean with bright, red granular tissue visible. There are not  "S & S of infection.    Review of Systems   Constitutional: Negative.    Respiratory: Negative.     Cardiovascular: Negative.    Skin:         As documented in the HPI.   All other systems reviewed and are negative.        Objective:      Vitals:    02/07/24 1441   BP: (!) 148/68   BP Location: Right arm   Patient Position: Sitting   Pulse: 78   Resp: 18   Weight: 94.8 kg (209 lb)   Height: 5' 6" (1.676 m)      Physical Exam  Vitals reviewed.   Constitutional:       Appearance: Normal appearance.   Cardiovascular:      Rate and Rhythm: Normal rate.   Pulmonary:      Effort: Pulmonary effort is normal.   Skin:     General: Skin is warm and dry.      Comments: DFU left plantar foot   Neurological:      Mental Status: She is alert.            Altered Skin Integrity 04/18/23 1351 Left medial Foot (Active)   04/18/23 1351   Altered Skin Integrity Present on Admission - Did Patient arrive to the hospital with altered skin?: yes   Side: Left   Orientation: medial   Location: Foot   Wound Number:    Is this injury device related?:    Primary Wound Type:    Description of Altered Skin Integrity:    Ankle-Brachial Index:    Pulses:    Removal Indication and Assessment:    Wound Outcome:    (Retired) Wound Length (cm):    (Retired) Wound Width (cm):    (Retired) Depth (cm):    Wound Description (Comments):    Removal Indications:    Dressing Appearance Moist drainage 02/07/24 1447   Drainage Amount Moderate 02/07/24 1447   Drainage Characteristics/Odor Serosanguineous 02/07/24 1447   Appearance Pink;Moist 02/07/24 1447   Tissue loss description Full thickness 02/07/24 1447   Periwound Area Dry 02/07/24 1447   Wound Edges Callused;Defined 02/07/24 1447   Wound Length (cm) 2.7 cm 02/07/24 1447   Wound Width (cm) 2.5 cm 02/07/24 1447   Wound Depth (cm) 0.2 cm 02/07/24 1447   Wound Volume (cm^3) 1.35 cm^3 02/07/24 1447   Wound Surface Area (cm^2) 6.75 cm^2 02/07/24 1447   Care Cleansed with:;Wound cleanser 02/07/24 1447   Dressing " "Applied 02/07/24 1447   Dressing Change Due 02/08/24 02/07/24 1447     2/7/24        Left foot (pre)                                                 Left foot (post)                                                                        (mesalt, silver alginate, 4x4, kelix, coban)    Assessment:           ICD-10-CM ICD-9-CM   1. Open wound of plantar aspect of left foot  S91.302A 892.0   2. Type 2 diabetes mellitus with Charcot's joint of left foot  E11.610 250.60     713.5   3. Diabetic ulcer of left foot associated with type 2 diabetes mellitus, limited to breakdown of skin, unspecified part of foot  E11.621 250.80    L97.521 707.15   4. Non-pressure chronic ulcer of other part of left foot with muscle involvement without evidence of necrosis  L97.525 707.15   5. Charcot's joint of left foot, non-diabetic  M14.672 094.0     713.5               Procedures:     Debridement     Date/Time: 2/7/2024 1:55 PM     Performed by: Barbra Perez NP  Authorized by: Barbra Perez NP    Time out: Immediately prior to procedure a "time out" was called to verify the correct patient, procedure, equipment, support staff and site/side marked as required.     Consent Done?:  Yes (Verbal)     Preparation: Patient was prepped and draped with clean technique    Local anesthesia used?: No       Wound Details:    Location:  Left foot    Location:  Left Plantar    Type of Debridement:  Non-excisional       Length (cm):  2.7       Area (sq cm):  6.75       Width (cm):  2.5       Percent Debrided (%):  100       Depth (cm):  0.2       Total Area Debrided (sq cm):  6.75    Depth of debridement:  Epidermis/Dermis    Devitalized tissue debrided:  Biofilm, Callus, Exudate and Fibrin    Instruments:  Curette (Dermal)  Bleeding:  None  Patient tolerance:  Patient tolerated the procedure well with no immediate complications       Excisional debridement performed:  [] Yes [] No   Selective debridement performed:  [x] Yes [] No  Mechanical " debridement performed:  [] Yes [] No   Silver nitrate application          [] Yes [] No   Labs ordered this visit:   [] Yes [] No   Imaging ordered this visit:   [] Yes [] No   Tissue pathology and/or culture taken:  [] Yes [] No           HOME HEALTH AGENCY:   [facility]     Home Health Services     Since 4/17/2023     [phone]     TIMES PER WEEK/DAYS: 1 x weekly, Fridays only  Left medial foot wound: Cleanse with wound cleanser and apply mesalt to the wound bed, cover with silver alginate and 4x4 gauze, wrap foot lightly in kerlix and coban - to be changed daily.       Follow up in 2 weeks (on 2/21/2024) for left foot.

## 2024-02-28 NOTE — PROCEDURES
Debridement    Date/Time: 12/18/2023 11:12 AM    Performed by: Barbra Perez NP  Authorized by: Barbra Perez NP    Consent Done?:  Yes (Verbal)    Preparation: Patient was prepped and draped with clean technique    Local anesthesia used?: No      Wound Details:    Location:  Left foot    Location:  Left Midfoot    Type of Debridement:  Non-excisional       Length (cm):  2       Area (sq cm):  3.8       Width (cm):  1.9       Percent Debrided (%):  100       Depth (cm):  0.2       Total Area Debrided (sq cm):  3.8    Depth of debridement:  Epidermis/Dermis    Devitalized tissue debrided:  Biofilm, Callus, Exudate, Slough and Fibrin    Instruments:  Curette (Dermal)  Bleeding:  Minimal  Hemostasis Achieved: Yes  Method Used:  Silver Nitrate  Patient tolerance:  Patient tolerated the procedure well with no immediate complications     Physical Therapy Visit    Visit Type: Daily Treatment Note  Visit: 4  Referring Provider: MELQUIADES Epps*  Medical Diagnosis (from order): M54.2 - Musculoskeletal neck pain  M25.511, G89.29, M25.512 - Chronic pain of both shoulders     SUBJECTIVE                                                                                                               Patient reports that her symptoms are slowing improving. She was quite sore after last session that resolved after 2 days.       OBJECTIVE                                                                                                                         Palpation  tenderness to palpation to B upper trap, distal B levator scapulae                Treatment     Therapeutic Exercise  Bilateral shoulder flexion wall foam roll AAROM: 30 x 3\"  Yellow theraband bilateral shoulder press + walk backs for scapular strengthening: 2 x 5    Manual Therapy   In prone, patient received soft tissue mobilization to B upper trap and distal levator scapulae muscle belly supplemented by dry cupping    Skilled input: verbal instruction/cues, tactile instruction/cues, demonstration and posture correction    Writer verbally educated and received verbal consent for hand placement, positioning of patient, and techniques to be performed today from patient for clothing adjustments for techniques, therapist position for techniques and hand placement and palpation for techniques as described above and how they are pertinent to the patient's plan of care.  Home Exercise Program  Access Code: [email or web address]  URL: [email or web address]  Date: 02/12/2024  Prepared by: Kriss Almendarez    Exercises  - Seated Scapular Retraction  - 1 x daily - 1 sets - 10 reps  - Seated Cervical Retraction  - 1 x daily - 7 x weekly - 3 sets - 10 reps  - Supine Cervical Retraction with Towel  - 1 x daily - 7 x weekly - 3 sets - 10 reps  - Sitting to Supine Roll  - 1 x daily - 7 x  weekly - 3 sets - 10 reps  - Supine Shoulder Flexion with Free Weight  - 1 x daily - 7 x weekly - 3 sets - 10 reps  - Seated Upper Trapezius Stretch  - 1 x daily - 7 x weekly - 3 sets - 10 reps      ASSESSMENT                                                                                                            Patient continues to demonstrate tenderness to bilateral levator scapulaes and had improvement in symptoms following manual treatment. She was progressed with scapular strengthening with bilateral foam roller wall rolls for shoulder protraction and yellow theraband lifts for lower trap strengthening. She required rest breaks due to fatigue. No adverse effects were a result of treatment today. Skilled PT remains medically necessary to address cervical range of motion and strength deficits to improve function    Education:   - Results of above outlined education: Verbalizes understanding and Demonstrates understanding    PLAN                                                                                                                           Suggestions for next session as indicated: Progress per plan of care       Therapy procedure time and total treatment time can be found documented on the Time Entry flowsheet

## 2024-03-01 ENCOUNTER — HOSPITAL ENCOUNTER (OUTPATIENT)
Dept: WOUND CARE | Facility: HOSPITAL | Age: 69
Discharge: HOME | End: 2024-03-01
Attending: FAMILY MEDICINE
Payer: MEDICARE

## 2024-03-01 VITALS
WEIGHT: 209 LBS | HEART RATE: 71 BPM | BODY MASS INDEX: 33.59 KG/M2 | DIASTOLIC BLOOD PRESSURE: 85 MMHG | SYSTOLIC BLOOD PRESSURE: 160 MMHG | RESPIRATION RATE: 17 BRPM | HEIGHT: 66 IN

## 2024-03-01 DIAGNOSIS — E11.621 DIABETIC ULCER OF LEFT FOOT ASSOCIATED WITH TYPE 2 DIABETES MELLITUS, LIMITED TO BREAKDOWN OF SKIN, UNSPECIFIED PART OF FOOT: ICD-10-CM

## 2024-03-01 DIAGNOSIS — E11.610 TYPE 2 DIABETES MELLITUS WITH CHARCOT'S JOINT OF LEFT FOOT: Primary | ICD-10-CM

## 2024-03-01 DIAGNOSIS — L97.521 DIABETIC ULCER OF LEFT FOOT ASSOCIATED WITH TYPE 2 DIABETES MELLITUS, LIMITED TO BREAKDOWN OF SKIN, UNSPECIFIED PART OF FOOT: ICD-10-CM

## 2024-03-01 DIAGNOSIS — I87.2 STASIS DERMATITIS OF BOTH LEGS: ICD-10-CM

## 2024-03-01 PROCEDURE — 97597 DBRDMT OPN WND 1ST 20 CM/<: CPT

## 2024-03-01 PROCEDURE — 99212 OFFICE O/P EST SF 10 MIN: CPT | Mod: 25

## 2024-03-01 PROCEDURE — 27000999 HC MEDICAL RECORD PHOTO DOCUMENTATION

## 2024-03-01 RX ORDER — TIRZEPATIDE 7.5 MG/.5ML
INJECTION, SOLUTION SUBCUTANEOUS
COMMUNITY
Start: 2024-02-29

## 2024-03-01 RX ORDER — BETAMETHASONE VALERATE 1 MG/G
CREAM TOPICAL 2 TIMES DAILY
Qty: 45 G | Refills: 2 | Status: SHIPPED | OUTPATIENT
Start: 2024-03-01 | End: 2024-03-11

## 2024-03-01 NOTE — PROGRESS NOTES
NOTES:  Send steroid cream to pharmacy      [facility]  Smoker  [x] Yes  [] No   Last A1C: 6.8 on 23 (getting info)   Last CB with coffee 24  Celia Tarango  [x] Yes [] No            Results: 0/5 left foot   Doppler done in office? [x] Yes [] No on 23  Posterior tibial: monophasic  Dorsalis pedal: monophasic  Darco Shoe [x] Yes [] No   Date given: does not like to wear  Is patient eligible for HBO [] Yes [x] No  (not a sal[place] 3)   Is the patient eligible for a graft, and/or currently grafting?  [x] Yes [] No (deductible to high for the patient though)   Right calf: 34 cm  Right ankle: 21.2 cm  Left calf: 37.8 cm  Left ankle: 24.5 cm    --------------------------------------------------------------------------------------------------------------------------------------------------------------------------------------------------------     Patient ID: Diane Larsen is a 68 y.o. female.    Chief Complaint: Diabetic Foot Ulcer (Left medial foot - salinas 2)    Subjective:    HPI  23 - Ms. Laresn was last seen in the clinic on 23. She was admitted to [facility] on 23 - 23 with hypotension and low O2 sats. While there it was discovered she had C-Diff. Her caregiver states that she did several rounds of oral antibiotics but is no longer taking anything. She has been using mesalt and silver alginate to dress the wound since being home. She has been diagnosed as a diabetic since her last visit with us. Her A1C on 23 was 6.8.   Today her wound was assessed in has improved since her last visit.  It was selectively debrided.  There is a light coating of slough over the entire wound bed, therefore Santyl has been ordered for daily application.  Today, we applied mesalt and if she is not able to get Santyl with her insurance she will continue with daily use of mesalt.    Additionally, her caregiver requested that her toenails be trimmed.  Six toenails were  debrided off today.    7/25/23 - the patient returns today for followup on a D FU to the left foot.  The wound was assessed and selectively debrided.  The hypergranulated tissue was chemically cauterized using silver nitrate.  The caregiver is still attempting to obtain Santyl that was ordered.  It was initially sent to [facility] in [place] who could not get the product.  The Rx was transferred to [facility] in [place]. She is waiting for this to go through so she can see if the medicine will be covered by the patient's insurance.   The would is improved, however, with the use of Mesalt only.  We will continue that product.  Will will also attempt to get her approved for grafts, preferably Apligraft or Theraskin.    8/9/23 - Ms. Larsen returns today for followup on the D FU to the left midfoot.  She reports that she went to ED on 7/30/23 c/o of being weak and that she got hot being outside in the sun. She was dehydrated and bradycardic. She was discharged on 8/2/23.   She saw Dr. Claros on 8/3/23 to have a halter monitor placed. She will wear this for 2 weeks, it will be d/c on 8/17/23 and she will go to [facility] in [place] on 8/28/23 for those results.   Today her wound was assessed and selectively debrided.  The periwound was quite macerated and she was reminded to be changing the dressings daily.  We are continuing to use Santyl on the wound bed.  There appears to be a thick layer of slough across the wound bed and the patient was reminded that she should be using at least a nickel thick layer of the product covered with mesalt.  When she returns in one week if there is still a layer of slough we will use the ultrasonic debrider.      8/23/23 - The patient returns today for followup on the mid foot diabetic foot ulcer.  She does have pronounced Charcot foot and this disfigured station of the foot causes her multiple problems.  Today, she presents with the wound bed itself improving, however, she had a very thick area of  callus surrounding the entire periwound.  The whole area was selectively debrided with a dermal curette.    We will continue to apply Santyl and me salt to this wound.    8/30/23 - Ms. Larsen is seen today for the D FU to her mid foot.  Today again there was a very thick area of callus surrounding the wound.  The wound bed itself looks stable but was in need of a selective debridement.  We discussed the possibility of authorize in grafts, however, she has a 3000 dollar detectable that has not been met.  We also discussed the possibility hyperbarics and she is considering that option.    9/6/23 - Ms. Larsen returns today with her caregiver.  The wound remains stable although there is again a very thick area of callus surrounding the wound margins.  The wound was selectively debrided.  There was an area of that margin that seemed to have pulled away from the epithelial tissue.  This had to be cleft off which subsequently enlarged the wound substantially.  The patient and I had a lengthy discussion regarding the importance of offloading even when she is in her home.  She does have some health little receive issues and does not fully comprehend the necessity of offloading.  She does have a Darco shoe with PEG assist that she refuses to wear.  We will consider discussion of a total contact cast at her next visit.    9/20/23 - the patient returns today for followup on a D FU to the left mid foot.  Of concern, however, is the swelling and warmth of the left lower extremity.  Although she does not have specific complaints of pain, she is mentally incompetent and therefore her interpretation of her pain level can not be crusted or assumed.  She is being sent to [facility] in [place] to have an ultrasound done to rule out DVT.  Today the wound was selectively debrided.  Again, there was a large amount of callus built around the wound was.  The wound bed itself is clean and epithelial tissue is moving in word.  We will continue with  daily application of me salt covered with a 4 x 4 and a gentle border dressing.  She no longer need Santyl at this time so we will hold that product.  Her caregiver was notified that if the ultrasound is negative for DVT that she is to  the prescription for Keflex which was sent to her local pharmacy.  Of note, her caregiver states that she has a follow up on 10/18/23 with Dr. Claros to get results from halter monitor.  They are instructed to call Dr. Claros's office to let them know about the left leg swelling frequently to see if they can get a sooner appt.     9/27/23 - Ms. Larsen returns to clinic today for follow up on the DFU to the left mid foot.  Again today the wound margin is very thickly callused.  The wound size has not changed significantly.  The wound was selectively debrided and we will begin application of Endoform to the wound bed.  Of note, an U/S was done on the left leg to r/o DVT.  She began taking Keflex on 9/20/23 once that U/S was normal.  She continues to take that medication.  However, today again both legs are extremely edematous.  Bilateral arterial/venous US have been scheduled on 10/9/23 @ 11:30 to address the edema. She does have a f/up appt with [facility] in [place] for her halter monitor results on 10/18/23.    10/4/23 - The patient returns to clinic today for the DFU to the left mid foot.  She and her caregiver report that on Saturday, 9/30/23, the patient stepped on an unknown object while ambulating on her driveway.  This caught the edge of the wound, ripping the skin causing damage.  The wound was assessed today and selectively debrided.  This area of damaged tissue was removed.  The wound does look slightly improved in spite of this injury. We will continue to use Endoform which is assisting with progress.   She is scheduled for an ultrasound on 10/9/23 at [facility]; She has a follow up with cardiology in [place]  on 10/18/23 but does not know the name of the Dr.     10/18/23 - Ms.  Chava returns today with her caregiver for f/up on the DFU to the left mid foot.  Again today there were a thick band of callus surrounding the entire wound.  The wound bed has decreased in size slightly.  It was selectively debrided today and we will continue application of Endoform to the wound bed.  Of note, the patient's caregiver states that the ultrasound appt that was scheduled for 10/9/23 has been rescheduled to 10/23 due to a miscommunication about the time is was scheduled for on 10/9/23.   She has a cardiology appt this afternoon    10/25/23 - The patient returns today for f/up on the DFU to the left mid foot.  An U/S had been done on 10/23/23.  Her arterial system is normal.  She does have venous reflux in the left greater saphenous vein.  This generally has not been an issue for the patient and it likely is unrelated to the wound.  However, today she did present with multiple scattered open blisters on the left lower extremity.  Her caregiver reports that these are a results of not using her lotion.  We will monitor and if they do not resolve she will be referred for a vascular consult.   Today the DFU was selectively debrided and remains stable, with little change in size.  She has completed her PO Keflex.  She is scheduled for a pacemaker replacement on 11/2/23.    11/8/23 - Ms. Larsen returns to clinic today for followup on the D FU to the left mid foot.  She did have a cardiovascular pacemaker placed on 11/02-23 by Dr. Hancock.  She was started on doxycycline following that procedure.  Today, she presents with a rash on several parts of her body.  After assessment and consideration of the placement of the rashes, it is likely due to the adhesive used to perform the EKG as there are 2 areas of rash on the back as well as areas under the breast.  She was instructed to begin application of Calmoseptine which was provided on all areas of the rash.  She also presents with multiple scattered fibrin covered  wounds on the left lower extremity.  Her caretaker reports that she has been out of Aquaphor and has likely been scratching these areas.  Were cleaned up and we will continue to monitor them.  Mupirocin was called out for application to these very small open areas.  The large D FU to the left mid foot was selectively debrided.  It again had a large area of hyperkeratotic tissue at the wound margin.  This was selectively debrided off and the wound is clean with no signs and symptoms of infection.    11/15/23 - The patient returns today for f/up on the DFU to the left mid foot.  Today there was an impressive decrease in callus around the wound margin.  She reports that she has been using her wheelchair much more.  That wound was selectively debrided.  She also had a rash at her last visit which had cleared up with the use of Calmoseptine.  The scattered areas on the left lower extremity are also resolved with the use of Aquaphor.  She will return to clinic in two weeks.    12/5/23 - Ms. Larsen is seen today for f/up on the D FU to the left mid foot.  Today, she has again developed a very thick callous at the periwound which had to be selectively debrided off.  Additionally, she did have a layer of slough in the wound bed that had develop, however, there was a slight decrease in size.  There are no signs and symptoms of infection.  Of note, her caregiver reports that she has not been consistently using her wheelchair as expected.  Additionally, she states that patient's blood sugar has been running from 200-250 the past couple of weeks.    12/13/23 - Ms. Larsen returns today with her caregiver for the DFU to the left mid foot.  A selective debridement was performed with removal of slough across the wound bed and a thickened callus.  The wound remains basically unchanged. The patient requested that her toe nails be cut - nails x 9 were trimmed.     12/18/23 - the patient returns to clinic today for the D FU to the left  mid foot.  Today a selective debridement was performed for removal of the callus and slough that was in the wound bed.  Again, the wound is reasonable unchanged but stable.  There is a small amount of redness around the periwound.  Today we will hold the Santyl and she will begin applying mupirocin ointment to the wound bed covered with me salt and a gentle border dressing.    1/8/23 - Ms. Larsen returns for f/up on the DFU to the L mid foot.  She has been using mupirocin daily since 12/18/23.  Today there is no erythema and no S & S of infection. We will d/c the mupirocin and apply mesalt only to the wound bed.  A selective debridement was performed to remove the very thickened callus and the patient was reminded again that she should be offloading this foot and using her wheelchair for ambulation.    1/22/24 - Ms. Larsen is seen today for f/up on the DFU to the left mid foot.  Today, as is customary for this wound, there is a very thick callus around the entire periwound.  A curette was used to remove that calluse.  Forceps and scissors were also used to trim the edge of the wound where a small amount of undermining was beginning to develop.  Of note, her caregiver states that patient has been having diarrhea on and off for a couple of weeks and they think it may be related to the Mounjaro shot. She called the patients PCP and is waiting for a phone call back    January 31, 2024:  68-year-old white female, diabetic, who is here for follow up of her left plantar diabetic foot ulcer.  It has deteriorated somewhat today.  There is some undermining.  There is lots of callus formation.  The wound bed itself does not look bad, no obvious secondary infection.  He has been using Mesalt.    2/7/24 - Ms. Larsen returns for f/up on the large DFU to the plantar surface of the left foot.  Again today there is a heavy, thickened callus around the wound margin.  This was selectively debrided off.  The wound bed is clean with  "bright, red granular tissue visible. There are not S & S of infection.    March 1, 2024:  68-year-old white female, who is here for follow up of the plantar left diabetic foot ulcer, with Charcot foot.  The ulcer has deteriorated somewhat, should she was last here.  There some undermining, and much callus formation.  He is having some pain in the wound.    Review of Systems   Constitutional: Negative.    Respiratory: Negative.     Cardiovascular: Negative.    Skin:         As documented in the HPI.   All other systems reviewed and are negative.        Objective:      Vitals:    03/01/24 1221   BP: (!) 160/85   Pulse: 71   Resp: 17   Weight: 94.8 kg (209 lb)   Height: 5' 6" (1.676 m)        Physical Exam  Vitals reviewed.   Constitutional:       Appearance: Normal appearance.   Cardiovascular:      Rate and Rhythm: Normal rate.   Pulmonary:      Effort: Pulmonary effort is normal.   Skin:     General: Skin is warm and dry.      Comments: The left plantar foot ulcer has not improved since last visit.  There is much callus around the wound bed, which I removed with a dermal curette.  I also removed some excess skin around the edges, the undermining area, with forceps and scissors.  Also, I used a freer to remove some biofilm, exudate, and slough within the wound bed itself.  She tolerated this well.  There is some erythema above her ankles, and she has a history of venous stasis.  This appears to be a dermatitis.  It is pruritic.   Neurological:      Mental Status: She is alert.            Altered Skin Integrity 04/18/23 1351 Left medial Foot (Active)   04/18/23 1351   Altered Skin Integrity Present on Admission - Did Patient arrive to the hospital with altered skin?: yes   Side: Left   Orientation: medial   Location: Foot   Wound Number:    Is this injury device related?:    Primary Wound Type:    Description of Altered Skin Integrity:    Ankle-Brachial Index:    Pulses:    Removal Indication and Assessment:    Wound " "Outcome:    (Retired) Wound Length (cm):    (Retired) Wound Width (cm):    (Retired) Depth (cm):    Wound Description (Comments):    Removal Indications:    Description of Altered Skin Integrity Full thickness tissue loss. Subcutaneous fat may be visible but bone, tendon or muscle are not exposed 03/01/24 1202   Dressing Appearance Intact;Moist drainage 03/01/24 1202   Drainage Amount Moderate 03/01/24 1202   Drainage Characteristics/Odor Serosanguineous 03/01/24 1202   Appearance Intact 03/01/24 1202   Tissue loss description Full thickness 03/01/24 1202   Periwound Area Intact;Dry 03/01/24 1202   Wound Edges Open;Callused 03/01/24 1202   Kovacs Classification (diabetic foot ulcers only) Grade 2 03/01/24 1202   Wound Length (cm) 3 cm 03/01/24 1202   Wound Width (cm) 2 cm 03/01/24 1202   Wound Depth (cm) 0.3 cm 03/01/24 1202   Wound Volume (cm^3) 1.8 cm^3 03/01/24 1202   Wound Surface Area (cm^2) 6 cm^2 03/01/24 1202   Care Cleansed with:;Other (see comments);Debrided 03/01/24 1202   Dressing Applied;Other (comment) 03/01/24 1202   Periwound Care Absorptive dressing applied 03/01/24 1202   Dressing Change Due 03/02/24 03/01/24 1202       Debridement     Date/Time: 3/1/2024 11:40 AM     Performed by: Samir Lassiter MD  Authorized by: Samir Lassiter MD    Time out: Immediately prior to procedure a "time out" was called to verify the correct patient, procedure, equipment, support staff and site/side marked as required.     Consent Done?:  Yes (Verbal)     Preparation: Patient was prepped and draped with aseptic technique    Local anesthesia used?: No       Wound Details:    Location:  Left foot    Location:  Left Plantar    Type of Debridement:  Non-excisional       Length (cm):  3       Area (sq cm):  6       Width (cm):  2       Percent Debrided (%):  100       Depth (cm):  0.3       Total Area Debrided (sq cm):  6    Depth of debridement:  Epidermis/Dermis    Tissue debrided:  Epidermis    Devitalized tissue " debrided:  Biofilm, Exudate, Slough and Callus    Instruments:  Curette, Forceps and Scissors (dermal, freer)  Bleeding:  None  Patient tolerance:  Patient tolerated the procedure well with no immediate complications  1st Wound Pain Assessment: 0           2/7/24        Left foot (pre)                                                 Left foot (post)                                                                        (mesalt, silver alginate, 4x4, kelix, coban)      3/1/24      Left foot (pre)                                                 Left foot (post)                                                                        (mesalt, silver alginate, 4x4, kelix, coban)  TR      Assessment:           ICD-10-CM ICD-9-CM   1. Type 2 diabetes mellitus with Charcot's joint of left foot  E11.610 250.60     713.5   2. Stasis dermatitis of both legs  I87.2 454.1   3. Diabetic ulcer of left foot associated with type 2 diabetes mellitus, limited to breakdown of skin, unspecified part of foot  E11.621 250.80    L97.521 707.15                 Procedures:            Excisional debridement performed:  [] Yes [x] No   Selective debridement performed:  [x] Yes [] No    (dermal)  Mechanical debridement performed:  [] Yes [x] No   Silver nitrate application          [] Yes [x] No   Labs ordered this visit:   [] Yes [] No   Imaging ordered this visit:   [] Yes [] No   Tissue pathology and/or culture taken:  [] Yes [] No           HOME HEALTH AGENCY:   [facility]     Home Health Services     Since 4/17/2023     [phone]     TIMES PER WEEK/DAYS: 1 x weekly, Fridays only  Left medial foot wound: Cleanse with wound cleanser and apply mesalt to the wound bed, cover with silver alginate and 4x4 gauze, wrap foot lightly in kerlix and coban - to be changed daily.     Betamethasone 0.1% cream BID to the stasis dermatitis.    Follow up in about 10 days (around 3/11/2024) for Left foot.

## 2024-03-01 NOTE — PROCEDURES
"Debridement    Date/Time: 3/1/2024 11:40 AM    Performed by: Samir Lassiter MD  Authorized by: Samir Lassiter MD    Time out: Immediately prior to procedure a "time out" was called to verify the correct patient, procedure, equipment, support staff and site/side marked as required.    Consent Done?:  Yes (Verbal)    Preparation: Patient was prepped and draped with aseptic technique    Local anesthesia used?: No      Wound Details:    Location:  Left foot    Location:  Left Plantar    Type of Debridement:  Non-excisional       Length (cm):  3       Area (sq cm):  6       Width (cm):  2       Percent Debrided (%):  100       Depth (cm):  0.3       Total Area Debrided (sq cm):  6    Depth of debridement:  Epidermis/Dermis    Tissue debrided:  Epidermis    Devitalized tissue debrided:  Biofilm, Exudate, Slough and Callus    Instruments:  Curette, Forceps and Scissors (dermal, freer)  Bleeding:  None  Patient tolerance:  Patient tolerated the procedure well with no immediate complications  1st Wound Pain Assessment: 0    "

## 2024-03-11 ENCOUNTER — HOSPITAL ENCOUNTER (OUTPATIENT)
Dept: WOUND CARE | Facility: HOSPITAL | Age: 69
Discharge: HOME | End: 2024-03-11
Attending: FAMILY MEDICINE
Payer: MEDICARE

## 2024-03-11 VITALS
BODY MASS INDEX: 33.59 KG/M2 | SYSTOLIC BLOOD PRESSURE: 178 MMHG | RESPIRATION RATE: 18 BRPM | HEART RATE: 81 BPM | HEIGHT: 66 IN | DIASTOLIC BLOOD PRESSURE: 73 MMHG | WEIGHT: 209 LBS

## 2024-03-11 DIAGNOSIS — L97.525 NON-PRESSURE CHRONIC ULCER OF OTHER PART OF LEFT FOOT WITH MUSCLE INVOLVEMENT WITHOUT EVIDENCE OF NECROSIS: ICD-10-CM

## 2024-03-11 DIAGNOSIS — E11.610 TYPE 2 DIABETES MELLITUS WITH CHARCOT'S JOINT OF LEFT FOOT: ICD-10-CM

## 2024-03-11 DIAGNOSIS — E11.621 DIABETIC ULCER OF LEFT FOOT ASSOCIATED WITH TYPE 2 DIABETES MELLITUS, LIMITED TO BREAKDOWN OF SKIN, UNSPECIFIED PART OF FOOT: Primary | ICD-10-CM

## 2024-03-11 DIAGNOSIS — I87.8 CHRONIC VENOUS STASIS: ICD-10-CM

## 2024-03-11 DIAGNOSIS — L60.2 ONYCHAUXIS: ICD-10-CM

## 2024-03-11 DIAGNOSIS — M14.672 CHARCOT'S JOINT OF LEFT FOOT, NON-DIABETIC: ICD-10-CM

## 2024-03-11 DIAGNOSIS — L97.521 DIABETIC ULCER OF LEFT FOOT ASSOCIATED WITH TYPE 2 DIABETES MELLITUS, LIMITED TO BREAKDOWN OF SKIN, UNSPECIFIED PART OF FOOT: Primary | ICD-10-CM

## 2024-03-11 DIAGNOSIS — S91.302A OPEN WOUND OF PLANTAR ASPECT OF LEFT FOOT: ICD-10-CM

## 2024-03-11 PROCEDURE — 27000999 HC MEDICAL RECORD PHOTO DOCUMENTATION

## 2024-03-11 PROCEDURE — 99212 OFFICE O/P EST SF 10 MIN: CPT | Mod: 25

## 2024-03-11 PROCEDURE — 97597 DBRDMT OPN WND 1ST 20 CM/<: CPT

## 2024-03-11 PROCEDURE — 11719 TRIM NAIL(S) ANY NUMBER: CPT

## 2024-03-11 RX ORDER — TRIAMCINOLONE ACETONIDE 1 MG/G
CREAM TOPICAL
COMMUNITY
Start: 2024-03-07

## 2024-03-11 NOTE — PROGRESS NOTES
Home Health: [facility]  Smoker  [x] Yes  [] No   Last A1C: 6.8 on 6/2/23 (getting info)   Last CBG: Did NOT check today, 03/11/24  Celia Tarango  [x] Yes [] No            Results: 0/5 left foot   Doppler done in office? [x] Yes [] No on 9/20/23  Posterior tibial: monophasic  Dorsalis pedal: monophasic  Darco Shoe [x] Yes [] No   Date given: does not like to wear  Is patient eligible for HBO [] Yes [x] No  (not a sal[place] 3)   Is the patient eligible for a graft, and/or currently grafting?  [x] Yes [] No (deductible to high for the patient though)   Right calf: 36 cm  Right ankle: 22.5 cm  Left calf: 38.5 cm  Left ankle: 25 cm    --------------------------------------------------------------------------------------------------------------------------------------------------------------------------------------------------------     Patient ID: Diane Larsen is a 68 y.o. female.    Chief Complaint: Diabetic Foot Ulcer (Left medial foot - salinas 2 )    Subjective:    HPI  7/18/23 - Ms. Larsen was last seen in the clinic on 5/30/23. She was admitted to [facility] on 5/31/23 - 6/6/23 with hypotension and low O2 sats. While there it was discovered she had C-Diff. Her caregiver states that she did several rounds of oral antibiotics but is no longer taking anything. She has been using mesalt and silver alginate to dress the wound since being home. She has been diagnosed as a diabetic since her last visit with us. Her A1C on 6/2/23 was 6.8.   Today her wound was assessed in has improved since her last visit.  It was selectively debrided.  There is a light coating of slough over the entire wound bed, therefore Santyl has been ordered for daily application.  Today, we applied mesalt and if she is not able to get Santyl with her insurance she will continue with daily use of mesalt.    Additionally, her caregiver requested that her toenails be trimmed.  Six toenails were debrided off today.    7/25/23 - the  patient returns today for followup on a D FU to the left foot.  The wound was assessed and selectively debrided.  The hypergranulated tissue was chemically cauterized using silver nitrate.  The caregiver is still attempting to obtain Santyl that was ordered.  It was initially sent to [facility] in [place] who could not get the product.  The Rx was transferred to [facility] in [place]. She is waiting for this to go through so she can see if the medicine will be covered by the patient's insurance.   The would is improved, however, with the use of Mesalt only.  We will continue that product.  Will will also attempt to get her approved for grafts, preferably Apligraft or Theraskin.    8/9/23 - Ms. Larsen returns today for followup on the D FU to the left midfoot.  She reports that she went to ED on 7/30/23 c/o of being weak and that she got hot being outside in the sun. She was dehydrated and bradycardic. She was discharged on 8/2/23.   She saw Dr. Claros on 8/3/23 to have a halter monitor placed. She will wear this for 2 weeks, it will be d/c on 8/17/23 and she will go to [facility] in [place] on 8/28/23 for those results.   Today her wound was assessed and selectively debrided.  The periwound was quite macerated and she was reminded to be changing the dressings daily.  We are continuing to use Santyl on the wound bed.  There appears to be a thick layer of slough across the wound bed and the patient was reminded that she should be using at least a nickel thick layer of the product covered with mesalt.  When she returns in one week if there is still a layer of slough we will use the ultrasonic debrider.      8/23/23 - The patient returns today for followup on the mid foot diabetic foot ulcer.  She does have pronounced Charcot foot and this disfigured station of the foot causes her multiple problems.  Today, she presents with the wound bed itself improving, however, she had a very thick area of callus surrounding the entire  periwound.  The whole area was selectively debrided with a dermal curette.    We will continue to apply Santyl and me salt to this wound.    8/30/23 - Ms. Larsen is seen today for the D FU to her mid foot.  Today again there was a very thick area of callus surrounding the wound.  The wound bed itself looks stable but was in need of a selective debridement.  We discussed the possibility of authorize in grafts, however, she has a 3000 dollar detectable that has not been met.  We also discussed the possibility hyperbarics and she is considering that option.    9/6/23 - Ms. Larsen returns today with her caregiver.  The wound remains stable although there is again a very thick area of callus surrounding the wound margins.  The wound was selectively debrided.  There was an area of that margin that seemed to have pulled away from the epithelial tissue.  This had to be cleft off which subsequently enlarged the wound substantially.  The patient and I had a lengthy discussion regarding the importance of offloading even when she is in her home.  She does have some health little receive issues and does not fully comprehend the necessity of offloading.  She does have a Darco shoe with PEG assist that she refuses to wear.  We will consider discussion of a total contact cast at her next visit.    9/20/23 - the patient returns today for followup on a D FU to the left mid foot.  Of concern, however, is the swelling and warmth of the left lower extremity.  Although she does not have specific complaints of pain, she is mentally incompetent and therefore her interpretation of her pain level can not be crusted or assumed.  She is being sent to [facility] in [place] to have an ultrasound done to rule out DVT.  Today the wound was selectively debrided.  Again, there was a large amount of callus built around the wound was.  The wound bed itself is clean and epithelial tissue is moving in word.  We will continue with daily application of me salt  covered with a 4 x 4 and a gentle border dressing.  She no longer need Santyl at this time so we will hold that product.  Her caregiver was notified that if the ultrasound is negative for DVT that she is to  the prescription for Keflex which was sent to her local pharmacy.  Of note, her caregiver states that she has a follow up on 10/18/23 with Dr. Claros to get results from halter monitor.  They are instructed to call Dr. Claros's office to let them know about the left leg swelling frequently to see if they can get a sooner appt.     9/27/23 - Ms. Larsen returns to clinic today for follow up on the DFU to the left mid foot.  Again today the wound margin is very thickly callused.  The wound size has not changed significantly.  The wound was selectively debrided and we will begin application of Endoform to the wound bed.  Of note, an U/S was done on the left leg to r/o DVT.  She began taking Keflex on 9/20/23 once that U/S was normal.  She continues to take that medication.  However, today again both legs are extremely edematous.  Bilateral arterial/venous US have been scheduled on 10/9/23 @ 11:30 to address the edema. She does have a f/up appt with [facility] in [place] for her halter monitor results on 10/18/23.    10/4/23 - The patient returns to clinic today for the DFU to the left mid foot.  She and her caregiver report that on Saturday, 9/30/23, the patient stepped on an unknown object while ambulating on her driveway.  This caught the edge of the wound, ripping the skin causing damage.  The wound was assessed today and selectively debrided.  This area of damaged tissue was removed.  The wound does look slightly improved in spite of this injury. We will continue to use Endoform which is assisting with progress.   She is scheduled for an ultrasound on 10/9/23 at [facility]; She has a follow up with cardiology in [place]  on 10/18/23 but does not know the name of the Dr.     10/18/23 - Ms. Larsen returns today with her  caregiver for f/up on the DFU to the left mid foot.  Again today there were a thick band of callus surrounding the entire wound.  The wound bed has decreased in size slightly.  It was selectively debrided today and we will continue application of Endoform to the wound bed.  Of note, the patient's caregiver states that the ultrasound appt that was scheduled for 10/9/23 has been rescheduled to 10/23 due to a miscommunication about the time is was scheduled for on 10/9/23.   She has a cardiology appt this afternoon    10/25/23 - The patient returns today for f/up on the DFU to the left mid foot.  An U/S had been done on 10/23/23.  Her arterial system is normal.  She does have venous reflux in the left greater saphenous vein.  This generally has not been an issue for the patient and it likely is unrelated to the wound.  However, today she did present with multiple scattered open blisters on the left lower extremity.  Her caregiver reports that these are a results of not using her lotion.  We will monitor and if they do not resolve she will be referred for a vascular consult.   Today the DFU was selectively debrided and remains stable, with little change in size.  She has completed her PO Keflex.  She is scheduled for a pacemaker replacement on 11/2/23.    11/8/23 - Ms. Larsen returns to clinic today for followup on the D FU to the left mid foot.  She did have a cardiovascular pacemaker placed on 11/02-23 by Dr. Hancock.  She was started on doxycycline following that procedure.  Today, she presents with a rash on several parts of her body.  After assessment and consideration of the placement of the rashes, it is likely due to the adhesive used to perform the EKG as there are 2 areas of rash on the back as well as areas under the breast.  She was instructed to begin application of Calmoseptine which was provided on all areas of the rash.  She also presents with multiple scattered fibrin covered wounds on the left lower  extremity.  Her caretaker reports that she has been out of Aquaphor and has likely been scratching these areas.  Were cleaned up and we will continue to monitor them.  Mupirocin was called out for application to these very small open areas.  The large D FU to the left mid foot was selectively debrided.  It again had a large area of hyperkeratotic tissue at the wound margin.  This was selectively debrided off and the wound is clean with no signs and symptoms of infection.    11/15/23 - The patient returns today for f/up on the DFU to the left mid foot.  Today there was an impressive decrease in callus around the wound margin.  She reports that she has been using her wheelchair much more.  That wound was selectively debrided.  She also had a rash at her last visit which had cleared up with the use of Calmoseptine.  The scattered areas on the left lower extremity are also resolved with the use of Aquaphor.  She will return to clinic in two weeks.    12/5/23 - Ms. Larsen is seen today for f/up on the D FU to the left mid foot.  Today, she has again developed a very thick callous at the periwound which had to be selectively debrided off.  Additionally, she did have a layer of slough in the wound bed that had develop, however, there was a slight decrease in size.  There are no signs and symptoms of infection.  Of note, her caregiver reports that she has not been consistently using her wheelchair as expected.  Additionally, she states that patient's blood sugar has been running from 200-250 the past couple of weeks.    12/13/23 - Ms. Larsen returns today with her caregiver for the DFU to the left mid foot.  A selective debridement was performed with removal of slough across the wound bed and a thickened callus.  The wound remains basically unchanged. The patient requested that her toe nails be cut - nails x 9 were trimmed.     12/18/23 - the patient returns to clinic today for the D FU to the left mid foot.  Today a  selective debridement was performed for removal of the callus and slough that was in the wound bed.  Again, the wound is reasonable unchanged but stable.  There is a small amount of redness around the periwound.  Today we will hold the Santyl and she will begin applying mupirocin ointment to the wound bed covered with me salt and a gentle border dressing.    1/8/23 - Ms. Larsen returns for f/up on the DFU to the L mid foot.  She has been using mupirocin daily since 12/18/23.  Today there is no erythema and no S & S of infection. We will d/c the mupirocin and apply mesalt only to the wound bed.  A selective debridement was performed to remove the very thickened callus and the patient was reminded again that she should be offloading this foot and using her wheelchair for ambulation.    1/22/24 - Ms. Larsen is seen today for f/up on the DFU to the left mid foot.  Today, as is customary for this wound, there is a very thick callus around the entire periwound.  A curette was used to remove that calluse.  Forceps and scissors were also used to trim the edge of the wound where a small amount of undermining was beginning to develop.  Of note, her caregiver states that patient has been having diarrhea on and off for a couple of weeks and they think it may be related to the Mounjaro shot. She called the patients PCP and is waiting for a phone call back    January 31, 2024:  68-year-old white female, diabetic, who is here for follow up of her left plantar diabetic foot ulcer.  It has deteriorated somewhat today.  There is some undermining.  There is lots of callus formation.  The wound bed itself does not look bad, no obvious secondary infection.  He has been using Mesalt.    2/7/24 - Ms. Larsen returns for f/up on the large DFU to the plantar surface of the left foot.  Again today there is a heavy, thickened callus around the wound margin.  This was selectively debrided off.  The wound bed is clean with bright, red granular  "tissue visible. There are not S & S of infection.    March 1, 2024:  68-year-old white female, who is here for follow up of the plantar left diabetic foot ulcer, with Charcot foot.  The ulcer has deteriorated somewhat, should she was last here.  There some undermining, and much callus formation.  He is having some pain in the wound.    3/11/24 - the diabetic foot ulcer to the left mid foot remains somewhat stable, with little change.  It does have a slight increase in depth, however, the callous at the periwound is decreased from normal.  Today we discussed the use of a total contact cast and the patient is in agreement with attempting that protocol.  Her caregiver who was present was explained the requirements including no showers or wetting the cast.  She will need to come on Monday and Wednesday of next week and then have the cast change every Monday going forward.  After discussion they are in agreement and will confirm on Monday.  Today the wound was selectively debrided.  Additionally, because the patient is a diabetic, she requested that her toenails be trimmed.    Review of Systems   Constitutional: Negative.    Respiratory: Negative.     Cardiovascular: Negative.    Skin:         As documented in the HPI.   All other systems reviewed and are negative.        Objective:      Vitals:    03/11/24 1116   BP: (!) 178/73   Pulse: 81   Resp: 18   Weight: 94.8 kg (209 lb)   Height: 5' 6" (1.676 m)     Physical Exam  Vitals reviewed.   Constitutional:       Appearance: Normal appearance.   Cardiovascular:      Rate and Rhythm: Normal rate.   Pulmonary:      Effort: Pulmonary effort is normal.   Skin:     General: Skin is warm and dry.      Comments: Left plantar DFU   Neurological:      Mental Status: She is alert.            Altered Skin Integrity 04/18/23 1351 Left medial Foot (Active)   04/18/23 1351   Altered Skin Integrity Present on Admission - Did Patient arrive to the hospital with altered skin?: yes   Side: " Left   Orientation: medial   Location: Foot   Wound Number:    Is this injury device related?:    Primary Wound Type:    Description of Altered Skin Integrity:    Ankle-Brachial Index:    Pulses:    Removal Indication and Assessment:    Wound Outcome:    (Retired) Wound Length (cm):    (Retired) Wound Width (cm):    (Retired) Depth (cm):    Wound Description (Comments):    Removal Indications:    Dressing Appearance Moist drainage 03/11/24 1118   Drainage Amount Moderate 03/11/24 1118   Drainage Characteristics/Odor Serosanguineous;No odor 03/11/24 1118   Appearance Pink;Slough;Moist 03/11/24 1118   Tissue loss description Full thickness 03/11/24 1118   Periwound Area Intact;Dry 03/11/24 1118   Wound Edges Open;Callused 03/11/24 1118   Kovacs Classification (diabetic foot ulcers only) Grade 2 03/11/24 1118   Wound Length (cm) 2.8 cm 03/11/24 1118   Wound Width (cm) 2.8 cm 03/11/24 1118   Wound Depth (cm) 0.5 cm 03/11/24 1118   Wound Volume (cm^3) 3.92 cm^3 03/11/24 1118   Wound Surface Area (cm^2) 7.84 cm^2 03/11/24 1118   Care Cleansed with:;Wound cleanser 03/11/24 1118   Dressing Applied 03/11/24 1118   Dressing Change Due 03/12/24 03/11/24 1118   02/7/24        Left foot (pre)                                                 Left foot (post)    3/1/24      Left foot (pre)                                                 Left foot (post)            03/11/24      Left foot - pre carmen                                          Left foot - post carmen   (Mesalt, silver alginate, 4x4 gauze, kerlix, coban)  ML  Assessment:           ICD-10-CM ICD-9-CM   1. Diabetic ulcer of left foot associated with type 2 diabetes mellitus, limited to breakdown of skin, unspecified part of foot  E11.621 250.80    L97.521 707.15   2. Open wound of plantar aspect of left foot  S91.302A 892.0   3. Non-pressure chronic ulcer of other part of left foot with muscle involvement without evidence of necrosis  L97.525 707.15   4. Charcot's joint of  "left foot, non-diabetic  M14.672 094.0     713.5   5. Type 2 diabetes mellitus with Charcot's joint of left foot  E11.610 250.60     713.5   6. Chronic venous stasis  I87.8 459.81           Procedures:     Debridement     Date/Time: 3/11/2024 11:07 AM     Performed by: Barbra Perez NP  Authorized by: Barbra Perez NP    Time out: Immediately prior to procedure a "time out" was called to verify the correct patient, procedure, equipment, support staff and site/side marked as required.     Consent Done?:  Yes (Verbal)     Preparation: Patient was prepped and draped with clean technique    Local anesthesia used?: No       Wound Details:    Location:  Left foot    Location:  Left Midfoot    Type of Debridement:  Non-excisional       Length (cm):  2.8       Area (sq cm):  7.84       Width (cm):  2.8       Percent Debrided (%):  100       Depth (cm):  0.5       Total Area Debrided (sq cm):  7.84    Depth of debridement:  Epidermis/Dermis    Devitalized tissue debrided:  Biofilm, Callus, Exudate, Fibrin and Slough    Instruments:  Curette (Dermal)  Bleeding:  Minimal  Hemostasis Achieved: Yes  Method Used:  Pressure  Patient tolerance:  Patient tolerated the procedure well with no immediate complications        **nail trimming x9**    Excisional debridement performed:  [] Yes [] No   Selective debridement performed:  [x] Yes [] No     Mechanical debridement performed:  [] Yes [] No   Silver nitrate application          [] Yes [] No   Labs ordered this visit:   [] Yes [] No   Imaging ordered this visit:   [] Yes [] No   Tissue pathology and/or culture taken:  [] Yes [] No           HOME HEALTH AGENCY:   [facility]     Since 4/17/2023     [phone]     TIMES PER WEEK/DAYS: 1 x weekly, Fridays only  Left medial foot wound: Cleanse with wound cleanser and apply mesalt to the wound bed, cover with silver alginate and 4x4 gauze, wrap foot lightly in kerlix and coban - to be " changed daily.       Follow up in about 1 week (around 3/18/2024) for Left foot .

## 2024-03-11 NOTE — PROCEDURES
"Debridement    Date/Time: 3/11/2024 11:07 AM    Performed by: Barbra Perez NP  Authorized by: Barbra Perez NP    Time out: Immediately prior to procedure a "time out" was called to verify the correct patient, procedure, equipment, support staff and site/side marked as required.    Consent Done?:  Yes (Verbal)    Preparation: Patient was prepped and draped with clean technique    Local anesthesia used?: No      Wound Details:    Location:  Left foot    Location:  Left Midfoot    Type of Debridement:  Non-excisional       Length (cm):  2.8       Area (sq cm):  7.84       Width (cm):  2.8       Percent Debrided (%):  100       Depth (cm):  0.5       Total Area Debrided (sq cm):  7.84    Depth of debridement:  Epidermis/Dermis    Devitalized tissue debrided:  Biofilm, Callus, Exudate, Fibrin and Slough    Instruments:  Curette (Dermal)  Bleeding:  Minimal  Hemostasis Achieved: Yes  Method Used:  Pressure  Patient tolerance:  Patient tolerated the procedure well with no immediate complications    "

## 2024-03-18 ENCOUNTER — HOSPITAL ENCOUNTER (OUTPATIENT)
Dept: WOUND CARE | Facility: HOSPITAL | Age: 69
Discharge: HOME | End: 2024-03-18
Attending: NURSE PRACTITIONER
Payer: MEDICARE

## 2024-03-18 VITALS
SYSTOLIC BLOOD PRESSURE: 154 MMHG | DIASTOLIC BLOOD PRESSURE: 84 MMHG | RESPIRATION RATE: 18 BRPM | WEIGHT: 209 LBS | BODY MASS INDEX: 33.59 KG/M2 | HEIGHT: 66 IN | HEART RATE: 69 BPM

## 2024-03-18 DIAGNOSIS — L97.521 DIABETIC ULCER OF LEFT FOOT ASSOCIATED WITH TYPE 2 DIABETES MELLITUS, LIMITED TO BREAKDOWN OF SKIN, UNSPECIFIED PART OF FOOT: Primary | ICD-10-CM

## 2024-03-18 DIAGNOSIS — E11.610 TYPE 2 DIABETES MELLITUS WITH CHARCOT'S JOINT OF LEFT FOOT: ICD-10-CM

## 2024-03-18 DIAGNOSIS — M14.672 CHARCOT'S JOINT OF LEFT FOOT, NON-DIABETIC: ICD-10-CM

## 2024-03-18 DIAGNOSIS — E11.621 DIABETIC ULCER OF LEFT FOOT ASSOCIATED WITH TYPE 2 DIABETES MELLITUS, LIMITED TO BREAKDOWN OF SKIN, UNSPECIFIED PART OF FOOT: Primary | ICD-10-CM

## 2024-03-18 DIAGNOSIS — L97.525 NON-PRESSURE CHRONIC ULCER OF OTHER PART OF LEFT FOOT WITH MUSCLE INVOLVEMENT WITHOUT EVIDENCE OF NECROSIS: ICD-10-CM

## 2024-03-18 DIAGNOSIS — S91.302A OPEN WOUND OF PLANTAR ASPECT OF LEFT FOOT: ICD-10-CM

## 2024-03-18 PROCEDURE — 97597 DBRDMT OPN WND 1ST 20 CM/<: CPT

## 2024-03-18 PROCEDURE — 99213 OFFICE O/P EST LOW 20 MIN: CPT | Mod: 25

## 2024-03-18 PROCEDURE — 27000999 HC MEDICAL RECORD PHOTO DOCUMENTATION

## 2024-03-18 NOTE — PROCEDURES
"Debridement    Date/Time: 3/18/2024 9:25 AM    Performed by: Barbra Perez NP  Authorized by: Barbra Perez NP    Time out: Immediately prior to procedure a "time out" was called to verify the correct patient, procedure, equipment, support staff and site/side marked as required.    Consent Done?:  Yes (Verbal)    Preparation: Patient was prepped and draped with clean technique    Local anesthesia used?: No      Wound Details:    Location:  Left foot    Debridement - 1st Wound - Specific Location: Medial.    Type of Debridement:  Non-excisional       Length (cm):  2.8       Area (sq cm):  7       Width (cm):  2.5       Percent Debrided (%):  100       Depth (cm):  0.5       Total Area Debrided (sq cm):  7    Depth of debridement:  Epidermis/Dermis    Devitalized tissue debrided:  Biofilm, Callus, Exudate and Fibrin    Instruments:  Curette (Dermal)  Bleeding:  None  Patient tolerance:  Patient tolerated the procedure well with no immediate complications    "

## 2024-03-18 NOTE — PROGRESS NOTES
Home Health: [facility]  Smoker  [x] Yes  [] No   Last A1C: 6.8 on 6/2/23 (getting info)   Last CBG: Did NOT check today, 03/11/24  Celia Tarango  [x] Yes [] No            Results: 0/5 left foot   Doppler done in office? [x] Yes [] No on 9/20/23  Posterior tibial: monophasic  Dorsalis pedal: monophasic  Darco Shoe [x] Yes [] No   Date given: does not like to wear  Is patient eligible for HBO [] Yes [x] No  (not a [place] 3)   Is the patient eligible for a graft, and/or currently grafting?  [x] Yes [] No (deductible to high for the patient though)   Right calf: 36 cm  Right ankle: 22.5 cm  Left calf: 38.5 cm  Left ankle: 25 cm      --------------------------------------------------------------------------------------------------------------------------------------------------------------------------------------------------------     Patient ID: Diane Larsen is a 69 y.o. female.    Chief Complaint: Diabetic Foot Ulcer (Left medial foot - salinas 2)    Subjective:    HPI  7/18/23 - Ms. Larsen was last seen in the clinic on 5/30/23. She was admitted to [facility] on 5/31/23 - 6/6/23 with hypotension and low O2 sats. While there it was discovered she had C-Diff. Her caregiver states that she did several rounds of oral antibiotics but is no longer taking anything. She has been using mesalt and silver alginate to dress the wound since being home. She has been diagnosed as a diabetic since her last visit with us. Her A1C on 6/2/23 was 6.8.   Today her wound was assessed in has improved since her last visit.  It was selectively debrided.  There is a light coating of slough over the entire wound bed, therefore Santyl has been ordered for daily application.  Today, we applied mesalt and if she is not able to get Santyl with her insurance she will continue with daily use of mesalt.    Additionally, her caregiver requested that her toenails be trimmed.  Six toenails were debrided off today.    7/25/23 - the  patient returns today for followup on a D FU to the left foot.  The wound was assessed and selectively debrided.  The hypergranulated tissue was chemically cauterized using silver nitrate.  The caregiver is still attempting to obtain Santyl that was ordered.  It was initially sent to [facility] in [place] who could not get the product.  The Rx was transferred to [facility] in [place]. She is waiting for this to go through so she can see if the medicine will be covered by the patient's insurance.   The would is improved, however, with the use of Mesalt only.  We will continue that product.  Will will also attempt to get her approved for grafts, preferably Apligraft or Theraskin.    8/9/23 - Ms. Larsen returns today for followup on the D FU to the left midfoot.  She reports that she went to ED on 7/30/23 c/o of being weak and that she got hot being outside in the sun. She was dehydrated and bradycardic. She was discharged on 8/2/23.   She saw Dr. Claros on 8/3/23 to have a halter monitor placed. She will wear this for 2 weeks, it will be d/c on 8/17/23 and she will go to [facility] in [place] on 8/28/23 for those results.   Today her wound was assessed and selectively debrided.  The periwound was quite macerated and she was reminded to be changing the dressings daily.  We are continuing to use Santyl on the wound bed.  There appears to be a thick layer of slough across the wound bed and the patient was reminded that she should be using at least a nickel thick layer of the product covered with mesalt.  When she returns in one week if there is still a layer of slough we will use the ultrasonic debrider.      8/23/23 - The patient returns today for followup on the mid foot diabetic foot ulcer.  She does have pronounced Charcot foot and this disfigured station of the foot causes her multiple problems.  Today, she presents with the wound bed itself improving, however, she had a very thick area of callus surrounding the entire  periwound.  The whole area was selectively debrided with a dermal curette.    We will continue to apply Santyl and me salt to this wound.    8/30/23 - Ms. Larsen is seen today for the D FU to her mid foot.  Today again there was a very thick area of callus surrounding the wound.  The wound bed itself looks stable but was in need of a selective debridement.  We discussed the possibility of authorize in grafts, however, she has a 3000 dollar detectable that has not been met.  We also discussed the possibility hyperbarics and she is considering that option.    9/6/23 - Ms. Larsen returns today with her caregiver.  The wound remains stable although there is again a very thick area of callus surrounding the wound margins.  The wound was selectively debrided.  There was an area of that margin that seemed to have pulled away from the epithelial tissue.  This had to be cleft off which subsequently enlarged the wound substantially.  The patient and I had a lengthy discussion regarding the importance of offloading even when she is in her home.  She does have some health little receive issues and does not fully comprehend the necessity of offloading.  She does have a Darco shoe with PEG assist that she refuses to wear.  We will consider discussion of a total contact cast at her next visit.    9/20/23 - the patient returns today for followup on a D FU to the left mid foot.  Of concern, however, is the swelling and warmth of the left lower extremity.  Although she does not have specific complaints of pain, she is mentally incompetent and therefore her interpretation of her pain level can not be crusted or assumed.  She is being sent to [facility] in [place] to have an ultrasound done to rule out DVT.  Today the wound was selectively debrided.  Again, there was a large amount of callus built around the wound was.  The wound bed itself is clean and epithelial tissue is moving in word.  We will continue with daily application of me salt  covered with a 4 x 4 and a gentle border dressing.  She no longer need Santyl at this time so we will hold that product.  Her caregiver was notified that if the ultrasound is negative for DVT that she is to  the prescription for Keflex which was sent to her local pharmacy.  Of note, her caregiver states that she has a follow up on 10/18/23 with Dr. Claros to get results from halter monitor.  They are instructed to call Dr. Claros's office to let them know about the left leg swelling frequently to see if they can get a sooner appt.     9/27/23 - Ms. Larsen returns to clinic today for follow up on the DFU to the left mid foot.  Again today the wound margin is very thickly callused.  The wound size has not changed significantly.  The wound was selectively debrided and we will begin application of Endoform to the wound bed.  Of note, an U/S was done on the left leg to r/o DVT.  She began taking Keflex on 9/20/23 once that U/S was normal.  She continues to take that medication.  However, today again both legs are extremely edematous.  Bilateral arterial/venous US have been scheduled on 10/9/23 @ 11:30 to address the edema. She does have a f/up appt with [facility] in [place] for her halter monitor results on 10/18/23.    10/4/23 - The patient returns to clinic today for the DFU to the left mid foot.  She and her caregiver report that on Saturday, 9/30/23, the patient stepped on an unknown object while ambulating on her driveway.  This caught the edge of the wound, ripping the skin causing damage.  The wound was assessed today and selectively debrided.  This area of damaged tissue was removed.  The wound does look slightly improved in spite of this injury. We will continue to use Endoform which is assisting with progress.   She is scheduled for an ultrasound on 10/9/23 at [facility]; She has a follow up with cardiology in [place]  on 10/18/23 but does not know the name of the Dr.     10/18/23 - Ms. Larsen returns today with her  caregiver for f/up on the DFU to the left mid foot.  Again today there were a thick band of callus surrounding the entire wound.  The wound bed has decreased in size slightly.  It was selectively debrided today and we will continue application of Endoform to the wound bed.  Of note, the patient's caregiver states that the ultrasound appt that was scheduled for 10/9/23 has been rescheduled to 10/23 due to a miscommunication about the time is was scheduled for on 10/9/23.   She has a cardiology appt this afternoon    10/25/23 - The patient returns today for f/up on the DFU to the left mid foot.  An U/S had been done on 10/23/23.  Her arterial system is normal.  She does have venous reflux in the left greater saphenous vein.  This generally has not been an issue for the patient and it likely is unrelated to the wound.  However, today she did present with multiple scattered open blisters on the left lower extremity.  Her caregiver reports that these are a results of not using her lotion.  We will monitor and if they do not resolve she will be referred for a vascular consult.   Today the DFU was selectively debrided and remains stable, with little change in size.  She has completed her PO Keflex.  She is scheduled for a pacemaker replacement on 11/2/23.    11/8/23 - Ms. Larsen returns to clinic today for followup on the D FU to the left mid foot.  She did have a cardiovascular pacemaker placed on 11/02-23 by Dr. Hancock.  She was started on doxycycline following that procedure.  Today, she presents with a rash on several parts of her body.  After assessment and consideration of the placement of the rashes, it is likely due to the adhesive used to perform the EKG as there are 2 areas of rash on the back as well as areas under the breast.  She was instructed to begin application of Calmoseptine which was provided on all areas of the rash.  She also presents with multiple scattered fibrin covered wounds on the left lower  extremity.  Her caretaker reports that she has been out of Aquaphor and has likely been scratching these areas.  Were cleaned up and we will continue to monitor them.  Mupirocin was called out for application to these very small open areas.  The large D FU to the left mid foot was selectively debrided.  It again had a large area of hyperkeratotic tissue at the wound margin.  This was selectively debrided off and the wound is clean with no signs and symptoms of infection.    11/15/23 - The patient returns today for f/up on the DFU to the left mid foot.  Today there was an impressive decrease in callus around the wound margin.  She reports that she has been using her wheelchair much more.  That wound was selectively debrided.  She also had a rash at her last visit which had cleared up with the use of Calmoseptine.  The scattered areas on the left lower extremity are also resolved with the use of Aquaphor.  She will return to clinic in two weeks.    12/5/23 - Ms. Larsen is seen today for f/up on the D FU to the left mid foot.  Today, she has again developed a very thick callous at the periwound which had to be selectively debrided off.  Additionally, she did have a layer of slough in the wound bed that had develop, however, there was a slight decrease in size.  There are no signs and symptoms of infection.  Of note, her caregiver reports that she has not been consistently using her wheelchair as expected.  Additionally, she states that patient's blood sugar has been running from 200-250 the past couple of weeks.    12/13/23 - Ms. Larsen returns today with her caregiver for the DFU to the left mid foot.  A selective debridement was performed with removal of slough across the wound bed and a thickened callus.  The wound remains basically unchanged. The patient requested that her toe nails be cut - nails x 9 were trimmed.     12/18/23 - the patient returns to clinic today for the D FU to the left mid foot.  Today a  selective debridement was performed for removal of the callus and slough that was in the wound bed.  Again, the wound is reasonable unchanged but stable.  There is a small amount of redness around the periwound.  Today we will hold the Santyl and she will begin applying mupirocin ointment to the wound bed covered with me salt and a gentle border dressing.    1/8/23 - Ms. Larsen returns for f/up on the DFU to the L mid foot.  She has been using mupirocin daily since 12/18/23.  Today there is no erythema and no S & S of infection. We will d/c the mupirocin and apply mesalt only to the wound bed.  A selective debridement was performed to remove the very thickened callus and the patient was reminded again that she should be offloading this foot and using her wheelchair for ambulation.    1/22/24 - Ms. Larsen is seen today for f/up on the DFU to the left mid foot.  Today, as is customary for this wound, there is a very thick callus around the entire periwound.  A curette was used to remove that calluse.  Forceps and scissors were also used to trim the edge of the wound where a small amount of undermining was beginning to develop.  Of note, her caregiver states that patient has been having diarrhea on and off for a couple of weeks and they think it may be related to the Mounjaro shot. She called the patients PCP and is waiting for a phone call back    January 31, 2024:  68-year-old white female, diabetic, who is here for follow up of her left plantar diabetic foot ulcer.  It has deteriorated somewhat today.  There is some undermining.  There is lots of callus formation.  The wound bed itself does not look bad, no obvious secondary infection.  He has been using Mesalt.    2/7/24 - Ms. Larsen returns for f/up on the large DFU to the plantar surface of the left foot.  Again today there is a heavy, thickened callus around the wound margin.  This was selectively debrided off.  The wound bed is clean with bright, red granular  "tissue visible. There are not S & S of infection.    March 1, 2024:  68-year-old white female, who is here for follow up of the plantar left diabetic foot ulcer, with Charcot foot.  The ulcer has deteriorated somewhat, should she was last here.  There some undermining, and much callus formation.  He is having some pain in the wound.    3/11/24 - the diabetic foot ulcer to the left mid foot remains somewhat stable, with little change.  It does have a slight increase in depth, however, the callous at the periwound is decreased from normal.  Today we discussed the use of a total contact cast and the patient is in agreement with attempting that protocol.  Her caregiver who was present was explained the requirements including no showers or wetting the cast.  She will need to come on Monday and Wednesday of next week and then have the cast change every Monday going forward.  After discussion they are in agreement and will confirm on Monday.  Today the wound was selectively debrided.  Additionally, because the patient is a diabetic, she requested that her toenails be trimmed.    3/18/24 - The patient was expected to have her first total contact cast applied today.  However, she refused indicating that she wants to be able to shower.  After detailed discussion of the possibilities of osteomyelitis with long term wounds, she will continue to consider.  A shower chair will be ordered if she decides to move forward.  Today a selective debridement was performed and there is improvement in the wound.     Review of Systems   Constitutional: Negative.    Respiratory: Negative.     Cardiovascular: Negative.    Skin:         As documented in the HPI.   All other systems reviewed and are negative.        Objective:      Vitals:    03/18/24 0949   BP: (!) 154/84   BP Location: Right arm   Patient Position: Sitting   Pulse: 69   Resp: 18   Weight: 94.8 kg (208 lb 15.9 oz)   Height: 5' 6" (1.676 m)     Physical Exam  Vitals reviewed. "   Constitutional:       Appearance: Normal appearance.   Cardiovascular:      Rate and Rhythm: Normal rate.   Pulmonary:      Effort: Pulmonary effort is normal.   Skin:     General: Skin is warm and dry.      Comments: Left plantar DFU   Neurological:      Mental Status: She is alert.            Altered Skin Integrity 04/18/23 1351 Left medial Foot (Active)   04/18/23 1351   Altered Skin Integrity Present on Admission - Did Patient arrive to the hospital with altered skin?: yes   Side: Left   Orientation: medial   Location: Foot   Wound Number:    Is this injury device related?:    Primary Wound Type:    Description of Altered Skin Integrity:    Ankle-Brachial Index:    Pulses:    Removal Indication and Assessment:    Wound Outcome:    (Retired) Wound Length (cm):    (Retired) Wound Width (cm):    (Retired) Depth (cm):    Wound Description (Comments):    Removal Indications:    Dressing Appearance Moist drainage 03/18/24 0950   Drainage Amount Moderate 03/18/24 0950   Drainage Characteristics/Odor Serosanguineous 03/18/24 0950   Appearance Moist;Pink;Slough 03/18/24 0950   Tissue loss description Full thickness 03/18/24 0950   Periwound Area Intact 03/18/24 0950   Wound Edges Open;Callused 03/18/24 0950   Kovacs Classification (diabetic foot ulcers only) Grade 2 03/18/24 0950   Wound Length (cm) 2.8 cm 03/18/24 0950   Wound Width (cm) 2.5 cm 03/18/24 0950   Wound Depth (cm) 0.5 cm 03/18/24 0950   Wound Volume (cm^3) 3.5 cm^3 03/18/24 0950   Wound Surface Area (cm^2) 7 cm^2 03/18/24 0950   Care Cleansed with:;Wound cleanser 03/18/24 0950   Dressing Applied;Other (comment) 03/18/24 0950   Dressing Change Due 03/19/24 03/18/24 0950         3/18/24      Left foot         Post debridement  Mesalt, silver alginate, 4x4 gauze, kerlix, coban  CT         Assessment:           ICD-10-CM ICD-9-CM   1. Diabetic ulcer of left foot associated with type 2 diabetes mellitus, limited to breakdown of skin, unspecified part of foot   "E11.621 250.80    L97.521 707.15   2. Open wound of plantar aspect of left foot  S91.302A 892.0   3. Non-pressure chronic ulcer of other part of left foot with muscle involvement without evidence of necrosis  L97.525 707.15   4. Charcot's joint of left foot, non-diabetic  M14.672 094.0     713.5   5. Type 2 diabetes mellitus with Charcot's joint of left foot  E11.610 250.60     713.5           Procedures:     Debridement     Date/Time: 3/18/2024 9:25 AM     Performed by: Barbra Perez NP  Authorized by: Barbra Perez NP    Time out: Immediately prior to procedure a "time out" was called to verify the correct patient, procedure, equipment, support staff and site/side marked as required.     Consent Done?:  Yes (Verbal)     Preparation: Patient was prepped and draped with clean technique    Local anesthesia used?: No       Wound Details:    Location:  Left foot    Debridement - 1st Wound - Specific Location: Medial.    Type of Debridement:  Non-excisional       Length (cm):  2.8       Area (sq cm):  7       Width (cm):  2.5       Percent Debrided (%):  100       Depth (cm):  0.5       Total Area Debrided (sq cm):  7    Depth of debridement:  Epidermis/Dermis    Devitalized tissue debrided:  Biofilm, Callus, Exudate and Fibrin    Instruments:  Curette (Dermal)  Bleeding:  None  Patient tolerance:  Patient tolerated the procedure well with no immediate complications       Excisional debridement performed:  [] Yes [] No   Selective debridement performed:  [x] Yes [] No     Mechanical debridement performed:  [] Yes [] No   Silver nitrate application          [] Yes [] No   Labs ordered this visit:   [] Yes [] No   Imaging ordered this visit:   [] Yes [] No   Tissue pathology and/or culture taken:  [] Yes [] No           HOME HEALTH AGENCY:   [facility]     Home Health Services     Since 4/17/2023     [phone]     TIMES PER WEEK/DAYS: 1 x weekly, Fridays only  Left medial foot wound: Cleanse " with wound cleanser and apply mesalt to the wound bed, cover with silver alginate and 4x4 gauze, wrap foot lightly in kerlix and coban - to be changed daily.       Follow up in 8 days (on 3/26/2024) for left foot .

## 2024-03-27 ENCOUNTER — HOSPITAL ENCOUNTER (OUTPATIENT)
Dept: RADIOLOGY | Facility: HOSPITAL | Age: 69
Discharge: HOME | End: 2024-03-27
Attending: NURSE PRACTITIONER
Payer: MEDICARE

## 2024-03-27 DIAGNOSIS — M14.672 CHARCOT'S JOINT OF FOOT, LEFT: ICD-10-CM

## 2024-03-27 PROCEDURE — 73630 X-RAY EXAM OF FOOT: CPT | Mod: TC,LT

## 2024-04-08 ENCOUNTER — HOSPITAL ENCOUNTER (OUTPATIENT)
Dept: RADIOLOGY | Facility: HOSPITAL | Age: 69
Discharge: HOME | End: 2024-04-08
Attending: FAMILY MEDICINE
Payer: MEDICARE

## 2024-04-08 DIAGNOSIS — M14.672 CHARCOT'S JOINT OF ANKLE, LEFT: ICD-10-CM

## 2024-04-08 PROCEDURE — 73702 CT LWR EXTREMITY W/O&W/DYE: CPT | Mod: TC,LT

## 2024-04-08 PROCEDURE — 25500020 PHARM REV CODE 255

## 2024-04-08 RX ADMIN — IOHEXOL 90 ML: 300 INJECTION, SOLUTION INTRAVENOUS at 10:04

## 2024-07-31 ENCOUNTER — LAB VISIT (OUTPATIENT)
Dept: LAB | Facility: HOSPITAL | Age: 69
End: 2024-07-31
Attending: NURSE PRACTITIONER
Payer: MEDICARE

## 2024-07-31 DIAGNOSIS — E83.42 HYPOMAGNESEMIA: ICD-10-CM

## 2024-07-31 DIAGNOSIS — E55.9 AVITAMINOSIS D: ICD-10-CM

## 2024-07-31 DIAGNOSIS — I10 ESSENTIAL HYPERTENSION, MALIGNANT: ICD-10-CM

## 2024-07-31 DIAGNOSIS — Z13.89 SCREENING FOR GOUT: ICD-10-CM

## 2024-07-31 DIAGNOSIS — E78.5 HYPERLIPIDEMIA, UNSPECIFIED HYPERLIPIDEMIA TYPE: ICD-10-CM

## 2024-07-31 DIAGNOSIS — K83.1 OBSTRUCTION OF BILE DUCT: Primary | ICD-10-CM

## 2024-07-31 DIAGNOSIS — Z51.81 ENCOUNTER FOR THERAPEUTIC DRUG MONITORING: ICD-10-CM

## 2024-07-31 DIAGNOSIS — E11.9 DIABETES: ICD-10-CM

## 2024-07-31 LAB
ALBUMIN SERPL-MCNC: 4 G/DL (ref 3.4–5)
ALBUMIN/GLOB SERPL: 1.1 RATIO
ALP SERPL-CCNC: 81 UNIT/L (ref 50–144)
ALT SERPL-CCNC: 23 UNIT/L (ref 1–45)
ANION GAP SERPL CALC-SCNC: 9 MEQ/L (ref 2–13)
AST SERPL-CCNC: 28 UNIT/L (ref 14–36)
BASOPHILS # BLD AUTO: 0.06 X10(3)/MCL (ref 0.01–0.08)
BASOPHILS NFR BLD AUTO: 0.6 % (ref 0.1–1.2)
BILIRUB SERPL-MCNC: 0.2 MG/DL (ref 0–1)
BUN SERPL-MCNC: 18 MG/DL (ref 7–20)
CALCIUM SERPL-MCNC: 9.9 MG/DL (ref 8.4–10.2)
CALCIUM SERPL-MCNC: 9.9 MG/DL (ref 8.4–10.2)
CHLORIDE SERPL-SCNC: 106 MMOL/L (ref 98–110)
CO2 SERPL-SCNC: 25 MMOL/L (ref 21–32)
CREAT SERPL-MCNC: 0.94 MG/DL (ref 0.66–1.25)
CREAT/UREA NIT SERPL: 19 (ref 12–20)
CRP SERPL-MCNC: 2.7 MG/DL
EOSINOPHIL # BLD AUTO: 0.49 X10(3)/MCL (ref 0.04–0.36)
EOSINOPHIL NFR BLD AUTO: 4.7 % (ref 0.7–7)
ERYTHROCYTE [DISTWIDTH] IN BLOOD BY AUTOMATED COUNT: 14.6 % (ref 11–14.5)
EST. AVERAGE GLUCOSE BLD GHB EST-MCNC: 134.1 MG/DL (ref 70–115)
GFR SERPLBLD CREATININE-BSD FMLA CKD-EPI: 66 ML/MIN/1.73/M2
GLOBULIN SER-MCNC: 3.5 GM/DL (ref 2–3.9)
GLUCOSE SERPL-MCNC: 112 MG/DL (ref 70–115)
HBA1C MFR BLD: 6.3 % (ref 4–6)
HCT VFR BLD AUTO: 38.4 % (ref 36–48)
HGB BLD-MCNC: 12.2 G/DL (ref 11.8–16)
IMM GRANULOCYTES # BLD AUTO: 0.03 X10(3)/MCL (ref 0–0.03)
IMM GRANULOCYTES NFR BLD AUTO: 0.3 % (ref 0–0.5)
LYMPHOCYTES # BLD AUTO: 2.28 X10(3)/MCL (ref 1.16–3.74)
LYMPHOCYTES NFR BLD AUTO: 22 % (ref 20–55)
MAGNESIUM SERPL-MCNC: 2 MG/DL (ref 1.8–2.4)
MCH RBC QN AUTO: 27.2 PG (ref 27–34)
MCHC RBC AUTO-ENTMCNC: 31.8 G/DL (ref 31–37)
MCV RBC AUTO: 85.7 FL (ref 79–99)
MONOCYTES # BLD AUTO: 0.56 X10(3)/MCL (ref 0.24–0.36)
MONOCYTES NFR BLD AUTO: 5.4 % (ref 4.7–12.5)
NEUTROPHILS # BLD AUTO: 6.93 X10(3)/MCL (ref 1.56–6.13)
NEUTROPHILS NFR BLD AUTO: 67 % (ref 37–73)
NRBC BLD AUTO-RTO: 0 %
PLATELET # BLD AUTO: 386 X10(3)/MCL (ref 140–371)
PMV BLD AUTO: 9.7 FL (ref 9.4–12.4)
POTASSIUM SERPL-SCNC: 4.2 MMOL/L (ref 3.5–5.1)
PROT SERPL-MCNC: 7.5 GM/DL (ref 6.3–8.2)
PTH-INTACT SERPL-MCNC: 64 PG/ML (ref 14–73)
RBC # BLD AUTO: 4.48 X10(6)/MCL (ref 4–5.1)
SODIUM SERPL-SCNC: 140 MMOL/L (ref 136–145)
T3FREE SERPL-MCNC: 2.97 PG/ML (ref 1.58–3.91)
T4 FREE SERPL-MCNC: 0.87 NG/DL (ref 0.78–2.19)
TSH SERPL-ACNC: 2.74 UIU/ML (ref 0.36–3.74)
VALPROATE SERPL-MCNC: 22.2 UG/ML (ref 50–100)
WBC # BLD AUTO: 10.35 X10(3)/MCL (ref 4–11.5)

## 2024-07-31 PROCEDURE — 83735 ASSAY OF MAGNESIUM: CPT

## 2024-07-31 PROCEDURE — 84443 ASSAY THYROID STIM HORMONE: CPT

## 2024-07-31 PROCEDURE — 80053 COMPREHEN METABOLIC PANEL: CPT

## 2024-07-31 PROCEDURE — 80164 ASSAY DIPROPYLACETIC ACD TOT: CPT

## 2024-07-31 PROCEDURE — 85025 COMPLETE CBC W/AUTO DIFF WBC: CPT

## 2024-07-31 PROCEDURE — 83970 ASSAY OF PARATHORMONE: CPT

## 2024-07-31 PROCEDURE — 84439 ASSAY OF FREE THYROXINE: CPT

## 2024-07-31 PROCEDURE — 36415 COLL VENOUS BLD VENIPUNCTURE: CPT

## 2024-07-31 PROCEDURE — 86140 C-REACTIVE PROTEIN: CPT

## 2024-07-31 PROCEDURE — 83036 HEMOGLOBIN GLYCOSYLATED A1C: CPT

## 2024-07-31 PROCEDURE — 84481 FREE ASSAY (FT-3): CPT

## 2024-12-20 DIAGNOSIS — Z78.0 POSTMENOPAUSAL: ICD-10-CM

## 2024-12-20 DIAGNOSIS — Z12.31 ENCOUNTER FOR SCREENING MAMMOGRAM FOR BREAST CANCER: Primary | ICD-10-CM

## 2025-01-17 ENCOUNTER — HOSPITAL ENCOUNTER (OUTPATIENT)
Dept: RADIOLOGY | Facility: HOSPITAL | Age: 70
Discharge: HOME | End: 2025-01-17
Attending: NURSE PRACTITIONER
Payer: MEDICARE

## 2025-01-17 DIAGNOSIS — Z12.31 ENCOUNTER FOR SCREENING MAMMOGRAM FOR BREAST CANCER: ICD-10-CM

## 2025-01-17 DIAGNOSIS — Z78.0 POSTMENOPAUSAL: ICD-10-CM

## 2025-01-17 PROCEDURE — 77067 SCR MAMMO BI INCL CAD: CPT | Mod: 26,,, | Performed by: STUDENT IN AN ORGANIZED HEALTH CARE EDUCATION/TRAINING PROGRAM

## 2025-01-17 PROCEDURE — 77067 SCR MAMMO BI INCL CAD: CPT | Mod: TC

## 2025-01-17 PROCEDURE — 77063 BREAST TOMOSYNTHESIS BI: CPT | Mod: 26,,, | Performed by: STUDENT IN AN ORGANIZED HEALTH CARE EDUCATION/TRAINING PROGRAM

## 2025-01-31 ENCOUNTER — HOSPITAL ENCOUNTER (EMERGENCY)
Facility: HOSPITAL | Age: 70
Discharge: HOME | End: 2025-01-31
Attending: EMERGENCY MEDICINE
Payer: MEDICARE

## 2025-01-31 VITALS
TEMPERATURE: 97 F | DIASTOLIC BLOOD PRESSURE: 76 MMHG | RESPIRATION RATE: 18 BRPM | BODY MASS INDEX: 31.34 KG/M2 | OXYGEN SATURATION: 97 % | HEART RATE: 60 BPM | SYSTOLIC BLOOD PRESSURE: 145 MMHG | WEIGHT: 195 LBS | HEIGHT: 66 IN

## 2025-01-31 DIAGNOSIS — E87.1 HYPONATREMIA: Primary | ICD-10-CM

## 2025-01-31 LAB
ALBUMIN SERPL-MCNC: 3.3 G/DL (ref 3.4–4.8)
ALBUMIN/GLOB SERPL: 0.8 RATIO (ref 1.1–2)
ALP SERPL-CCNC: 60 UNIT/L (ref 40–150)
ALT SERPL-CCNC: 13 UNIT/L (ref 0–55)
ANION GAP SERPL CALC-SCNC: 9 MEQ/L
AST SERPL-CCNC: 19 UNIT/L (ref 5–34)
BACTERIA #/AREA URNS AUTO: ABNORMAL /HPF
BASOPHILS # BLD AUTO: 0.03 X10(3)/MCL
BASOPHILS NFR BLD AUTO: 0.3 %
BILIRUB SERPL-MCNC: 0.2 MG/DL
BILIRUB UR QL STRIP.AUTO: NEGATIVE
BUN SERPL-MCNC: 17 MG/DL (ref 9.8–20.1)
CALCIUM SERPL-MCNC: 9.2 MG/DL (ref 8.4–10.2)
CHLORIDE SERPL-SCNC: 94 MMOL/L (ref 98–107)
CLARITY UR: CLEAR
CO2 SERPL-SCNC: 23 MMOL/L (ref 23–31)
COLOR UR AUTO: YELLOW
CREAT SERPL-MCNC: 0.75 MG/DL (ref 0.55–1.02)
CREAT/UREA NIT SERPL: 23
EOSINOPHIL # BLD AUTO: 0.29 X10(3)/MCL (ref 0–0.9)
EOSINOPHIL NFR BLD AUTO: 2.9 %
ERYTHROCYTE [DISTWIDTH] IN BLOOD BY AUTOMATED COUNT: 13.3 % (ref 11.5–17)
GFR SERPLBLD CREATININE-BSD FMLA CKD-EPI: >60 ML/MIN/1.73/M2
GLOBULIN SER-MCNC: 4 GM/DL (ref 2.4–3.5)
GLUCOSE SERPL-MCNC: 101 MG/DL (ref 82–115)
GLUCOSE UR QL STRIP: NEGATIVE
HCT VFR BLD AUTO: 31.3 % (ref 37–47)
HGB BLD-MCNC: 10.8 G/DL (ref 12–16)
HGB UR QL STRIP: ABNORMAL
IMM GRANULOCYTES # BLD AUTO: 0.03 X10(3)/MCL (ref 0–0.04)
IMM GRANULOCYTES NFR BLD AUTO: 0.3 %
KETONES UR QL STRIP: NEGATIVE
LEUKOCYTE ESTERASE UR QL STRIP: NEGATIVE
LYMPHOCYTES # BLD AUTO: 2.3 X10(3)/MCL (ref 0.6–4.6)
LYMPHOCYTES NFR BLD AUTO: 23.1 %
MAGNESIUM SERPL-MCNC: 2 MG/DL (ref 1.6–2.6)
MCH RBC QN AUTO: 28.2 PG (ref 27–31)
MCHC RBC AUTO-ENTMCNC: 34.5 G/DL (ref 33–36)
MCV RBC AUTO: 81.7 FL (ref 80–94)
MONOCYTES # BLD AUTO: 0.62 X10(3)/MCL (ref 0.1–1.3)
MONOCYTES NFR BLD AUTO: 6.2 %
MUCOUS THREADS URNS QL MICRO: ABNORMAL /LPF
NEUTROPHILS # BLD AUTO: 6.68 X10(3)/MCL (ref 2.1–9.2)
NEUTROPHILS NFR BLD AUTO: 67.2 %
NITRITE UR QL STRIP: NEGATIVE
NRBC BLD AUTO-RTO: 0 %
OSMOLALITY SERPL: 263 MOSM/KG (ref 280–300)
OSMOLALITY UR: 167 MOSM/KG (ref 300–1300)
PH UR STRIP: 7 [PH]
PLATELET # BLD AUTO: 451 X10(3)/MCL (ref 130–400)
PMV BLD AUTO: 8.9 FL (ref 7.4–10.4)
POTASSIUM SERPL-SCNC: 4.4 MMOL/L (ref 3.5–5.1)
PROT SERPL-MCNC: 7.3 GM/DL (ref 5.8–7.6)
PROT UR QL STRIP: ABNORMAL
RBC # BLD AUTO: 3.83 X10(6)/MCL (ref 4.2–5.4)
RBC #/AREA URNS AUTO: ABNORMAL /HPF
SODIUM SERPL-SCNC: 126 MMOL/L (ref 136–145)
SODIUM UR-SCNC: 35 MMOL/L
SP GR UR STRIP.AUTO: 1.01 (ref 1–1.03)
SQUAMOUS #/AREA URNS AUTO: ABNORMAL /HPF
TSH SERPL-ACNC: 1.52 UIU/ML (ref 0.35–4.94)
UROBILINOGEN UR STRIP-ACNC: 0.2
WBC # BLD AUTO: 9.95 X10(3)/MCL (ref 4.5–11.5)
WBC #/AREA URNS AUTO: ABNORMAL /HPF

## 2025-01-31 PROCEDURE — 85025 COMPLETE CBC W/AUTO DIFF WBC: CPT | Performed by: EMERGENCY MEDICINE

## 2025-01-31 PROCEDURE — 81003 URINALYSIS AUTO W/O SCOPE: CPT | Performed by: EMERGENCY MEDICINE

## 2025-01-31 PROCEDURE — 83930 ASSAY OF BLOOD OSMOLALITY: CPT | Performed by: EMERGENCY MEDICINE

## 2025-01-31 PROCEDURE — 83735 ASSAY OF MAGNESIUM: CPT | Performed by: EMERGENCY MEDICINE

## 2025-01-31 PROCEDURE — 83935 ASSAY OF URINE OSMOLALITY: CPT | Performed by: EMERGENCY MEDICINE

## 2025-01-31 PROCEDURE — 80053 COMPREHEN METABOLIC PANEL: CPT | Performed by: EMERGENCY MEDICINE

## 2025-01-31 PROCEDURE — 25000003 PHARM REV CODE 250: Performed by: EMERGENCY MEDICINE

## 2025-01-31 PROCEDURE — 96360 HYDRATION IV INFUSION INIT: CPT

## 2025-01-31 PROCEDURE — 99284 EMERGENCY DEPT VISIT MOD MDM: CPT | Mod: 25

## 2025-01-31 PROCEDURE — 84300 ASSAY OF URINE SODIUM: CPT | Performed by: EMERGENCY MEDICINE

## 2025-01-31 PROCEDURE — 84443 ASSAY THYROID STIM HORMONE: CPT | Performed by: EMERGENCY MEDICINE

## 2025-01-31 RX ADMIN — SODIUM CHLORIDE 1000 ML: 9 INJECTION, SOLUTION INTRAVENOUS at 03:01

## 2025-01-31 NOTE — ED PROVIDER NOTES
Encounter Date: 1/31/2025       History     Chief Complaint   Patient presents with    Abnormal Lab test     Pt states Dr. Rangel instructed her to come to er because her sodium was low on lab work done today. Pt denies complaints.     The history is provided by the patient.   Illness   The current episode started today. The problem occurs rarely. The problem has been unchanged. Nothing relieves the symptoms. Nothing aggravates the symptoms. Pertinent negatives include no fever, no nausea, no sore throat, no shortness of breath and no rash.   Patient denies complaints.  She had outpatient labs done and was called by her MD and told to report to the ED for low sodium level.    Review of patient's allergies indicates:  No Known Allergies  Past Medical History:   Diagnosis Date    Anxiety disorder, unspecified     Bipolar disorder, unspecified     COPD (chronic obstructive pulmonary disease)     Depression     Diabetes mellitus, type 2     Essential (primary) hypertension     Neuropathy     Schizophrenia, unspecified     Seizure disorder      Past Surgical History:   Procedure Laterality Date    A-V CARDIAC PACEMAKER INSERTION N/A 11/2/2023    Procedure: INSERTION, CARDIAC PACEMAKER, DUAL CHAMBER;  Surgeon: Jamal Hancock MD;  Location: [facility] CATH LAB;  Service: Cardiology;  Laterality: N/A;  DUAL CHAMBER PPM (SJM)    BACK SURGERY  2003    jesusita inserted    EXCISION-WIDE LOCAL Left 04/11/2023    left foot     Family History   Problem Relation Name Age of Onset    Hypertension Mother      Parkinsonism Mother      Hypertension Father      Alzheimer's disease Father      Stroke Father      Heart attack Father       Social History     Tobacco Use    Smoking status: Every Day     Current packs/day: 0.50     Average packs/day: 0.5 packs/day for 50.0 years (25.0 ttl pk-yrs)     Types: Cigarettes    Smokeless tobacco: Never   Substance Use Topics    Alcohol use: Not Currently    Drug use: Never     Review of Systems    Constitutional:  Negative for fever.   HENT:  Negative for sore throat.    Respiratory:  Negative for shortness of breath.    Cardiovascular:  Negative for chest pain.   Gastrointestinal:  Negative for nausea.   Genitourinary:  Negative for dysuria.   Musculoskeletal:  Negative for back pain.   Skin:  Negative for rash.   Neurological:  Negative for weakness.   Hematological:  Does not bruise/bleed easily.       Physical Exam     Initial Vitals [01/31/25 1441]   BP Pulse Resp Temp SpO2   (!) 145/76 60 18 97.3 °F (36.3 °C) 97 %      MAP       --         Physical Exam    Nursing note and vitals reviewed.  Constitutional: She appears well-developed and well-nourished.   HENT:   Head: Normocephalic and atraumatic.   Right Ear: External ear normal.   Left Ear: External ear normal.   Eyes: Conjunctivae and EOM are normal. Pupils are equal, round, and reactive to light.   Neck: Neck supple.   Normal range of motion.  Cardiovascular:  Normal rate, regular rhythm, normal heart sounds and intact distal pulses.           Pulmonary/Chest: Breath sounds normal.   Abdominal: Abdomen is soft. Bowel sounds are normal.   obese   Musculoskeletal:         General: Normal range of motion.      Cervical back: Normal range of motion and neck supple.     Neurological: She is alert and oriented to person, place, and time. GCS score is 15. GCS eye subscore is 4. GCS verbal subscore is 5. GCS motor subscore is 6.   Skin: Skin is warm and dry. Capillary refill takes less than 2 seconds.   Psychiatric: She has a normal mood and affect. Her behavior is normal. Judgment and thought content normal.         ED Course   Procedures  Labs Reviewed   COMPREHENSIVE METABOLIC PANEL - Abnormal       Result Value    Sodium 126 (*)     Potassium 4.4      Chloride 94 (*)     CO2 23      Glucose 101      Blood Urea Nitrogen 17.0      Creatinine 0.75      Calcium 9.2      Protein Total 7.3      Albumin 3.3 (*)     Globulin 4.0 (*)     Albumin/Globulin Ratio  0.8 (*)     Bilirubin Total 0.2      ALP 60      ALT 13      AST 19      eGFR >60      Anion Gap 9.0      BUN/Creatinine Ratio 23     URINALYSIS, REFLEX TO URINE CULTURE - Abnormal    Color, UA Yellow      Appearance, UA Clear      Specific Gravity, UA 1.015      pH, UA 7.0      Protein, UA 2+ (*)     Glucose, UA Negative      Ketones, UA Negative      Blood, UA Trace-Lysed (*)     Bilirubin, UA Negative      Urobilinogen, UA 0.2      Nitrites, UA Negative      Leukocyte Esterase, UA Negative     CBC WITH DIFFERENTIAL - Abnormal    WBC 9.95      RBC 3.83 (*)     Hgb 10.8 (*)     Hct 31.3 (*)     MCV 81.7      MCH 28.2      MCHC 34.5      RDW 13.3      Platelet 451 (*)     MPV 8.9      Neut % 67.2      Lymph % 23.1      Mono % 6.2      Eos % 2.9      Basophil % 0.3      Imm Grans % 0.3      Neut # 6.68      Lymph # 2.30      Mono # 0.62      Eos # 0.29      Baso # 0.03      Imm Gran # 0.03      NRBC% 0.0     URINALYSIS, MICROSCOPIC - Abnormal    Bacteria, UA None Seen      Mucous, UA Small (*)     RBC, UA 6-10 (*)     WBC, UA 0-5      Squamous Epithelial Cells, UA Few (*)    MAGNESIUM - Normal    Magnesium Level 2.00     TSH - Normal    TSH 1.517     CBC W/ AUTO DIFFERENTIAL    Narrative:     The following orders were created for panel order CBC auto differential.  Procedure                               Abnormality         Status                     ---------                               -----------         ------                     CBC with Differential[[id number]]       Abnormal            Final result                 Please view results for these tests on the individual orders.   OSMOLALITY, SERUM   OSMOLALITY, URINE RANDOM   SODIUM, URINE, RANDOM          Imaging Results    None          Medications   sodium chloride 0.9% bolus 1,000 mL 1,000 mL (1,000 mLs Intravenous New Bag 1/31/25 0850)     Medical Decision Making  Amount and/or Complexity of Data Reviewed  Labs: ordered. Decision-making details documented  "in ED Course.    Differential includes:  medication side effects, renal disease, adrenal disease, psychogenic polydipsia, thyroid disease.  Will obtain CBC, CMP, mag, UA, urine sodium, serum/urine osmolality and give bolus of normal saline.           ED Course as of 01/31/25 1709 Fri Jan 31, 2025 1707 Na 126.  When asked, patient states she drinks "a lot" of water throughout the day.  Osmolality studies had to be sent to [facility] and won't be resulted for some time.  Given asymptomatic status, will D/C and we have discussed fluid restriction.  She can F/U with her PCP next week to F/U on osmolality studies and repeat sodium levels. [CL]      ED Course User Index  [CL] Loco Buchanan MD                           Clinical Impression:  Final diagnoses:  [E87.1] Hyponatremia (Primary)          ED Disposition Condition    Discharge Stable          ED Prescriptions    None       Follow-up Information       Follow up With Specialties Details Why Contact Info    Doreen Barnes FNP Family Medicine, Emergency Medicine Schedule an appointment as soon as possible for a visit   [address] [place] [address]  [phone]               Loco Buchanan MD  01/31/25 1709    "

## 2025-02-04 ENCOUNTER — LAB REQUISITION (OUTPATIENT)
Dept: LAB | Facility: HOSPITAL | Age: 70
End: 2025-02-04
Payer: MEDICARE

## 2025-02-04 DIAGNOSIS — E11.621 TYPE 2 DIABETES MELLITUS WITH FOOT ULCER (CODE): ICD-10-CM

## 2025-02-04 LAB
ANION GAP SERPL CALC-SCNC: 11 MEQ/L
BUN SERPL-MCNC: 18 MG/DL (ref 9.8–20.1)
CALCIUM SERPL-MCNC: 9.2 MG/DL (ref 8.4–10.2)
CHLORIDE SERPL-SCNC: 96 MMOL/L (ref 98–107)
CO2 SERPL-SCNC: 25 MMOL/L (ref 23–31)
CREAT SERPL-MCNC: 0.82 MG/DL (ref 0.55–1.02)
CREAT/UREA NIT SERPL: 22
GFR SERPLBLD CREATININE-BSD FMLA CKD-EPI: >60 ML/MIN/1.73/M2
GLUCOSE SERPL-MCNC: 90 MG/DL (ref 82–115)
POTASSIUM SERPL-SCNC: 4.9 MMOL/L (ref 3.5–5.1)
SODIUM SERPL-SCNC: 132 MMOL/L (ref 136–145)

## 2025-02-04 PROCEDURE — 80048 BASIC METABOLIC PNL TOTAL CA: CPT | Performed by: FAMILY MEDICINE

## 2025-04-04 DIAGNOSIS — R94.39 ABNORMAL STRESS TEST: ICD-10-CM

## 2025-04-04 DIAGNOSIS — R94.31 ABNORMAL EKG: ICD-10-CM

## 2025-04-04 DIAGNOSIS — I63.9 CVA (CEREBRAL VASCULAR ACCIDENT): Primary | ICD-10-CM

## 2025-04-16 RX ORDER — IBUPROFEN 200 MG
1 TABLET ORAL DAILY
Status: ON HOLD | COMMUNITY
Start: 2024-10-11 | End: 2025-04-22 | Stop reason: ALTCHOICE

## 2025-04-16 RX ORDER — ERGOCALCIFEROL 1.25 MG/1
50000 CAPSULE ORAL
COMMUNITY
Start: 2024-10-11

## 2025-04-16 RX ORDER — ROSUVASTATIN CALCIUM 5 MG/1
5 TABLET, COATED ORAL DAILY
COMMUNITY
Start: 2025-01-10

## 2025-04-16 RX ORDER — LUMATEPERONE 42 MG/1
1 CAPSULE ORAL DAILY
COMMUNITY
Start: 2025-03-08

## 2025-04-16 RX ORDER — ASPIRIN 81 MG/1
81 TABLET ORAL DAILY
COMMUNITY

## 2025-04-16 RX ORDER — IBUPROFEN 200 MG
1 TABLET ORAL
COMMUNITY

## 2025-04-16 RX ORDER — MONTELUKAST SODIUM 10 MG/1
10 TABLET ORAL DAILY
COMMUNITY
Start: 2025-01-06

## 2025-04-17 DIAGNOSIS — R94.39 ABNORMAL STRESS TEST: Primary | ICD-10-CM

## 2025-04-17 DIAGNOSIS — Z95.0 CARDIAC PACEMAKER: ICD-10-CM

## 2025-04-22 ENCOUNTER — HOSPITAL ENCOUNTER (OUTPATIENT)
Facility: HOSPITAL | Age: 70
Discharge: HOME | End: 2025-04-23
Attending: INTERNAL MEDICINE | Admitting: INTERNAL MEDICINE
Payer: MEDICARE

## 2025-04-22 ENCOUNTER — HOSPITAL ENCOUNTER (OUTPATIENT)
Dept: CARDIOLOGY | Facility: HOSPITAL | Age: 70
Discharge: HOME | End: 2025-04-22
Attending: INTERNAL MEDICINE | Admitting: INTERNAL MEDICINE
Payer: MEDICARE

## 2025-04-22 ENCOUNTER — HOSPITAL ENCOUNTER (OUTPATIENT)
Dept: RADIOLOGY | Facility: HOSPITAL | Age: 70
Discharge: HOME | End: 2025-04-22
Attending: INTERNAL MEDICINE | Admitting: INTERNAL MEDICINE
Payer: MEDICARE

## 2025-04-22 DIAGNOSIS — R06.09 DOE (DYSPNEA ON EXERTION): Primary | ICD-10-CM

## 2025-04-22 DIAGNOSIS — I63.9 CVA (CEREBRAL VASCULAR ACCIDENT): ICD-10-CM

## 2025-04-22 DIAGNOSIS — R94.39 ABNORMAL STRESS TEST: ICD-10-CM

## 2025-04-22 DIAGNOSIS — R94.31 ABNORMAL EKG: ICD-10-CM

## 2025-04-22 DIAGNOSIS — R07.9 CHEST PAIN: ICD-10-CM

## 2025-04-22 DIAGNOSIS — R94.39 ABNORMAL CARDIOVASCULAR STRESS TEST: ICD-10-CM

## 2025-04-22 LAB
AORTIC VALVE CUSP SEPERATION: 1.54 CM
ASCENDING AORTA: 2.32 CM
AV INDEX (PROSTH): 0.6
AV MEAN GRADIENT: 6 MMHG
AV PEAK GRADIENT: 13 MMHG
AV VALVE AREA BY VELOCITY RATIO: 1.6 CM²
AV VALVE AREA: 1.7 CM²
AV VELOCITY RATIO: 0.56
BSA FOR ECHO PROCEDURE: 1.93 M2
CV ECHO LV RWT: 0.47 CM
DOP CALC AO PEAK VEL: 1.8 M/S
DOP CALC AO VTI: 41.1 CM
DOP CALC LVOT AREA: 2.8 CM2
DOP CALC LVOT DIAMETER: 1.9 CM
DOP CALC LVOT PEAK VEL: 1 M/S
DOP CALC LVOT STROKE VOLUME: 69.7 CM3
DOP CALCLVOT PEAK VEL VTI: 24.6 CM
E WAVE DECELERATION TIME: 237 MSEC
ECHO LV POSTERIOR WALL: 1.2 CM (ref 0.6–1.1)
FRACTIONAL SHORTENING: 41.2 % (ref 28–44)
INFERIOR VENA CAVA SIZE SNIFF: 0.4 CM
INTERVENTRICULAR SEPTUM: 1.2 CM (ref 0.6–1.1)
IVC DIAMETER: 1.6 CM
LEFT ATRIUM SIZE: 3.9 CM
LEFT ATRIUM VOLUME INDEX MOD: 19 ML/M2
LEFT ATRIUM VOLUME MOD: 35 ML
LEFT INTERNAL DIMENSION IN SYSTOLE: 3 CM (ref 2.1–4)
LEFT VENTRICLE DIASTOLIC VOLUME INDEX: 65.78 ML/M2
LEFT VENTRICLE DIASTOLIC VOLUME: 123 ML
LEFT VENTRICLE END SYSTOLIC VOLUME APICAL 2 CHAMBER: 39.8 ML
LEFT VENTRICLE END SYSTOLIC VOLUME APICAL 4 CHAMBER: 30.3 ML
LEFT VENTRICLE MASS INDEX: 129 G/M2
LEFT VENTRICLE SYSTOLIC VOLUME INDEX: 18.2 ML/M2
LEFT VENTRICLE SYSTOLIC VOLUME: 34 ML
LEFT VENTRICULAR INTERNAL DIMENSION IN DIASTOLE: 5.1 CM (ref 3.5–6)
LEFT VENTRICULAR MASS: 241.2 G
LVED V (TEICH): 122.7 ML
LVES V (TEICH): 33.6 ML
LVOT MG: 1.9 MMHG
LVOT MV: 0.65 CM/S
MV PEAK A VEL: 0.95 M/S
OHS CV RV/LV RATIO: 0.57 CM
PISA TR MAX VEL: 2.4 M/S
POCT GLUCOSE: 107 MG/DL (ref 70–110)
POCT GLUCOSE: 120 MG/DL (ref 70–110)
RA MAJOR: 4.8 CM
RA PRESSURE ESTIMATED: 8 MMHG
RIGHT ATRIAL AREA: 13.1 CM2
RIGHT ATRIUM VOLUME AREA LENGTH APICAL 4 CHAMBER: 28.9 ML
RIGHT VENTRICLE DIASTOLIC BASEL DIMENSION: 2.9 CM
RV TB RVSP: 10 MMHG
TDI LATERAL: 0.1 M/S
TDI SEPTAL: 0.11 M/S
TDI: 0.11 M/S
TR MAX PG: 23 MMHG
TRICUSPID ANNULAR PLANE SYSTOLIC EXCURSION: 1.72 CM
TV REST PULMONARY ARTERY PRESSURE: 31 MMHG
Z-SCORE OF LEFT VENTRICULAR DIMENSION IN END DIASTOLE: -0.22
Z-SCORE OF LEFT VENTRICULAR DIMENSION IN END SYSTOLE: -0.55

## 2025-04-22 PROCEDURE — 94761 N-INVAS EAR/PLS OXIMETRY MLT: CPT

## 2025-04-22 PROCEDURE — 99152 MOD SED SAME PHYS/QHP 5/>YRS: CPT | Performed by: INTERNAL MEDICINE

## 2025-04-22 PROCEDURE — 93306 TTE W/DOPPLER COMPLETE: CPT

## 2025-04-22 PROCEDURE — 25000003 PHARM REV CODE 250: Performed by: INTERNAL MEDICINE

## 2025-04-22 PROCEDURE — C1769 GUIDE WIRE: HCPCS | Performed by: INTERNAL MEDICINE

## 2025-04-22 PROCEDURE — 25000003 PHARM REV CODE 250: Performed by: FAMILY MEDICINE

## 2025-04-22 PROCEDURE — G0378 HOSPITAL OBSERVATION PER HR: HCPCS

## 2025-04-22 PROCEDURE — C1887 CATHETER, GUIDING: HCPCS | Performed by: INTERNAL MEDICINE

## 2025-04-22 PROCEDURE — 27000221 HC OXYGEN, UP TO 24 HOURS

## 2025-04-22 PROCEDURE — 93458 L HRT ARTERY/VENTRICLE ANGIO: CPT | Performed by: INTERNAL MEDICINE

## 2025-04-22 PROCEDURE — 25500020 PHARM REV CODE 255: Performed by: INTERNAL MEDICINE

## 2025-04-22 PROCEDURE — 99900035 HC TECH TIME PER 15 MIN (STAT)

## 2025-04-22 PROCEDURE — S4991 NICOTINE PATCH NONLEGEND: HCPCS | Performed by: FAMILY MEDICINE

## 2025-04-22 PROCEDURE — 25000003 PHARM REV CODE 250

## 2025-04-22 PROCEDURE — 94640 AIRWAY INHALATION TREATMENT: CPT

## 2025-04-22 PROCEDURE — 27201423 OPTIME MED/SURG SUP & DEVICES STERILE SUPPLY: Performed by: INTERNAL MEDICINE

## 2025-04-22 PROCEDURE — C1894 INTRO/SHEATH, NON-LASER: HCPCS | Performed by: INTERNAL MEDICINE

## 2025-04-22 PROCEDURE — 63600175 PHARM REV CODE 636 W HCPCS: Performed by: INTERNAL MEDICINE

## 2025-04-22 PROCEDURE — 25000242 PHARM REV CODE 250 ALT 637 W/ HCPCS: Performed by: INTERNAL MEDICINE

## 2025-04-22 PROCEDURE — 94799 UNLISTED PULMONARY SVC/PX: CPT

## 2025-04-22 PROCEDURE — 71046 X-RAY EXAM CHEST 2 VIEWS: CPT | Mod: TC

## 2025-04-22 RX ORDER — SODIUM CHLORIDE 9 MG/ML
INJECTION, SOLUTION INTRAVENOUS CONTINUOUS
Status: DISCONTINUED | OUTPATIENT
Start: 2025-04-22 | End: 2025-04-22

## 2025-04-22 RX ORDER — MONTELUKAST SODIUM 10 MG/1
10 TABLET ORAL DAILY
Status: DISCONTINUED | OUTPATIENT
Start: 2025-04-22 | End: 2025-04-23 | Stop reason: HOSPADM

## 2025-04-22 RX ORDER — IPRATROPIUM BROMIDE AND ALBUTEROL SULFATE 2.5; .5 MG/3ML; MG/3ML
3 SOLUTION RESPIRATORY (INHALATION) ONCE
Status: COMPLETED | OUTPATIENT
Start: 2025-04-22 | End: 2025-04-22

## 2025-04-22 RX ORDER — ASPIRIN 81 MG/1
81 TABLET ORAL DAILY
Status: DISCONTINUED | OUTPATIENT
Start: 2025-04-23 | End: 2025-04-23 | Stop reason: HOSPADM

## 2025-04-22 RX ORDER — LIDOCAINE HYDROCHLORIDE 20 MG/ML
INJECTION, SOLUTION EPIDURAL; INFILTRATION; INTRACAUDAL; PERINEURAL
Status: DISCONTINUED | OUTPATIENT
Start: 2025-04-22 | End: 2025-04-22 | Stop reason: HOSPADM

## 2025-04-22 RX ORDER — QUETIAPINE FUMARATE 25 MG/1
50 TABLET, FILM COATED ORAL NIGHTLY
Status: DISCONTINUED | OUTPATIENT
Start: 2025-04-22 | End: 2025-04-23 | Stop reason: HOSPADM

## 2025-04-22 RX ORDER — OXYBUTYNIN CHLORIDE 5 MG/1
5 TABLET, EXTENDED RELEASE ORAL 2 TIMES DAILY
Status: DISCONTINUED | OUTPATIENT
Start: 2025-04-22 | End: 2025-04-23 | Stop reason: HOSPADM

## 2025-04-22 RX ORDER — AMLODIPINE BESYLATE 5 MG/1
10 TABLET ORAL DAILY
Status: DISCONTINUED | OUTPATIENT
Start: 2025-04-23 | End: 2025-04-23 | Stop reason: HOSPADM

## 2025-04-22 RX ORDER — DIPHENHYDRAMINE HCL 25 MG
50 CAPSULE ORAL
Status: DISCONTINUED | OUTPATIENT
Start: 2025-04-22 | End: 2025-04-22 | Stop reason: HOSPADM

## 2025-04-22 RX ORDER — ATORVASTATIN CALCIUM 40 MG/1
40 TABLET, FILM COATED ORAL NIGHTLY
Status: DISCONTINUED | OUTPATIENT
Start: 2025-04-22 | End: 2025-04-23 | Stop reason: HOSPADM

## 2025-04-22 RX ORDER — ASPIRIN 325 MG
325 TABLET ORAL
Status: DISCONTINUED | OUTPATIENT
Start: 2025-04-22 | End: 2025-04-22 | Stop reason: HOSPADM

## 2025-04-22 RX ORDER — HEPARIN SODIUM 1000 [USP'U]/ML
INJECTION, SOLUTION INTRAVENOUS; SUBCUTANEOUS
Status: DISCONTINUED | OUTPATIENT
Start: 2025-04-22 | End: 2025-04-22 | Stop reason: HOSPADM

## 2025-04-22 RX ORDER — PANTOPRAZOLE SODIUM 40 MG/1
40 TABLET, DELAYED RELEASE ORAL DAILY
Status: DISCONTINUED | OUTPATIENT
Start: 2025-04-22 | End: 2025-04-23 | Stop reason: HOSPADM

## 2025-04-22 RX ORDER — DIAZEPAM 5 MG/1
10 TABLET ORAL
Status: DISCONTINUED | OUTPATIENT
Start: 2025-04-22 | End: 2025-04-22 | Stop reason: HOSPADM

## 2025-04-22 RX ORDER — DOXYLAMINE SUCCINATE 25 MG
TABLET ORAL 2 TIMES DAILY
Status: DISCONTINUED | OUTPATIENT
Start: 2025-04-22 | End: 2025-04-23 | Stop reason: HOSPADM

## 2025-04-22 RX ORDER — HYDROCODONE BITARTRATE AND ACETAMINOPHEN 5; 325 MG/1; MG/1
1 TABLET ORAL EVERY 4 HOURS PRN
Refills: 0 | Status: DISCONTINUED | OUTPATIENT
Start: 2025-04-22 | End: 2025-04-23 | Stop reason: HOSPADM

## 2025-04-22 RX ORDER — ACETAMINOPHEN 325 MG/1
650 TABLET ORAL EVERY 4 HOURS PRN
Status: DISCONTINUED | OUTPATIENT
Start: 2025-04-22 | End: 2025-04-23 | Stop reason: HOSPADM

## 2025-04-22 RX ORDER — MORPHINE SULFATE 2 MG/ML
2 INJECTION, SOLUTION INTRAMUSCULAR; INTRAVENOUS EVERY 4 HOURS PRN
Refills: 0 | Status: DISCONTINUED | OUTPATIENT
Start: 2025-04-22 | End: 2025-04-23 | Stop reason: HOSPADM

## 2025-04-22 RX ORDER — IOPAMIDOL 755 MG/ML
INJECTION, SOLUTION INTRAVASCULAR
Status: DISCONTINUED | OUTPATIENT
Start: 2025-04-22 | End: 2025-04-22 | Stop reason: HOSPADM

## 2025-04-22 RX ORDER — ROPINIROLE 0.5 MG/1
0.5 TABLET, FILM COATED ORAL NIGHTLY
Status: DISCONTINUED | OUTPATIENT
Start: 2025-04-22 | End: 2025-04-23 | Stop reason: HOSPADM

## 2025-04-22 RX ORDER — GABAPENTIN 600 MG/1
600 TABLET ORAL 3 TIMES DAILY
Status: DISCONTINUED | OUTPATIENT
Start: 2025-04-22 | End: 2025-04-23 | Stop reason: HOSPADM

## 2025-04-22 RX ORDER — HYDRALAZINE HYDROCHLORIDE 50 MG/1
50 TABLET, FILM COATED ORAL 3 TIMES DAILY
Status: DISCONTINUED | OUTPATIENT
Start: 2025-04-22 | End: 2025-04-23 | Stop reason: HOSPADM

## 2025-04-22 RX ORDER — PRIMIDONE 50 MG/1
50 TABLET ORAL DAILY
Status: DISCONTINUED | OUTPATIENT
Start: 2025-04-22 | End: 2025-04-23 | Stop reason: HOSPADM

## 2025-04-22 RX ORDER — TRAZODONE HYDROCHLORIDE 50 MG/1
50 TABLET ORAL NIGHTLY PRN
Status: DISCONTINUED | OUTPATIENT
Start: 2025-04-22 | End: 2025-04-23 | Stop reason: HOSPADM

## 2025-04-22 RX ORDER — FENTANYL CITRATE 50 UG/ML
INJECTION, SOLUTION INTRAMUSCULAR; INTRAVENOUS
Status: DISCONTINUED | OUTPATIENT
Start: 2025-04-22 | End: 2025-04-22 | Stop reason: HOSPADM

## 2025-04-22 RX ORDER — IBUPROFEN 200 MG
1 TABLET ORAL EVERY 24 HOURS
Status: DISCONTINUED | OUTPATIENT
Start: 2025-04-22 | End: 2025-04-23 | Stop reason: HOSPADM

## 2025-04-22 RX ORDER — BUSPIRONE HYDROCHLORIDE 5 MG/1
10 TABLET ORAL 2 TIMES DAILY
Status: DISCONTINUED | OUTPATIENT
Start: 2025-04-22 | End: 2025-04-23 | Stop reason: HOSPADM

## 2025-04-22 RX ORDER — HYDRALAZINE HYDROCHLORIDE 20 MG/ML
10 INJECTION INTRAMUSCULAR; INTRAVENOUS EVERY 4 HOURS PRN
Status: DISCONTINUED | OUTPATIENT
Start: 2025-04-22 | End: 2025-04-23 | Stop reason: HOSPADM

## 2025-04-22 RX ORDER — ALBUTEROL SULFATE 90 UG/1
1 INHALANT RESPIRATORY (INHALATION) EVERY 4 HOURS PRN
Status: DISCONTINUED | OUTPATIENT
Start: 2025-04-22 | End: 2025-04-23 | Stop reason: HOSPADM

## 2025-04-22 RX ORDER — MIDAZOLAM HYDROCHLORIDE 1 MG/ML
INJECTION, SOLUTION INTRAMUSCULAR; INTRAVENOUS
Status: DISCONTINUED | OUTPATIENT
Start: 2025-04-22 | End: 2025-04-22 | Stop reason: HOSPADM

## 2025-04-22 RX ORDER — MIRTAZAPINE 15 MG/1
15 TABLET, FILM COATED ORAL NIGHTLY
Status: DISCONTINUED | OUTPATIENT
Start: 2025-04-22 | End: 2025-04-23 | Stop reason: HOSPADM

## 2025-04-22 RX ORDER — FENOFIBRATE 145 MG/1
145 TABLET, FILM COATED ORAL DAILY
Status: DISCONTINUED | OUTPATIENT
Start: 2025-04-23 | End: 2025-04-23 | Stop reason: HOSPADM

## 2025-04-22 RX ORDER — DIVALPROEX SODIUM 250 MG/1
1000 TABLET, DELAYED RELEASE ORAL NIGHTLY
Status: DISCONTINUED | OUTPATIENT
Start: 2025-04-22 | End: 2025-04-23 | Stop reason: HOSPADM

## 2025-04-22 RX ORDER — SERTRALINE HYDROCHLORIDE 50 MG/1
100 TABLET, FILM COATED ORAL DAILY
Status: DISCONTINUED | OUTPATIENT
Start: 2025-04-23 | End: 2025-04-23 | Stop reason: HOSPADM

## 2025-04-22 RX ORDER — LIOTHYRONINE SODIUM 5 UG/1
5 TABLET ORAL EVERY MORNING
Status: DISCONTINUED | OUTPATIENT
Start: 2025-04-23 | End: 2025-04-23 | Stop reason: HOSPADM

## 2025-04-22 RX ORDER — LOSARTAN POTASSIUM 50 MG/1
100 TABLET ORAL DAILY
Status: DISCONTINUED | OUTPATIENT
Start: 2025-04-23 | End: 2025-04-23 | Stop reason: HOSPADM

## 2025-04-22 RX ORDER — ONDANSETRON HYDROCHLORIDE 2 MG/ML
4 INJECTION, SOLUTION INTRAVENOUS EVERY 8 HOURS PRN
Status: DISCONTINUED | OUTPATIENT
Start: 2025-04-22 | End: 2025-04-23 | Stop reason: HOSPADM

## 2025-04-22 RX ORDER — SODIUM CHLORIDE 9 MG/ML
INJECTION, SOLUTION INTRAVENOUS CONTINUOUS
Status: ACTIVE | OUTPATIENT
Start: 2025-04-22 | End: 2025-04-22

## 2025-04-22 RX ADMIN — GABAPENTIN 600 MG: 600 TABLET, FILM COATED ORAL at 08:04

## 2025-04-22 RX ADMIN — HYDROCODONE BITARTRATE AND ACETAMINOPHEN 1 TABLET: 5; 325 TABLET ORAL at 04:04

## 2025-04-22 RX ADMIN — ATORVASTATIN CALCIUM 40 MG: 40 TABLET, FILM COATED ORAL at 08:04

## 2025-04-22 RX ADMIN — HYDROCODONE BITARTRATE AND ACETAMINOPHEN 1 TABLET: 5; 325 TABLET ORAL at 08:04

## 2025-04-22 RX ADMIN — ASPIRIN 325 MG ORAL TABLET 325 MG: 325 PILL ORAL at 10:04

## 2025-04-22 RX ADMIN — PRIMIDONE 50 MG: 50 TABLET ORAL at 04:04

## 2025-04-22 RX ADMIN — HYDRALAZINE HYDROCHLORIDE 50 MG: 50 TABLET ORAL at 08:04

## 2025-04-22 RX ADMIN — NICOTINE 1 PATCH: 14 PATCH, EXTENDED RELEASE TRANSDERMAL at 04:04

## 2025-04-22 RX ADMIN — MONTELUKAST 10 MG: 10 TABLET, FILM COATED ORAL at 04:04

## 2025-04-22 RX ADMIN — MIRTAZAPINE 15 MG: 15 TABLET, FILM COATED ORAL at 08:04

## 2025-04-22 RX ADMIN — IPRATROPIUM BROMIDE AND ALBUTEROL SULFATE 3 ML: 2.5; .5 SOLUTION RESPIRATORY (INHALATION) at 01:04

## 2025-04-22 RX ADMIN — SODIUM CHLORIDE: 9 INJECTION, SOLUTION INTRAVENOUS at 10:04

## 2025-04-22 RX ADMIN — BUSPIRONE HYDROCHLORIDE 10 MG: 5 TABLET ORAL at 08:04

## 2025-04-22 RX ADMIN — DIPHENHYDRAMINE HYDROCHLORIDE 50 MG: 25 CAPSULE ORAL at 10:04

## 2025-04-22 RX ADMIN — OXYBUTYNIN CHLORIDE 5 MG: 5 TABLET, EXTENDED RELEASE ORAL at 04:04

## 2025-04-22 RX ADMIN — ROPINIROLE HYDROCHLORIDE 0.5 MG: 0.5 TABLET, FILM COATED ORAL at 08:04

## 2025-04-22 RX ADMIN — QUETIAPINE FUMARATE 50 MG: 25 TABLET ORAL at 08:04

## 2025-04-22 RX ADMIN — DIVALPROEX SODIUM 1000 MG: 250 TABLET, DELAYED RELEASE ORAL at 08:04

## 2025-04-22 RX ADMIN — PANTOPRAZOLE SODIUM 40 MG: 40 TABLET, DELAYED RELEASE ORAL at 04:04

## 2025-04-22 RX ADMIN — DIAZEPAM 10 MG: 5 TABLET ORAL at 10:04

## 2025-04-22 NOTE — H&P
[facility]  History & Physical    Patient Name: Diane Larsen  MRN: [id number]  Patient Class: OP- Observation  Admission Date: 4/22/2025  Attending Physician: Yana Jalolh MD  Primary Care Provider: Doreen Barnes FNP         Patient information was obtained from patient, past medical records, and ER records.     Subjective:     Principal Problem:<principal problem not specified>    Chief Complaint:   Chief Complaint   Patient presents with    Chest Pain        HPI: 70* yo with PMH bradycardia, depression, anxiety, schizophrenia, HNT< COPD, DM2, sick sinus s/p pacemaker, followed by Dr. Hancock, had recent PET, CUS, BLE Aerterial and venous US< chornic left LE wound followed by wound care, presented to [facility] with continued dyspnea and sob GIBBS, underwent LHC this am, Dr. Mathur wants to admit over night for observation due to medical issues and psyche issues complicating her care.  She lives in a group home in [place].    Pt without c/o at present, LHC did not find any significant blockages and no stents or balloons were performed.       Past Medical History:   Diagnosis Date    Abnormal stress test     Anxiety disorder, unspecified     Bipolar disorder, unspecified     Carotid bruit     COPD (chronic obstructive pulmonary disease)     Depression     Diabetes mellitus, type 2     Diabetic leg ulcer     Dizziness     history of    GIBBS (dyspnea on exertion)     Essential (primary) hypertension     Neuropathy     Schizophrenia, unspecified     Seizure disorder     SSS (sick sinus syndrome)     VHD (valvular heart disease)        Past Surgical History:   Procedure Laterality Date    A-V CARDIAC PACEMAKER INSERTION N/A 11/2/2023    Procedure: INSERTION, CARDIAC PACEMAKER, DUAL CHAMBER;  Surgeon: Jamal Hancock MD;  Location: [facility] CATH LAB;  Service: Cardiology;  Laterality: N/A;  DUAL CHAMBER PPM (SJM)    BACK SURGERY  2003    jesusita inserted    EXCISION-WIDE LOCAL Left  04/11/2023    left foot       Review of patient's allergies indicates:  No Known Allergies    Current Facility-Administered Medications on File Prior to Encounter   Medication    0.9%  NaCl infusion    cefazolin (ANCEF) 2 gram in dextrose 5% 50 mL IVPB (premix)     Current Outpatient Medications on File Prior to Encounter   Medication Sig    albuterol (PROVENTIL) 2.5 mg /3 mL (0.083 %) nebulizer solution USE ONE VIAL PER NEB THREE TIMES A DAY    albuterol (PROVENTIL/VENTOLIN HFA) 90 mcg/actuation inhaler INHALE TWO PUFFS BY INHALATION EVERY 4 HOURS    amLODIPine (NORVASC) 10 MG tablet Take 10 mg by mouth.    aspirin (ECOTRIN) 81 MG EC tablet Take 81 mg by mouth once daily.    busPIRone (BUSPAR) 10 MG tablet Take 10 mg by mouth 2 (two) times daily.    CAPLYTA 42 mg Cap Take 1 capsule by mouth Daily.    dapagliflozin (FARXIGA) 10 mg tablet Take 10 mg by mouth once daily.    diphenoxylate-atropine 2.5-0.025 mg (LOMOTIL) 2.5-0.025 mg per tablet Take 1 tablet by mouth 4 (four) times daily as needed for Diarrhea.    divalproex (DEPAKOTE) 500 MG TbEC Take 1,000 mg by mouth every evening.    ergocalciferol (ERGOCALCIFEROL) 50,000 unit Cap Take 50,000 Units by mouth every 7 days.    fenofibrate (TRICOR) 54 MG tablet TAKE ONE TABLET BY MOUTH ONCE A DAY WITH FOOD    furosemide (LASIX) 20 MG tablet TAKE ONE TABLET BY MOUTH EVERY DAY AS NEEDED FOR FLUID    gabapentin (NEURONTIN) 600 MG tablet Take 600 mg by mouth 3 (three) times daily.    hydrALAZINE (APRESOLINE) 50 MG tablet Take 1 tablet (50 mg total) by mouth 3 (three) times daily.    ipratropium (ATROVENT) 0.02 % nebulizer solution Take 500 mcg by nebulization 4 (four) times daily. Rescue    liothyronine (CYTOMEL) 5 MCG Tab Take 5 mcg by mouth every morning.    losartan (COZAAR) 100 MG tablet Take 100 mg by mouth.    mirtazapine (REMERON) 15 MG tablet Take 15 mg by mouth every evening. at bedtime.    montelukast (SINGULAIR) 10 mg tablet Take 10 mg by mouth once daily.     mupirocin (BACTROBAN) 2 % ointment Apply topically once daily.    nystatin (MYCOSTATIN) cream Apply topically 2 (two) times daily.    ONETOUCH DELICA PLUS LANCET 30 gauge Misc use as directed    ONETOUCH VERIO TEST STRIPS Strp use as directed    oxybutynin (DITROPAN-XL) 5 MG TR24 Take 5 mg by mouth 2 (two) times daily.    pantoprazole (PROTONIX) 40 MG tablet Take 40 mg by mouth.    primidone (MYSOLINE) 50 MG Tab Take 50 mg by mouth Daily.    QUEtiapine (SEROQUEL) 50 MG tablet Take 50 mg by mouth every evening.    rOPINIRole (REQUIP) 0.5 MG tablet Take 0.5 mg by mouth every evening. at bedtime.    rosuvastatin (CRESTOR) 5 MG tablet Take 5 mg by mouth once daily.    sertraline (ZOLOFT) 100 MG tablet Take 100 mg by mouth.    TRELEGY ELLIPTA 200-62.5-25 mcg inhaler INHALE ONE PUFF INTO THE LUNGS EVERY DAY    WESTAB PLUS 27 mg iron- 1 mg Tab Take 1 tablet by mouth.    HYDROcodone-acetaminophen (NORCO) 5-325 mg per tablet     metFORMIN (GLUCOPHAGE-XR) 500 MG ER 24hr tablet Take 500 mg by mouth Daily.    MOUNJARO 7.5 mg/0.5 mL PnIj INJECT 0.5ML IN THE SKIN EVERY WEEK    nicotine (NICODERM CQ) 14 mg/24 hr Place 1 patch onto the skin every 24 hours.    potassium chloride (KLOR-CON) 10 MEQ TbSR TAKE ONE TABLET BY MOUTH ONCE A DAY ONLY WHEN TAKING LASIX    SANTYL ointment     traZODone (DESYREL) 50 MG tablet Take 50 mg by mouth nightly as needed.    triamcinolone acetonide 0.1% (KENALOG) 0.1 % cream APPLY A THIN LAYER TO THE AFFECTED AREA(S) BY TOPICAL ROUTE 2 TIMES PER DAY    [DISCONTINUED] nicotine (NICODERM CQ) 21 mg/24 hr Place 1 patch onto the skin once daily.    [DISCONTINUED] tamsulosin (FLOMAX) 0.4 mg Cap Take 1 capsule by mouth.     Family History       Problem Relation (Age of Onset)    Alzheimer's disease Father    Heart attack Father    Hypertension Mother, Father    Parkinsonism Mother    Stroke Father          Tobacco Use    Smoking status: Every Day     Current packs/day: 1.00     Average packs/day: 1 pack/day  for 50.0 years (50.0 ttl pk-yrs)     Types: Cigarettes    Smokeless tobacco: Never   Substance and Sexual Activity    Alcohol use: Not Currently    Drug use: Never    Sexual activity: Not on file     Review of Systems   Constitutional:  Negative for activity change, appetite change and fever.   Respiratory:  Negative for chest tightness, shortness of breath and wheezing.    Cardiovascular:  Negative for chest pain.   Gastrointestinal:  Negative for abdominal pain, constipation, diarrhea, nausea and vomiting.   Genitourinary:  Negative for dysuria.   Skin:  Negative for rash and wound.   Neurological:  Negative for tremors and headaches.     Objective:     Vital Signs (Most Recent):  Temp: 97.5 °F (36.4 °C) (04/22/25 0830)  Pulse: 60 (04/22/25 1400)  Resp: 19 (04/22/25 1400)  BP: (!) 139/92 (04/22/25 1400)  SpO2: (!) 88 % (04/22/25 1400) Vital Signs (24h Range):  Temp:  [97.5 °F (36.4 °C)] 97.5 °F (36.4 °C)  Pulse:  [60-67] 60  Resp:  [14-21] 19  SpO2:  [88 %-98 %] 88 %  BP: (135-162)/(82-92) 139/92     Weight: 82.1 kg (181 lb)  Body mass index is 31.07 kg/m².     Physical Exam  Vitals and nursing note reviewed. Exam conducted with a chaperone present.   Constitutional:       General: She is not in acute distress.     Appearance: Normal appearance. She is obese. She is not ill-appearing, toxic-appearing or diaphoretic.   HENT:      Head: Normocephalic and atraumatic.      Right Ear: External ear normal.      Left Ear: External ear normal.      Nose: Nose normal. No congestion or rhinorrhea.      Mouth/Throat:      Mouth: Mucous membranes are moist.      Pharynx: Oropharynx is clear.   Eyes:      Extraocular Movements: Extraocular movements intact.      Conjunctiva/sclera: Conjunctivae normal.      Pupils: Pupils are equal, round, and reactive to light.   Neck:      Vascular: No carotid bruit.   Cardiovascular:      Rate and Rhythm: Normal rate and regular rhythm.      Pulses: Normal pulses.      Heart sounds: Normal  "heart sounds. No murmur heard.  Pulmonary:      Effort: Pulmonary effort is normal. No respiratory distress.      Breath sounds: Normal breath sounds. No wheezing, rhonchi or rales.   Abdominal:      General: Abdomen is flat. Bowel sounds are normal. There is no distension.      Palpations: Abdomen is soft.      Tenderness: There is no abdominal tenderness. There is no guarding.   Musculoskeletal:         General: No swelling or tenderness. Normal range of motion.      Cervical back: Normal range of motion and neck supple. No tenderness.      Right lower leg: No edema.      Left lower leg: No edema.   Lymphadenopathy:      Cervical: No cervical adenopathy.   Skin:     General: Skin is warm and dry.      Coloration: Skin is not jaundiced or pale.   Neurological:      General: No focal deficit present.      Mental Status: She is alert and oriented to person, place, and time. Mental status is at baseline.      Cranial Nerves: No cranial nerve deficit.      Motor: No weakness.      Coordination: Coordination normal.      Gait: Gait normal.   Psychiatric:         Mood and Affect: Mood normal.         Behavior: Behavior normal.         Thought Content: Thought content normal.         Judgment: Judgment normal.              CRANIAL NERVES     CN III, IV, VI   Pupils are equal, round, and reactive to light.       Significant Labs: All pertinent labs within the past 24 hours have been reviewed.  BMP: No results for input(s): "GLU", "NA", "K", "CL", "CO2", "BUN", "CREATININE", "CALCIUM", "MG" in the last 48 hours.  CBC: No results for input(s): "WBC", "HGB", "HCT", "PLT" in the last 48 hours.    Significant Imaging: I have reviewed all pertinent imaging results/findings within the past 24 hours.  Assessment/Plan:     Assessment & Plan  T2DM (type 2 diabetes mellitus)  Patient's FSGs are controlled on current medication regimen.  Last A1c reviewed-   Lab Results   Component Value Date    HGBA1C 6.3 (H) 07/31/2024     Most " recent fingerstick glucose reviewed-   Recent Labs   Lab 04/22/25  0909   POCTGLUCOSE 107     Current correctional scale  Low  Maintain anti-hyperglycemic dose as follows-   Antihyperglycemics (From admission, onward)      None          Hold Oral hypoglycemics while patient is in the hospital.  HTN (hypertension)  Patient's blood pressure range in the last 24 hours was: BP  Min: 135/82  Max: 162/91.The patient's inpatient anti-hypertensive regimen is listed below:  Current Antihypertensives  amLODIPine tablet 10 mg, Daily, Oral  hydrALAZINE injection 10 mg, Every 4 hours PRN, Intravenous    Plan  - BP is controlled, no changes needed to their regimen  -    COPD (chronic obstructive pulmonary disease)  Patient's COPD is controlled currently.  Patient is currently on COPD Pathway. Continue scheduled inhalers Steroids, Antibiotics, and Supplemental oxygen and monitor respiratory status closely.   Schizophrenia  Resume and continue home meds        GIBBS (dyspnea on exertion)  S/p [facility]  Admit for overnight observation  [facility] following    VTE Risk Mitigation (From admission, onward)      None               On 04/22/2025, patient should be placed in hospital observation services under my care.    Patient Screened for food insecurity, housing instability, transportation needs, utility difficulties, and interpersonal safety.  No needs identified    Home medications were received by me and reconciled based on the consideration of mental status, ability to tolerate oral medications and side effects. I will reassess and resume additional home medications as clinically indicated.            Yana Jalloh MD  Department of Hospital Medicine  [facility]

## 2025-04-22 NOTE — SUBJECTIVE & OBJECTIVE
Past Medical History:   Diagnosis Date    Abnormal stress test     Anxiety disorder, unspecified     Bipolar disorder, unspecified     Carotid bruit     COPD (chronic obstructive pulmonary disease)     Depression     Diabetes mellitus, type 2     Diabetic leg ulcer     Dizziness     history of    GIBBS (dyspnea on exertion)     Essential (primary) hypertension     Neuropathy     Schizophrenia, unspecified     Seizure disorder     SSS (sick sinus syndrome)     VHD (valvular heart disease)        Past Surgical History:   Procedure Laterality Date    A-V CARDIAC PACEMAKER INSERTION N/A 11/2/2023    Procedure: INSERTION, CARDIAC PACEMAKER, DUAL CHAMBER;  Surgeon: Jamal Hancock MD;  Location: [facility] CATH LAB;  Service: Cardiology;  Laterality: N/A;  DUAL CHAMBER PPM (SJM)    BACK SURGERY  2003    jesusita inserted    EXCISION-WIDE LOCAL Left 04/11/2023    left foot       Review of patient's allergies indicates:  No Known Allergies    Current Facility-Administered Medications on File Prior to Encounter   Medication    0.9%  NaCl infusion    cefazolin (ANCEF) 2 gram in dextrose 5% 50 mL IVPB (premix)     Current Outpatient Medications on File Prior to Encounter   Medication Sig    albuterol (PROVENTIL) 2.5 mg /3 mL (0.083 %) nebulizer solution USE ONE VIAL PER NEB THREE TIMES A DAY    albuterol (PROVENTIL/VENTOLIN HFA) 90 mcg/actuation inhaler INHALE TWO PUFFS BY INHALATION EVERY 4 HOURS    amLODIPine (NORVASC) 10 MG tablet Take 10 mg by mouth.    aspirin (ECOTRIN) 81 MG EC tablet Take 81 mg by mouth once daily.    busPIRone (BUSPAR) 10 MG tablet Take 10 mg by mouth 2 (two) times daily.    CAPLYTA 42 mg Cap Take 1 capsule by mouth Daily.    dapagliflozin (FARXIGA) 10 mg tablet Take 10 mg by mouth once daily.    diphenoxylate-atropine 2.5-0.025 mg (LOMOTIL) 2.5-0.025 mg per tablet Take 1 tablet by mouth 4 (four) times daily as needed for Diarrhea.    divalproex (DEPAKOTE) 500 MG TbEC Take 1,000 mg by mouth every evening.     ergocalciferol (ERGOCALCIFEROL) 50,000 unit Cap Take 50,000 Units by mouth every 7 days.    fenofibrate (TRICOR) 54 MG tablet TAKE ONE TABLET BY MOUTH ONCE A DAY WITH FOOD    furosemide (LASIX) 20 MG tablet TAKE ONE TABLET BY MOUTH EVERY DAY AS NEEDED FOR FLUID    gabapentin (NEURONTIN) 600 MG tablet Take 600 mg by mouth 3 (three) times daily.    hydrALAZINE (APRESOLINE) 50 MG tablet Take 1 tablet (50 mg total) by mouth 3 (three) times daily.    ipratropium (ATROVENT) 0.02 % nebulizer solution Take 500 mcg by nebulization 4 (four) times daily. Rescue    liothyronine (CYTOMEL) 5 MCG Tab Take 5 mcg by mouth every morning.    losartan (COZAAR) 100 MG tablet Take 100 mg by mouth.    mirtazapine (REMERON) 15 MG tablet Take 15 mg by mouth every evening. at bedtime.    montelukast (SINGULAIR) 10 mg tablet Take 10 mg by mouth once daily.    mupirocin (BACTROBAN) 2 % ointment Apply topically once daily.    nystatin (MYCOSTATIN) cream Apply topically 2 (two) times daily.    ONETOUCH DELICA PLUS LANCET 30 gauge Misc use as directed    ONETOUCH VERIO TEST STRIPS Strp use as directed    oxybutynin (DITROPAN-XL) 5 MG TR24 Take 5 mg by mouth 2 (two) times daily.    pantoprazole (PROTONIX) 40 MG tablet Take 40 mg by mouth.    primidone (MYSOLINE) 50 MG Tab Take 50 mg by mouth Daily.    QUEtiapine (SEROQUEL) 50 MG tablet Take 50 mg by mouth every evening.    rOPINIRole (REQUIP) 0.5 MG tablet Take 0.5 mg by mouth every evening. at bedtime.    rosuvastatin (CRESTOR) 5 MG tablet Take 5 mg by mouth once daily.    sertraline (ZOLOFT) 100 MG tablet Take 100 mg by mouth.    TRELEGY ELLIPTA 200-62.5-25 mcg inhaler INHALE ONE PUFF INTO THE LUNGS EVERY DAY    WESTAB PLUS 27 mg iron- 1 mg Tab Take 1 tablet by mouth.    HYDROcodone-acetaminophen (NORCO) 5-325 mg per tablet     metFORMIN (GLUCOPHAGE-XR) 500 MG ER 24hr tablet Take 500 mg by mouth Daily.    MOUNJARO 7.5 mg/0.5 mL Shahid INJECT 0.5ML IN THE SKIN EVERY WEEK    nicotine (NICODERM CQ)  14 mg/24 hr Place 1 patch onto the skin every 24 hours.    potassium chloride (KLOR-CON) 10 MEQ TbSR TAKE ONE TABLET BY MOUTH ONCE A DAY ONLY WHEN TAKING LASIX    SANTYL ointment     traZODone (DESYREL) 50 MG tablet Take 50 mg by mouth nightly as needed.    triamcinolone acetonide 0.1% (KENALOG) 0.1 % cream APPLY A THIN LAYER TO THE AFFECTED AREA(S) BY TOPICAL ROUTE 2 TIMES PER DAY    [DISCONTINUED] nicotine (NICODERM CQ) 21 mg/24 hr Place 1 patch onto the skin once daily.    [DISCONTINUED] tamsulosin (FLOMAX) 0.4 mg Cap Take 1 capsule by mouth.     Family History       Problem Relation (Age of Onset)    Alzheimer's disease Father    Heart attack Father    Hypertension Mother, Father    Parkinsonism Mother    Stroke Father          Tobacco Use    Smoking status: Every Day     Current packs/day: 1.00     Average packs/day: 1 pack/day for 50.0 years (50.0 ttl pk-yrs)     Types: Cigarettes    Smokeless tobacco: Never   Substance and Sexual Activity    Alcohol use: Not Currently    Drug use: Never    Sexual activity: Not on file     Review of Systems   Constitutional:  Negative for activity change, appetite change and fever.   Respiratory:  Negative for chest tightness, shortness of breath and wheezing.    Cardiovascular:  Negative for chest pain.   Gastrointestinal:  Negative for abdominal pain, constipation, diarrhea, nausea and vomiting.   Genitourinary:  Negative for dysuria.   Skin:  Negative for rash and wound.   Neurological:  Negative for tremors and headaches.     Objective:     Vital Signs (Most Recent):  Temp: 97.5 °F (36.4 °C) (04/22/25 0830)  Pulse: 60 (04/22/25 1400)  Resp: 19 (04/22/25 1400)  BP: (!) 139/92 (04/22/25 1400)  SpO2: (!) 88 % (04/22/25 1400) Vital Signs (24h Range):  Temp:  [97.5 °F (36.4 °C)] 97.5 °F (36.4 °C)  Pulse:  [60-67] 60  Resp:  [14-21] 19  SpO2:  [88 %-98 %] 88 %  BP: (135-162)/(82-92) 139/92     Weight: 82.1 kg (181 lb)  Body mass index is 31.07 kg/m².     Physical Exam  Vitals  and nursing note reviewed. Exam conducted with a chaperone present.   Constitutional:       General: She is not in acute distress.     Appearance: Normal appearance. She is obese. She is not ill-appearing, toxic-appearing or diaphoretic.   HENT:      Head: Normocephalic and atraumatic.      Right Ear: External ear normal.      Left Ear: External ear normal.      Nose: Nose normal. No congestion or rhinorrhea.      Mouth/Throat:      Mouth: Mucous membranes are moist.      Pharynx: Oropharynx is clear.   Eyes:      Extraocular Movements: Extraocular movements intact.      Conjunctiva/sclera: Conjunctivae normal.      Pupils: Pupils are equal, round, and reactive to light.   Neck:      Vascular: No carotid bruit.   Cardiovascular:      Rate and Rhythm: Normal rate and regular rhythm.      Pulses: Normal pulses.      Heart sounds: Normal heart sounds. No murmur heard.  Pulmonary:      Effort: Pulmonary effort is normal. No respiratory distress.      Breath sounds: Normal breath sounds. No wheezing, rhonchi or rales.   Abdominal:      General: Abdomen is flat. Bowel sounds are normal. There is no distension.      Palpations: Abdomen is soft.      Tenderness: There is no abdominal tenderness. There is no guarding.   Musculoskeletal:         General: No swelling or tenderness. Normal range of motion.      Cervical back: Normal range of motion and neck supple. No tenderness.      Right lower leg: No edema.      Left lower leg: No edema.   Lymphadenopathy:      Cervical: No cervical adenopathy.   Skin:     General: Skin is warm and dry.      Coloration: Skin is not jaundiced or pale.   Neurological:      General: No focal deficit present.      Mental Status: She is alert and oriented to person, place, and time. Mental status is at baseline.      Cranial Nerves: No cranial nerve deficit.      Motor: No weakness.      Coordination: Coordination normal.      Gait: Gait normal.   Psychiatric:         Mood and Affect: Mood  "normal.         Behavior: Behavior normal.         Thought Content: Thought content normal.         Judgment: Judgment normal.              CRANIAL NERVES     CN III, IV, VI   Pupils are equal, round, and reactive to light.       Significant Labs: All pertinent labs within the past 24 hours have been reviewed.  BMP: No results for input(s): "GLU", "NA", "K", "CL", "CO2", "BUN", "CREATININE", "CALCIUM", "MG" in the last 48 hours.  CBC: No results for input(s): "WBC", "HGB", "HCT", "PLT" in the last 48 hours.    Significant Imaging: I have reviewed all pertinent imaging results/findings within the past 24 hours.  "

## 2025-04-22 NOTE — PLAN OF CARE
Pt. has been oriented to unit. Pt. has been educated on cath lab procedure process and was given time to ask questions. Patient will not experience bleeding or hematoma to cath site this visit.

## 2025-04-22 NOTE — NURSING
Note on Cath Lab communication to call Ángela [phone] or Melonie [phone] when time to discharge. Patient resides at [facility] per report from Cath Lab nurse

## 2025-04-22 NOTE — HPI
70* yo with PMH bradycardia, depression, anxiety, schizophrenia, HNT< COPD, DM2, sick sinus s/p pacemaker, followed by Dr. Hancock, had recent PET, CUS, BLE Aerterial and venous US< chornic left LE wound followed by wound care, presented to [facility] with continued dyspnea and sob GIBBS, underwent LHC this am, Dr. Mathur wants to admit over night for observation due to medical issues and psyche issues complicating her care.  She lives in a group home in [place].    Pt without c/o at present, LHC did not find any significant blockages and no stents or balloons were performed.

## 2025-04-22 NOTE — ASSESSMENT & PLAN NOTE
Patient's blood pressure range in the last 24 hours was: BP  Min: 135/82  Max: 162/91.The patient's inpatient anti-hypertensive regimen is listed below:  Current Antihypertensives  amLODIPine tablet 10 mg, Daily, Oral  hydrALAZINE injection 10 mg, Every 4 hours PRN, Intravenous    Plan  - BP is controlled, no changes needed to their regimen  -

## 2025-04-22 NOTE — Clinical Note
The catheter was repositioned into the ostium   right coronary artery. An angiography was performed of the right coronary arteries. Multiple views were taken. The angiography was performed via power injection. The injected amount was 8 mL contrast at 6 mL/s. The PSI from the power injection was 500.

## 2025-04-22 NOTE — ASSESSMENT & PLAN NOTE
Patient's FSGs are controlled on current medication regimen.  Last A1c reviewed-   Lab Results   Component Value Date    HGBA1C 6.3 (H) 07/31/2024     Most recent fingerstick glucose reviewed-   Recent Labs   Lab 04/22/25  0909   POCTGLUCOSE 107     Current correctional scale  Low  Maintain anti-hyperglycemic dose as follows-   Antihyperglycemics (From admission, onward)      None          Hold Oral hypoglycemics while patient is in the hospital.

## 2025-04-22 NOTE — Clinical Note
The catheter was inserted into the, was removed from the and was inserted over the wire into the aorta ostium   left main. An angiography was performed of the left coronary arteries. Multiple views were taken. The angiography was performed via power injection. The injected amount was 8 mL contrast at 6 mL/s. The PSI from the power injection was 500.

## 2025-04-22 NOTE — PLAN OF CARE
04/22/25 1553   Discharge Assessment   Assessment Type Discharge Planning Assessment   Confirmed/corrected address, phone number and insurance Yes   Confirmed Demographics Correct on Facesheet   Source of Information health care advocate;health record   When was your last doctors appointment?   (about a week ago)   Communicated MEGHANN with patient/caregiver Yes   People in Home alone   Facility Arrived From: Home   Do you expect to return to your current living situation? Yes   Do you have help at home or someone to help you manage your care at home? Yes   Prior to hospitilization cognitive status: Alert/Oriented   Current cognitive status: Alert/Oriented   Walking or Climbing Stairs Difficulty yes   Walking or Climbing Stairs ambulation difficulty, requires equipment;ambulation difficulty, dependent;ambulation difficulty, assistance 1 person   Mobility Management Rollator, Power Chair   Dressing/Bathing Difficulty yes   Dressing/Bathing bathing difficulty, requires equipment;bathing difficulty, assistance 1 person;dressing difficulty, assistance 1 person   Dressing/Bathing Management shower chair, Caregivers assists   Equipment Currently Used at Home glucometer;wound care supplies;shower chair;power chair;nebulizer;rollator   Readmission within 30 days? No   Patient currently being followed by outpatient case management? No   Do you currently have service(s) that help you manage your care at home? Yes   How Many hours does patient receive services 49   Name and Contact number of agency [facility] [phone] and [facility]  [phone]   Is the pt/caregiver preference to resume services with current agency Yes   Do you take prescription medications? Yes   Do you have prescription coverage? Yes   Coverage [employer] Managed MCR Dual Complete   Do you have any problems affording any of your prescribed medications? No   Is the patient taking medications as prescribed? yes   Who is  going to help you get home at discharge? Caregiver   How do you get to doctors appointments? family or friend will provide   Are you on dialysis? No   Do you take coumadin? No   Discharge Plan A Home Health;Other  ([facility] Waiver Program and Wound Care Clinic)   DME Needed Upon Discharge  none   Discharge Plan discussed with: Patient;Caregiver   Name(s) and Number(s) Melonie Tomlinson- [facility] [phone]   Transition of Care Barriers Mobility   Physical Activity   On average, how many days per week do you engage in moderate to strenuous exercise (like a brisk walk)? 0 days   On average, how many minutes do you engage in exercise at this level? 0 min   Financial Resource Strain   How hard is it for you to pay for the very basics like food, housing, medical care, and heating? Not hard   Housing Stability   In the last 12 months, was there a time when you were not able to pay the mortgage or rent on time? N   In the past 12 months, how many times have you moved where you were living? 0   At any time in the past 12 months, were you homeless or living in a shelter (including now)? N   Transportation Needs   In the past 12 months, has lack of transportation kept you from medical appointments or from getting medications? no   In the past 12 months, has lack of transportation kept you from meetings, work, or from getting things needed for daily living? No   Food Insecurity   Within the past 12 months, you worried that your food would run out before you got the money to buy more. Never true   Within the past 12 months, the food you bought just didn't last and you didn't have money to get more. Never true   Stress   Do you feel stress - tense, restless, nervous, or anxious, or unable to sleep at night because your mind is troubled all the time - these days? To some exte   Social Isolation   How often do you feel lonely or isolated from those around you?  Never   Alcohol Use   Q1: How often do you  have a drink containing alcohol? Never   Q2: How many drinks containing alcohol do you have on a typical day when you are drinking? None   Q3: How often do you have six or more drinks on one occasion? Never   Utilities   In the past 12 months has the electric, gas, oil, or water company threatened to shut off services in your home? No   Health Literacy   How often do you need to have someone help you when you read instructions, pamphlets, or other written material from your doctor or pharmacy? Always

## 2025-04-23 VITALS
OXYGEN SATURATION: 96 % | RESPIRATION RATE: 18 BRPM | HEART RATE: 62 BPM | WEIGHT: 181 LBS | SYSTOLIC BLOOD PRESSURE: 148 MMHG | HEIGHT: 64 IN | DIASTOLIC BLOOD PRESSURE: 78 MMHG | TEMPERATURE: 98 F | BODY MASS INDEX: 30.9 KG/M2

## 2025-04-23 LAB
POCT GLUCOSE: 139 MG/DL (ref 70–110)
POCT GLUCOSE: 141 MG/DL (ref 70–110)

## 2025-04-23 PROCEDURE — 25000003 PHARM REV CODE 250: Performed by: INTERNAL MEDICINE

## 2025-04-23 PROCEDURE — 94761 N-INVAS EAR/PLS OXIMETRY MLT: CPT

## 2025-04-23 PROCEDURE — 25000003 PHARM REV CODE 250: Performed by: FAMILY MEDICINE

## 2025-04-23 PROCEDURE — G0378 HOSPITAL OBSERVATION PER HR: HCPCS

## 2025-04-23 PROCEDURE — S4991 NICOTINE PATCH NONLEGEND: HCPCS | Performed by: FAMILY MEDICINE

## 2025-04-23 RX ADMIN — NICOTINE 1 PATCH: 14 PATCH, EXTENDED RELEASE TRANSDERMAL at 08:04

## 2025-04-23 RX ADMIN — AMLODIPINE BESYLATE 10 MG: 5 TABLET ORAL at 08:04

## 2025-04-23 RX ADMIN — LOSARTAN POTASSIUM 100 MG: 50 TABLET, FILM COATED ORAL at 08:04

## 2025-04-23 RX ADMIN — PANTOPRAZOLE SODIUM 40 MG: 40 TABLET, DELAYED RELEASE ORAL at 08:04

## 2025-04-23 RX ADMIN — FENOFIBRATE 145 MG: 145 TABLET ORAL at 08:04

## 2025-04-23 RX ADMIN — BUSPIRONE HYDROCHLORIDE 10 MG: 5 TABLET ORAL at 08:04

## 2025-04-23 RX ADMIN — SERTRALINE HYDROCHLORIDE 100 MG: 50 TABLET ORAL at 08:04

## 2025-04-23 RX ADMIN — OXYBUTYNIN CHLORIDE 5 MG: 5 TABLET, EXTENDED RELEASE ORAL at 08:04

## 2025-04-23 RX ADMIN — MONTELUKAST 10 MG: 10 TABLET, FILM COATED ORAL at 08:04

## 2025-04-23 RX ADMIN — GABAPENTIN 600 MG: 600 TABLET, FILM COATED ORAL at 08:04

## 2025-04-23 RX ADMIN — HYDRALAZINE HYDROCHLORIDE 50 MG: 50 TABLET ORAL at 08:04

## 2025-04-23 RX ADMIN — HYDROCODONE BITARTRATE AND ACETAMINOPHEN 1 TABLET: 5; 325 TABLET ORAL at 02:04

## 2025-04-23 RX ADMIN — ASPIRIN 81 MG: 81 TABLET ORAL at 08:04

## 2025-04-23 NOTE — HOSPITAL COURSE
04/23/2025 DISCHARGE SUMMARY: pt was admitted s/p [facility] and has done well overnight.  No stents or balloon necessary, she is ready for d/c back to her group home living situation.  Findings of [facility]  Dominance: right   Left main: no obstructive disease with <10% epicardial stenosis  Left anterior descending artery:  Mildly calcified 20-30% stenosis noted in the mid LAD.  Diffuse luminal irregularities in the distal LAD.  Diffuse luminal irregularities in the diagonal branches.  Circumflex artery: no obstructive disease with <10% epicardial stenosis  Right coronary artery: no obstructive disease with <10% epicardial stenosis  Pt stable for d/c and will f/u with PCP Doreen Barnes and Dr. Mathur for further care as needed

## 2025-04-23 NOTE — PROGRESS NOTES
[facility]-Med/Surg  Cardiology  Progress Note    Patient Name: Diane Larsen  MRN: [id number]  Admission Date: 4/22/2025  Hospital Length of Stay: 0 days  Code Status: Prior   Attending Physician: Yana Jalloh MD   Primary Care Physician: Doreen Barnes FNP  Expected Discharge Date: 4/23/2025  Principal Problem:GIBBS (dyspnea on exertion)    Subjective:     Hospital Course: Patient is a 69 yo female who was brought to cath lab yesterday for abnormal stress test and SOB. Angiogram did not reveal any obstructive CAD. She was kept overnight due to her high risk of bleeding. Her right wrist access site is stable, no hematoma or swelling. She has a positive right radial pulse. She is currently resting in bed without distress. She denies any CP or SOB.      Review of Systems   Constitutional: Negative.   Cardiovascular: Negative.    Respiratory: Negative.       Objective:     Vital Signs (Most Recent):  Temp: 98.1 °F (36.7 °C) (04/23/25 1115)  Pulse: 62 (04/23/25 1115)  Resp: 18 (04/23/25 0343)  BP: (!) 148/78 (04/23/25 1115)  SpO2: 96 % (04/23/25 1115) Vital Signs (24h Range):  Temp:  [97.4 °F (36.3 °C)-98.1 °F (36.7 °C)] 98.1 °F (36.7 °C)  Pulse:  [60-72] 62  Resp:  [14-21] 18  SpO2:  [88 %-100 %] 96 %  BP: (125-164)/() 148/78     Weight: 82.1 kg (181 lb)  Body mass index is 31.07 kg/m².    SpO2: 96 %         Intake/Output Summary (Last 24 hours) at 4/23/2025 1217  Last data filed at 4/23/2025 0843  Gross per 24 hour   Intake 960 ml   Output --   Net 960 ml       Lines/Drains/Airways       None                   Physical Exam  Constitutional:       General: She is not in acute distress.  Eyes:      Extraocular Movements: Extraocular movements intact.   Cardiovascular:      Rate and Rhythm: Normal rate and regular rhythm.      Heart sounds: No murmur heard.  Pulmonary:      Effort: Pulmonary effort is normal. No respiratory distress.      Breath sounds: Normal breath sounds.  "  Musculoskeletal:         General: No swelling. Normal range of motion.   Skin:     General: Skin is warm and dry.   Neurological:      Mental Status: She is alert and oriented to person, place, and time.   Psychiatric:         Mood and Affect: Mood normal.         Behavior: Behavior normal.         Significant Labs: CMP No results for input(s): "NA", "K", "CL", "CO2", "GLU", "BUN", "CREATININE", "CALCIUM", "PROT", "ALBUMIN", "BILITOT", "ALKPHOS", "AST", "ALT", "ANIONGAP", "ESTGFRAFRICA", "EGFRNONAA" in the last 48 hours. and CBC No results for input(s): "WBC", "HGB", "HCT", "PLT" in the last 48 hours.    Significant Imaging:   Echocardiogram 4/22/25:     Left Ventricle: The left ventricle is normal in size. There is normal systolic function with a visually estimated ejection fraction of 55 - 60%. Grade I diastolic dysfunction.    Right Ventricle: The right ventricle is normal in size. Systolic function is normal.    Aortic Valve: There is mild aortic valve sclerosis.    Mitral Valve: There is mild (1+) regurgitation.    Tricuspid Valve: There is mild (1+) regurgitation.    The estimated pulmonary artery systolic pressure is 31 mmHg.   Assessment and Plan:     Abnormal stress test  No obstructive CAD on angiogram  Right wrist is stable with positive radial pulse  Smoker  SSS/PPM  DM  HTN    Okay to discharge from cardiac standpoint.  Follow up with Dr Mathur 5/2/25 @ 9614    NIGEL Holland  Cardiology  [facility]-Med/Surg  "

## 2025-04-23 NOTE — ASSESSMENT & PLAN NOTE
Patient's blood pressure range in the last 24 hours was: BP  Min: 125/87  Max: 164/82.The patient's inpatient anti-hypertensive regimen is listed below:  Current Antihypertensives  amLODIPine tablet 10 mg, Daily, Oral  hydrALAZINE injection 10 mg, Every 4 hours PRN, Intravenous  hydrALAZINE tablet 50 mg, 3 times daily, Oral  losartan tablet 100 mg, Daily, Oral    Plan  - BP is controlled, no changes needed to their regimen  -  Resume and continue home meds at discharge

## 2025-04-23 NOTE — PLAN OF CARE
04/23/25 1019   Final Note   Assessment Type Final Discharge Note   Anticipated Discharge Disposition Home-Health  ([facility]-49 hours a week)   What phone number can be called within the next 1-3 days to see how you are doing after discharge? [phone]   Hospital Resources/Appts/Education Provided Post-Acute resouces added to AVS   Post-Acute Status   Post-Acute Authorization Home Health   Home Health Status Set-up Complete/Auth obtained   Coverage [employer] Managed MCR Dual Complete   Discharge Delays None known at this time

## 2025-04-23 NOTE — NURSING
This nurse contacted Ángela to advise that patient is being discharged. Ángela notified this nurse that she will contact her supervisor,Melonie to pick patient up from [facility] facility upon discharge

## 2025-04-23 NOTE — ASSESSMENT & PLAN NOTE
Patient's COPD is controlled currently.  Patient is currently on COPD Pathway. Continue scheduled inhalers Steroids, Antibiotics, and Supplemental oxygen and monitor respiratory status closely.     Resume and continue home meds at discharge

## 2025-04-23 NOTE — PLAN OF CARE
Physician ordered to discharge patient home. Pt's home health care provider ( [facility] ) was notified per phone/fax of pt's discharge, spoke with Louise. [facility] nurse to resume home visits.

## 2025-04-23 NOTE — ASSESSMENT & PLAN NOTE
Patient's FSGs are controlled on current medication regimen.  Last A1c reviewed-   Lab Results   Component Value Date    HGBA1C 6.3 (H) 07/31/2024     Most recent fingerstick glucose reviewed-   Recent Labs   Lab 04/22/25  1554 04/23/25  0706   POCTGLUCOSE 120* 139*     Current correctional scale  Low  Maintain anti-hyperglycemic dose as follows-   Antihyperglycemics (From admission, onward)      None        Resume and continue home meds at discharge

## 2025-04-23 NOTE — DISCHARGE SUMMARY
[facility]/Surg  [facility] Medicine  Discharge Summary      Patient Name: Diane Larsen  MRN: [id number]  MARYJANE: [id number]  Patient Class: OP- Observation  Admission Date: 4/22/2025  Hospital Length of Stay: 0 days  Discharge Date and Time: No discharge date for patient encounter.  Attending Physician: Yana Jalloh MD   Discharging Provider: Yana Jalloh MD  Primary Care Provider: Doreen Barnes FNP    Primary Care Team: Networked reference to record PCT     HPI:   70* yo with PMH bradycardia, depression, anxiety, schizophrenia, HNT< COPD, DM2, sick sinus s/p pacemaker, followed by Dr. Hanocck, had recent PET, CUS, BLE Aerterial and venous US< chornic left LE wound followed by wound care, presented to [facility] with continued dyspnea and sob GIBBS, underwent LHC this am, Dr. Mathur wants to admit over night for observation due to medical issues and psyche issues complicating her care.  She lives in a group home in [place].    Pt without c/o at present, LHC did not find any significant blockages and no stents or balloons were performed.       Procedure(s) (LRB):  ANGIOGRAM, CORONARY ARTERY (Right)  Left heart cath (Left)      Hospital Course:   04/23/2025 DISCHARGE SUMMARY: pt was admitted s/p C and has done well overnight.  No stents or balloon necessary, she is ready for d/c back to her group home living situation.  Findings of LHC  Dominance: right   Left main: no obstructive disease with <10% epicardial stenosis  Left anterior descending artery:  Mildly calcified 20-30% stenosis noted in the mid LAD.  Diffuse luminal irregularities in the distal LAD.  Diffuse luminal irregularities in the diagonal branches.  Circumflex artery: no obstructive disease with <10% epicardial stenosis  Right coronary artery: no obstructive disease with <10% epicardial stenosis  Pt stable for d/c and will f/u with PCP Doreen Barnes and Dr. Mathur for further care as needed     Goals of Care Treatment  Preferences:  Code Status: Full Code      SDOH Screening:  The patient was screened for utility difficulties, food insecurity, transport difficulties, housing insecurity, and interpersonal safety and there were no concerns identified this admission.     Consults:     Assessment & Plan  T2DM (type 2 diabetes mellitus)  Patient's FSGs are controlled on current medication regimen.  Last A1c reviewed-   Lab Results   Component Value Date    HGBA1C 6.3 (H) 07/31/2024     Most recent fingerstick glucose reviewed-   Recent Labs   Lab 04/22/25  1554 04/23/25  0706   POCTGLUCOSE 120* 139*     Current correctional scale  Low  Maintain anti-hyperglycemic dose as follows-   Antihyperglycemics (From admission, onward)      None        Resume and continue home meds at discharge    HTN (hypertension)  Patient's blood pressure range in the last 24 hours was: BP  Min: 125/87  Max: 164/82.The patient's inpatient anti-hypertensive regimen is listed below:  Current Antihypertensives  amLODIPine tablet 10 mg, Daily, Oral  hydrALAZINE injection 10 mg, Every 4 hours PRN, Intravenous  hydrALAZINE tablet 50 mg, 3 times daily, Oral  losartan tablet 100 mg, Daily, Oral    Plan  - BP is controlled, no changes needed to their regimen  -  Resume and continue home meds at discharge    COPD (chronic obstructive pulmonary disease)  Patient's COPD is controlled currently.  Patient is currently on COPD Pathway. Continue scheduled inhalers Steroids, Antibiotics, and Supplemental oxygen and monitor respiratory status closely.     Resume and continue home meds at discharge    Schizophrenia  Resume and continue home meds  at [place]      GIBBS (dyspnea on exertion)  S/p [facility]  D/c home this am  [facility] follow up     Final Active Diagnoses:    Diagnosis Date Noted POA    PRINCIPAL PROBLEM:  GIBBS (dyspnea on exertion) [R06.09] 04/22/2025 Yes    T2DM (type 2 diabetes mellitus) [E11.9] 07/31/2023 Yes    HTN (hypertension) [I10] 07/31/2023 Yes    COPD (chronic obstructive  "pulmonary disease) [J44.9] 07/31/2023 Yes    Schizophrenia [F20.9] 07/31/2023 Yes      Problems Resolved During this Admission:       Discharged Condition: good    Disposition:     Follow Up:   Contact information for follow-up providers       [facility] - Follow up on 5/2/2025.    Specialties: Cardiovascular Disease, Cardiology  Why: Follow-up with Dr. Mathur at 2:30pm.  Contact information:  [address]  [place] [address]  [phone]                       Contact information for after-discharge care       [facility]       [facility] .    Service: Home Health Services  Contact information:  [address] [facility] [address] [phone]                                 Patient Instructions:   No discharge procedures on file.    Significant Diagnostic Studies: Labs: CMP No results for input(s): "NA", "K", "CL", "CO2", "GLU", "BUN", "CREATININE", "CALCIUM", "PROT", "ALBUMIN", "BILITOT", "ALKPHOS", "AST", "ALT", "ANIONGAP", "ESTGFRAFRICA", "EGFRNONAA" in the last 48 hours. and CBC No results for input(s): "WBC", "HGB", "HCT", "PLT" in the last 48 hours.    Pending Diagnostic Studies:       None           Medications:  Reconciled Home Medications:      Medication List        ASK your doctor about these medications      * albuterol 90 mcg/actuation inhaler  Commonly known as: PROVENTIL/VENTOLIN HFA  INHALE TWO PUFFS BY INHALATION EVERY 4 HOURS     * albuterol 2.5 mg /3 mL (0.083 %) nebulizer solution  Commonly known as: PROVENTIL  USE ONE VIAL PER NEB THREE TIMES A DAY     amLODIPine 10 MG tablet  Commonly known as: NORVASC  Take 10 mg by mouth.     aspirin 81 MG EC tablet  Commonly known as: ECOTRIN  Take 81 mg by mouth once daily.     busPIRone 10 MG tablet  Commonly known as: BUSPAR  Take 10 mg by mouth 2 (two) times daily.     CAPLYTA 42 mg Cap  Generic drug: lumateperone  Take 1 capsule by mouth Daily.     diphenoxylate-atropine 2.5-0.025 " mg 2.5-0.025 mg per tablet  Commonly known as: LOMOTIL  Take 1 tablet by mouth 4 (four) times daily as needed for Diarrhea.     divalproex 500 MG Tbec  Commonly known as: DEPAKOTE  Take 1,000 mg by mouth every evening.     ergocalciferol 50,000 unit Cap  Commonly known as: ERGOCALCIFEROL  Take 50,000 Units by mouth every 7 days.     FARXIGA 10 mg tablet  Generic drug: dapagliflozin propanediol  Take 10 mg by mouth once daily.     fenofibrate 54 MG tablet  Commonly known as: TRICOR  TAKE ONE TABLET BY MOUTH ONCE A DAY WITH FOOD     furosemide 20 MG tablet  Commonly known as: LASIX  TAKE ONE TABLET BY MOUTH EVERY DAY AS NEEDED FOR FLUID     gabapentin 600 MG tablet  Commonly known as: NEURONTIN  Take 600 mg by mouth 3 (three) times daily.     hydrALAZINE 50 MG tablet  Commonly known as: APRESOLINE  Take 1 tablet (50 mg total) by mouth 3 (three) times daily.     HYDROcodone-acetaminophen 5-325 mg per tablet  Commonly known as: NORCO     ipratropium 0.02 % nebulizer solution  Commonly known as: ATROVENT  Take 500 mcg by nebulization 4 (four) times daily. Rescue     liothyronine 5 MCG Tab  Commonly known as: CYTOMEL  Take 5 mcg by mouth every morning.     losartan 100 MG tablet  Commonly known as: COZAAR  Take 100 mg by mouth.     metFORMIN 500 MG ER 24hr tablet  Commonly known as: GLUCOPHAGE-XR  Take 500 mg by mouth Daily.     mirtazapine 15 MG tablet  Commonly known as: REMERON  Take 15 mg by mouth every evening. at bedtime.     montelukast 10 mg tablet  Commonly known as: SINGULAIR  Take 10 mg by mouth once daily.     MOUNJARO 7.5 mg/0.5 mL Pnij  Generic drug: tirzepatide  INJECT 0.5ML IN THE SKIN EVERY WEEK     mupirocin 2 % ointment  Commonly known as: BACTROBAN  Apply topically once daily.     nicotine 14 mg/24 hr  Commonly known as: NICODERM CQ  Place 1 patch onto the skin every 24 hours.  Ask about: Which instructions should I use?     nystatin cream  Commonly known as: MYCOSTATIN  Apply topically 2 (two) times  daily.     ONETOUCH DELICA PLUS LANCET 30 gauge Misc  Generic drug: lancets  use as directed     ONETOUCH VERIO TEST STRIPS Strp  Generic drug: blood sugar diagnostic  use as directed     oxybutynin 5 MG Tr24  Commonly known as: DITROPAN-XL  Take 5 mg by mouth 2 (two) times daily.     pantoprazole 40 MG tablet  Commonly known as: PROTONIX  Take 40 mg by mouth.     potassium chloride 10 MEQ Tbsr  Commonly known as: KLOR-CON  TAKE ONE TABLET BY MOUTH ONCE A DAY ONLY WHEN TAKING LASIX     primidone 50 MG Tab  Commonly known as: MYSOLINE  Take 50 mg by mouth Daily.     QUEtiapine 50 MG tablet  Commonly known as: SEROQUEL  Take 50 mg by mouth every evening.     rOPINIRole 0.5 MG tablet  Commonly known as: REQUIP  Take 0.5 mg by mouth every evening. at bedtime.     rosuvastatin 5 MG tablet  Commonly known as: CRESTOR  Take 5 mg by mouth once daily.     SantyL ointment  Generic drug: collagenase     sertraline 100 MG tablet  Commonly known as: ZOLOFT  Take 100 mg by mouth.     traZODone 50 MG tablet  Commonly known as: DESYREL  Take 50 mg by mouth nightly as needed.     TRELEGY ELLIPTA 200-62.5-25 mcg inhaler  Generic drug: fluticasone-umeclidin-vilanter  INHALE ONE PUFF INTO THE LUNGS EVERY DAY     triamcinolone acetonide 0.1% 0.1 % cream  Commonly known as: KENALOG  APPLY A THIN LAYER TO THE AFFECTED AREA(S) BY TOPICAL ROUTE 2 TIMES PER DAY     WESTAB PLUS 27 mg iron- 1 mg Tab  Generic drug: PNV,calcium 72-iron-folic acid  Take 1 tablet by mouth.           * This list has 2 medication(s) that are the same as other medications prescribed for you. Read the directions carefully, and ask your doctor or other care provider to review them with you.                  Indwelling Lines/Drains at time of discharge:   Lines/Drains/Airways       None                   Time spent on the discharge of patient: 36 minutes    Patient Screened for food insecurity, housing instability, transportation needs, utility difficulties, and  interpersonal safety.  No needs identified     Physical Exam  Vitals and nursing note reviewed. Exam conducted with a chaperone present.   Constitutional:       General: She is not in acute distress.     Appearance: Normal appearance. She is obese. She is not ill-appearing.   Cardiovascular:      Rate and Rhythm: Normal rate and regular rhythm.      Pulses: Normal pulses.      Heart sounds: Normal heart sounds.   Pulmonary:      Effort: Pulmonary effort is normal.      Breath sounds: Normal breath sounds.   Abdominal:      General: Abdomen is flat.      Palpations: Abdomen is soft.   Musculoskeletal:      Comments: Right wrist no hematoma, mild bruisng, non tender   Skin:     General: Skin is warm and dry.      Findings: No erythema, lesion or rash.   Neurological:      Mental Status: She is alert.   Psychiatric:         Behavior: Behavior normal.      Comments: I had a face to face encounter with this patient prior to d/c         Yana Jalloh MD  Department of Hospital Medicine  [facility]

## 2025-04-24 ENCOUNTER — PATIENT OUTREACH (OUTPATIENT)
Dept: ADMINISTRATIVE | Facility: CLINIC | Age: 70
End: 2025-04-24
Payer: MEDICARE

## 2025-04-24 NOTE — PROGRESS NOTES
C3 nurse attempted to contact Diane Larsen's caregiver supervisor, Melonie, and she noted that the patient gets her medications pre-packaged and DSW monitors this and the patient has a HOSFU appointment scheduled and no further needs at this time. The patient is considered Not Applicable.

## 2025-04-29 ENCOUNTER — HOSPITAL ENCOUNTER (OUTPATIENT)
Dept: RADIOLOGY | Facility: HOSPITAL | Age: 70
Discharge: HOME | End: 2025-04-29
Attending: NURSE PRACTITIONER
Payer: MEDICARE

## 2025-04-29 PROCEDURE — 77080 DXA BONE DENSITY AXIAL: CPT | Mod: TC

## 2025-06-19 ENCOUNTER — HOSPITAL ENCOUNTER (OUTPATIENT)
Dept: RADIOLOGY | Facility: HOSPITAL | Age: 70
Discharge: HOME | End: 2025-06-19
Attending: NURSE PRACTITIONER
Payer: MEDICARE

## 2025-06-19 DIAGNOSIS — Z87.891 PERSONAL HISTORY OF TOBACCO USE, PRESENTING HAZARDS TO HEALTH: ICD-10-CM

## 2025-06-19 PROCEDURE — 71271 CT THORAX LUNG CANCER SCR C-: CPT | Mod: TC

## (undated) DEVICE — COVER LIGHT HANDLE CHROMOPHARE

## (undated) DEVICE — Device

## (undated) DEVICE — GUIDEWIRE INQWIRE SE 3MM JTIP

## (undated) DEVICE — NDL HYPO REG 25G X 1 1/2

## (undated) DEVICE — KIT MANIFOLD LOW PRESS TUBING

## (undated) DEVICE — ADHESIVE DERMABOND ADVANCED

## (undated) DEVICE — ANGIOTOUCH KIT

## (undated) DEVICE — ARMBOARD ADULT ARTERIAL

## (undated) DEVICE — PAD DEFIB CADENCE ADULT R2

## (undated) DEVICE — CATH OPTITORQUE TIGER 5F 100CM

## (undated) DEVICE — COVER FLEXI-FEEL PROBE 6X96IN

## (undated) DEVICE — CATH PIGTAIL MOD 5.2FR 125CM

## (undated) DEVICE — SHEATH GLIDE SLENDER 6FR

## (undated) DEVICE — SUT SILK 0 SH 30IN BLK BR

## (undated) DEVICE — SUT 2-0 VICRYL / CT-1

## (undated) DEVICE — PAD DEFIB RADIOTRANSPARENT

## (undated) DEVICE — SUT CTD VICRYL PLUS 4/0

## (undated) DEVICE — BAND TR COMP DEVICE REG 24CM

## (undated) DEVICE — CANNULA NASAL ADULT

## (undated) DEVICE — TOWEL OR DISP STRL BLUE 4/PK

## (undated) DEVICE — DRAPE INCISE IOBAN 2 23X23IN